# Patient Record
Sex: FEMALE | Race: WHITE | NOT HISPANIC OR LATINO | Employment: PART TIME | URBAN - METROPOLITAN AREA
[De-identification: names, ages, dates, MRNs, and addresses within clinical notes are randomized per-mention and may not be internally consistent; named-entity substitution may affect disease eponyms.]

---

## 2017-01-24 ENCOUNTER — ALLSCRIPTS OFFICE VISIT (OUTPATIENT)
Dept: OTHER | Facility: OTHER | Age: 32
End: 2017-01-24

## 2017-01-24 ENCOUNTER — TRANSCRIBE ORDERS (OUTPATIENT)
Dept: LAB | Facility: HOSPITAL | Age: 32
End: 2017-01-24

## 2017-01-24 ENCOUNTER — APPOINTMENT (OUTPATIENT)
Dept: LAB | Facility: HOSPITAL | Age: 32
End: 2017-01-24
Payer: COMMERCIAL

## 2017-01-24 DIAGNOSIS — F45.8 ANXIETY HYPERVENTILATION: ICD-10-CM

## 2017-01-24 DIAGNOSIS — R73.09 OTHER ABNORMAL GLUCOSE: ICD-10-CM

## 2017-01-24 DIAGNOSIS — R73.03 DIABETES MELLITUS, LATENT: ICD-10-CM

## 2017-01-24 DIAGNOSIS — E66.01 MORBID OBESITY, UNSPECIFIED OBESITY TYPE (HCC): ICD-10-CM

## 2017-01-24 DIAGNOSIS — F41.9 ANXIETY HYPERVENTILATION: Primary | ICD-10-CM

## 2017-01-24 DIAGNOSIS — F45.8 ANXIETY HYPERVENTILATION: Primary | ICD-10-CM

## 2017-01-24 DIAGNOSIS — F41.9 ANXIETY HYPERVENTILATION: ICD-10-CM

## 2017-01-24 LAB
ALBUMIN SERPL BCP-MCNC: 3.3 G/DL (ref 3.5–5)
ALP SERPL-CCNC: 72 U/L (ref 46–116)
ALT SERPL W P-5'-P-CCNC: 18 U/L (ref 12–78)
ANION GAP SERPL CALCULATED.3IONS-SCNC: 6 MMOL/L (ref 4–13)
AST SERPL W P-5'-P-CCNC: 9 U/L (ref 5–45)
BILIRUB SERPL-MCNC: 0.38 MG/DL (ref 0.2–1)
BUN SERPL-MCNC: 12 MG/DL (ref 5–25)
CALCIUM SERPL-MCNC: 8.7 MG/DL (ref 8.3–10.1)
CHLORIDE SERPL-SCNC: 103 MMOL/L (ref 100–108)
CHOLEST SERPL-MCNC: 172 MG/DL (ref 50–200)
CO2 SERPL-SCNC: 29 MMOL/L (ref 21–32)
CREAT SERPL-MCNC: 0.68 MG/DL (ref 0.6–1.3)
ERYTHROCYTE [DISTWIDTH] IN BLOOD BY AUTOMATED COUNT: 14.6 % (ref 11.6–15.1)
EST. AVERAGE GLUCOSE BLD GHB EST-MCNC: 120 MG/DL
GFR SERPL CREATININE-BSD FRML MDRD: >60 ML/MIN/1.73SQ M
GLUCOSE SERPL-MCNC: 123 MG/DL (ref 65–140)
HBA1C MFR BLD: 5.8 % (ref 4.2–6.3)
HCT VFR BLD AUTO: 41.5 % (ref 34.8–46.1)
HDLC SERPL-MCNC: 63 MG/DL (ref 40–60)
HGB BLD-MCNC: 13.5 G/DL (ref 11.5–15.4)
LDLC SERPL CALC-MCNC: 87 MG/DL (ref 0–100)
MCH RBC QN AUTO: 26.8 PG (ref 26.8–34.3)
MCHC RBC AUTO-ENTMCNC: 32.5 G/DL (ref 31.4–37.4)
MCV RBC AUTO: 82 FL (ref 82–98)
PLATELET # BLD AUTO: 471 THOUSANDS/UL (ref 149–390)
PMV BLD AUTO: 9.6 FL (ref 8.9–12.7)
POTASSIUM SERPL-SCNC: 3.7 MMOL/L (ref 3.5–5.3)
PROT SERPL-MCNC: 7.4 G/DL (ref 6.4–8.2)
RBC # BLD AUTO: 5.04 MILLION/UL (ref 3.81–5.12)
SODIUM SERPL-SCNC: 138 MMOL/L (ref 136–145)
TRIGL SERPL-MCNC: 111 MG/DL
TSH SERPL DL<=0.05 MIU/L-ACNC: 1.68 UIU/ML (ref 0.36–3.74)
WBC # BLD AUTO: 13.72 THOUSAND/UL (ref 4.31–10.16)

## 2017-01-24 PROCEDURE — 83036 HEMOGLOBIN GLYCOSYLATED A1C: CPT

## 2017-01-24 PROCEDURE — 80061 LIPID PANEL: CPT

## 2017-01-24 PROCEDURE — 36415 COLL VENOUS BLD VENIPUNCTURE: CPT

## 2017-01-24 PROCEDURE — 85027 COMPLETE CBC AUTOMATED: CPT

## 2017-01-24 PROCEDURE — 80053 COMPREHEN METABOLIC PANEL: CPT

## 2017-01-24 PROCEDURE — 84443 ASSAY THYROID STIM HORMONE: CPT

## 2017-01-26 ENCOUNTER — GENERIC CONVERSION - ENCOUNTER (OUTPATIENT)
Dept: OTHER | Facility: OTHER | Age: 32
End: 2017-01-26

## 2018-01-11 NOTE — RESULT NOTES
Verified Results  (1) CBC/ PLT (NO DIFF) [id number] 07:06AM Brooke Notegraphy     Test Name Result Flag Reference   HEMATOCRIT 41 5 %  34 8-46 1   HEMOGLOBIN 13 5 g/dL  11 5-15 4   MCHC 32 5 g/dL  31 4-37 4   MCH 26 8 pg  26 8-34 3   MCV 82 fL  82-98   PLATELET COUNT 892 Thousands/uL H 149-390   RBC COUNT 5 04 Million/uL  3 81-5 12   RDW 14 6 %  11 6-15 1   WBC COUNT 13 72 Thousand/uL H 4 31-10 16   MPV 9 6 fL  8 9-12 7     (1) HEMOGLOBIN A1C [id number] 07:06AM Brooke Notegraphy     Test Name Result Flag Reference   HEMOGLOBIN A1C 5 8 %  4 2-6 3   EST  AVG  GLUCOSE 120 mg/dl       (1) COMPREHENSIVE METABOLIC PANEL [id number] 25:57DM Brooke Notegraphy     Test Name Result Flag Reference   GLUCOSE,RANDM 123 mg/dL     If the patient is fasting, the ADA then defines impaired fasting glucose as > 100 mg/dL and diabetes as > or equal to 123 mg/dL  SODIUM 138 mmol/L  136-145   POTASSIUM 3 7 mmol/L  3 5-5 3   CHLORIDE 103 mmol/L  100-108   CARBON DIOXIDE 29 mmol/L  21-32   ANION GAP (CALC) 6 mmol/L  4-13   BLOOD UREA NITROGEN 12 mg/dL  5-25   CREATININE 0 68 mg/dL  0 60-1 30   Standardized to IDMS reference method   CALCIUM 8 7 mg/dL  8 3-10 1   BILI, TOTAL 0 38 mg/dL  0 20-1 00   ALK PHOSPHATAS 72 U/L     ALT (SGPT) 18 U/L  12-78   AST(SGOT) 9 U/L  5-45   ALBUMIN 3 3 g/dL L 3 5-5 0   TOTAL PROTEIN 7 4 g/dL  6 4-8 2   eGFR Non-African American      >60 0 ml/min/1 73sq [facility] Disease Education Program recommendations are as follows:  GFR calculation is accurate only with a steady state creatinine  Chronic Kidney disease less than 60 ml/min/1 73 sq  meters  Kidney failure less than 15 ml/min/1 73 sq  meters  (1) LIPID PANEL, FASTING [id number] 07:06AM Brooke Notegraphy     Test Name Result Flag Reference   CHOLESTEROL 172 mg/dL     HDL,DIRECT 63 mg/dL H 40-60   Specimen collection should occur prior to Metamizole administration due to the potential for falsely depressed results     LDL CHOLESTEROL CALCULATED 87 mg/dL  0-100   Triglyceride:         Normal              <150 mg/dl       Borderline High    150-199 mg/dl       High               200-499 mg/dl       Very High          >499 mg/dl  Cholesterol:         Desirable        <200 mg/dl      Borderline High  200-239 mg/dl      High             >239 mg/dl  HDL Cholesterol:        High    >59 mg/dL      Low     <41 mg/dL  LDL CALCULATED:    This screening LDL is a calculated result  It does not have the accuracy of the Direct Measured LDL in the monitoring of patients with hyperlipidemia and/or statin therapy  Direct Measure LDL (CSQ761) must be ordered separately in these patients  TRIGLYCERIDES 111 mg/dL  <=150   Specimen collection should occur prior to N-Acetylcysteine or Metamizole administration due to the potential for falsely depressed results  (1) TSH WITH FT4 REFLEX [id number] 07:06AM Akira Richards     Test Name Result Flag Reference   TSH 1 680 uIU/mL  0 358-3 740   Patients undergoing fluorescein dye angiography may retain small amounts of fluorescein in the body for 48-72 hours post procedure  Samples containing fluorescein can produce falsely depressed TSH values  If the patient had this procedure,a specimen should be resubmitted post fluorescein clearance  The recommended reference ranges for TSH during pregnancy are as follows:  First trimester 0 1 to 2 5 uIU/mL  Second trimester  0 2 to 3 0 uIU/mL  Third trimester 0 3 to 3 0 uIU/m       Discussion/Summary   blood work good  slightly elev wbcs likely r/t hives  consider allergy testing   copy of labs mailed     Signatures   Electronically signed by : DEE Millard; Jan 26 2017 12:45PM EST                       (Author)

## 2018-01-13 VITALS
BODY MASS INDEX: 45.99 KG/M2 | SYSTOLIC BLOOD PRESSURE: 132 MMHG | TEMPERATURE: 99.3 F | DIASTOLIC BLOOD PRESSURE: 80 MMHG | HEART RATE: 92 BPM | RESPIRATION RATE: 16 BRPM | HEIGHT: 67 IN | WEIGHT: 293 LBS

## 2018-12-19 ENCOUNTER — OFFICE VISIT (OUTPATIENT)
Dept: OBGYN CLINIC | Facility: MEDICAL CENTER | Age: 33
End: 2018-12-19
Payer: COMMERCIAL

## 2018-12-19 VITALS
SYSTOLIC BLOOD PRESSURE: 120 MMHG | DIASTOLIC BLOOD PRESSURE: 82 MMHG | HEIGHT: 67 IN | BODY MASS INDEX: 45.99 KG/M2 | WEIGHT: 293 LBS

## 2018-12-19 DIAGNOSIS — Z30.41 SURVEILLANCE FOR BIRTH CONTROL, ORAL CONTRACEPTIVES: Primary | ICD-10-CM

## 2018-12-19 DIAGNOSIS — Z01.419 ENCOUNTER FOR ROUTINE GYNECOLOGICAL EXAMINATION WITH PAPANICOLAOU SMEAR OF CERVIX: ICD-10-CM

## 2018-12-19 DIAGNOSIS — E66.01 CLASS 3 SEVERE OBESITY WITH BODY MASS INDEX (BMI) OF 50.0 TO 59.9 IN ADULT, UNSPECIFIED OBESITY TYPE, UNSPECIFIED WHETHER SERIOUS COMORBIDITY PRESENT (HCC): ICD-10-CM

## 2018-12-19 PROBLEM — E66.813 CLASS 3 SEVERE OBESITY WITH BODY MASS INDEX (BMI) OF 50.0 TO 59.9 IN ADULT (HCC): Status: ACTIVE | Noted: 2018-12-19

## 2018-12-19 PROCEDURE — 99385 PREV VISIT NEW AGE 18-39: CPT | Performed by: NURSE PRACTITIONER

## 2018-12-19 PROCEDURE — 87624 HPV HI-RISK TYP POOLED RSLT: CPT | Performed by: NURSE PRACTITIONER

## 2018-12-19 PROCEDURE — G0145 SCR C/V CYTO,THINLAYER,RESCR: HCPCS | Performed by: NURSE PRACTITIONER

## 2018-12-19 RX ORDER — ESCITALOPRAM OXALATE 20 MG/1
1 TABLET ORAL
COMMUNITY
Start: 2016-06-06 | End: 2021-02-02 | Stop reason: SDUPTHER

## 2018-12-19 RX ORDER — NORGESTIMATE AND ETHINYL ESTRADIOL
1 KIT DAILY
Qty: 84 TABLET | Refills: 3 | Status: SHIPPED | OUTPATIENT
Start: 2018-12-19 | End: 2019-03-26 | Stop reason: SDUPTHER

## 2018-12-19 RX ORDER — NORGESTIMATE AND ETHINYL ESTRADIOL
1 KIT DAILY
Refills: 0 | COMMUNITY
Start: 2018-10-26 | End: 2018-12-19 | Stop reason: SDUPTHER

## 2018-12-19 NOTE — PROGRESS NOTES
ASSESSMENT & PLAN: Hellen Sanchez is a 29 y o  Nancy Watts with normal gynecologic exam     1   Routine well woman exam done today  2  Pap and HPV:  The patient's last pap and hpv was   It was normal     Pap and cotesting was done today  Current ASCCP Guidelines reviewed  3   The following were reviewed in today's visit: breast self exam, mammography screening ordered, use and side effects of OCPs, adequate intake of calcium and vitamin D, exercise, healthy diet   Gerhardt Sep 4  Referral for wt management given to pt  She is motivated to lose wt, usual wt was ~ 255, until had major life stressor  5  rx for ocp sent to the pharmacy for the year  CC:  Annual Gynecologic Examination    HPI: Hellen Sanchez is a 29 y o  Nancy Watts who presents for annual gynecologic examination  She has the following concerns:   Interested in non surgical wt loss program  Works at [employer], and here b/c of ins  Change and wants pap test  Had Leep in , with f/u paps q 6 months for awhile then went to routine schedule  Health Maintenance:    She wears her seatbelt routinely  She does not perform regular monthly self breast exams  She feels safe at home  Past Medical History:   Diagnosis Date    Anxiety     Depression        Past Surgical History:   Procedure Laterality Date    CERVICAL BIOPSY  W/ LOOP ELECTRODE EXCISION      TONSILLECTOMY         Past OB/Gyn History:  OB History      Para Term  AB Living    0 0 0 0 0 0    SAB TAB Ectopic Multiple Live Births    0 0 0 0 0        Pt does not have menstrual issues  History of sexually transmitted infection: No   History of abnormal pap smears: Yes in   Patient is  currently sexually active    heterosexual   The current method of family planning is condoms and ocp    Family History   Problem Relation Age of Onset    Diabetes Mother     Hypertension Father     Colon cancer Maternal Grandmother     Diabetes Paternal Grandmother    Alden Arellano Diabetes Paternal Grandfather     Heart disease Paternal Grandfather        Social History:  Social History     Social History    Marital status: Single     Spouse name: N/A    Number of children: N/A    Years of education: N/A     Occupational History    Not on file  Social History Main Topics    Smoking status: Never Smoker    Smokeless tobacco: Never Used    Alcohol use Yes      Comment: wine 2 glasses a week    Drug use: No    Sexual activity: Yes     Partners: Male     Birth control/ protection: Pill     Other Topics Concern    Not on file     Social History Narrative    No narrative on file     Presently lives alone  Patient is single  Patient is currently employed as a , psychiatric therapist at [employer]  No Known Allergies      Current Outpatient Prescriptions:     escitalopram (LEXAPRO) 20 mg tablet, Take 1 tablet by mouth, Disp: , Rfl:     TRI-LO-IMANI 0 18/0 215/0 25 MG-25 MCG per tablet, Take 1 tablet by mouth daily, Disp: , Rfl: 0      Review of Systems  Constitutional :no fever, feels well, no tiredness, no recent weight gain or loss  ENT: no ear ache, no loss of hearing, no nosebleeds or nasal discharge, no sore throat or hoarseness  Cardiovascular: no complaints of slow or fast heart beat, no chest pain, no palpitations, no leg claudication or lower extremity edema  Respiratory: no complaints of shortness of shortness of breath, no CEBALLOS  Breasts:no complaints of breast pain, breast lump, or nipple discharge  Gastrointestinal: no complaints of abdominal pain, constipation, nausea, vomiting, or diarrhea or bloody stools  Genitourinary : no complaints of dysuria, incontinence, pelvic pain, no dysmenorrhea, vaginal discharge or abnormal vaginal bleeding and as noted in HPI  Musculoskeletal: no complaints of arthralgia, no myalgia, no joint swelling or stiffness, no limb pain or swelling    Integumentary: no complaints of skin rash or lesion, itching or dry skin  Neurological: no complaints of headache, no confusion, no numbness or tingling, no dizziness or fainting    Objective      /82   Ht 5' 7" (1 702 m)   Wt (!) 155 kg (341 lb)   LMP 12/03/2018   BMI 53 41 kg/m²   General:   appears stated age, cooperative, alert normal mood and affect   Neck: normal, supple,trachea midline, no masses   Heart: regular rate and rhythm, S1, S2 normal, no murmur, click, rub or gallop   Lungs: clear to auscultation bilaterally   Breasts: normal appearance, no masses or tenderness   Abdomen: soft, non-tender, without masses or organomegaly   Vulva: normal female genitalia   Vagina: normal vagina   Urethra: normal   Cervix: Normal, no discharge  Uterus: normal size, contour, position, consistency, mobility, non-tender   Adnexa: normal adnexa   Lymphatic palpation of lymph nodes in neck, axilla, groin and/or other locations: no lymphadenopathy or masses noted   Skin normal skin turgor and no rashes     Psychiatric orientation to person, place, and time: normal  mood and affect: normal

## 2018-12-21 LAB
HPV HR 12 DNA CVX QL NAA+PROBE: NEGATIVE
HPV16 DNA CVX QL NAA+PROBE: NEGATIVE
HPV18 DNA CVX QL NAA+PROBE: NEGATIVE

## 2018-12-24 LAB
LAB AP GYN PRIMARY INTERPRETATION: NORMAL
LAB AP LMP: NORMAL
Lab: NORMAL

## 2019-03-11 ENCOUNTER — OFFICE VISIT (OUTPATIENT)
Dept: URGENT CARE | Facility: CLINIC | Age: 34
End: 2019-03-11
Payer: COMMERCIAL

## 2019-03-11 VITALS
TEMPERATURE: 99.2 F | DIASTOLIC BLOOD PRESSURE: 94 MMHG | HEART RATE: 66 BPM | WEIGHT: 293 LBS | OXYGEN SATURATION: 98 % | HEIGHT: 67 IN | SYSTOLIC BLOOD PRESSURE: 152 MMHG | RESPIRATION RATE: 18 BRPM | BODY MASS INDEX: 45.99 KG/M2

## 2019-03-11 DIAGNOSIS — J01.00 ACUTE NON-RECURRENT MAXILLARY SINUSITIS: Primary | ICD-10-CM

## 2019-03-11 PROCEDURE — 99213 OFFICE O/P EST LOW 20 MIN: CPT | Performed by: NURSE PRACTITIONER

## 2019-03-11 PROCEDURE — S9088 SERVICES PROVIDED IN URGENT: HCPCS | Performed by: NURSE PRACTITIONER

## 2019-03-11 RX ORDER — AMOXICILLIN AND CLAVULANATE POTASSIUM 875; 125 MG/1; MG/1
1 TABLET, FILM COATED ORAL EVERY 12 HOURS SCHEDULED
Qty: 14 TABLET | Refills: 0 | Status: SHIPPED | OUTPATIENT
Start: 2019-03-11 | End: 2019-03-19 | Stop reason: HOSPADM

## 2019-03-11 NOTE — PROGRESS NOTES
[facility]        NAME: Samia Ragsdale is a 29 y o  female  : 1985    MRN: [id number]  DATE: 2019  TIME: 4:47 PM    Assessment and Plan   Acute non-recurrent maxillary sinusitis [J01 00]  1  Acute non-recurrent maxillary sinusitis  amoxicillin-clavulanate (AUGMENTIN) 875-125 mg per tablet         Patient Instructions     Take meds as directed  Follow up with PCP in 7 days  Proceed to  ER if symptoms worsen  Chief Complaint     Sinus pressure and pain    Chief Complaint   Patient presents with    Nasal Congestion     x 2 weeks; has taken mucinex and sudafed with no relief         History of Present Illness       HPI  She has been having symptoms for about 2 weeks  Used sudafed the past 3 days, with mucinex, and they are not helping  Review of Systems   Review of Systems   Constitutional: Positive for chills  HENT: Positive for congestion, postnasal drip, rhinorrhea, sinus pressure and sinus pain  Negative for ear pain, sore throat and trouble swallowing  Respiratory: Positive for cough (from post nasal drip in back of the throat)  Negative for chest tightness, shortness of breath and wheezing  Cardiovascular: Negative for chest pain  Gastrointestinal: Negative for abdominal pain, diarrhea, nausea and vomiting           Current Medications       Current Outpatient Medications:     amoxicillin-clavulanate (AUGMENTIN) 875-125 mg per tablet, Take 1 tablet by mouth every 12 (twelve) hours for 7 days, Disp: 14 tablet, Rfl: 0    escitalopram (LEXAPRO) 20 mg tablet, Take 1 tablet by mouth, Disp: , Rfl:     TRI-LO-IMANI 0 18/0 215/0 25 MG-25 MCG per tablet, Take 1 tablet by mouth daily for 84 days, Disp: 84 tablet, Rfl: 3    Current Allergies     Allergies as of 2019    (No Known Allergies)            The following portions of the patient's history were reviewed and updated as appropriate: allergies, current medications, past family history, past medical history, past social history, past surgical history and problem list      Past Medical History:   Diagnosis Date    Anxiety     Depression        Past Surgical History:   Procedure Laterality Date    CERVICAL BIOPSY  W/ LOOP ELECTRODE EXCISION  2009    TONSILLECTOMY  1990       Family History   Problem Relation Age of Onset    Diabetes Mother     Hypertension Father     Colon cancer Maternal Grandmother     Diabetes Paternal Grandmother     Diabetes Paternal Grandfather     Heart disease Paternal Grandfather          Medications have been verified  Objective   /94 (BP Location: Left arm, Patient Position: Sitting, Cuff Size: Large) Comment: pt states not usual for her/sudafed?/will f/u with pcp  Pulse 66   Temp 99 2 °F (37 3 °C) (Tympanic Core)   Resp 18   Ht 5' 7" (1 702 m)   Wt (!) 154 kg (340 lb)   SpO2 98%   BMI 53 25 kg/m²        Physical Exam     Physical Exam   HENT:   Right Ear: External ear normal    Left Ear: External ear normal    Mod post nasal drip  Mod nasal discharge  Turbinates 2-3+  Mod TTP of both the maxillary and frontal sinuses  Cardiovascular: Normal rate and regular rhythm  Pulmonary/Chest: Effort normal and breath sounds normal  No respiratory distress  Lymphadenopathy:     She has no cervical adenopathy

## 2019-03-11 NOTE — PATIENT INSTRUCTIONS

## 2019-03-17 ENCOUNTER — HOSPITAL ENCOUNTER (OUTPATIENT)
Facility: HOSPITAL | Age: 34
Setting detail: OBSERVATION
Discharge: HOME | End: 2019-03-19
Attending: EMERGENCY MEDICINE | Admitting: FAMILY MEDICINE
Payer: COMMERCIAL

## 2019-03-17 ENCOUNTER — APPOINTMENT (EMERGENCY)
Dept: RADIOLOGY | Facility: HOSPITAL | Age: 34
End: 2019-03-17
Payer: COMMERCIAL

## 2019-03-17 ENCOUNTER — APPOINTMENT (EMERGENCY)
Dept: CT IMAGING | Facility: HOSPITAL | Age: 34
End: 2019-03-17
Payer: COMMERCIAL

## 2019-03-17 DIAGNOSIS — E66.01 CLASS 3 SEVERE OBESITY WITH BODY MASS INDEX (BMI) OF 50.0 TO 59.9 IN ADULT, UNSPECIFIED OBESITY TYPE, UNSPECIFIED WHETHER SERIOUS COMORBIDITY PRESENT (HCC): ICD-10-CM

## 2019-03-17 DIAGNOSIS — J01.90: ICD-10-CM

## 2019-03-17 DIAGNOSIS — I50.9 ACUTE CONGESTIVE HEART FAILURE (HCC): ICD-10-CM

## 2019-03-17 DIAGNOSIS — J81.1 PULMONARY EDEMA: ICD-10-CM

## 2019-03-17 DIAGNOSIS — I31.3 PERICARDIAL EFFUSION: ICD-10-CM

## 2019-03-17 DIAGNOSIS — J90 PLEURAL EFFUSION: Primary | ICD-10-CM

## 2019-03-17 DIAGNOSIS — R03.0 ELEVATED BP WITHOUT DIAGNOSIS OF HYPERTENSION: ICD-10-CM

## 2019-03-17 PROBLEM — R06.00 EXERTIONAL DYSPNEA: Status: ACTIVE | Noted: 2019-03-17

## 2019-03-17 PROBLEM — IMO0001 BIRTH CONTROL: Status: ACTIVE | Noted: 2019-03-17

## 2019-03-17 PROBLEM — R06.09 EXERTIONAL DYSPNEA: Status: ACTIVE | Noted: 2019-03-17

## 2019-03-17 PROBLEM — F32.A DEPRESSION: Status: ACTIVE | Noted: 2019-03-17

## 2019-03-17 PROBLEM — I31.39 PERICARDIAL EFFUSION: Status: ACTIVE | Noted: 2019-03-17

## 2019-03-17 LAB
ALBUMIN SERPL BCP-MCNC: 3.3 G/DL (ref 3–5.2)
ALP SERPL-CCNC: 85 U/L (ref 43–122)
ALT SERPL W P-5'-P-CCNC: 31 U/L (ref 9–52)
ANION GAP SERPL CALCULATED.3IONS-SCNC: 5 MMOL/L (ref 5–14)
AST SERPL W P-5'-P-CCNC: 22 U/L (ref 14–36)
BASOPHILS # BLD AUTO: 0.1 THOUSANDS/ΜL (ref 0–0.1)
BASOPHILS NFR BLD AUTO: 1 % (ref 0–1)
BILIRUB SERPL-MCNC: 0.5 MG/DL
BUN SERPL-MCNC: 17 MG/DL (ref 5–25)
CALCIUM SERPL-MCNC: 9 MG/DL (ref 8.4–10.2)
CHLORIDE SERPL-SCNC: 104 MMOL/L (ref 97–108)
CO2 SERPL-SCNC: 28 MMOL/L (ref 22–30)
CREAT SERPL-MCNC: 0.56 MG/DL (ref 0.6–1.2)
EOSINOPHIL # BLD AUTO: 0.1 THOUSAND/ΜL (ref 0–0.4)
EOSINOPHIL NFR BLD AUTO: 2 % (ref 0–6)
ERYTHROCYTE [DISTWIDTH] IN BLOOD BY AUTOMATED COUNT: 15 %
EXT PREG TEST URINE: NEGATIVE
GFR SERPL CREATININE-BSD FRML MDRD: 124 ML/MIN/1.73SQ M
GLUCOSE SERPL-MCNC: 106 MG/DL (ref 70–99)
HCT VFR BLD AUTO: 35.2 % (ref 36–46)
HGB BLD-MCNC: 11.2 G/DL (ref 12–16)
LIPASE SERPL-CCNC: 22 U/L (ref 23–300)
LYMPHOCYTES # BLD AUTO: 1.9 THOUSANDS/ΜL (ref 0.5–4)
LYMPHOCYTES NFR BLD AUTO: 19 % (ref 25–45)
MCH RBC QN AUTO: 25.7 PG (ref 26–34)
MCHC RBC AUTO-ENTMCNC: 32 G/DL (ref 31–36)
MCV RBC AUTO: 80 FL (ref 80–100)
MONOCYTES # BLD AUTO: 0.6 THOUSAND/ΜL (ref 0.2–0.9)
MONOCYTES NFR BLD AUTO: 6 % (ref 1–10)
NEUTROPHILS # BLD AUTO: 7.2 THOUSANDS/ΜL (ref 1.8–7.8)
NEUTS SEG NFR BLD AUTO: 73 % (ref 45–65)
NT-PROBNP SERPL-MCNC: 1430 PG/ML (ref 0–299)
PLATELET # BLD AUTO: 356 THOUSANDS/UL (ref 150–450)
PMV BLD AUTO: 7.7 FL (ref 8.9–12.7)
POTASSIUM SERPL-SCNC: 4 MMOL/L (ref 3.6–5)
PROT SERPL-MCNC: 6.1 G/DL (ref 5.9–8.4)
RBC # BLD AUTO: 4.38 MILLION/UL (ref 4–5.2)
SODIUM SERPL-SCNC: 137 MMOL/L (ref 137–147)
TROPONIN I SERPL-MCNC: <0.01 NG/ML (ref 0–0.03)
TSH SERPL DL<=0.05 MIU/L-ACNC: 3.31 UIU/ML (ref 0.47–4.68)
WBC # BLD AUTO: 9.8 THOUSAND/UL (ref 4.5–11)

## 2019-03-17 PROCEDURE — 36415 COLL VENOUS BLD VENIPUNCTURE: CPT | Performed by: EMERGENCY MEDICINE

## 2019-03-17 PROCEDURE — 99285 EMERGENCY DEPT VISIT HI MDM: CPT

## 2019-03-17 PROCEDURE — 83690 ASSAY OF LIPASE: CPT | Performed by: EMERGENCY MEDICINE

## 2019-03-17 PROCEDURE — 81025 URINE PREGNANCY TEST: CPT | Performed by: EMERGENCY MEDICINE

## 2019-03-17 PROCEDURE — 85025 COMPLETE CBC W/AUTO DIFF WBC: CPT | Performed by: EMERGENCY MEDICINE

## 2019-03-17 PROCEDURE — 83880 ASSAY OF NATRIURETIC PEPTIDE: CPT | Performed by: EMERGENCY MEDICINE

## 2019-03-17 PROCEDURE — 71046 X-RAY EXAM CHEST 2 VIEWS: CPT

## 2019-03-17 PROCEDURE — 93005 ELECTROCARDIOGRAM TRACING: CPT

## 2019-03-17 PROCEDURE — 84484 ASSAY OF TROPONIN QUANT: CPT | Performed by: FAMILY MEDICINE

## 2019-03-17 PROCEDURE — 80053 COMPREHEN METABOLIC PANEL: CPT | Performed by: EMERGENCY MEDICINE

## 2019-03-17 PROCEDURE — 84484 ASSAY OF TROPONIN QUANT: CPT | Performed by: EMERGENCY MEDICINE

## 2019-03-17 PROCEDURE — 71275 CT ANGIOGRAPHY CHEST: CPT

## 2019-03-17 PROCEDURE — 84443 ASSAY THYROID STIM HORMONE: CPT | Performed by: FAMILY MEDICINE

## 2019-03-17 PROCEDURE — 99220 PR INITIAL OBSERVATION CARE/DAY 70 MINUTES: CPT | Performed by: FAMILY MEDICINE

## 2019-03-17 RX ORDER — LISINOPRIL 10 MG/1
10 TABLET ORAL DAILY
Status: DISCONTINUED | OUTPATIENT
Start: 2019-03-17 | End: 2019-03-18

## 2019-03-17 RX ORDER — FUROSEMIDE 10 MG/ML
20 INJECTION INTRAMUSCULAR; INTRAVENOUS
Status: DISCONTINUED | OUTPATIENT
Start: 2019-03-17 | End: 2019-03-19

## 2019-03-17 RX ORDER — FUROSEMIDE 10 MG/ML
20 INJECTION INTRAMUSCULAR; INTRAVENOUS ONCE
Status: COMPLETED | OUTPATIENT
Start: 2019-03-17 | End: 2019-03-17

## 2019-03-17 RX ORDER — IBUPROFEN 600 MG/1
600 TABLET ORAL ONCE
Status: COMPLETED | OUTPATIENT
Start: 2019-03-17 | End: 2019-03-17

## 2019-03-17 RX ORDER — ESCITALOPRAM OXALATE 10 MG/1
20 TABLET ORAL DAILY
Status: DISCONTINUED | OUTPATIENT
Start: 2019-03-17 | End: 2019-03-19 | Stop reason: HOSPADM

## 2019-03-17 RX ORDER — ACETAMINOPHEN 325 MG/1
650 TABLET ORAL EVERY 6 HOURS PRN
Status: DISCONTINUED | OUTPATIENT
Start: 2019-03-17 | End: 2019-03-19 | Stop reason: HOSPADM

## 2019-03-17 RX ORDER — FLUTICASONE PROPIONATE 50 MCG
1 SPRAY, SUSPENSION (ML) NASAL DAILY
Status: DISCONTINUED | OUTPATIENT
Start: 2019-03-17 | End: 2019-03-18

## 2019-03-17 RX ADMIN — FUROSEMIDE 20 MG: 10 INJECTION, SOLUTION INTRAMUSCULAR; INTRAVENOUS at 13:37

## 2019-03-17 RX ADMIN — IBUPROFEN 600 MG: 600 TABLET ORAL at 16:45

## 2019-03-17 RX ADMIN — ENOXAPARIN SODIUM 40 MG: 40 INJECTION SUBCUTANEOUS at 16:17

## 2019-03-17 RX ADMIN — FLUTICASONE PROPIONATE 1 SPRAY: 50 SPRAY, METERED NASAL at 16:42

## 2019-03-17 RX ADMIN — LISINOPRIL 10 MG: 10 TABLET ORAL at 16:17

## 2019-03-17 RX ADMIN — FUROSEMIDE 20 MG: 10 INJECTION, SOLUTION INTRAVENOUS at 16:18

## 2019-03-17 RX ADMIN — ESCITALOPRAM OXALATE 20 MG: 10 TABLET ORAL at 16:17

## 2019-03-17 RX ADMIN — IOHEXOL 100 ML: 350 INJECTION, SOLUTION INTRAVENOUS at 12:44

## 2019-03-17 NOTE — ED PROVIDER NOTES
History  Chief Complaint   Patient presents with    Shortness of Breath     Any kind of exertion cause me to feel short of breath  I have been on Augmentin for 5 days for sinuse infection  I have had no fever or pains  I feel bloated  to my upper abdomin  I have beeen burbing some  Patient is a 36-year-old female complaining of a 2 day history of  worsening dyspnea on exertion  Denies any chest pain  Patient states that she has been battling a sinus infection for last week was seen at urgent care and placed on amoxicillin  Patient does not believe she has any prominent leg swelling this time  No cough no fevers sweats or chills  Patient is complaining of some upper abdominal bloating pain as well as GI upset  Patient attributes that to the amoxicillin  Patient does have a history of smoking as well as oral birth control pills  Is concerned that she might have a blood clot  Prior to Admission Medications   Prescriptions Last Dose Informant Patient Reported? Taking? TRI-LO-IMANI 0 18/0 215/0 25 MG-25 MCG per tablet   No No   Sig: Take 1 tablet by mouth daily for 84 days   amoxicillin-clavulanate (AUGMENTIN) 875-125 mg per tablet   No No   Sig: Take 1 tablet by mouth every 12 (twelve) hours for 7 days   escitalopram (LEXAPRO) 20 mg tablet   Yes No   Sig: Take 1 tablet by mouth      Facility-Administered Medications: None       Past Medical History:   Diagnosis Date    Anxiety     Depression        Past Surgical History:   Procedure Laterality Date    CERVICAL BIOPSY  W/ LOOP ELECTRODE EXCISION  2009    TONSILLECTOMY  1990       Family History   Problem Relation Age of Onset    Diabetes Mother     Hypertension Father     Colon cancer Maternal Grandmother     Diabetes Paternal Grandmother     Diabetes Paternal Grandfather     Heart disease Paternal Grandfather      I have reviewed and agree with the history as documented      Social History     Tobacco Use    Smoking status: Never Smoker    Smokeless tobacco: Never Used   Substance Use Topics    Alcohol use: Yes     Comment: wine 2 glasses a week    Drug use: No        Review of Systems   Constitutional: Negative  HENT: Negative  Eyes: Negative  Respiratory: Positive for shortness of breath  Negative for cough and wheezing  Cardiovascular: Negative  Negative for chest pain  Gastrointestinal: Positive for abdominal distention  Endocrine: Negative  Genitourinary: Negative  Musculoskeletal: Negative  Skin: Negative  Allergic/Immunologic: Negative  Neurological: Negative  Hematological: Negative  Psychiatric/Behavioral: Negative  All other systems reviewed and are negative  Physical Exam  Physical Exam   Constitutional: She is oriented to person, place, and time  She appears well-developed and well-nourished  HENT:   Head: Normocephalic and atraumatic  Mouth/Throat: Oropharynx is clear and moist    Eyes: Pupils are equal, round, and reactive to light  Neck: Normal range of motion  Neck supple  Cardiovascular: Normal rate, regular rhythm, normal heart sounds and intact distal pulses  Pulmonary/Chest: Effort normal and breath sounds normal  No accessory muscle usage or stridor  No apnea, no tachypnea and no bradypnea  No respiratory distress  She has no decreased breath sounds  She has no wheezes  She has no rhonchi  She has no rales  Abdominal: Soft  Bowel sounds are normal    Musculoskeletal: Normal range of motion  Right lower leg: Normal  She exhibits no tenderness and no edema  Left lower leg: Normal  She exhibits no tenderness and no edema  Neurological: She is alert and oriented to person, place, and time  Skin: Skin is warm and dry  Capillary refill takes less than 2 seconds  Psychiatric: She has a normal mood and affect  Her behavior is normal    Nursing note and vitals reviewed        Vital Signs  ED Triage Vitals [03/17/19 1045]   Temperature Pulse Respirations Blood Pressure SpO2   98 °F (36 7 °C) 96 20 (!) 181/119 97 %      Temp Source Heart Rate Source Patient Position - Orthostatic VS BP Location FiO2 (%)   Tympanic Monitor Sitting Left arm --      Pain Score       No Pain           Vitals:    03/17/19 1045   BP: (!) 181/119   Pulse: 96   Patient Position - Orthostatic VS: Sitting       qSOFA     Row Name 03/17/19 1045                Altered mental status GCS < 15          Respiratory Rate > / =52  0        Systolic BP < / =333  0        Q Sofa Score  0              Visual Acuity      ED Medications  Medications - No data to display    Diagnostic Studies  Results Reviewed     Procedure Component Value Units Date/Time    CBC and differential [[id number]]     Lab Status:  No result Specimen:  Blood     Comprehensive metabolic panel [[id number]]     Lab Status:  No result Specimen:  Blood     NT-BNP PRO [[id number]]     Lab Status:  No result Specimen:  Blood     Lipase [[id number]]     Lab Status:  No result Specimen:  Blood     POCT pregnancy, urine [[id number]]     Lab Status:  No result                  XR chest pa & lateral    (Results Pending)              Procedures  Procedures       Phone Contacts  ED Phone Contact    ED Course  ED Course as of Mar 17 1407   Sun Mar 17, 2019   1202 NT-proBNP(!): 1,430   1206 Spoke with patient about results  Will CT scan check troponin  [address] over results with patient  SLIM paged  [id number] with Dr Dayami Castillo will admit med surge tele out  wants trial of IV 20 mg Lasix                                    MDM  Number of Diagnoses or Management Options  Pericardial effusion:   Pleural effusion:   Pulmonary edema:      Amount and/or Complexity of Data Reviewed  Clinical lab tests: ordered and reviewed  Tests in the radiology section of CPT®: ordered and reviewed  Tests in the medicine section of CPT®: reviewed and ordered  Discuss the patient with other providers: yes  Independent visualization of images, tracings, or specimens: yes        Disposition  Final diagnoses:   None     ED Disposition     None      Follow-up Information    None         Patient's Medications   Discharge Prescriptions    No medications on file     No discharge procedures on file      ED Provider  Electronically Signed by           Gary Corral MD  03/17/19 3975

## 2019-03-17 NOTE — ASSESSMENT & PLAN NOTE
· Small found on CT    Will add MERRICK/RF/ESR CRP  · 2D echo to be done tomorrow  · Cardiology consulted

## 2019-03-17 NOTE — ED NOTES
Pt  States that she started with feeling short of breath while walking for a couple of weeks -  Is being treated for a sinus infection with Augmentin for the last 5 days  Has had diarrhea /vomiting and abd  Pain with the medication     States that last night she started with swelling of her legs and feet - Upper abd feels swollen       Raghav Rayo, DOMENICO  03/17/19 5423

## 2019-03-17 NOTE — ASSESSMENT & PLAN NOTE
· Came in with elevated blood pressure no history of hypertension  Could be secondary to being fluid overloaded  She will be continued diuresing also added lisinopril 10 mg daily

## 2019-03-17 NOTE — H&P
H&P- Ronal Seals 1985, 29 y o  female MRN: [id number]    Unit/Bed#: 5T -01 Encounter: [id number]    Primary Care Provider: Susen Koyanagi, MD   Date and time admitted to hospital: 3/17/2019 10:41 AM        Depression  Assessment & Plan  · Resume Lexapro    Elevated BP without diagnosis of hypertension  Assessment & Plan  · Came in with elevated blood pressure no history of hypertension  Could be secondary to being fluid overloaded  She will be continued diuresing also added lisinopril 10 mg daily  Birth control  Assessment & Plan  · Patient is on birth control I will hold while inpatient discussed the risk of increased for clots  Subacute sinusitis  Assessment & Plan  · Has pressure headache with the tenderness of the frontal and maxillary sinuses though she has no discharge has been on antibiotics for 5 days of Augmentin  With no improvement  She actually has the nasal congestion with sinus pain for 2 weeks to quit 2 weeks prior to evaluation by urgent care  · I will not change the antibiotic I will discontinue the Augmentin actually will check a CT sinus   · Will add Flonase daily  · Provide Tylenol p r n  Pain    Pericardial effusion  Assessment & Plan  · Small found on CT  Will add MERRICK/RF/ESR CRP  · 2D echo to be done tomorrow  · Cardiology consulted    Class 3 severe obesity with body mass index (BMI) of 50 0 to 59 9 in adult [facility])  Assessment & Plan  · Counseled  · Will need to check hemoglobin A1c  · Will check TSH    * Exertional dyspnea  Assessment & Plan  · Concern for new onset heart failure  Negative for PE CT of the chest revealed mild pulmonary edema bilateral small pleural effusions small pericardial effusion and a proBNP of 14 30  Patient felt better than urinated quite a bit after the Lasix 20 mg IV x1  Patient does state that from Monday to now she feels like she can about 20 lb with increased lower extremity edema    EKG reviewed no acute ischemic changes troponins negative  Recent viral URI it seems  With on response to antibiotics for 5 days as was treated by urgent care for a sinus infection  · Patient is still significantly edematous in the lower extremities will provide Lasix 20 mg IV b i d  With re-evaluation tomorrow will repeat a proBNP 2D echo troponins x2  Will a TSH as well  EKG in the morning  · Ask Cardiology to evaluate  · I do not suspect this is an infectious multifocal pneumonia- as she has no cough, no fever and no white count although she has been on antibiotics I will do a procalcitonin in the morning  · Check vde              VTE Prophylaxis: Enoxaparin (Lovenox)  / reason for no mechanical VTE prophylaxis Ruling out DVT   Code Status: full code  POLST: There is no POLST form on file for this patient (pre-hospital)  Discussion with family:  Boyfriend at bedside    Anticipated Length of Stay:  Patient will be admitted on an Observation basis with an anticipated length of stay of  < 2 midnights  Justification for Hospital Stay:  Exertional dyspnea workup    Total Time for Visit, including Counseling / Coordination of Care: 1 hour  Greater than 50% of this total time spent on direct patient counseling and coordination of care  Chief Complaint:   Shortness of breath    History of Present Illness:    Safia Becker is a 29 y o  female who presents with exertional shortness of breath to the point that she can having get dressed normal like she used to also happens when she walks up the stairs, since Monday  The patient had evaluation at the urgent care center on March 11th for a 2 week history of sinus had pressure was started on Augmentin for 5 days she completed but she still has the sinus pain and of the head pressure and shortness of breath has improved  She has no nasal congestion  She also noticed her legs have increased swelling  No cough    She is able to sleep flat without any shortness of breath she does not have any paroxysmal nocturnal dyspnea  She states since Monday she feels like she gained 20 lb she feels her abdomen being bloated as well  She denied chest pain at any time  She is a smoker 1 pack per day she only drinks 2 drinks a week she denies any drug use  She does not have any postnasal drip  No dizziness  She does not have any family history with heart disease at young age  she works her at [employer]  Review of Systems:    Review of Systems   Constitutional: Positive for activity change and unexpected weight change  Negative for fatigue and fever  HENT: Positive for sinus pressure  Negative for ear pain, rhinorrhea and sore throat  Eyes: Negative  Negative for pain and itching  Respiratory: Positive for shortness of breath  Negative for cough and wheezing  Cardiovascular: Positive for leg swelling  Negative for chest pain and palpitations  Gastrointestinal: Negative  Negative for abdominal pain, diarrhea, nausea and vomiting  Endocrine: Negative for polydipsia, polyphagia and polyuria  Genitourinary: Negative for dysuria and flank pain  Musculoskeletal: Negative  Negative for back pain and myalgias  Skin: Negative  Negative for rash and wound  Neurological: Positive for headaches  Negative for dizziness, syncope, speech difficulty, weakness, light-headedness and numbness  Psychiatric/Behavioral: Negative for agitation, confusion and decreased concentration  All other systems reviewed and are negative  Past Medical and Surgical History:     Past Medical History:   Diagnosis Date    Anxiety     Depression        Past Surgical History:   Procedure Laterality Date    CERVICAL BIOPSY  W/ LOOP ELECTRODE EXCISION  2009    TONSILLECTOMY  1990       Meds/Allergies:    Prior to Admission medications    Medication Sig Start Date End Date Taking?  Authorizing Provider   amoxicillin-clavulanate (AUGMENTIN) 875-125 mg per tablet Take 1 tablet by mouth every 12 (twelve) hours for 7 days 3/11/19 3/18/19 Yes ARYA Yepez   escitalopram (LEXAPRO) 20 mg tablet Take 1 tablet by mouth 6/6/16  Yes Historical Provider, MD Tomas June 0 18/0 215/0 25 MG-25 MCG per tablet Take 1 tablet by mouth daily for 84 days 12/19/18 3/17/19 Yes ARYA Gallego     I have reviewed home medications using allscripts  Allergies: No Known Allergies    Social History:     Marital Status: Single   Substance Use History:   Social History     Substance and Sexual Activity   Alcohol Use Yes    Comment: wine 2 glasses a week     Social History     Tobacco Use   Smoking Status Never Smoker   Smokeless Tobacco Never Used     Social History     Substance and Sexual Activity   Drug Use No       Family History:    Family History   Problem Relation Age of Onset    Diabetes Mother     Hypertension Father     Colon cancer Maternal Grandmother     Diabetes Paternal Grandmother     Diabetes Paternal Grandfather     Heart disease Paternal Grandfather        Physical Exam:     Vitals:   Blood Pressure: (!) 184/97 (03/17/19 1536)  Pulse: 72 (03/17/19 1433)  Temperature: 98 7 °F (37 1 °C) (03/17/19 1433)  Temp Source: Temporal (03/17/19 1433)  Respirations: 18 (03/17/19 1433)  Height: 5' 7" (170 2 cm) (03/17/19 1433)  Weight - Scale: (!) 163 kg (358 lb 11 oz) (03/17/19 1433)  SpO2: 97 % (03/17/19 1433)    Physical Exam   Constitutional: She is oriented to person, place, and time  obese   HENT:   Head: Normocephalic and atraumatic  Nose: Right sinus exhibits maxillary sinus tenderness and frontal sinus tenderness  Left sinus exhibits maxillary sinus tenderness and frontal sinus tenderness  Eyes: Pupils are equal, round, and reactive to light  EOM are normal    Neck: Normal range of motion  No JVD present  Cardiovascular: Normal rate, regular rhythm and normal heart sounds  Pulmonary/Chest: Effort normal and breath sounds normal  No stridor  No respiratory distress  She has no wheezes  She has no rales     Was given lasix   Abdominal: Soft  Bowel sounds are normal  She exhibits no distension  There is no tenderness  There is no guarding  Musculoskeletal: Normal range of motion  She exhibits edema (from feet up shins)  She exhibits no tenderness  Neurological: She is alert and oriented to person, place, and time  She has normal reflexes  Skin: Skin is warm  Psychiatric: She has a normal mood and affect  Additional Data:     Lab Results: I have personally reviewed pertinent reports  Results from last 7 days   Lab Units 03/17/19  1128   WBC Thousand/uL 9 80   HEMOGLOBIN g/dL 11 2*   HEMATOCRIT % 35 2*   PLATELETS Thousands/uL 356   NEUTROS PCT % 73*   LYMPHS PCT % 19*   MONOS PCT % 6   EOS PCT % 2     Results from last 7 days   Lab Units 03/17/19  1128   SODIUM mmol/L 137   POTASSIUM mmol/L 4 0   CHLORIDE mmol/L 104   CO2 mmol/L 28   BUN mg/dL 17   CREATININE mg/dL 0 56*   ANION GAP mmol/L 5   CALCIUM mg/dL 9 0   ALBUMIN g/dL 3 3   TOTAL BILIRUBIN mg/dL 0 50   ALK PHOS U/L 85   ALT U/L 31   AST U/L 22   GLUCOSE RANDOM mg/dL 106*                       Imaging: I have personally reviewed pertinent films in PACS    CTA ED chest PE study   Final Result by Wendy Vincent MD (03/17 1303)      1  No evidence of embolism in the main pulmonary artery  Suboptimal evaluation of the lobar, segmental, and subsegmental branches due to poor opacification  2   Mild diffuse bilateral interlobular septal thickening and ill-defined groundglass opacities suspicious for mild pulmonary edema  Multifocal infection in the appropriate clinical setting is included in the differential    3   Small right and trace left pleural effusions  4   Small pericardial effusion  5   5 mm subpleural nodule in the right lower lobe  Based on current Fleischner Society 2017 Guidelines on incidental pulmonary nodule, no routine follow-up is needed if the patient is considered low risk for lung cancer    If the patient is considered high risk for lung cancer, 12 month follow-up non-contrast chest CT is recommended  The study was marked in [facility] for immediate notification  Workstation performed: [id number]         XR chest pa & lateral   ED Interpretation by Yandel Lo MD (03/17 1200)   Cardiomegaly  Final Result by Zeny Richmond MD (03/17 0636)      No active pulmonary disease on examination which is somewhat limited secondary to low lung volumes  Workstation performed: [id number]         CT sinus wo contrast    (Results Pending)   VAS lower limb venous duplex study, complete bilateral    (Results Pending)       EKG, Pathology, and Other Studies Reviewed on Admission:   · EKG: reviewed    Allscripts / Epic Records Reviewed: Yes     ** Please Note: This note has been constructed using a voice recognition system   **

## 2019-03-17 NOTE — ASSESSMENT & PLAN NOTE
· Has pressure headache with the tenderness of the frontal and maxillary sinuses though she has no discharge has been on antibiotics for 5 days of Augmentin  With no improvement  She actually has the nasal congestion with sinus pain for 2 weeks to quit 2 weeks prior to evaluation by urgent care  · I will not change the antibiotic I will discontinue the Augmentin actually will check a CT sinus   · Will add Flonase daily  · Provide Tylenol p r n   Pain

## 2019-03-17 NOTE — ASSESSMENT & PLAN NOTE
· Patient is on birth control I will hold while inpatient discussed the risk of increased for clots

## 2019-03-17 NOTE — ED PROCEDURE NOTE
PROCEDURE  ECG 12 Lead Documentation  Date/Time: 3/17/2019 11:04 AM  Performed by: Isidoro Branch MD  Authorized by: Isidoro Branch MD     Indications / Diagnosis:  Dyspnea  ECG reviewed by me, the ED Provider: yes    Patient location:  ED  Interpretation:     Interpretation: normal    Rate:     ECG rate:  71    ECG rate assessment: normal    Rhythm:     Rhythm: sinus rhythm    Ectopy:     Ectopy: none    QRS:     QRS axis:  Normal    QRS intervals:  Normal  Conduction:     Conduction: normal    ST segments:     ST segments:  Normal  T waves:     T waves: normal           Isidoro Branch MD  03/17/19 1104

## 2019-03-17 NOTE — NURSING NOTE
PT arrived on floor from ED @ [address] regular B/L LE + 2 edema Lungs decreased clear + bowel sounds x4 + PP ABD soft large  Denies any pain  Will continue to monitor call bell within reach

## 2019-03-17 NOTE — ASSESSMENT & PLAN NOTE
· Concern for new onset heart failure  Negative for PE CT of the chest revealed mild pulmonary edema bilateral small pleural effusions small pericardial effusion and a proBNP of 14 30  Patient felt better than urinated quite a bit after the Lasix 20 mg IV x1  Patient does state that from Monday to now she feels like she can about 20 lb with increased lower extremity edema  EKG reviewed no acute ischemic changes troponins negative  Recent viral URI it seems  With on response to antibiotics for 5 days as was treated by urgent care for a sinus infection  · Patient is still significantly edematous in the lower extremities will provide Lasix 20 mg IV b i d  With re-evaluation tomorrow will repeat a proBNP 2D echo troponins x2  Will a TSH as well    EKG in the morning  · Ask Cardiology to evaluate  · I do not suspect this is an infectious multifocal pneumonia- as she has no cough, no fever and no white count although she has been on antibiotics I will do a procalcitonin in the morning  · Check vde

## 2019-03-18 ENCOUNTER — APPOINTMENT (OUTPATIENT)
Dept: CT IMAGING | Facility: HOSPITAL | Age: 34
End: 2019-03-18
Payer: COMMERCIAL

## 2019-03-18 ENCOUNTER — APPOINTMENT (OUTPATIENT)
Dept: NON INVASIVE DIAGNOSTICS | Facility: HOSPITAL | Age: 34
End: 2019-03-18
Payer: COMMERCIAL

## 2019-03-18 PROBLEM — I50.9 ACUTE CONGESTIVE HEART FAILURE (HCC): Status: ACTIVE | Noted: 2019-03-17

## 2019-03-18 LAB
ANION GAP SERPL CALCULATED.3IONS-SCNC: 6 MMOL/L (ref 5–14)
ATRIAL RATE: 71 BPM
ATRIAL RATE: 77 BPM
BASOPHILS # BLD AUTO: 0.1 THOUSANDS/ΜL (ref 0–0.1)
BASOPHILS NFR BLD AUTO: 1 % (ref 0–1)
BUN SERPL-MCNC: 14 MG/DL (ref 5–25)
CALCIUM SERPL-MCNC: 8.6 MG/DL (ref 8.4–10.2)
CHLORIDE SERPL-SCNC: 101 MMOL/L (ref 97–108)
CO2 SERPL-SCNC: 29 MMOL/L (ref 22–30)
CREAT SERPL-MCNC: 0.62 MG/DL (ref 0.6–1.2)
CRP SERPL QL: 36.7 MG/L
EOSINOPHIL # BLD AUTO: 0.3 THOUSAND/ΜL (ref 0–0.4)
EOSINOPHIL NFR BLD AUTO: 3 % (ref 0–6)
ERYTHROCYTE [DISTWIDTH] IN BLOOD BY AUTOMATED COUNT: 15.2 %
ERYTHROCYTE [SEDIMENTATION RATE] IN BLOOD: 51 MM/HOUR (ref 1–20)
EST. AVERAGE GLUCOSE BLD GHB EST-MCNC: 120 MG/DL
GFR SERPL CREATININE-BSD FRML MDRD: 120 ML/MIN/1.73SQ M
GLUCOSE SERPL-MCNC: 94 MG/DL (ref 70–99)
HBA1C MFR BLD: 5.8 % (ref 4.2–6.3)
HCT VFR BLD AUTO: 31.8 % (ref 36–46)
HGB BLD-MCNC: 10.5 G/DL (ref 12–16)
LYMPHOCYTES # BLD AUTO: 2.8 THOUSANDS/ΜL (ref 0.5–4)
LYMPHOCYTES NFR BLD AUTO: 28 % (ref 25–45)
MAGNESIUM SERPL-MCNC: 2 MG/DL (ref 1.6–2.3)
MCH RBC QN AUTO: 26.2 PG (ref 26–34)
MCHC RBC AUTO-ENTMCNC: 33 G/DL (ref 31–36)
MCV RBC AUTO: 79 FL (ref 80–100)
MONOCYTES # BLD AUTO: 0.6 THOUSAND/ΜL (ref 0.2–0.9)
MONOCYTES NFR BLD AUTO: 6 % (ref 1–10)
NEUTROPHILS # BLD AUTO: 6.1 THOUSANDS/ΜL (ref 1.8–7.8)
NEUTS SEG NFR BLD AUTO: 62 % (ref 45–65)
NT-PROBNP SERPL-MCNC: 1640 PG/ML (ref 0–299)
P AXIS: 40 DEGREES
P AXIS: 74 DEGREES
PLATELET # BLD AUTO: 329 THOUSANDS/UL (ref 150–450)
PLATELET BLD QL SMEAR: ADEQUATE
PMV BLD AUTO: 8.1 FL (ref 8.9–12.7)
POTASSIUM SERPL-SCNC: 3.4 MMOL/L (ref 3.6–5)
PR INTERVAL: 138 MS
PR INTERVAL: 158 MS
PROCALCITONIN SERPL-MCNC: <0.05 NG/ML
QRS AXIS: 36 DEGREES
QRS AXIS: 54 DEGREES
QRSD INTERVAL: 76 MS
QRSD INTERVAL: 80 MS
QT INTERVAL: 378 MS
QT INTERVAL: 392 MS
QTC INTERVAL: 410 MS
QTC INTERVAL: 443 MS
RBC # BLD AUTO: 4.01 MILLION/UL (ref 4–5.2)
RBC MORPH BLD: NORMAL
SODIUM SERPL-SCNC: 136 MMOL/L (ref 137–147)
T WAVE AXIS: 20 DEGREES
T WAVE AXIS: 54 DEGREES
VENTRICULAR RATE: 71 BPM
VENTRICULAR RATE: 77 BPM
WBC # BLD AUTO: 9.9 THOUSAND/UL (ref 4.5–11)

## 2019-03-18 PROCEDURE — 85652 RBC SED RATE AUTOMATED: CPT | Performed by: FAMILY MEDICINE

## 2019-03-18 PROCEDURE — 86038 ANTINUCLEAR ANTIBODIES: CPT | Performed by: FAMILY MEDICINE

## 2019-03-18 PROCEDURE — 70486 CT MAXILLOFACIAL W/O DYE: CPT

## 2019-03-18 PROCEDURE — 83036 HEMOGLOBIN GLYCOSYLATED A1C: CPT | Performed by: FAMILY MEDICINE

## 2019-03-18 PROCEDURE — 84145 PROCALCITONIN (PCT): CPT | Performed by: FAMILY MEDICINE

## 2019-03-18 PROCEDURE — 93970 EXTREMITY STUDY: CPT

## 2019-03-18 PROCEDURE — 93306 TTE W/DOPPLER COMPLETE: CPT | Performed by: INTERNAL MEDICINE

## 2019-03-18 PROCEDURE — 83880 ASSAY OF NATRIURETIC PEPTIDE: CPT | Performed by: FAMILY MEDICINE

## 2019-03-18 PROCEDURE — 99226 PR SBSQ OBSERVATION CARE/DAY 35 MINUTES: CPT | Performed by: FAMILY MEDICINE

## 2019-03-18 PROCEDURE — 83735 ASSAY OF MAGNESIUM: CPT | Performed by: FAMILY MEDICINE

## 2019-03-18 PROCEDURE — 80048 BASIC METABOLIC PNL TOTAL CA: CPT | Performed by: FAMILY MEDICINE

## 2019-03-18 PROCEDURE — 85025 COMPLETE CBC W/AUTO DIFF WBC: CPT | Performed by: FAMILY MEDICINE

## 2019-03-18 PROCEDURE — 93010 ELECTROCARDIOGRAM REPORT: CPT | Performed by: INTERNAL MEDICINE

## 2019-03-18 PROCEDURE — 93970 EXTREMITY STUDY: CPT | Performed by: SURGERY

## 2019-03-18 PROCEDURE — 86140 C-REACTIVE PROTEIN: CPT | Performed by: FAMILY MEDICINE

## 2019-03-18 PROCEDURE — 93005 ELECTROCARDIOGRAM TRACING: CPT

## 2019-03-18 PROCEDURE — 93306 TTE W/DOPPLER COMPLETE: CPT

## 2019-03-18 PROCEDURE — 86430 RHEUMATOID FACTOR TEST QUAL: CPT | Performed by: FAMILY MEDICINE

## 2019-03-18 PROCEDURE — 99244 OFF/OP CNSLTJ NEW/EST MOD 40: CPT | Performed by: INTERNAL MEDICINE

## 2019-03-18 RX ORDER — POTASSIUM CHLORIDE 20 MEQ/1
20 TABLET, EXTENDED RELEASE ORAL ONCE
Status: COMPLETED | OUTPATIENT
Start: 2019-03-18 | End: 2019-03-18

## 2019-03-18 RX ORDER — FLUTICASONE PROPIONATE 50 MCG
1 SPRAY, SUSPENSION (ML) NASAL 2 TIMES DAILY
Status: DISCONTINUED | OUTPATIENT
Start: 2019-03-18 | End: 2019-03-19 | Stop reason: HOSPADM

## 2019-03-18 RX ORDER — CARVEDILOL 3.12 MG/1
3.12 TABLET ORAL 2 TIMES DAILY WITH MEALS
Status: DISCONTINUED | OUTPATIENT
Start: 2019-03-18 | End: 2019-03-19 | Stop reason: HOSPADM

## 2019-03-18 RX ORDER — LISINOPRIL 20 MG/1
20 TABLET ORAL DAILY
Status: DISCONTINUED | OUTPATIENT
Start: 2019-03-19 | End: 2019-03-19 | Stop reason: HOSPADM

## 2019-03-18 RX ORDER — IBUPROFEN 600 MG/1
600 TABLET ORAL EVERY 6 HOURS PRN
Status: DISCONTINUED | OUTPATIENT
Start: 2019-03-18 | End: 2019-03-19 | Stop reason: HOSPADM

## 2019-03-18 RX ADMIN — FUROSEMIDE 20 MG: 10 INJECTION, SOLUTION INTRAVENOUS at 16:44

## 2019-03-18 RX ADMIN — FUROSEMIDE 20 MG: 10 INJECTION, SOLUTION INTRAVENOUS at 09:59

## 2019-03-18 RX ADMIN — POTASSIUM CHLORIDE 20 MEQ: 20 TABLET, EXTENDED RELEASE ORAL at 12:02

## 2019-03-18 RX ADMIN — IBUPROFEN 600 MG: 600 TABLET ORAL at 12:02

## 2019-03-18 RX ADMIN — Medication 1 SPRAY: at 21:25

## 2019-03-18 RX ADMIN — CARVEDILOL 3.12 MG: 3.12 TABLET, FILM COATED ORAL at 16:43

## 2019-03-18 RX ADMIN — FLUTICASONE PROPIONATE 1 SPRAY: 50 SPRAY, METERED NASAL at 08:40

## 2019-03-18 RX ADMIN — LISINOPRIL 10 MG: 10 TABLET ORAL at 08:36

## 2019-03-18 RX ADMIN — ESCITALOPRAM OXALATE 20 MG: 10 TABLET ORAL at 08:36

## 2019-03-18 NOTE — ASSESSMENT & PLAN NOTE
· Patient responded well to the Lasix 20 mg IV b i d  Her legs did decrease edema and she is not short of breath anymore  Her lungs are clear  But states she still has some or edema to go on her lower extremities will continue IV Lasix for another 24 hours awaiting 2D echo results to reveal the LV function     Negative for PE CT of the chest revealed mild pulmonary edema bilateral small pleural effusions small pericardial effusion and a proBNP of 14 30  Patient felt better than urinated quite a bit after the Lasix 20 mg IV x1  Patient does state that from Monday to now she feels like she can about 20 lb with increased lower extremity edema  EKG reviewed no acute ischemic changes troponins negative  Recent viral URI it seems  With on response to antibiotics for 5 days as was treated by urgent care for a sinus infection  · TSH is normal troponins are negative  ESR is elevated most likely suspicion is myocarditis with a 2 weeks ago viral infection  · Ask Cardiology to evaluate  · I do not suspect this is an infectious multifocal pneumonia- as she has no cough, no fever and no white count although she has been on antibiotics I will do a procalcitonin in the morning- still pending  · Check vde- neg   · Discussed with cardiology as well  · Also had a long discussion with the patient as well  · Increase lisinopril 20 mg daily blood pressure uncontrolled  Could be also aspect anxiety she is very worried about her new diagnosis

## 2019-03-18 NOTE — NURSING NOTE
Pt alert and oriented times three  Pt states she feels a little better  Lower extremities with 2/1 pulses  No shortness of breath  Call bell with in reach, continue to monitor

## 2019-03-18 NOTE — UTILIZATION REVIEW
Initial Clinical Review    Admission: Date/Time/Statement: 03/17/19 @ 1321 -- OBS  Orders Placed This Encounter   Procedures    Place in Observation     Standing Status:   Standing     Number of Occurrences:   1     Order Specific Question:   Admitting Physician     Answer:   Yasmeen Parmar [[id number]]     Order Specific Question:   Level of Care     Answer:   Med Surg [16]     ED: Date/Time/Mode of Arrival:   ED Arrival Information     Expected Arrival Acuity Means of Arrival Escorted By Service Admission Type    - 3/17/2019 10:19 Urgent Walk-In Self General Medicine Urgent    Arrival Complaint    sob        Chief Complaint:   Chief Complaint   Patient presents with    Shortness of Breath     Any kind of exertion cause me to feel short of breath  I have been on Augmentin for 5 days for sinuse infection  I have had no fever or pains  I feel bloated  to my upper abdomin  I have beeen burbing some  Assessment/Plan: 28 y/o female presents with SOB with minimal activity for ~5 days  Was seen at urgent care on 3/11 for a 2 wk hx of sinus pressue and started on Augmentin x 5d, which she completed but still has the sinus pain and head pressure  Now noted to have (+) LE, with feeling of abd swelling and states she has gained 20 lbs since Mon     · Dx: Exertional dyspnea -- concern for new onset heart failure -- CT of the chest revealed mild pulmonary edema bilateral small pleural effusions small pericardial effusion and a proBNP of 14 30  Had some improvement after lasix 20 mg IV  Echo ordered  Cardiology consult    · Elevated BP (no hx of HTN) -- added lisinopril 10 mg daily  · Subacute sinusitis -- d/c augmentin, recheck CT sinus, tylenol prn    ED Vital Signs:   ED Triage Vitals [03/17/19 1045]   Temperature Pulse Respirations Blood Pressure SpO2   98 °F (36 7 °C) 96 20 (!) 181/119 97 %      Temp Source Heart Rate Source Patient Position - Orthostatic VS BP Location FiO2 (%)   Tympanic Monitor Sitting Left arm --      Pain Score       No Pain        Wt Readings from Last 1 Encounters:   03/18/19 (!) 161 kg (354 lb 11 5 oz)     Vital Signs:  03/18/19 0728  99 2 °F (37 3 °C)  83    169/106Abnormal   98 %    03/18/19 0025  97 9 °F (36 6 °C)  81  22  167/97  93 %    03/17/19 1926  97 7 °F (36 5 °C)  69  20  145/85  98 %    03/17/19 1536        184/97Abnormal       03/17/19 1433  98 7 °F (37 1 °C)  72  18  193/107Abnormal   97 %    03/17/19 1343    69  20  166/103Abnormal   97 %    03/17/19 1208    73  20  162/97  97 %    03/17/19 1045  98 °F (36 7 °C)  96  20  181/119Abnormal   97 %      03/18/19 0600  161 kg (354 lb 11 5 oz)Abnormal      03/17/19 1433  163 kg (358 lb 11 oz)Abnormal   5' 7" (1 702 m)   03/17/19 1045  163 kg (360 lb)Abnormal   5' 7" (1 702 m)       Pertinent Labs/Diagnostic Test Results: Cr 0 56, Glucose 106, Lipase 22, BNP 1,430, Hgb 11 2, Hct 35 2  CXR -- No active pulmonary disease on examination which is somewhat limited secondary to low lung volumes  CTA chest PE study --  1   No evidence of embolism in the main pulmonary artery  Suboptimal evaluation of the lobar, segmental, and subsegmental branches due to poor opacification  2   Mild diffuse bilateral interlobular septal thickening and ill-defined groundglass opacities suspicious for mild pulmonary edema  Multifocal infection in the appropriate clinical setting is included in the differential   3   Small right and trace left pleural effusions  4   Small pericardial effusion  5   5 mm subpleural nodule in the right lower lobe    EKG -- Sinus rhythm with Premature atrial complexes       ED Treatment:   Medication Administration from 03/17/2019 1019 to 03/17/2019 1428       Date/Time Order Dose Route Action     03/17/2019 1244 iohexol (OMNIPAQUE) 350 MG/ML injection (MULTI-DOSE) 100 mL 100 mL Intravenous Given     03/17/2019 1337 furosemide (LASIX) injection 20 mg 20 mg Intravenous Given     Past Medical/Surgical History:    Active Ambulatory Problems     Diagnosis Date Noted    Encounter for routine gynecological examination with Papanicolaou smear of cervix 12/19/2018    Class 3 severe obesity with body mass index (BMI) of 50 0 to 59 9 in adult [facility]) 12/19/2018     Past Medical History:   Diagnosis Date    Anxiety     Depression      Admitting Diagnosis: Shortness of breath [R06 02]  Pericardial effusion [I31 3]  Pulmonary edema [J81 1]  Pleural effusion [J90]  Age/Sex: 29 y o  female  Admission Orders:  Scheduled Meds:   Current Facility-Administered Medications:  acetaminophen 650 mg Oral Q6H PRN   enoxaparin 40 mg Subcutaneous Daily   escitalopram 20 mg Oral Daily   fluticasone 1 spray Each Nare Daily   furosemide 20 mg Intravenous BID (diuretic)   lisinopril 10 mg Oral Daily     Telem  Serial trops  EKG  Daily wts  I/O  Activity as madi  Cardiac diet

## 2019-03-18 NOTE — CONSULTS
Consultation - Cardiology   Adam Zhou 29 y o  female MRN: [id number]  Unit/Bed#: 5T -50 Encounter: [id number]    Physician Requesting Consult: Stanislaw Barbosa MD  Reason for Consult / Principal Problem:      Congestive heart failure  Consulting physician:  Mike Rutledge MD      Assessment/Plan     Assessment:  1  Acute congestive heart failure, right and left, HEFpEF  2  ?  Myocarditis verses decompensation related to obesity and sleep apnea  The elevated sedimentation rate is suggestive of myocarditis      Plan:  1  Would observe patient overnight to make sure she does not have arrhythmia associated with possible myocarditis  2  Recommend long-term weight loss/bariatric surgery  3  Recommend screening for sleep apnea  4  Would be glad to see patient in the office in about a month post discharge     History of Present Illness   HPI: Adam Zhou is a 29y o  year old female who developed which she felt was a sinus infection approximately 3 weeks ago  About 2 weeks ago, she began to note exertional dyspnea  One week ago she went to an emergency center who placed her on Augmentin  She developed progressive abdominal bloating and peripheral edema with worsening of her shortness of breath and came to the emergency room  Chest x-ray is compatible with congestive heart failure and CT scan of the chest documents congestive heart failure  Abdominal bloating and peripheral edema of her lower extremities was present  She states that she had gained 20 lb in the last several weeks  She has responded extremely well to diuretics at the present time but the etiology of her congestive heart failure is not apparent  She has a mild anemia of 11 2 g/dL which appears to be iron deficient C  Her sedimentation rate is elevated at 51  C reactive protein is pending  Laboratory studies demonstrated an initial BNP of 1430 with a repeat BNP today of 1640  I am not sure these values are significantly different  Troponin levels have been normal   EKG demonstrates sinus rhythm with PACs  Patient smokes a pack a day and has 2 drinks a week  She is morbidly obese with a BMI of 50 0-59 9  Historical Information   Past Medical History:   Diagnosis Date    Anxiety     Depression      Past Surgical History:   Procedure Laterality Date    CERVICAL BIOPSY  W/ LOOP ELECTRODE EXCISION  2009    TONSILLECTOMY  1990     Social History:  Social History     Substance and Sexual Activity   Alcohol Use Yes    Comment: wine 2 glasses a week     Social History     Substance and Sexual Activity   Drug Use No     Social History     Tobacco Use   Smoking Status Never Smoker   Smokeless Tobacco Never Used     Family History:   family history includes Colon cancer in her maternal grandmother; Diabetes in her mother, paternal grandfather, and paternal grandmother; Heart disease in her paternal grandfather; Hypertension in her father  Meds/Allergies   Current Facility-Administered Medications   Medication Dose Route Frequency    acetaminophen (TYLENOL) tablet 650 mg  650 mg Oral Q6H PRN    enoxaparin (LOVENOX) subcutaneous injection 40 mg  40 mg Subcutaneous Daily    escitalopram (LEXAPRO) tablet 20 mg  20 mg Oral Daily    fluticasone (FLONASE) 50 mcg/act nasal spray 1 spray  1 spray Each Nare Daily    furosemide (LASIX) injection 20 mg  20 mg Intravenous BID (diuretic)    ibuprofen (MOTRIN) tablet 600 mg  600 mg Oral Q6H PRN    [START ON 3/19/2019] lisinopril (ZESTRIL) tablet 20 mg  20 mg Oral Daily    potassium chloride (K-DUR,KLOR-CON) CR tablet 20 mEq  20 mEq Oral Once     No Known Allergies    Review of Systems   Constitutional: Negative  HENT: Negative  Respiratory: Positive for shortness of breath  Negative for chest tightness  Cardiovascular: Positive for leg swelling  Negative for chest pain  Gastrointestinal: Positive for abdominal distention  Endocrine: Negative  Genitourinary: Negative  Musculoskeletal: Negative  Skin: Negative  Allergic/Immunologic: Negative  Neurological: Positive for headaches  Hematological: Negative  Psychiatric/Behavioral: Negative  Objective   Vitals: Blood pressure (!) 169/106, pulse 83, temperature 99 2 °F (37 3 °C), temperature source Temporal, resp  rate 22, height 5' 7" (1 702 m), weight (!) 161 kg (354 lb 11 5 oz), last menstrual period 03/14/2019, SpO2 98 %  Orthostatic Blood Pressures      Most Recent Value   Blood Pressure  169/106  (Abnormal)  filed at 03/18/2019 0541   Patient Position - Orthostatic VS  Sitting filed at 03/18/2019 0323          Intake/Output Summary (Last 24 hours) at 3/18/2019 1145  Last data filed at 3/18/2019 0830  Gross per 24 hour   Intake 600 ml   Output 2875 ml   Net -2275 ml     Invasive Devices     Peripheral Intravenous Line            Peripheral IV 03/18/19 Left Hand less than 1 day                Physical Exam   Constitutional: She is oriented to person, place, and time  She appears well-developed and well-nourished  Morbidly obese   HENT:   Head: Normocephalic and atraumatic  Neck: Normal range of motion  Neck supple  No JVD present  No tracheal deviation present  No thyromegaly present  Cardiovascular: Normal rate, regular rhythm, normal heart sounds and intact distal pulses  Exam reveals no gallop and no friction rub  No murmur heard  Pulmonary/Chest: Effort normal  No respiratory distress  She has no rales  Abdominal: Soft  Bowel sounds are normal    Musculoskeletal: Normal range of motion  She exhibits no edema  Neurological: She is alert and oriented to person, place, and time  Skin: Skin is warm and dry  Psychiatric: She has a normal mood and affect   Her behavior is normal        Lab Results:     CBC with diff:   Results from last 7 days   Lab Units 03/18/19  0509   WBC Thousand/uL 9 90   RBC Million/uL 4 01   HEMOGLOBIN g/dL 10 5*   HEMATOCRIT % 31 8*   MCV fL 79*   MCH pg 26 2   MCHC g/dL 33 0   RDW % 15 2   MPV fL 8 1*   PLATELETS Thousands/uL 329     CMP:   Results from last 7 days   Lab Units 03/18/19  0509 03/17/19  1128   SODIUM mmol/L 136* 137   POTASSIUM mmol/L 3 4* 4 0   CHLORIDE mmol/L 101 104   CO2 mmol/L 29 28   BUN mg/dL 14 17   CREATININE mg/dL 0 62 0 56*   CALCIUM mg/dL 8 6 9 0   AST U/L  --  22   ALT U/L  --  31   ALK PHOS U/L  --  85   EGFR ml/min/1 73sq m 120 124     Troponin:   0   Lab Value Date/Time    TROPONINI <0 01 03/17/2019 2005    Basil Cruel <0 01 03/17/2019 1712    TROPONINI <0 01 03/17/2019 1212     BNP:   Results from last 7 days   Lab Units 03/18/19  0509   POTASSIUM mmol/L 3 4*   CHLORIDE mmol/L 101   CO2 mmol/L 29   BUN mg/dL 14   CREATININE mg/dL 0 62   CALCIUM mg/dL 8 6   EGFR ml/min/1 73sq m 120     TSH:   Results from last 7 days   Lab Units 03/17/19  1712   TSH 3RD GENERATON uIU/mL 3 310     Echocardiogram  LEFT VENTRICLE:  Low normal left ventricular systolic function, EF 58%  Moderately enlarged left ventricular cavity  Mild concentric left ventricular hypertrophy  No regional wall motion abnormalities  Normal left ventricular diastolic function and filling  pressures   LEFT ATRIUM:  Mild left atrial enlargement    RIGHT ATRIUM:  Mild right atrial enlargement    MITRAL VALVE:  Mild mitral regurgitation    TRICUSPID VALVE:  Trivial tricuspid regurgitation    PULMONARY ARTERIES:  Mild pulmonary hypertension, pulmonary artery systolic pressure is estimated at 38 mmHg  PERICARDIUM:  Trivial posterior pericardial effusion of no hemodynamic significance    Imaging:   I have personally reviewed pertinent reports        EKG:  Normal sinus rhythm with PACs

## 2019-03-18 NOTE — SOCIAL WORK
Pt admitted as observation with SOB/Fluid overload- likely CHF with echo pending  Observation notification reviewed with verbal understanding of same given- pt signed and to be scanned into EHR  Pt lives with BF in [facility] w/ Bed/bath on 2nd floor-no issues navigating steps  Pt is independent with all aspects of care and household chores, continues to work FT and drives self (pt drove self to hospital and car in parking garage)  Pt's [place] [facility] and is looking to switch to one closer to home- [address] [facility] [address] information placed on AVS per her request   Pt uses [facility] for prescriptions  No questions/concerns voiced at this time  No barriers to d/c identified at this time however will continue to follow should any arise

## 2019-03-18 NOTE — ASSESSMENT & PLAN NOTE
· Has pressure headache with the tenderness of the frontal and maxillary sinuses though she has no discharge has been on antibiotics for 5 days of Augmentin  With no improvement  She actually has the nasal congestion with sinus pain for 2 weeks to quit 2 weeks prior to evaluation by urgent care  · I will not change the antibiotic I will discontinue the Augmentin actually will check a CT sinus   · Will add Flonase daily  Increased to b i d  As patient states has been helping  · Provide Tylenol p r n   Pain  · Ibuprofen for  sinus headache pressure as it has been helping

## 2019-03-18 NOTE — PLAN OF CARE
Problem: DISCHARGE PLANNING - CARE MANAGEMENT  Goal: Discharge to post-acute care or home with appropriate resources  Description  INTERVENTIONS:  - Conduct assessment to determine patient/family and health care team treatment goals, and need for post-acute services based on payer coverage, community resources, and patient preferences, and barriers to discharge  - Address psychosocial, clinical, and financial barriers to discharge as identified in assessment in conjunction with the patient/family and health care team  - Arrange appropriate level of post-acute services according to patient?s   needs and preference and payer coverage in collaboration with the physician and health care team  - Communicate with and update the patient/family, physician, and health care team regarding progress on the discharge plan  - Arrange appropriate transportation to post-acute venues  Outcome: Adequate for Discharge  No Barriers to d/c identified at this time

## 2019-03-18 NOTE — PROGRESS NOTES
Progress Note - Nabor Cooperr 1985, 29 y o  female MRN: [id number]    Unit/Bed#: 5T -01 Encounter: [id number]    Primary Care Provider: Dayami Alvarado MD   Date and time admitted to hospital: 3/17/2019 10:41 AM        Depression  Assessment & Plan  · continue Lexapro    Elevated BP without diagnosis of hypertension  Assessment & Plan  · Still elevated blood pressure although could be also exacerbated by anxiety is she is worried about the new diagnosis  Continue Lasix IV and also increase the Toprol to 20 mg daily  Birth control  Assessment & Plan  · Patient is on birth control I will hold while inpatient discussed the risk of increased for clots  Subacute sinusitis  Assessment & Plan  · Has pressure headache with the tenderness of the frontal and maxillary sinuses though she has no discharge has been on antibiotics for 5 days of Augmentin  With no improvement  She actually has the nasal congestion with sinus pain for 2 weeks to quit 2 weeks prior to evaluation by urgent care  · I will not change the antibiotic I will discontinue the Augmentin actually will check a CT sinus   · Will add Flonase daily  Increased to b i d  As patient states has been helping  · Provide Tylenol p r n  Pain  · Ibuprofen for  sinus headache pressure as it has been helping    Pericardial effusion  Assessment & Plan  · Small found on CT  Will add MERRICK/RF/ESR CRP- in is pending the CRP is pending ESR is elevated see above explanation suspect myocarditis  · 2D echo to be done today  · Cardiology consulted- discussed with dr Glenna Kaur    Class 3 severe obesity with body mass index (BMI) of 50 0 to 59 9 in adult [facility])  Assessment & Plan  · Counseled  · Will need to check hemoglobin A1c- pending   · tsh normal     * Acute congestive heart failure ([address] )  Assessment & Plan  · Patient responded well to the Lasix 20 mg IV b i d  Her legs did decrease edema and she is not short of breath anymore  Her lungs are clear  But states she still has some or edema to go on her lower extremities will continue IV Lasix for another 24 hours awaiting 2D echo results to reveal the LV function     Negative for PE CT of the chest revealed mild pulmonary edema bilateral small pleural effusions small pericardial effusion and a proBNP of 14 30  Patient felt better than urinated quite a bit after the Lasix 20 mg IV x1  Patient does state that from Monday to now she feels like she can about 20 lb with increased lower extremity edema  EKG reviewed no acute ischemic changes troponins negative  Recent viral URI it seems  With on response to antibiotics for 5 days as was treated by urgent care for a sinus infection  · TSH is normal troponins are negative  ESR is elevated most likely suspicion is myocarditis with a 2 weeks ago viral infection  · Ask Cardiology to evaluate  · I do not suspect this is an infectious multifocal pneumonia- as she has no cough, no fever and no white count although she has been on antibiotics I will do a procalcitonin in the morning- still pending  · Check vde- neg   · Discussed with cardiology as well  · Also had a long discussion with the patient as well  · Increase lisinopril 20 mg daily blood pressure uncontrolled  Could be also aspect anxiety she is very worried about her new diagnosis  VTE Pharmacologic Prophylaxis:   Pharmacologic: Enoxaparin (Lovenox)  Mechanical VTE Prophylaxis in Place: Yes    Patient Centered Rounds: I have performed bedside rounds with nursing staff today  Discussions with Specialists or Other Care Team Provider:  Cardiology    Education and Discussions with Family / Patient:  Patient and boyfriend    Time Spent for Care: 45 minutes  More than 50% of total time spent on counseling and coordination of care as described above      Current Length of Stay: 0 day(s)    Current Patient Status: Observation   Certification Statement: The patient will continue to require additional inpatient hospital stay due to CHF    Discharge Plan:  Home when medically cleared    Code Status: Level 1 - Full Code      Subjective:   Patient seen and examined breathing improved leg edema improved no chest pain she says the Flonase helps her in terms of her sinus pressure she does not have any sinus discharge she still has the pressure headache right now it comes up when she wakes up/lays down  Objective:     Vitals:   Temp (24hrs), Av 4 °F (36 9 °C), Min:97 7 °F (36 5 °C), Max:99 2 °F (37 3 °C)    Temp:  [97 7 °F (36 5 °C)-99 2 °F (37 3 °C)] 99 2 °F (37 3 °C)  HR:  [69-83] 83  Resp:  [18-22] 22  BP: (145-193)/() 169/106  SpO2:  [93 %-98 %] 98 %  Body mass index is 55 56 kg/m²  Input and Output Summary (last 24 hours): Intake/Output Summary (Last 24 hours) at 3/18/2019 1129  Last data filed at 3/18/2019 0830  Gross per 24 hour   Intake 600 ml   Output 2875 ml   Net -2275 ml       Physical Exam:     Physical Exam   Constitutional: She is oriented to person, place, and time  obese   HENT:   Head: Normocephalic and atraumatic  Nose: Right sinus exhibits maxillary sinus tenderness and frontal sinus tenderness  Left sinus exhibits maxillary sinus tenderness and frontal sinus tenderness  Eyes: Pupils are equal, round, and reactive to light  EOM are normal    Neck: Normal range of motion  No JVD present  Cardiovascular: Normal rate, regular rhythm and normal heart sounds  Pulmonary/Chest: Effort normal and breath sounds normal  No stridor  No respiratory distress  She has no wheezes  She has no rales  She exhibits no tenderness  Abdominal: Soft  Bowel sounds are normal    Musculoskeletal: Normal range of motion  She exhibits edema (although improved )  Neurological: She is alert and oriented to person, place, and time  She has normal reflexes  Skin: Skin is warm  Psychiatric: She has a normal mood and affect           Additional Data:     Labs:    Results from last 7 days   Lab Units 03/18/19  0509   WBC Thousand/uL 9 90   HEMOGLOBIN g/dL 10 5*   HEMATOCRIT % 31 8*   PLATELETS Thousands/uL 329   NEUTROS PCT % 62   LYMPHS PCT % 28   MONOS PCT % 6   EOS PCT % 3     Results from last 7 days   Lab Units 03/18/19  0509 03/17/19  1128   SODIUM mmol/L 136* 137   POTASSIUM mmol/L 3 4* 4 0   CHLORIDE mmol/L 101 104   CO2 mmol/L 29 28   BUN mg/dL 14 17   CREATININE mg/dL 0 62 0 56*   ANION GAP mmol/L 6 5   CALCIUM mg/dL 8 6 9 0   ALBUMIN g/dL  --  3 3   TOTAL BILIRUBIN mg/dL  --  0 50   ALK PHOS U/L  --  85   ALT U/L  --  31   AST U/L  --  22   GLUCOSE RANDOM mg/dL 94 106*                           * I Have Reviewed All Lab Data Listed Above  * Additional Pertinent Lab Tests Reviewed: All Labs Within Last 24 Hours Reviewed    Imaging:    Imaging Reports Reviewed Today Include: awaiting ct/echo  Imaging Personally Reviewed by Myself Includes:      Recent Cultures (last 7 days):           Last 24 Hours Medication List:     Current Facility-Administered Medications:  acetaminophen 650 mg Oral Q6H PRN Riky Michele MD   enoxaparin 40 mg Subcutaneous Daily Riky Michele MD   escitalopram 20 mg Oral Daily Riky Michele MD   fluticasone 1 spray Each Nare Daily Riky Michele MD   furosemide 20 mg Intravenous BID (diuretic) Riky Michele MD   ibuprofen 600 mg Oral Q6H PRN Riky Michele MD   [START ON 3/19/2019] lisinopril 20 mg Oral Daily Riky Michele MD   potassium chloride 20 mEq Oral Once Riky Michele MD        Today, Patient Was Seen By: Riky Michele MD    ** Please Note: Dictation voice to text software may have been used in the creation of this document   **

## 2019-03-18 NOTE — ASSESSMENT & PLAN NOTE
· Small found on CT    Will add MERRICK/RF/ESR CRP- in is pending the CRP is pending ESR is elevated see above explanation suspect myocarditis  · 2D echo to be done today  · Cardiology consulted- discussed with dr Giorgi Corea

## 2019-03-18 NOTE — ASSESSMENT & PLAN NOTE
· Still elevated blood pressure although could be also exacerbated by anxiety is she is worried about the new diagnosis  Continue Lasix IV and also increase the Toprol to 20 mg daily

## 2019-03-19 ENCOUNTER — TRANSITIONAL CARE MANAGEMENT (OUTPATIENT)
Dept: FAMILY MEDICINE CLINIC | Facility: CLINIC | Age: 34
End: 2019-03-19

## 2019-03-19 VITALS
DIASTOLIC BLOOD PRESSURE: 86 MMHG | SYSTOLIC BLOOD PRESSURE: 153 MMHG | WEIGHT: 293 LBS | RESPIRATION RATE: 20 BRPM | TEMPERATURE: 97.4 F | HEART RATE: 66 BPM | OXYGEN SATURATION: 93 % | HEIGHT: 67 IN | BODY MASS INDEX: 45.99 KG/M2

## 2019-03-19 PROBLEM — R73.03 PREDIABETES: Status: ACTIVE | Noted: 2019-03-19

## 2019-03-19 PROBLEM — I10 HTN (HYPERTENSION): Status: ACTIVE | Noted: 2019-03-17

## 2019-03-19 LAB
ANION GAP SERPL CALCULATED.3IONS-SCNC: 3 MMOL/L (ref 5–14)
BUN SERPL-MCNC: 14 MG/DL (ref 5–25)
CALCIUM SERPL-MCNC: 8.5 MG/DL (ref 8.4–10.2)
CHLORIDE SERPL-SCNC: 100 MMOL/L (ref 97–108)
CO2 SERPL-SCNC: 32 MMOL/L (ref 22–30)
CREAT SERPL-MCNC: 0.53 MG/DL (ref 0.6–1.2)
GFR SERPL CREATININE-BSD FRML MDRD: 126 ML/MIN/1.73SQ M
GLUCOSE SERPL-MCNC: 100 MG/DL (ref 70–99)
POTASSIUM SERPL-SCNC: 3.3 MMOL/L (ref 3.6–5)
RHEUMATOID FACT SER QL LA: NEGATIVE
SODIUM SERPL-SCNC: 135 MMOL/L (ref 137–147)

## 2019-03-19 PROCEDURE — 99213 OFFICE O/P EST LOW 20 MIN: CPT | Performed by: INTERNAL MEDICINE

## 2019-03-19 PROCEDURE — 80048 BASIC METABOLIC PNL TOTAL CA: CPT | Performed by: FAMILY MEDICINE

## 2019-03-19 PROCEDURE — 99217 PR OBSERVATION CARE DISCHARGE MANAGEMENT: CPT | Performed by: FAMILY MEDICINE

## 2019-03-19 RX ORDER — IBUPROFEN 600 MG/1
600 TABLET ORAL EVERY 6 HOURS PRN
Qty: 30 TABLET | Refills: 0 | Status: SHIPPED | OUTPATIENT
Start: 2019-03-19 | End: 2019-03-21 | Stop reason: ALTCHOICE

## 2019-03-19 RX ORDER — FUROSEMIDE 20 MG/1
20 TABLET ORAL DAILY
Status: DISCONTINUED | OUTPATIENT
Start: 2019-03-20 | End: 2019-03-19 | Stop reason: HOSPADM

## 2019-03-19 RX ORDER — LISINOPRIL 20 MG/1
20 TABLET ORAL 2 TIMES DAILY
Qty: 60 TABLET | Refills: 0 | Status: SHIPPED | OUTPATIENT
Start: 2019-03-19 | End: 2019-06-12 | Stop reason: SDUPTHER

## 2019-03-19 RX ORDER — LISINOPRIL 10 MG/1
10 TABLET ORAL EVERY EVENING
Status: DISCONTINUED | OUTPATIENT
Start: 2019-03-19 | End: 2019-03-19 | Stop reason: HOSPADM

## 2019-03-19 RX ORDER — POTASSIUM CHLORIDE 20 MEQ/1
40 TABLET, EXTENDED RELEASE ORAL ONCE
Status: COMPLETED | OUTPATIENT
Start: 2019-03-19 | End: 2019-03-19

## 2019-03-19 RX ORDER — FLUTICASONE PROPIONATE 50 MCG
1 SPRAY, SUSPENSION (ML) NASAL 2 TIMES DAILY
Qty: 1 G | Refills: 0 | Status: SHIPPED | OUTPATIENT
Start: 2019-03-19 | End: 2019-07-13 | Stop reason: ALTCHOICE

## 2019-03-19 RX ORDER — FUROSEMIDE 20 MG/1
20 TABLET ORAL DAILY
Qty: 30 TABLET | Refills: 0 | Status: SHIPPED | OUTPATIENT
Start: 2019-03-20 | End: 2019-04-15 | Stop reason: SDUPTHER

## 2019-03-19 RX ORDER — CARVEDILOL 3.12 MG/1
3.12 TABLET ORAL 2 TIMES DAILY WITH MEALS
Qty: 60 TABLET | Refills: 0 | Status: SHIPPED | OUTPATIENT
Start: 2019-03-19 | End: 2019-08-08 | Stop reason: SDUPTHER

## 2019-03-19 RX ADMIN — LISINOPRIL 20 MG: 20 TABLET ORAL at 08:58

## 2019-03-19 RX ADMIN — ENOXAPARIN SODIUM 40 MG: 40 INJECTION SUBCUTANEOUS at 08:58

## 2019-03-19 RX ADMIN — FUROSEMIDE 20 MG: 10 INJECTION, SOLUTION INTRAVENOUS at 08:58

## 2019-03-19 RX ADMIN — ESCITALOPRAM OXALATE 20 MG: 10 TABLET ORAL at 08:58

## 2019-03-19 RX ADMIN — CARVEDILOL 3.12 MG: 3.12 TABLET, FILM COATED ORAL at 08:58

## 2019-03-19 RX ADMIN — Medication 1 SPRAY: at 08:59

## 2019-03-19 RX ADMIN — POTASSIUM CHLORIDE 40 MEQ: 20 TABLET, EXTENDED RELEASE ORAL at 08:58

## 2019-03-19 NOTE — ASSESSMENT & PLAN NOTE
· Counseled  · Will need to check hemoglobin A1c- 5 8-prediabetic I had a long discussion with her regarding weight loss nutrition has been obtained    · tsh normal

## 2019-03-19 NOTE — ASSESSMENT & PLAN NOTE
· Suspect secondary myocarditis  From a viral infection  Also exacerbated by uncontrolled new onset hypertension , obesity  It has improved she dropped down to 159 4 kilos good urine output  DC IV diuretics will switch her over to Lasix 20 mg p o  Daily to start tomorrow  Her blood pressure was controlled improved yesterday today was elevated I will continue the Coreg 3 125 mg p o  B i d  Increase lisinopril to 20 mg p o  B i d   As discussed with Cardiology as well she does have a preserved ejection fraction heart failure  · I did discuss with the patient regarding her weight and I did obtain a nutritional consult she wants to go over the nutrition diet plan she is aware that she is overweight but at this point I did recommend to her for bariatric surgery she does not want to get at this time  · Monitor weights at home low-salt diet

## 2019-03-19 NOTE — ASSESSMENT & PLAN NOTE
· Has pressure headache with the tenderness of the frontal and maxillary sinuses though she has no discharge has been on antibiotics for 5 days of Augmentin  With no improvement  She actually has the nasal congestion with sinus pain for 2 weeks to quit 2 weeks prior to evaluation by urgent care  · She does not have any ongoing sinusitis I did review the CT she has nasal septum deviation with some mucosal thickening  And her symptoms have improved since the Flonase 1 puff b i d  -continue that outpatient and recommended follow-up with ENT regarding the nasal septum

## 2019-03-19 NOTE — DISCHARGE SUMMARY
Discharge- Caroline Goodson 1985, 29 y o  female MRN: [id number]    Unit/Bed#: 5T -01 Encounter: [id number]    Primary Care Provider: Susan Almonte MD   Date and time admitted to hospital: 3/17/2019 10:41 AM        Prediabetes  Assessment & Plan  · Counseled-hemoglobin A1c 5 8 disc had a long discussion regarding weight loss she is going to see nutrition today  Will not treat at this time  Depression  Assessment & Plan  · continue Lexapro    HTN (hypertension)  Assessment & Plan  · New onset, improved but was elevated today continue Coreg 3 125 mg p o  B i d  And increase lisinopril to 20 mg p o  B i d     Birth control  Assessment & Plan  · Patient is on birth control I will hold while inpatient discussed the risk of increased for clots  Subacute sinusitis  Assessment & Plan  · Has pressure headache with the tenderness of the frontal and maxillary sinuses though she has no discharge has been on antibiotics for 5 days of Augmentin  With no improvement  She actually has the nasal congestion with sinus pain for 2 weeks to quit 2 weeks prior to evaluation by urgent care  · She does not have any ongoing sinusitis I did review the CT she has nasal septum deviation with some mucosal thickening  And her symptoms have improved since the Flonase 1 puff b i d  -continue that outpatient and recommended follow-up with ENT regarding the nasal septum  Pericardial effusion  Assessment & Plan  · Small found on CT  Will add MERRICK/RF/ESR CRP- in is pending the CRP is pending ESR is elevated see above explanation suspect myocarditis-MERRICK and RF pending   · 2D echo -a trivial pericardial effusion  · Cardiology consulted- discussed with dr Ayla Corea    Class 3 severe obesity with body mass index (BMI) of 50 0 to 59 9 in [facility])  Assessment & Plan  · Counseled  · Will need to check hemoglobin A1c- 5 8-prediabetic I had a long discussion with her regarding weight loss nutrition has been obtained    · tsh normal     * Acute congestive heart failure ([facility] [address] )  Assessment & Plan  · Suspect secondary myocarditis  From a viral infection  Also exacerbated by uncontrolled new onset hypertension , obesity  It has improved she dropped down to 159 4 kilos good urine output  DC IV diuretics will switch her over to Lasix 20 mg p o  Daily to start tomorrow  Her blood pressure was controlled improved yesterday today was elevated I will continue the Coreg 3 125 mg p o  B i d  Increase lisinopril to 20 mg p o  B i d   As discussed with Cardiology as well she does have a preserved ejection fraction heart failure  · I did discuss with the patient regarding her weight and I did obtain a nutritional consult she wants to go over the nutrition diet plan she is aware that she is overweight but at this point I did recommend to her for bariatric surgery she does not want to get at this time  · Monitor weights at home low-salt diet  Discharging Physician / Practitioner: Tamanna Boyd MD  PCP: Susan Almonte MD  Admission Date:   Admission Orders (From admission, onward)    Ordered        03/17/19 1321  Place in Observation  Once     Order ID Start Status   [id number] 03/17/19 1321 Completed              Discharge Date: 03/19/19    Resolved Problems  Date Reviewed: 3/19/2019    None          Consultations During Hospital Stay:  · Cardiology  · Nutrition    Procedures Performed:   · none    Significant Findings / Test Results:   · CT PE- 1  No evidence of embolism in the main pulmonary artery  Suboptimal evaluation of the lobar, segmental, and subsegmental branches due to poor opacification  2   Mild diffuse bilateral interlobular septal thickening and ill-defined groundglass opacities suspicious for mild pulmonary edema  Multifocal infection in the appropriate clinical setting is included in the differential   3   Small right and trace left pleural effusions  4   Small pericardial effusion    · 2D echo- EF 49% moderately enlarged left ventricular cavity  Mild concentric left ventricular hypertrophy  Mild pulmonary hypertension  Trivial posterior pericardial effusion of no hemodynamic significance  · VD E- negative  · CT sinus-   Slight narrowing of the left anterior ostiomeatal complex due to some localized mucosal thickening      Rightward septal deviation    Incidental Findings:   ·   5 mm subpleural nodule in the right lower lobe  Based on current Fleischner Society 2017 Guidelines on incidental pulmonary nodule, no routine follow-up is needed if the patient is considered low risk for lung cancer  If the patient is considered   high risk for lung cancer, 12 month follow-up non-contrast chest CT is recommended  Test Results Pending at Discharge (will require follow up):   · MERRICK and a RF     Outpatient Tests Requested:  · none    Complications:  none    Reason for Admission:  Shortness of breath    Hospital Course:     Princess Doll is a 29 y o  female patient who originally presented to the hospital on 3/17/2019 due to shortness of breath  Patient had what seemed like a viral URI versus sinusitis 2 weeks ago when she went to the urgent care center got augmentin but she still had head pressure and sinus tenderness, also progressive shortness of breath on exertion surgery came into the ER her imaging CT PE and chest x-ray showed vascular congestion pleural effusions  ESR and CRP elevated most likely myocarditis set her the acute CHF with preserved ejection fraction as discussed with Cardiology  She was IV diuresed and lost from 163 kilos to 159 with great diuresis in terms of urine she was switched over to Lasix 20 mg daily her blood pressure also became more control she does have new onset hypertension also morbid obesity for which she was extensively counseled on weight loss  As the risk factors to sent her into heart failure is obesity and new onset hypertension her medications were adjusted    She did have a CT sinus which does not show an acute sinusitis just a nasal septal deviation with mild mucosal thickening which improved her symptoms improved with Flonase b i d  With no longer sinus tenderness or head pressure  Hemoglobin A1c was 5 8 she has a pre diabetic nutritional was to evaluate the patient discussed diet plan as an outpatient as the patient does not want the proceed with bariatric surgery tree at this time  Her probnp was 1430  Her symptoms have extensively improved she was ambulated without shortness of breath leg edema came down  She was clear to be discharged home she may return to work a week after discharge follow-up with Cardiology as scheduled and also establish care with a primary care physician  Her TSH is normal   Procalcitonin was normal         Please see above list of diagnoses and related plan for additional information  Condition at Discharge: stable     Discharge Day Visit / Exam:     Subjective:  Patient seen and examined denies any chest pain or shortness of breath feels much better no head pressure no sinus tenderness after than Flonase  Vitals: Blood Pressure: 153/86 (03/19/19 0751)  Pulse: 66 (03/19/19 0751)  Temperature: (!) 97 4 °F (36 3 °C) (03/19/19 0751)  Temp Source: Temporal (03/19/19 0751)  Respirations: 20 (03/19/19 0751)  Height: 5' 7" (170 2 cm) (03/18/19 0926)  Weight - Scale: (!) 161 kg (355 lb 6 1 oz) (03/19/19 0600)  SpO2: 93 % (03/19/19 0751)  Exam:   Physical Exam   Constitutional: She is oriented to person, place, and time  Morbid obesity   HENT:   Head: Normocephalic and atraumatic  Eyes: Pupils are equal, round, and reactive to light  EOM are normal    Neck: Normal range of motion  Cardiovascular: Normal rate, regular rhythm and normal heart sounds  Pulmonary/Chest: Effort normal and breath sounds normal  No stridor  No respiratory distress  She has no wheezes  Abdominal: Soft  Bowel sounds are normal  She exhibits no distension   There is no tenderness  Musculoskeletal: Normal range of motion  She exhibits no edema  Neurological: She is alert and oriented to person, place, and time  She has normal reflexes  Skin: Skin is warm  Psychiatric: She has a normal mood and affect  Discussion with Family:  Patient    Discharge instructions/Information to patient and family:   See after visit summary for information provided to patient and family  Provisions for Follow-Up Care:  See after visit summary for information related to follow-up care and any pertinent home health orders  Disposition:     Home    For Discharges to [facility] SNF:   · Not Applicable to this Patient - Not Applicable to this Patient    Planned Readmission: no      Discharge Statement:  I spent >35 minutes discharging the patient  This time was spent on the day of discharge  I had direct contact with the patient on the day of discharge  Greater than 50% of the total time was spent examining patient, answering all patient questions, arranging and discussing plan of care with patient as well as directly providing post-discharge instructions  Additional time then spent on discharge activities  Discharge Medications:  See after visit summary for reconciled discharge medications provided to patient and family        ** Please Note: This note has been constructed using a voice recognition system **

## 2019-03-19 NOTE — PROGRESS NOTES
Progress Note - Cardiology   Iwona Fitting 29 y o  female MRN: [id number]  Unit/Bed#: 5T -01 Encounter: [id number]    Assessment:  1  Acute Heart failure with preserved ejection fraction  2  Suspect viral myocarditis  3  Hypertension   4  Morbid obesity     Plan:  1  Okay to discharge patient  2  Instructed patient on the importance of losing weight and even consider bariatric surgery  3  Follow-up in my office with consideration for repeat echocardiogram in 3 months   4  Sedimentation rate and CRP prior to return to see me in the office  Interval history:  Patient is feeling much better since yesterday  Her breathing is back to normal   She no longer feels bloated  All peripheral edema has resolved  She is anxious to go home  Vitals: /86 (BP Location: Right arm)   Pulse 66   Temp (!) 97 4 °F (36 3 °C) (Temporal)   Resp 20   Ht 5' 7" (1 702 m)   Wt (!) 161 kg (355 lb 6 1 oz)   LMP 03/14/2019 (Exact Date)   SpO2 93%   BMI 55 66 kg/m²   Vitals:    03/18/19 0600 03/19/19 0600   Weight: (!) 161 kg (354 lb 11 5 oz) (!) 161 kg (355 lb 6 1 oz)     Orthostatic Blood Pressures      Most Recent Value   Blood Pressure  153/86 filed at 03/19/2019 0751   Patient Position - Orthostatic VS  Lying filed at 03/19/2019 0751            Intake/Output Summary (Last 24 hours) at 3/19/2019 1158  Last data filed at 3/19/2019 0600  Gross per 24 hour   Intake 660 ml   Output 750 ml   Net -90 ml       Invasive Devices     Peripheral Intravenous Line            Peripheral IV 03/18/19 Left Hand 1 day                Review of Systems   Respiratory: Negative for cough, choking, chest tightness, shortness of breath and wheezing  Cardiovascular: Negative for chest pain, palpitations and leg swelling  Musculoskeletal: Negative for gait problem  Skin: Negative for rash  Neurological: Negative for dizziness, tremors, syncope, weakness, light-headedness, numbness and headaches     Psychiatric/Behavioral: Negative for agitation and behavioral problems  The patient is not hyperactive  Physical Exam   Constitutional: She is oriented to person, place, and time  She appears well-developed and well-nourished  No distress  Morbidly obese   HENT:   Head: Normocephalic and atraumatic  Neck: No JVD present  No thyromegaly present  Cardiovascular: Normal rate, regular rhythm, normal heart sounds and intact distal pulses  Exam reveals no gallop and no friction rub  No murmur heard  Pulmonary/Chest: No respiratory distress  She has no wheezes  She has no rales  Musculoskeletal: She exhibits no edema  Neurological: She is alert and oriented to person, place, and time  Skin: Skin is warm and dry  Psychiatric: She has a normal mood and affect  Her behavior is normal        Lab Results:   CBC with diff:   Results from last 7 days   Lab Units 03/18/19  0509   WBC Thousand/uL 9 90   RBC Million/uL 4 01   HEMOGLOBIN g/dL 10 5*   HEMATOCRIT % 31 8*   MCV fL 79*   MCH pg 26 2   MCHC g/dL 33 0   RDW % 15 2   MPV fL 8 1*   PLATELETS Thousands/uL 329     BNP:   Results from last 7 days   Lab Units 03/19/19  0505   POTASSIUM mmol/L 3 3*   CHLORIDE mmol/L 100   CO2 mmol/L 32*   BUN mg/dL 14   CREATININE mg/dL 0 53*   CALCIUM mg/dL 8 5   EGFR ml/min/1 73sq m 126       Imaging: I have personally reviewed pertinent reports

## 2019-03-19 NOTE — ASSESSMENT & PLAN NOTE
· New onset, improved but was elevated today continue Coreg 3 125 mg p o  B i d   And increase lisinopril to 20 mg p o  B i d

## 2019-03-19 NOTE — PLAN OF CARE
Problem: Potential for Falls  Goal: Patient will remain free of falls  Description  INTERVENTIONS:  - Assess patient frequently for physical needs  -  Identify cognitive and physical deficits and behaviors that affect risk of falls    -  [place] fall precautions as indicated by assessment   - Educate patient/family on patient safety including physical limitations  - Instruct patient to call for assistance with activity based on assessment  - Modify environment to reduce risk of injury  - Consider OT/PT consult to assist with strengthening/mobility  Outcome: Progressing     Problem: PAIN - ADULT  Goal: Verbalizes/displays adequate comfort level or baseline comfort level  Description  Interventions:  - Encourage patient to monitor pain and request assistance  - Assess pain using appropriate pain scale  - Administer analgesics based on type and severity of pain and evaluate response  - Implement non-pharmacological measures as appropriate and evaluate response  - Consider cultural and social influences on pain and pain management  - Notify physician/advanced practitioner if interventions unsuccessful or patient reports new pain  Outcome: Progressing     Problem: INFECTION - ADULT  Goal: Absence or prevention of progression during hospitalization  Description  INTERVENTIONS:  - Assess and monitor for signs and symptoms of infection  - Monitor lab/diagnostic results  - Monitor all insertion sites, i e  indwelling lines, tubes, and drains  - Monitor endotracheal (as able) and nasal secretions for changes in amount and color  - [place] appropriate cooling/warming therapies per order  - Administer medications as ordered  - Instruct and encourage patient and family to use good hand hygiene technique  - Identify and instruct in appropriate isolation precautions for identified infection/condition  Outcome: Progressing  Goal: Absence of fever/infection during neutropenic period  Description  INTERVENTIONS:  - Monitor WBC  - Implement neutropenic guidelines  Outcome: Progressing     Problem: SAFETY ADULT  Goal: Patient will remain free of falls  Description  INTERVENTIONS:  - Assess patient frequently for physical needs  -  Identify cognitive and physical deficits and behaviors that affect risk of falls    -  [place] fall precautions as indicated by assessment   - Educate patient/family on patient safety including physical limitations  - Instruct patient to call for assistance with activity based on assessment  - Modify environment to reduce risk of injury  - Consider OT/PT consult to assist with strengthening/mobility  Outcome: Progressing  Goal: Maintain or return to baseline ADL function  Description  INTERVENTIONS:  -  Assess patient's ability to carry out ADLs; assess patient's baseline for ADL function and identify physical deficits which impact ability to perform ADLs (bathing, care of mouth/teeth, toileting, grooming, dressing, etc )  - Assess/evaluate cause of self-care deficits   - Assess range of motion  - Assess patient's mobility; develop plan if impaired  - Assess patient's need for assistive devices and provide as appropriate  - Encourage maximum independence but intervene and supervise when necessary  ¯ Involve family in performance of ADLs  ¯ Assess for home care needs following discharge   ¯ Request OT consult to assist with ADL evaluation and planning for discharge  ¯ Provide patient education as appropriate  Outcome: Progressing  Goal: Maintain or return mobility status to optimal level  Description  INTERVENTIONS:  - Assess patient's baseline mobility status (ambulation, transfers, stairs, etc )    - Identify cognitive and physical deficits and behaviors that affect mobility  - Identify mobility aids required to assist with transfers and/or ambulation (gait belt, sit-to-stand, lift, walker, cane, etc )  - [place] fall precautions as indicated by assessment  - Record patient progress and toleration of activity level on Mobility SBAR; progress patient to next Phase/Stage  - Instruct patient to call for assistance with activity based on assessment  - Request Rehabilitation consult to assist with strengthening/weightbearing, etc   Outcome: Progressing     Problem: DISCHARGE PLANNING  Goal: Discharge to home or other facility with appropriate resources  Description  INTERVENTIONS:  - Identify barriers to discharge w/patient and caregiver  - Arrange for needed discharge resources and transportation as appropriate  - Identify discharge learning needs (meds, wound care, etc )  - Arrange for interpretive services to assist at discharge as needed  - Refer to Case Management Department for coordinating discharge planning if the patient needs post-hospital services based on physician/advanced practitioner order or complex needs related to functional status, cognitive ability, or social support system  Outcome: Progressing     Problem: Knowledge Deficit  Goal: Patient/family/caregiver demonstrates understanding of disease process, treatment plan, medications, and discharge instructions  Description  Complete learning assessment and assess knowledge base  Interventions:  - Provide teaching at level of understanding  - Provide teaching via preferred learning methods  Outcome: Progressing     Problem: Nutrition/Hydration-ADULT  Goal: Nutrient/Hydration intake appropriate for improving, restoring or maintaining nutritional needs  Description  Monitor and assess patient's nutrition/hydration status for malnutrition (ex- brittle hair, bruises, dry skin, pale skin and conjunctiva, muscle wasting, smooth red tongue, and disorientation)  Collaborate with interdisciplinary team and initiate plan and interventions as ordered  Monitor patient's weight and dietary intake as ordered or per policy  Utilize nutrition screening tool and intervene per policy   Determine patient's food preferences and provide high-protein, high-caloric foods as appropriate       INTERVENTIONS:  - Monitor oral intake, urinary output, labs, and treatment plans  - Assess nutrition and hydration status and recommend course of action  - Evaluate amount of meals eaten  - Assist patient with eating if necessary   - Allow adequate time for meals  - Recommend/ encourage appropriate diets, oral nutritional supplements, and vitamin/mineral supplements  - Order, calculate, and assess calorie counts as needed  - Recommend, monitor, and adjust tube feedings and TPN/PPN based on assessed needs  - Assess need for intravenous fluids  - Provide specific nutrition/hydration education as appropriate  - Include patient/family/caregiver in decisions related to nutrition  Outcome: Progressing     Problem: DISCHARGE PLANNING - CARE MANAGEMENT  Goal: Discharge to post-acute care or home with appropriate resources  Description  INTERVENTIONS:  - Conduct assessment to determine patient/family and health care team treatment goals, and need for post-acute services based on payer coverage, community resources, and patient preferences, and barriers to discharge  - Address psychosocial, clinical, and financial barriers to discharge as identified in assessment in conjunction with the patient/family and health care team  - Arrange appropriate level of post-acute services according to patient?s   needs and preference and payer coverage in collaboration with the physician and health care team  - Communicate with and update the patient/family, physician, and health care team regarding progress on the discharge plan  - Arrange appropriate transportation to post-acute venues  Outcome: Progressing

## 2019-03-19 NOTE — ASSESSMENT & PLAN NOTE
· Counseled-hemoglobin A1c 5 8 disc had a long discussion regarding weight loss she is going to see nutrition today  Will not treat at this time

## 2019-03-19 NOTE — DISCHARGE INSTRUCTIONS
Carvedilol (By mouth)   Carvedilol (xgr-UV-auf-ol)  Treats high blood pressure and heart failure  Also reduces the risk of death after a heart attack  This medicine is a beta-blocker  Brand Name(s): Coreg, Tramaine HSU   There may be other brand names for this medicine  When This Medicine Should Not Be Used: This medicine is not right for everyone  Do not use it if you had an allergic reaction to carvedilol, or if you have asthma, severe liver disease, or certain heart problems  Ask your doctor about these heart problems  How to Use This Medicine:   Long Acting Capsule, Tablet  · Take your medicine as directed  Your dose may need to be changed several times to find what works best for you  · It is best to take this medicine with food or milk  · Extended-release capsule instructions:   ¨ Take the capsule in the morning with food  ¨ Swallow the capsule whole  Do not crush or chew it  ¨ If you cannot swallow the capsule, you may open it and sprinkle the medicine over a spoonful of applesauce  Swallow the applesauce right away  · Read and follow the patient instructions that come with this medicine  Talk to your doctor or pharmacist if you have any questions  · Store the medicine in a closed container at room temperature, away from heat, moisture, and direct light  · Missed dose: Take a dose as soon as you remember  If it is almost time for your next dose, wait until then and take a regular dose  Do not take extra medicine to make up for a missed dose  Drugs and Foods to Avoid:   Ask your doctor or pharmacist before using any other medicine, including over-the-counter medicines, vitamins, and herbal products  · Some medicines can affect how carvedilol works  Tell your doctor if you are using amiodarone, clonidine, diltiazem, cyclosporine, digoxin, fluconazole, reserpine, rifampin, verapamil, or an MAO inhibitor (MAOI)    Warnings While Using This Medicine:   · Tell your doctor if you are pregnant or breastfeeding, or if you have kidney disease, liver disease, bradycardia (slow heartbeat), coronary artery disease, circulation problems, edema (fluid retention or swelling), heart or blood vessel problems, low blood pressure, lung problems (such as bronchitis or emphysema), an overactive thyroid, pheochromocytoma, or frequent chest pains  Tell your doctor if you have a history of severe allergic reactions or if you are scheduled to have surgery  · This medicine may cause the following problems:   ¨ Changes to your blood sugar level (if you have diabetes, report any blood sugar level changes to your doctor)  ¨ Fewer tears than usual in contact lens wearers  ¨ An eye problem called Intraoperative Floppy Iris Syndrome during cataract surgery  · This medicine may make you dizzy or drowsy  Do not drive, use machines, or do anything else that could be dangerous if you are not alert  · Do not stop using this medicine suddenly  Your doctor will need to slowly decrease your dose before you stop it completely  · Keep all medicine out of the reach of children  Never share your medicine with anyone    Possible Side Effects While Using This Medicine:   Call your doctor right away if you notice any of these side effects:  · Allergic reaction: Itching or hives, swelling in your face or hands, swelling or tingling in your mouth or throat, chest tightness, trouble breathing  · Change in how much or how often you urinate  · Chest pain that may spread to your arms, jaw, back, or neck, trouble breathing, nausea, unusual sweating, faintness  · Leg pain when you walk, legs and feet that feel cold or numb  · Lightheadedness, dizziness, or fainting  · Rapid weight gain, swelling in your hands, ankles, or feet  · Shaking, trembling, sweating, hunger, confusion  · Slow, fast, or uneven heartbeat  · Unusual bleeding or bruising  · Wheezing or trouble breathing  If you notice these less serious side effects, talk with your doctor:   · Diarrhea  · Trouble having sex  · Unusual tiredness or weakness  If you notice other side effects that you think are caused by this medicine, tell your doctor  Call your doctor for medical advice about side effects  You may report side effects to FDA at 4-793-FDA-9830  © 2017 [address] Information is for End User's use only and may not be sold, redistributed or otherwise used for commercial purposes  The above information is an  only  It is not intended as medical advice for individual conditions or treatments  Talk to your doctor, nurse or pharmacist before following any medical regimen to see if it is safe and effective for you  Fluticasone (Into the nose)   Fluticasone (lvgg-ANT-k-sone)  Treats allergy symptoms, such as runny or stuffy nose  This medicine is a corticosteroid  Brand Name(s): Children's Flonase, DermacinRx Leeo, DermacinRx [place], [facility], Flonase Sensimist, Fluticasone Propionate Novaplus, Ticaspray, Veramyst   There may be other brand names for this medicine  When This Medicine Should Not Be Used: This medicine is not right for everyone  Do not use if you had an allergic reaction to fluticasone  How to Use This Medicine:   Spray  · Your doctor will tell you how much medicine to use  Do not use more than directed  · This medicine is for use only in the nose  Do not get any of it in your eyes or on your skin  If it does get on these areas, rinse it off right away  · Prime the spray: Release 6 test sprays into the air away from the face, or pump the bottle until some of the medicine sprays out  Now it is ready to use  Prime the spray if it has not been used for more than 7 days (or 30 days for Veramyst®) or if the cap has been left off the bottle for 5 days or longer  · Shake the medicine well just before each use  · Before using the medicine, gently blow your nose to clear the nostrils    · After using the nasal spray, wipe the tip of the bottle with a clean tissue and put the cap back on  · You may need to use this medicine for a few days before you start to feel better  · Read and follow the patient instructions that come with this medicine  Talk to your doctor or pharmacist if you have any questions  · Follow the instructions on the medicine label if you are using this medicine without a prescription  · Missed dose: Take a dose as soon as you remember  If it is almost time for your next dose, wait until then and take a regular dose  Do not take extra medicine to make up for a missed dose  · Keep the bottle tightly closed when not using it  Store at room temperature, away from heat and direct light  Do not freeze or refrigerate  Throw this medicine away after you use 120 sprays  Drugs and Foods to Avoid:   Ask your doctor or pharmacist before using any other medicine, including over-the-counter medicines, vitamins, and herbal products  · Do not use this medicine together with ritonavir  · Some foods and medicines can affect how fluticasone works  Tell your doctor if you are using ketoconazole  Warnings While Using This Medicine:   · Tell your doctor if you are pregnant or breastfeeding, or if you have liver disease, asthma, an infection, or a history of cataracts or glaucoma  Make sure your doctor knows if you have had nose surgery, a nose injury, or a recent infection in your nose  · This medicine may cause the following problems:  ¨ Holes or ulcers inside the nose  ¨ Slow wound healing  ¨ Cataracts or glaucoma  ¨ Problems with the adrenal glands  ¨ Slow growth in children  · Avoid people who are sick or have infections  Tell your doctor right away if you think you have been exposed to measles or chickenpox  · Your doctor will check your progress and the effects of this medicine at regular visits  Keep all appointments  · Call your doctor if your symptoms do not improve or if they get worse    · Keep all medicine out of the reach of children  Never share your medicine with anyone  Possible Side Effects While Using This Medicine:   Call your doctor right away if you notice any of these side effects:  · Allergic reaction: Itching or hives, swelling in your face or hands, swelling or tingling in your mouth or throat, chest tightness, trouble breathing  · Burning, redness, swelling, or irritation around or inside your nose  · Eye pain or vision changes  · Fever, chills, cough, sore throat, and body aches  · Heavy nosebleeds  · Sores or white patches inside the nose or mouth  · Tiredness, weakness, dizziness  If you notice other side effects that you think are caused by this medicine, tell your doctor  Call your doctor for medical advice about side effects  You may report side effects to FDA at 6-093-FDA-1897  © 2017 [address] Information is for End User's use only and may not be sold, redistributed or otherwise used for commercial purposes  The above information is an  only  It is not intended as medical advice for individual conditions or treatments  Talk to your doctor, nurse or pharmacist before following any medical regimen to see if it is safe and effective for you  Furosemide (By mouth)   Furosemide (eajs-SE-ue-mide)  Treats fluid retention (edema) and high blood pressure  This medicine is a diuretic (water pill)  Brand Name(s): Active-Medicated Specimen Collection Kit, Diuscreen Multi-Drug Medicated Test Kit, Lasix, RX-Specimen Collection Kit, Specimen Collection Kit   There may be other brand names for this medicine  When This Medicine Should Not Be Used: This medicine is not right for everyone  Do not use it if you had an allergic reaction to furosemide  How to Use This Medicine:   Liquid, Tablet  · Take your medicine as directed  Your dose may need to be changed several times to find what works best for you    · You may take this medicine with food if it upsets your stomach  · Oral liquid: Measure the oral liquid medicine with a marked measuring spoon, oral syringe, or medicine cup  · Tablet: Swallow the tablet whole  Do not crush, break, or chew it  · Missed dose: Take a dose as soon as you remember  If it is almost time for your next dose, wait until then and take a regular dose  Do not take extra medicine to make up for a missed dose  · Store the medicine in a closed container at room temperature, away from heat, moisture, and direct light  Drugs and Foods to Avoid:   Ask your doctor or pharmacist before using any other medicine, including over-the-counter medicines, vitamins, and herbal products  · Some medicines can affect how furosemide works  Tell your doctor if you are also using any of the following:  ¨ Cisplatin, cyclosporine, digoxin, ethacrynic acid, licorice, lithium, methotrexate, or phenytoin  ¨ Adrenocorticotropic hormone (ACTH)  ¨ Laxative  ¨ Medicine to treat an infection  ¨ NSAID pain or arthritis medicine (including aspirin, diclofenac, ibuprofen, indomethacin, naproxen)  ¨ Other blood pressure medicines  ¨ Steroid medicine (including dexamethasone, hydrocortisone, methylprednisolone, prednisolone, prednisone)  ¨ Thyroid medicine  · If you also take sucralfate, allow at least 2 hours between the time you take furosemide and the time you take sucralfate  · Alcohol, narcotic pain medicine, or sleeping pills may cause you to feel more lightheaded, dizzy, or faint when used with this medicine  Warnings While Using This Medicine:   · Tell your doctor if you are pregnant or breastfeeding, or if you have kidney disease, liver disease (including cirrhosis), diabetes, gout, low blood pressure, lupus, an enlarged prostate, trouble urinating, or an allergy to sulfa drugs  Tell your doctor if you are on a low-salt diet    · This medicine may cause the following problems:   ¨ Low levels of minerals in your blood, such as potassium and sodium  ¨ Blood sugar level changes  ¨ Hearing problems  · Make sure any doctor or dentist who treats you knows that you are using this medicine  · This medicine could lower your blood pressure too much, especially when you first use it or if you are dehydrated  Stand or sit up slowly if you feel lightheaded or dizzy  · This medicine may make your skin more sensitive to sunlight  Wear sunscreen  Do not use sunlamps or tanning beds  · Your doctor will do lab tests at regular visits to check on the effects of this medicine  Keep all appointments  · Keep all medicine out of the reach of children  Never share your medicine with anyone  Possible Side Effects While Using This Medicine:   Call your doctor right away if you notice any of these side effects:  · Allergic reaction: Itching or hives, swelling in your face or hands, swelling or tingling in your mouth or throat, chest tightness, trouble breathing  · Blistering, peeling, red skin rash  · Confusion, weakness, muscle twitching  · Dry mouth, increased thirst, muscle cramps, uneven heartbeat  · Sudden and severe stomach pain, nausea, vomiting, fever, lightheadedness  · Hearing loss, ringing in the ears  · Lightheadedness, dizziness, fainting  · Severe diarrhea  · Unusual bleeding or bruising  · Yellow skin or eyes  If you notice these less serious side effects, talk with your doctor:   · Loss of appetite, stomach cramps  If you notice other side effects that you think are caused by this medicine, tell your doctor  Call your doctor for medical advice about side effects  You may report side effects to FDA at 3-037-FDA-0341  © 2017 [address] Information is for End User's use only and may not be sold, redistributed or otherwise used for commercial purposes  The above information is an  only  It is not intended as medical advice for individual conditions or treatments   Talk to your doctor, nurse or pharmacist before following any medical regimen to see if it is safe and effective for you  Ibuprofen (By mouth)   Ibuprofen (eye-bue-PROE-fen)  Treats pain and fever  This medicine is an NSAID  Brand Name(s): Advil, Advil Children's, Advil Liqui-Gels, Advil Migraine, All-Purpose First Aid Kit, Children's Ibuprofen, Children's Motrin, Comfort Pac, Concentrated Motrin Infants' Drops, Equate Ibuprofen Joao Strength, Genpril, [facility]ighbor Cap-Profen, Good Neighbor Ibuprofen Infants', [facility] Children's Ibuprofen, [facility] Ibuprofen   There may be other brand names for this medicine  When This Medicine Should Not Be Used: This medicine is not right for everyone  Do not use if you had an allergic reaction (including asthma) to ibuprofen, aspirin, or another NSAID, or right before or after heart surgery  How to Use This Medicine:   Capsule, Liquid Filled Capsule, Suspension, Tablet, Chewable Tablet  · Your doctor will tell you how much medicine to use  Do not use more than directed  · Prescription ibuprofen should come with a Medication Guide  Ask your pharmacist for the Medication Guide if you do not have one  · Follow the instructions on the medicine label if you are using this medicine without a prescription  · Take this medicine with food or milk if it upsets your stomach  · Oral liquid: Shake well just before using  Measure with a marked measuring spoon, oral syringe, or medicine cup  · Chewable tablet: Chew completely before you swallow it  Then drink some water to make sure you swallow all of the medicine  · For Children: Ask your pharmacist if you are not sure how much medicine to give a child  The dose is usually based on weight, not age  Never give more medicine than directed  · For Adults: Do not take more than 6 pills in 1 day (24 hours) unless your doctor tells you to  · Missed dose: If you take this medicine on a regular basis and miss a dose, take it as soon as you can   If it is almost time for your next dose, wait until then to use the medicine and skip the missed dose  Do not use extra medicine to make up for a missed dose  · Store the medicine in a closed container at room temperature, away from heat, moisture, and direct light  Do not freeze the oral liquid  Drugs and Foods to Avoid:   Ask your doctor or pharmacist before using any other medicine, including over-the-counter medicines, vitamins, and herbal products  · Some foods and medicine can affect how ibuprofen works  Tell your doctor if you are also using lithium, methotrexate, a blood thinner (such as warfarin), a steroid medicine (such as hydrocortisone, prednisolone, prednisone), a diuretic (water pill), or an ACE inhibitor blood pressure medicine  · Do not use any other NSAID medicine unless your doctor says it is okay  Some other NSAIDs are aspirin, diclofenac, naproxen, or celecoxib  · Do not drink alcohol while you are using this medicine  Warnings While Using This Medicine:   · Tell your doctor if you are pregnant or breastfeeding  Do not use this medicine during the later part of pregnancy  · Tell your doctor if you have kidney disease, liver disease, asthma, lupus or a similar connective tissue disease, or a history of ulcers or other digestion problems  Tell your doctor if you smoke or have heart or blood circulation problems, including high blood pressure, heart failure (CHF), or bleeding problems  · This medicine may cause the following problems:  ¨ Bleeding and ulcers in the stomach or intestines  ¨ Higher risk of heart attack or stroke  ¨ Liver damage  ¨ Kidney damage  ¨ Vision problems  · Call your doctor if symptoms get worse, pain lasts more than 10 days, or fever lasts more than 3 days  · This medicine might contain sugar or phenylalanine (aspartame)  · Tell any doctor or dentist who treats you that you are using this medicine  · Keep all medicine out of the reach of children  Never share your medicine with anyone    Possible Side Effects While Using This Medicine:   Call your doctor right away if you notice any of these side effects:  · Allergic reaction: Itching or hives, swelling in your face or hands, swelling or tingling in your mouth or throat, chest tightness, trouble breathing  · Blistering, peeling, or red skin rash  · Change in how much or how often you urinate  · Chest pain that may spread to your arms, jaw, back, or neck, trouble breathing, nausea, unusual sweating, faintness  · Chest pain, trouble breathing, weakness on one side of your body, severe headache, trouble seeing or talking, pain in your lower leg  · Dark urine or pale stools, nausea, vomiting, loss of appetite, stomach pain, yellow skin or eyes  · Fever, neck pain, stiff neck  · Severe stomach pain, vomiting blood, bloody or black, tarry stools  · Swelling in your hands, ankles, or feet, rapid weight gain  · Trouble seeing, blind spots, change in how you see colors  · Unusual bleeding, bruising, or weakness  If you notice these less serious side effects, talk with your doctor:   · Constipation, diarrhea, gas, mild upset stomach  · Dizziness, headache, ringing in the ears  If you notice other side effects that you think are caused by this medicine, tell your doctor  Call your doctor for medical advice about side effects  You may report side effects to FDA at 8-045-FDA-5113  © 2017 [address] Information is for End User's use only and may not be sold, redistributed or otherwise used for commercial purposes  The above information is an  only  It is not intended as medical advice for individual conditions or treatments  Talk to your doctor, nurse or pharmacist before following any medical regimen to see if it is safe and effective for you  Lisinopril (By mouth)   Lisinopril (lye-SIN-oh-pril)  Treats high blood pressure and heart failure  Also given to reduce the risk of death after a heart attack  This medicine is an ACE inhibitor     Brand Name(s): Prinivil, Qbrelis, Zestril   There may be other brand names for this medicine  When This Medicine Should Not Be Used: This medicine is not right for everyone  Do not use it if you had an allergic reaction to lisinopril or another ACE inhibitor, or if you are pregnant  How to Use This Medicine:   Liquid, Tablet  · Take your medicine as directed  Your dose may need to be changed several times to find what works best for you  · Oral liquid: Measure the oral liquid medicine with a marked measuring spoon, oral syringe, or medicine cup  · Missed dose: Take a dose as soon as you remember  If it is almost time for your next dose, wait until then and take a regular dose  Do not take extra medicine to make up for a missed dose  · Store the medicine in a closed container at room temperature, away from heat, moisture, and direct light  Drugs and Foods to Avoid:   Ask your doctor or pharmacist before using any other medicine, including over-the-counter medicines, vitamins, and herbal products  · Do not use this medicine together with aliskiren if you have diabetes  · Some foods and medicines may affect how lisinopril works  Tell your doctor if you are using any of the following:   ¨ Aliskiren, everolimus, lithium, sirolimus, temsirolimus  ¨ Another blood pressure medicine, including an angiotensin receptor blocker (ARB)  ¨ Diuretic (water pill, including amiloride, spironolactone, triamterene)  ¨ Insulin or diabetes medicine  ¨ NSAID pain or arthritis medicine (including aspirin, celecoxib, diclofenac, ibuprofen, naproxen)  · Ask your doctor before you use any medicine, supplement, or salt substitute that contains potassium  Warnings While Using This Medicine:   · It is not safe to take this medicine during pregnancy  It could harm an unborn baby  Tell your doctor right away if you become pregnant    · Tell your doctor if you are breastfeeding, or if you have kidney disease, liver disease, diabetes, or heart or blood vessel disease  · This medicine may cause the following problems:  ¨ Angioedema (severe swelling)  ¨ Kidney problems  ¨ Serious liver problems  · This medicine could lower your blood pressure too much, especially when you first use it or if you are dehydrated  Stand or sit up slowly if you feel lightheaded or dizzy  · Do not stop using this medicine without asking your doctor, even if you feel well  This medicine will not cure your high blood pressure, but it will help keep it in a normal range  You may have to take blood pressure medicine for the rest of your life  · Tell any doctor or dentist who treats you that you are using this medicine  · Your doctor will do lab tests at regular visits to check on the effects of this medicine  Keep all appointments  · Keep all medicine out of the reach of children  Never share your medicine with anyone  Possible Side Effects While Using This Medicine:   Call your doctor right away if you notice any of these side effects:  · Allergic reaction: Itching or hives, swelling in your face or hands, swelling or tingling in your mouth or throat, chest tightness, trouble breathing  · Blistering, peeling, or red skin rash  · Change in how much or how often you urinate  · Confusion, weakness, uneven heartbeat, trouble breathing, numbness or tingling in your hands, feet, or lips  · Dark urine or pale stools, nausea, vomiting, loss of appetite, stomach pain, yellow skin or eyes  · Fever, chills, sore throat, body aches  · Lightheadedness, dizziness, fainting  · Severe stomach pain (with or without nausea or vomiting)  If you notice these less serious side effects, talk with your doctor:   · Dry cough  If you notice other side effects that you think are caused by this medicine, tell your doctor  Call your doctor for medical advice about side effects   You may report side effects to FDA at 3-417-FDA-1574  © 2017 [address] Information is for End User's use only and may not be sold, redistributed or otherwise used for commercial purposes  The above information is an  only  It is not intended as medical advice for individual conditions or treatments  Talk to your doctor, nurse or pharmacist before following any medical regimen to see if it is safe and effective for you

## 2019-03-19 NOTE — NURSING NOTE
Monitor shows normal sinus rhythm  VSS  Pt  Denies SOB or chest pain  Lung sounds decreased in bases  +2 bilateral lower extremity edema persists  Pt  OOB, ambulating in hallway  Call bell within reach  Will continue to monitor

## 2019-03-19 NOTE — NURSING NOTE
Discharge Note  Patient leaves for home in stable condition, afebrile with all personal items after verbalizing understanding of discharge instructions  Is accompanied off unit by staff walking as requested

## 2019-03-19 NOTE — NURSING NOTE
Monitor shows normal sinus rhythm  VSS  Pt  Denies SOB or chest pain  No change in previous assessment  Will continue to monitor

## 2019-03-19 NOTE — NURSING NOTE
On initial rounds patient in no apparent distress  Skin warm and dry to touch  Pleasant and cooperative  Denies pain  Ambulates well by self  Patient has large legs was asked if that was normal she said yes so no edema  All safety maintained  Will continue to monitor

## 2019-03-19 NOTE — ASSESSMENT & PLAN NOTE
· Small found on CT    Will add MERRICK/RF/ESR CRP- in is pending the CRP is pending ESR is elevated see above explanation suspect myocarditis-MERRICK and RF pending   · 2D echo -a trivial pericardial effusion  · Cardiology consulted- discussed with dr Tierney Desai

## 2019-03-20 LAB — RYE IGE QN: NEGATIVE

## 2019-03-20 NOTE — UTILIZATION REVIEW
Notification of Discharge  This is a Notification of Discharge from our facility [address]  Please be advised that this patient has been discharge from our facility  Below you will find the admission and discharge date and time including the patients disposition  PRESENTATION DATE: 3/17/2019 10:41 AM  IP ADMISSION DATE: N/A N/A  DISCHARGE DATE: 3/19/2019  1:47 PM  DISPOSITION: [id number] Utilization Review Department  Phone: [phone]; Fax [phone]  [email or web address]  ATTENTION: Please call with any questions or concerns to [phone]  and carefully listen to the prompts so that you are directed to the right person  Send all requests for admission clinical reviews, approved or denied determinations and any other requests to fax [phone]   All voicemails are confidential

## 2019-03-21 ENCOUNTER — OFFICE VISIT (OUTPATIENT)
Dept: FAMILY MEDICINE CLINIC | Facility: CLINIC | Age: 34
End: 2019-03-21
Payer: COMMERCIAL

## 2019-03-21 VITALS
WEIGHT: 293 LBS | DIASTOLIC BLOOD PRESSURE: 70 MMHG | HEIGHT: 67 IN | HEART RATE: 72 BPM | SYSTOLIC BLOOD PRESSURE: 134 MMHG | RESPIRATION RATE: 16 BRPM | BODY MASS INDEX: 45.99 KG/M2

## 2019-03-21 DIAGNOSIS — I31.3 PERICARDIAL EFFUSION: ICD-10-CM

## 2019-03-21 DIAGNOSIS — R73.03 PRE-DIABETES: ICD-10-CM

## 2019-03-21 DIAGNOSIS — E66.01 MORBID OBESITY (HCC): ICD-10-CM

## 2019-03-21 DIAGNOSIS — I10 ESSENTIAL HYPERTENSION: Primary | ICD-10-CM

## 2019-03-21 PROBLEM — I50.41 ACUTE COMBINED SYSTOLIC AND DIASTOLIC CONGESTIVE HEART FAILURE (HCC): Status: ACTIVE | Noted: 2019-03-17

## 2019-03-21 PROBLEM — J01.90 SUBACUTE SINUSITIS: Status: RESOLVED | Noted: 2019-03-17 | Resolved: 2019-03-21

## 2019-03-21 PROBLEM — Z01.419 ENCOUNTER FOR ROUTINE GYNECOLOGICAL EXAMINATION WITH PAPANICOLAOU SMEAR OF CERVIX: Status: RESOLVED | Noted: 2018-12-19 | Resolved: 2019-03-21

## 2019-03-21 PROCEDURE — 3078F DIAST BP <80 MM HG: CPT | Performed by: FAMILY MEDICINE

## 2019-03-21 PROCEDURE — 1111F DSCHRG MED/CURRENT MED MERGE: CPT | Performed by: FAMILY MEDICINE

## 2019-03-21 PROCEDURE — 3008F BODY MASS INDEX DOCD: CPT | Performed by: FAMILY MEDICINE

## 2019-03-21 PROCEDURE — 3075F SYST BP GE 130 - 139MM HG: CPT | Performed by: FAMILY MEDICINE

## 2019-03-21 PROCEDURE — 99205 OFFICE O/P NEW HI 60 MIN: CPT | Performed by: FAMILY MEDICINE

## 2019-03-21 RX ORDER — NORGESTIMATE AND ETHINYL ESTRADIOL 7DAYSX3 LO
1 KIT ORAL DAILY
COMMUNITY
End: 2019-11-21 | Stop reason: SDUPTHER
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
## 2019-03-26 DIAGNOSIS — Z30.41 SURVEILLANCE FOR BIRTH CONTROL, ORAL CONTRACEPTIVES: ICD-10-CM

## 2019-03-27 RX ORDER — NORGESTIMATE AND ETHINYL ESTRADIOL
1 KIT DAILY
Qty: 84 TABLET | Refills: 2 | Status: SHIPPED | OUTPATIENT
Start: 2019-03-27 | End: 2019-04-05 | Stop reason: SDUPTHER

## 2019-04-03 ENCOUNTER — APPOINTMENT (OUTPATIENT)
Dept: LAB | Facility: HOSPITAL | Age: 34
End: 2019-04-03
Payer: COMMERCIAL

## 2019-04-03 ENCOUNTER — OFFICE VISIT (OUTPATIENT)
Dept: CARDIOLOGY CLINIC | Facility: CLINIC | Age: 34
End: 2019-04-03
Payer: COMMERCIAL

## 2019-04-03 VITALS
HEART RATE: 80 BPM | DIASTOLIC BLOOD PRESSURE: 72 MMHG | WEIGHT: 293 LBS | SYSTOLIC BLOOD PRESSURE: 104 MMHG | HEIGHT: 67 IN | BODY MASS INDEX: 45.99 KG/M2

## 2019-04-03 DIAGNOSIS — I10 ESSENTIAL HYPERTENSION: ICD-10-CM

## 2019-04-03 DIAGNOSIS — E66.01 MORBID OBESITY (HCC): ICD-10-CM

## 2019-04-03 DIAGNOSIS — Z72.0 TOBACCO ABUSE: ICD-10-CM

## 2019-04-03 DIAGNOSIS — I50.32 CHRONIC DIASTOLIC HEART FAILURE (HCC): Primary | ICD-10-CM

## 2019-04-03 DIAGNOSIS — I50.32 CHRONIC DIASTOLIC HEART FAILURE (HCC): ICD-10-CM

## 2019-04-03 LAB
ANION GAP SERPL CALCULATED.3IONS-SCNC: 9 MMOL/L (ref 4–13)
BUN SERPL-MCNC: 20 MG/DL (ref 5–25)
CALCIUM SERPL-MCNC: 8.9 MG/DL (ref 8.3–10.1)
CHLORIDE SERPL-SCNC: 103 MMOL/L (ref 100–108)
CO2 SERPL-SCNC: 28 MMOL/L (ref 21–32)
CREAT SERPL-MCNC: 0.71 MG/DL (ref 0.6–1.3)
CRP SERPL HS-MCNC: 42.74 MG/L
GFR SERPL CREATININE-BSD FRML MDRD: 113 ML/MIN/1.73SQ M
GLUCOSE SERPL-MCNC: 113 MG/DL (ref 65–140)
MAGNESIUM SERPL-MCNC: 2 MG/DL (ref 1.6–2.6)
POTASSIUM SERPL-SCNC: 4.2 MMOL/L (ref 3.5–5.3)
SODIUM SERPL-SCNC: 140 MMOL/L (ref 136–145)

## 2019-04-03 PROCEDURE — 86141 C-REACTIVE PROTEIN HS: CPT

## 2019-04-03 PROCEDURE — 36415 COLL VENOUS BLD VENIPUNCTURE: CPT | Performed by: NURSE PRACTITIONER

## 2019-04-03 PROCEDURE — 99215 OFFICE O/P EST HI 40 MIN: CPT | Performed by: NURSE PRACTITIONER

## 2019-04-03 PROCEDURE — 80048 BASIC METABOLIC PNL TOTAL CA: CPT | Performed by: NURSE PRACTITIONER

## 2019-04-03 PROCEDURE — 83735 ASSAY OF MAGNESIUM: CPT

## 2019-04-04 ENCOUNTER — TELEPHONE (OUTPATIENT)
Dept: PSYCHIATRY | Facility: CLINIC | Age: 34
End: 2019-04-04

## 2019-04-04 ENCOUNTER — TELEPHONE (OUTPATIENT)
Dept: CARDIOLOGY CLINIC | Facility: CLINIC | Age: 34
End: 2019-04-04

## 2019-04-04 DIAGNOSIS — I50.32 DIASTOLIC CHF, CHRONIC (HCC): Primary | ICD-10-CM

## 2019-04-05 ENCOUNTER — TELEPHONE (OUTPATIENT)
Dept: SLEEP CENTER | Facility: CLINIC | Age: 34
End: 2019-04-05

## 2019-04-05 ENCOUNTER — CONSULT (OUTPATIENT)
Dept: BARIATRICS | Facility: CLINIC | Age: 34
End: 2019-04-05
Payer: COMMERCIAL

## 2019-04-05 VITALS
SYSTOLIC BLOOD PRESSURE: 120 MMHG | DIASTOLIC BLOOD PRESSURE: 90 MMHG | TEMPERATURE: 97.9 F | WEIGHT: 293 LBS | RESPIRATION RATE: 18 BRPM | BODY MASS INDEX: 45.99 KG/M2 | HEIGHT: 67 IN | HEART RATE: 68 BPM

## 2019-04-05 DIAGNOSIS — R73.03 PRE-DIABETES: ICD-10-CM

## 2019-04-05 DIAGNOSIS — F32.89 OTHER DEPRESSION: ICD-10-CM

## 2019-04-05 DIAGNOSIS — E66.01 MORBID OBESITY (HCC): Primary | ICD-10-CM

## 2019-04-05 DIAGNOSIS — D64.9 ANEMIA, UNSPECIFIED TYPE: ICD-10-CM

## 2019-04-05 DIAGNOSIS — N93.9 ABNORMAL UTERINE BLEEDING: ICD-10-CM

## 2019-04-05 DIAGNOSIS — I50.41 ACUTE COMBINED SYSTOLIC AND DIASTOLIC CONGESTIVE HEART FAILURE (HCC): ICD-10-CM

## 2019-04-05 DIAGNOSIS — I10 ESSENTIAL HYPERTENSION: ICD-10-CM

## 2019-04-05 PROCEDURE — 99244 OFF/OP CNSLTJ NEW/EST MOD 40: CPT | Performed by: PHYSICIAN ASSISTANT

## 2019-04-10 ENCOUNTER — TELEPHONE (OUTPATIENT)
Dept: BARIATRICS | Facility: CLINIC | Age: 34
End: 2019-04-10

## 2019-04-15 DIAGNOSIS — I50.9 ACUTE CONGESTIVE HEART FAILURE, UNSPECIFIED HEART FAILURE TYPE (HCC): ICD-10-CM

## 2019-04-15 DIAGNOSIS — I10 HYPERTENSION, UNSPECIFIED TYPE: Primary | ICD-10-CM

## 2019-04-15 DIAGNOSIS — E66.01 MORBID OBESITY (HCC): ICD-10-CM

## 2019-04-15 RX ORDER — LISINOPRIL 20 MG/1
20 TABLET ORAL DAILY
Qty: 30 TABLET | Refills: 5 | Status: SHIPPED | OUTPATIENT
Start: 2019-04-15 | End: 2019-05-02 | Stop reason: SDUPTHER

## 2019-04-15 RX ORDER — CARVEDILOL 3.12 MG/1
3.12 TABLET ORAL 2 TIMES DAILY WITH MEALS
Qty: 60 TABLET | Refills: 5 | Status: SHIPPED | OUTPATIENT
Start: 2019-04-15 | End: 2019-05-02 | Stop reason: SDUPTHER

## 2019-04-15 RX ORDER — FUROSEMIDE 20 MG/1
20 TABLET ORAL DAILY
Qty: 30 TABLET | Refills: 5 | Status: SHIPPED | OUTPATIENT
Start: 2019-04-15 | End: 2019-04-19 | Stop reason: SDUPTHER

## 2019-04-19 DIAGNOSIS — I50.9 ACUTE CONGESTIVE HEART FAILURE, UNSPECIFIED HEART FAILURE TYPE (HCC): ICD-10-CM

## 2019-04-22 RX ORDER — FUROSEMIDE 20 MG/1
20 TABLET ORAL DAILY
Qty: 90 TABLET | Refills: 3 | Status: SHIPPED | OUTPATIENT
Start: 2019-04-22 | End: 2019-05-16 | Stop reason: SDUPTHER

## 2019-04-24 ENCOUNTER — OFFICE VISIT (OUTPATIENT)
Dept: BARIATRICS | Facility: CLINIC | Age: 34
End: 2019-04-24

## 2019-04-24 VITALS — BODY MASS INDEX: 45.99 KG/M2 | HEIGHT: 67 IN | WEIGHT: 293 LBS

## 2019-04-24 DIAGNOSIS — R63.5 ABNORMAL WEIGHT GAIN: ICD-10-CM

## 2019-04-24 PROCEDURE — RECHECK: Performed by: DIETITIAN, REGISTERED

## 2019-04-24 PROCEDURE — WMPFE WEIGHT MANAGEMENT PRO FEE EMPLOYEE: Performed by: DIETITIAN, REGISTERED

## 2019-04-29 ENCOUNTER — APPOINTMENT (OUTPATIENT)
Dept: LAB | Facility: HOSPITAL | Age: 34
End: 2019-04-29
Payer: COMMERCIAL

## 2019-04-29 DIAGNOSIS — N93.9 ABNORMAL UTERINE BLEEDING: ICD-10-CM

## 2019-04-29 DIAGNOSIS — E66.01 MORBID OBESITY (HCC): ICD-10-CM

## 2019-04-29 DIAGNOSIS — I10 ESSENTIAL HYPERTENSION: ICD-10-CM

## 2019-04-29 DIAGNOSIS — I50.32 DIASTOLIC CHF, CHRONIC (HCC): ICD-10-CM

## 2019-04-29 DIAGNOSIS — F32.89 OTHER DEPRESSION: ICD-10-CM

## 2019-04-29 DIAGNOSIS — I50.41 ACUTE COMBINED SYSTOLIC AND DIASTOLIC CONGESTIVE HEART FAILURE (HCC): ICD-10-CM

## 2019-04-29 DIAGNOSIS — R73.03 PRE-DIABETES: ICD-10-CM

## 2019-04-29 DIAGNOSIS — D64.9 ANEMIA, UNSPECIFIED TYPE: ICD-10-CM

## 2019-04-29 LAB
CHOLEST SERPL-MCNC: 181 MG/DL
FERRITIN SERPL-MCNC: 34 NG/ML (ref 8–388)
FOLATE SERPL-MCNC: 5.9 NG/ML (ref 3.1–17.5)
HCT VFR BLD AUTO: 39.8 % (ref 36–46)
HDLC SERPL-MCNC: 44 MG/DL (ref 40–59)
HGB BLD-MCNC: 12.7 G/DL (ref 12–16)
IRON SATN MFR SERPL: 35 %
IRON SERPL-MCNC: 96 UG/DL (ref 50–170)
LDLC SERPL CALC-MCNC: 111 MG/DL
NONHDLC SERPL-MCNC: 137 MG/DL
TIBC SERPL-MCNC: 277 UG/DL (ref 250–450)
TRIGL SERPL-MCNC: 128 MG/DL
VIT B12 SERPL-MCNC: 289 PG/ML (ref 100–900)

## 2019-04-29 PROCEDURE — 83540 ASSAY OF IRON: CPT

## 2019-04-29 PROCEDURE — 85014 HEMATOCRIT: CPT

## 2019-04-29 PROCEDURE — 82728 ASSAY OF FERRITIN: CPT

## 2019-04-29 PROCEDURE — 82746 ASSAY OF FOLIC ACID SERUM: CPT

## 2019-04-29 PROCEDURE — 82607 VITAMIN B-12: CPT

## 2019-04-29 PROCEDURE — 80061 LIPID PANEL: CPT

## 2019-04-29 PROCEDURE — 36415 COLL VENOUS BLD VENIPUNCTURE: CPT

## 2019-04-29 PROCEDURE — 85018 HEMOGLOBIN: CPT

## 2019-04-29 PROCEDURE — 83550 IRON BINDING TEST: CPT

## 2019-05-01 ENCOUNTER — HOSPITAL ENCOUNTER (OUTPATIENT)
Dept: SLEEP CENTER | Facility: CLINIC | Age: 34
Discharge: HOME | End: 2019-05-01
Payer: COMMERCIAL

## 2019-05-01 DIAGNOSIS — I50.32 CHRONIC DIASTOLIC HEART FAILURE (HCC): ICD-10-CM

## 2019-05-01 PROCEDURE — G0399 HOME SLEEP TEST/TYPE 3 PORTA: HCPCS

## 2019-05-02 ENCOUNTER — CLINICAL SUPPORT (OUTPATIENT)
Dept: BARIATRICS | Facility: CLINIC | Age: 34
End: 2019-05-02

## 2019-05-02 ENCOUNTER — OFFICE VISIT (OUTPATIENT)
Dept: CARDIOLOGY CLINIC | Facility: CLINIC | Age: 34
End: 2019-05-02
Payer: COMMERCIAL

## 2019-05-02 VITALS
WEIGHT: 293 LBS | DIASTOLIC BLOOD PRESSURE: 70 MMHG | HEIGHT: 67 IN | SYSTOLIC BLOOD PRESSURE: 106 MMHG | HEART RATE: 78 BPM | BODY MASS INDEX: 45.99 KG/M2 | OXYGEN SATURATION: 98 %

## 2019-05-02 VITALS — HEIGHT: 67 IN | BODY MASS INDEX: 45.99 KG/M2 | WEIGHT: 293 LBS

## 2019-05-02 DIAGNOSIS — I10 HTN (HYPERTENSION), BENIGN: ICD-10-CM

## 2019-05-02 DIAGNOSIS — F17.200 TOBACCO USE DISORDER: ICD-10-CM

## 2019-05-02 DIAGNOSIS — E66.01 MORBID OBESITY WITH BMI OF 50.0-59.9, ADULT (HCC): ICD-10-CM

## 2019-05-02 DIAGNOSIS — I50.30 (HFPEF) HEART FAILURE WITH PRESERVED EJECTION FRACTION (HCC): Primary | ICD-10-CM

## 2019-05-02 DIAGNOSIS — R63.5 ABNORMAL WEIGHT GAIN: Primary | ICD-10-CM

## 2019-05-02 PROCEDURE — RECHECK

## 2019-05-02 PROCEDURE — 99204 OFFICE O/P NEW MOD 45 MIN: CPT | Performed by: INTERNAL MEDICINE

## 2019-05-03 ENCOUNTER — TELEPHONE (OUTPATIENT)
Dept: SLEEP CENTER | Facility: CLINIC | Age: 34
End: 2019-05-03

## 2019-05-08 ENCOUNTER — OFFICE VISIT (OUTPATIENT)
Dept: BEHAVIORAL/MENTAL HEALTH CLINIC | Facility: CLINIC | Age: 34
End: 2019-05-08
Payer: COMMERCIAL

## 2019-05-08 DIAGNOSIS — F41.1 GENERALIZED ANXIETY DISORDER: ICD-10-CM

## 2019-05-08 DIAGNOSIS — F33.1 MDD (MAJOR DEPRESSIVE DISORDER), RECURRENT EPISODE, MODERATE (HCC): Primary | ICD-10-CM

## 2019-05-08 PROCEDURE — 90791 PSYCH DIAGNOSTIC EVALUATION: CPT | Performed by: SOCIAL WORKER

## 2019-05-09 ENCOUNTER — CLINICAL SUPPORT (OUTPATIENT)
Dept: BARIATRICS | Facility: CLINIC | Age: 34
End: 2019-05-09

## 2019-05-09 VITALS — BODY MASS INDEX: 45.99 KG/M2 | WEIGHT: 293 LBS | HEIGHT: 67 IN

## 2019-05-09 DIAGNOSIS — R63.5 ABNORMAL WEIGHT GAIN: Primary | ICD-10-CM

## 2019-05-09 PROCEDURE — RECHECK

## 2019-05-13 ENCOUNTER — OFFICE VISIT (OUTPATIENT)
Dept: BARIATRICS | Facility: CLINIC | Age: 34
End: 2019-05-13

## 2019-05-13 VITALS — WEIGHT: 293 LBS | BODY MASS INDEX: 45.99 KG/M2 | HEIGHT: 67 IN

## 2019-05-13 DIAGNOSIS — R63.5 ABNORMAL WEIGHT GAIN: Primary | ICD-10-CM

## 2019-05-13 PROCEDURE — RECHECK: Performed by: DIETITIAN, REGISTERED

## 2019-05-16 ENCOUNTER — CLINICAL SUPPORT (OUTPATIENT)
Dept: BARIATRICS | Facility: CLINIC | Age: 34
End: 2019-05-16

## 2019-05-16 VITALS — BODY MASS INDEX: 45.99 KG/M2 | WEIGHT: 293 LBS | HEIGHT: 67 IN

## 2019-05-16 DIAGNOSIS — R63.5 ABNORMAL WEIGHT GAIN: Primary | ICD-10-CM

## 2019-05-16 DIAGNOSIS — I50.9 ACUTE CONGESTIVE HEART FAILURE, UNSPECIFIED HEART FAILURE TYPE (HCC): ICD-10-CM

## 2019-05-16 PROCEDURE — RECHECK

## 2019-05-16 RX ORDER — FUROSEMIDE 20 MG/1
TABLET ORAL
Qty: 30 TABLET | Refills: 0 | Status: SHIPPED | OUTPATIENT
Start: 2019-05-16 | End: 2019-07-13 | Stop reason: ALTCHOICE

## 2019-05-23 ENCOUNTER — CLINICAL SUPPORT (OUTPATIENT)
Dept: BARIATRICS | Facility: CLINIC | Age: 34
End: 2019-05-23

## 2019-05-23 VITALS — BODY MASS INDEX: 45.99 KG/M2 | WEIGHT: 293 LBS | HEIGHT: 67 IN

## 2019-05-23 DIAGNOSIS — R63.5 ABNORMAL WEIGHT GAIN: Primary | ICD-10-CM

## 2019-05-23 PROCEDURE — RECHECK

## 2019-05-30 ENCOUNTER — CLINICAL SUPPORT (OUTPATIENT)
Dept: BARIATRICS | Facility: CLINIC | Age: 34
End: 2019-05-30

## 2019-05-30 VITALS — BODY MASS INDEX: 45.99 KG/M2 | WEIGHT: 293 LBS | HEIGHT: 67 IN

## 2019-05-30 DIAGNOSIS — R63.5 ABNORMAL WEIGHT GAIN: Primary | ICD-10-CM

## 2019-05-30 PROCEDURE — RECHECK

## 2019-06-03 ENCOUNTER — HOSPITAL ENCOUNTER (OUTPATIENT)
Dept: RADIOLOGY | Facility: HOSPITAL | Age: 34
Discharge: HOME | End: 2019-06-03
Attending: INTERNAL MEDICINE
Payer: COMMERCIAL

## 2019-06-03 DIAGNOSIS — I50.30 (HFPEF) HEART FAILURE WITH PRESERVED EJECTION FRACTION (HCC): ICD-10-CM

## 2019-06-03 DIAGNOSIS — I10 HTN (HYPERTENSION), BENIGN: ICD-10-CM

## 2019-06-03 PROCEDURE — A9585 GADOBUTROL INJECTION: HCPCS | Performed by: INTERNAL MEDICINE

## 2019-06-03 PROCEDURE — 75561 CARDIAC MRI FOR MORPH W/DYE: CPT

## 2019-06-03 RX ADMIN — GADOBUTROL 24 ML: 604.72 INJECTION INTRAVENOUS at 17:45

## 2019-06-06 ENCOUNTER — CLINICAL SUPPORT (OUTPATIENT)
Dept: BARIATRICS | Facility: CLINIC | Age: 34
End: 2019-06-06

## 2019-06-06 VITALS — WEIGHT: 293 LBS | HEIGHT: 67 IN | BODY MASS INDEX: 45.99 KG/M2

## 2019-06-06 DIAGNOSIS — R63.5 ABNORMAL WEIGHT GAIN: Primary | ICD-10-CM

## 2019-06-06 PROCEDURE — RECHECK

## 2019-06-07 ENCOUNTER — OFFICE VISIT (OUTPATIENT)
Dept: BARIATRICS | Facility: CLINIC | Age: 34
End: 2019-06-07
Payer: COMMERCIAL

## 2019-06-07 VITALS
RESPIRATION RATE: 18 BRPM | TEMPERATURE: 98 F | DIASTOLIC BLOOD PRESSURE: 72 MMHG | SYSTOLIC BLOOD PRESSURE: 120 MMHG | WEIGHT: 293 LBS | BODY MASS INDEX: 44.41 KG/M2 | HEIGHT: 68 IN | HEART RATE: 61 BPM

## 2019-06-07 DIAGNOSIS — E66.01 MORBID OBESITY (HCC): Primary | ICD-10-CM

## 2019-06-07 DIAGNOSIS — I10 ESSENTIAL HYPERTENSION: ICD-10-CM

## 2019-06-07 DIAGNOSIS — R73.03 PRE-DIABETES: ICD-10-CM

## 2019-06-07 PROCEDURE — 99214 OFFICE O/P EST MOD 30 MIN: CPT | Performed by: PHYSICIAN ASSISTANT

## 2019-06-10 ENCOUNTER — OFFICE VISIT (OUTPATIENT)
Dept: BEHAVIORAL/MENTAL HEALTH CLINIC | Facility: CLINIC | Age: 34
End: 2019-06-10
Payer: COMMERCIAL

## 2019-06-10 DIAGNOSIS — F41.1 GENERALIZED ANXIETY DISORDER: ICD-10-CM

## 2019-06-10 DIAGNOSIS — F32.89 OTHER DEPRESSION: ICD-10-CM

## 2019-06-10 PROCEDURE — 90834 PSYTX W PT 45 MINUTES: CPT | Performed by: SOCIAL WORKER

## 2019-06-12 DIAGNOSIS — R03.0 ELEVATED BP WITHOUT DIAGNOSIS OF HYPERTENSION: ICD-10-CM

## 2019-06-12 RX ORDER — LISINOPRIL 20 MG/1
20 TABLET ORAL 2 TIMES DAILY
Qty: 180 TABLET | Refills: 3 | OUTPATIENT
Start: 2019-06-12 | End: 2019-07-13 | Stop reason: SDUPTHER

## 2019-06-13 ENCOUNTER — TELEPHONE (OUTPATIENT)
Dept: CARDIOLOGY CLINIC | Facility: CLINIC | Age: 34
End: 2019-06-13

## 2019-06-13 ENCOUNTER — CLINICAL SUPPORT (OUTPATIENT)
Dept: BARIATRICS | Facility: CLINIC | Age: 34
End: 2019-06-13

## 2019-06-13 VITALS — HEIGHT: 68 IN | BODY MASS INDEX: 44.41 KG/M2 | WEIGHT: 293 LBS

## 2019-06-13 DIAGNOSIS — R63.5 ABNORMAL WEIGHT GAIN: Primary | ICD-10-CM

## 2019-06-13 PROCEDURE — RECHECK

## 2019-06-19 ENCOUNTER — OFFICE VISIT (OUTPATIENT)
Dept: BARIATRICS | Facility: CLINIC | Age: 34
End: 2019-06-19

## 2019-06-19 VITALS — WEIGHT: 293 LBS | BODY MASS INDEX: 45.99 KG/M2 | HEIGHT: 67 IN

## 2019-06-19 DIAGNOSIS — R63.5 ABNORMAL WEIGHT GAIN: Primary | ICD-10-CM

## 2019-06-19 PROCEDURE — RECHECK: Performed by: DIETITIAN, REGISTERED

## 2019-06-20 ENCOUNTER — APPOINTMENT (OUTPATIENT)
Dept: LAB | Facility: HOSPITAL | Age: 34
End: 2019-06-20
Payer: COMMERCIAL

## 2019-06-20 DIAGNOSIS — R73.03 PRE-DIABETES: ICD-10-CM

## 2019-06-20 DIAGNOSIS — I50.30 (HFPEF) HEART FAILURE WITH PRESERVED EJECTION FRACTION (HCC): ICD-10-CM

## 2019-06-20 LAB
ANION GAP SERPL CALCULATED.3IONS-SCNC: 4 MMOL/L (ref 5–14)
BUN SERPL-MCNC: 18 MG/DL (ref 5–25)
CALCIUM SERPL-MCNC: 8.8 MG/DL (ref 8.4–10.2)
CHLORIDE SERPL-SCNC: 104 MMOL/L (ref 97–108)
CO2 SERPL-SCNC: 29 MMOL/L (ref 22–30)
CREAT SERPL-MCNC: 0.56 MG/DL (ref 0.6–1.2)
EST. AVERAGE GLUCOSE BLD GHB EST-MCNC: 126 MG/DL
GFR SERPL CREATININE-BSD FRML MDRD: 124 ML/MIN/1.73SQ M
GLUCOSE P FAST SERPL-MCNC: 103 MG/DL (ref 70–99)
HBA1C MFR BLD: 6 % (ref 4.2–6.3)
NT-PROBNP SERPL-MCNC: 493 PG/ML (ref 0–299)
POTASSIUM SERPL-SCNC: 4.2 MMOL/L (ref 3.6–5)
SODIUM SERPL-SCNC: 137 MMOL/L (ref 137–147)

## 2019-06-20 PROCEDURE — 83880 ASSAY OF NATRIURETIC PEPTIDE: CPT

## 2019-06-20 PROCEDURE — 80048 BASIC METABOLIC PNL TOTAL CA: CPT | Performed by: INTERNAL MEDICINE

## 2019-06-20 PROCEDURE — 83036 HEMOGLOBIN GLYCOSYLATED A1C: CPT

## 2019-06-20 PROCEDURE — 36415 COLL VENOUS BLD VENIPUNCTURE: CPT | Performed by: INTERNAL MEDICINE

## 2019-06-22 ENCOUNTER — TELEPHONE (OUTPATIENT)
Dept: OTHER | Facility: OTHER | Age: 34
End: 2019-06-22

## 2019-06-22 NOTE — TELEPHONE ENCOUNTER
0920: Pt is calling stating she is at the office and the door is locked  She received an appointment notice via My Chart to reminder her of her appointment today at [phone]  She is requesting a call, I did explain to her the office is closed

## 2019-06-24 ENCOUNTER — SOCIAL WORK (OUTPATIENT)
Dept: BEHAVIORAL/MENTAL HEALTH CLINIC | Facility: CLINIC | Age: 34
End: 2019-06-24
Payer: COMMERCIAL

## 2019-06-24 DIAGNOSIS — F41.1 GENERALIZED ANXIETY DISORDER: ICD-10-CM

## 2019-06-24 DIAGNOSIS — F32.89 OTHER DEPRESSION: ICD-10-CM

## 2019-06-24 PROCEDURE — 90834 PSYTX W PT 45 MINUTES: CPT | Performed by: SOCIAL WORKER

## 2019-06-24 NOTE — TELEPHONE ENCOUNTER
Called and left  for patient to call the office expressing my apologies that patient didn't get the phone call to reschedule appointment for Saturday

## 2019-06-27 ENCOUNTER — CLINICAL SUPPORT (OUTPATIENT)
Dept: BARIATRICS | Facility: CLINIC | Age: 34
End: 2019-06-27

## 2019-06-27 VITALS — HEIGHT: 68 IN | WEIGHT: 293 LBS | BODY MASS INDEX: 44.41 KG/M2

## 2019-06-27 DIAGNOSIS — R63.5 ABNORMAL WEIGHT GAIN: Primary | ICD-10-CM

## 2019-06-27 PROCEDURE — RECHECK

## 2019-07-02 ENCOUNTER — CLINICAL SUPPORT (OUTPATIENT)
Dept: BARIATRICS | Facility: CLINIC | Age: 34
End: 2019-07-02

## 2019-07-02 ENCOUNTER — OFFICE VISIT (OUTPATIENT)
Dept: OBGYN CLINIC | Facility: MEDICAL CENTER | Age: 34
End: 2019-07-02
Payer: COMMERCIAL

## 2019-07-02 VITALS — BODY MASS INDEX: 47.53 KG/M2 | DIASTOLIC BLOOD PRESSURE: 74 MMHG | WEIGHT: 293 LBS | SYSTOLIC BLOOD PRESSURE: 118 MMHG

## 2019-07-02 VITALS — BODY MASS INDEX: 44.41 KG/M2 | WEIGHT: 293 LBS | HEIGHT: 68 IN

## 2019-07-02 DIAGNOSIS — N89.8 VAGINAL DISCHARGE: ICD-10-CM

## 2019-07-02 DIAGNOSIS — N93.0 POSTCOITAL BLEEDING: ICD-10-CM

## 2019-07-02 DIAGNOSIS — R63.5 ABNORMAL WEIGHT GAIN: Primary | ICD-10-CM

## 2019-07-02 DIAGNOSIS — Z11.3 SCREENING EXAMINATION FOR STD (SEXUALLY TRANSMITTED DISEASE): Primary | ICD-10-CM

## 2019-07-02 DIAGNOSIS — B97.7 HPV IN FEMALE: ICD-10-CM

## 2019-07-02 PROCEDURE — 87480 CANDIDA DNA DIR PROBE: CPT | Performed by: NURSE PRACTITIONER

## 2019-07-02 PROCEDURE — 99214 OFFICE O/P EST MOD 30 MIN: CPT | Performed by: NURSE PRACTITIONER

## 2019-07-02 PROCEDURE — RECHECK

## 2019-07-02 PROCEDURE — 87510 GARDNER VAG DNA DIR PROBE: CPT | Performed by: NURSE PRACTITIONER

## 2019-07-02 PROCEDURE — 87660 TRICHOMONAS VAGIN DIR PROBE: CPT | Performed by: NURSE PRACTITIONER

## 2019-07-02 RX ORDER — IMIQUIMOD 12.5 MG/.25G
1 CREAM TOPICAL 3 TIMES WEEKLY
Qty: 12 EACH | Refills: 2 | Status: SHIPPED | OUTPATIENT
Start: 2019-07-03 | End: 2019-10-12 | Stop reason: ALTCHOICE

## 2019-07-02 NOTE — PROGRESS NOTES
Assessment Diagnoses and all orders for this visit:    HPV in female    Vaginal discharge  -     VAGINOSIS DNA PROBE (AFFIRM)    Postcoital bleeding  -     Chlamydia/GC amplified DNA by PCR    Screening examination for STD (sexually transmitted disease)  -     Chlamydia/GC amplified DNA by PCR                Post coital bleeding and friable cx with insertion cleaning cx with q tip before g/c specimen collected  Plan: culture for g/c sent, affim culture of vaginal d/c sent  Will call with results of testing  Gave info on cryo as a possible option if pcb persists  Pt desires pregnancy in future and had leep in the              Past  Last pap 12/2018 was neg with neg hpv  Small genital wart near rectum  Rx for aldara with instructions for use d/w pt and rx sent to pharmacy  Recently dx'd with chf,  Has d/c'd smoking has lost 30# and going to wt management and wt watcher programs  Kelly Johnson is a 29 y o  female here for a problem visit  Patient is complaining of brb after sex for 3 times  Sx's started   2  weeks ago  Patient reports that sx's are not related to her menses  States more sexually active now since losing weight  Same partner  Patient Active Problem List   Diagnosis    Morbid obesity ([facility] [address] )    Acute combined systolic and diastolic congestive heart failure (HCC)    Pericardial effusion    Birth control    Essential hypertension    Depression    Pre-diabetes    Abnormal blood sugar    Generalized anxiety disorder    History of cervical dysplasia    Obstructive sleep apnea    Snoring       Gynecologic History  Patient's last menstrual period was 06/18/2019  The current method of family planning is OCP (estrogen/progesterone)      Past Medical History:   Diagnosis Date    Anxiety     Depression     Temporomandibular joint disorder     Resolved 7/7/2015      Past Surgical History:   Procedure Laterality Date    CERVICAL BIOPSY  W/ LOOP ELECTRODE EXCISION      DENTAL SURGERY      [place] teeth extraction    TONSILLECTOMY       Family History   Problem Relation Age of Onset    Diabetes Mother     Hypertension Father     Obesity Father     OCD Father     Colon cancer Maternal Grandmother     Diabetes Paternal Grandmother     Stroke Paternal Grandmother         TIA    Diabetes Paternal Grandfather     Heart disease Paternal Grandfather     Alcohol abuse Brother     Depression Maternal Grandfather     Anxiety disorder Maternal Grandfather     Hypothyroidism Paternal Aunt     Substance Abuse Neg Hx     Thyroid cancer Neg Hx      Social History     Socioeconomic History    Marital status: Single     Spouse name: Not on file    Number of children: Not on file    Years of education: Not on file    Highest education level: Not on file   Occupational History    Not on file   Social Needs    Financial resource strain: Not on file    Food insecurity:     Worry: Not on file     Inability: Not on file    Transportation needs:     Medical: Not on file     Non-medical: Not on file   Tobacco Use    Smoking status: Former Smoker     Last attempt to quit: 3/17/2019     Years since quittin 2    Smokeless tobacco: Never Used   Substance and Sexual Activity    Alcohol use: Yes     Comment: wine 2 glasses a week    Drug use: No    Sexual activity: Yes     Partners: Male     Birth control/protection: Pill   Lifestyle    Physical activity:     Days per week: Not on file     Minutes per session: Not on file    Stress: Not on file   Relationships    Social connections:     Talks on phone: Not on file     Gets together: Not on file     Attends Confucianism service: Not on file     Active member of club or organization: Not on file     Attends meetings of clubs or organizations: Not on file     Relationship status: Not on file    Intimate partner violence:     Fear of current or ex partner: Not on file     Emotionally abused: Not on file     Physically abused: Not on file     Forced sexual activity: Not on file   Other Topics Concern    Not on file   Social History Narrative    Current every day smoker - As per [employer]    Daily caffeinated coffee consumption      No Known Allergies    Current Outpatient Medications:     carvedilol (COREG) 3 125 mg tablet, Take 1 tablet (3 125 mg total) by mouth 2 (two) times a day with meals, Disp: 60 tablet, Rfl: 0    escitalopram (LEXAPRO) 20 mg tablet, Take 1 tablet by mouth, Disp: , Rfl:     furosemide (LASIX) 20 mg tablet, TAKE 1 TABLET BY MOUTH EVERY DAY, Disp: 30 tablet, Rfl: 0    lisinopril (ZESTRIL) 20 mg tablet, Take 1 tablet (20 mg total) by mouth 2 (two) times a day, Disp: 180 tablet, Rfl: 3    norgestimate-ethinyl estradiol (TRI-LO-IMANI) 0 18/0 215/0 25 MG-25 MCG per tablet, Take 1 tablet by mouth daily, Disp: , Rfl:     fluticasone (FLONASE) 50 mcg/act nasal spray, 1 spray into each nostril 2 (two) times a day (Patient not taking: Reported on 6/7/2019), Disp: 1 g, Rfl: 0    Review of Systems  Constitutional :no fever, feels well, no tiredness, no recent weight gain or loss  ENT: no ear ache, no loss of hearing, no nosebleeds or nasal discharge, no sore throat or hoarseness  Cardiovascular: no complaints of slow or fast heart beat, no chest pain, no palpitations, no leg claudication or lower extremity edema  Respiratory: no complaints of shortness of shortness of breath, no CEBALLOS  Breasts:no complaints of breast pain, breast lump, or nipple discharge  Gastrointestinal: no complaints of abdominal pain, constipation, nausea, vomiting, or diarrhea or bloody stools  Genitourinary : no complaints of dysuria, incontinence, pelvic pain, no dysmenorrhea, vaginal discharge or abnormal vaginal bleeding and as noted in HPI  Musculoskeletal: no complaints of arthralgia, no myalgia, no joint swelling or stiffness, no limb pain or swelling    Integumentary: no complaints of skin rash or lesion, itching or dry skin  Neurological: no complaints of headache, no confusion, no numbness or tingling, no dizziness or fainting     Objective     /74   Wt (!) 140 kg (308 lb)   LMP 06/18/2019   BMI 47 53 kg/m²     General:   appears stated age, cooperative, alert normal mood and affect   Vulva: normal female genitalia   Vagina: normal vagina   Urethra: normal   Cervix: Normal, no discharge  friable with swabbing  Uterus: normal size, contour, position, consistency, mobility, non-tender   Adnexa: no mass, fullness, tenderness   Lymphatic palpation of lymph nodes in neck, axilla, groin and/or other locations: no lymphadenopathy or masses noted   Skin normal skin turgor and no rashes     Psychiatric orientation to person, place, and time: normal  mood and affect: normal

## 2019-07-03 PROCEDURE — 87491 CHLMYD TRACH DNA AMP PROBE: CPT | Performed by: NURSE PRACTITIONER

## 2019-07-03 PROCEDURE — 87591 N.GONORRHOEAE DNA AMP PROB: CPT | Performed by: NURSE PRACTITIONER

## 2019-07-05 LAB
C TRACH DNA SPEC QL NAA+PROBE: NEGATIVE
CANDIDA RRNA VAG QL PROBE: NEGATIVE
G VAGINALIS RRNA GENITAL QL PROBE: POSITIVE
N GONORRHOEA DNA SPEC QL NAA+PROBE: NEGATIVE
T VAGINALIS RRNA GENITAL QL PROBE: NEGATIVE

## 2019-07-08 DIAGNOSIS — B96.89 BV (BACTERIAL VAGINOSIS): Primary | ICD-10-CM

## 2019-07-08 DIAGNOSIS — N76.0 BV (BACTERIAL VAGINOSIS): Primary | ICD-10-CM

## 2019-07-08 RX ORDER — METRONIDAZOLE 500 MG/1
500 TABLET ORAL EVERY 12 HOURS SCHEDULED
Qty: 14 TABLET | Refills: 0 | Status: SHIPPED | OUTPATIENT
Start: 2019-07-08 | End: 2019-07-15

## 2019-07-08 NOTE — TELEPHONE ENCOUNTER
Notified of BV on culture and need for treatment  RX  For flagyl pended    Advised no ETOH while taking medication

## 2019-07-11 ENCOUNTER — CLINICAL SUPPORT (OUTPATIENT)
Dept: BARIATRICS | Facility: CLINIC | Age: 34
End: 2019-07-11

## 2019-07-11 VITALS — HEIGHT: 68 IN | BODY MASS INDEX: 44.41 KG/M2 | WEIGHT: 293 LBS

## 2019-07-11 DIAGNOSIS — R63.5 ABNORMAL WEIGHT GAIN: Primary | ICD-10-CM

## 2019-07-11 PROCEDURE — RECHECK

## 2019-07-13 ENCOUNTER — OFFICE VISIT (OUTPATIENT)
Dept: FAMILY MEDICINE CLINIC | Facility: CLINIC | Age: 34
End: 2019-07-13
Payer: COMMERCIAL

## 2019-07-13 VITALS
RESPIRATION RATE: 16 BRPM | DIASTOLIC BLOOD PRESSURE: 70 MMHG | HEIGHT: 67 IN | BODY MASS INDEX: 45.99 KG/M2 | HEART RATE: 76 BPM | SYSTOLIC BLOOD PRESSURE: 110 MMHG | WEIGHT: 293 LBS

## 2019-07-13 DIAGNOSIS — I50.9 ACUTE CONGESTIVE HEART FAILURE, UNSPECIFIED HEART FAILURE TYPE (HCC): ICD-10-CM

## 2019-07-13 DIAGNOSIS — F41.1 GENERALIZED ANXIETY DISORDER: ICD-10-CM

## 2019-07-13 DIAGNOSIS — R03.0 ELEVATED BP WITHOUT DIAGNOSIS OF HYPERTENSION: ICD-10-CM

## 2019-07-13 DIAGNOSIS — R73.09 ABNORMAL BLOOD SUGAR: Primary | ICD-10-CM

## 2019-07-13 DIAGNOSIS — R73.03 PRE-DIABETES: ICD-10-CM

## 2019-07-13 DIAGNOSIS — I10 ESSENTIAL HYPERTENSION: ICD-10-CM

## 2019-07-13 DIAGNOSIS — G47.33 OBSTRUCTIVE SLEEP APNEA: ICD-10-CM

## 2019-07-13 DIAGNOSIS — M79.671 RIGHT FOOT PAIN: ICD-10-CM

## 2019-07-13 DIAGNOSIS — E66.01 MORBID OBESITY (HCC): ICD-10-CM

## 2019-07-13 PROBLEM — I31.39 PERICARDIAL EFFUSION: Status: RESOLVED | Noted: 2019-03-17 | Resolved: 2019-07-13

## 2019-07-13 PROBLEM — I50.41 ACUTE COMBINED SYSTOLIC AND DIASTOLIC CONGESTIVE HEART FAILURE (HCC): Status: RESOLVED | Noted: 2019-03-17 | Resolved: 2019-07-13

## 2019-07-13 PROBLEM — IMO0001 BIRTH CONTROL: Status: RESOLVED | Noted: 2019-03-17 | Resolved: 2019-07-13

## 2019-07-13 PROBLEM — I31.3 PERICARDIAL EFFUSION: Status: RESOLVED | Noted: 2019-03-17 | Resolved: 2019-07-13

## 2019-07-13 PROCEDURE — 99215 OFFICE O/P EST HI 40 MIN: CPT | Performed by: FAMILY MEDICINE

## 2019-07-13 RX ORDER — LISINOPRIL 20 MG/1
20 TABLET ORAL
Qty: 180 TABLET | Refills: 3
Start: 2019-07-13 | End: 2019-11-20 | Stop reason: SDUPTHER

## 2019-07-13 RX ORDER — FUROSEMIDE 20 MG/1
20 TABLET ORAL DAILY
Qty: 30 TABLET | Refills: 0
Start: 2019-07-13 | End: 2019-08-26 | Stop reason: SDUPTHER

## 2019-07-13 NOTE — PROGRESS NOTES
ASSESSMENT/PLAN:    Foot pain  Patient will follow up with Podiatry    Obstructive sleep apnea  With her continued weight loss I would not do anything for treatment until she was done losing weight and then reassess whether she even needs anything at that time  Viral myocarditis  MRI shows no inflammation an ejection fraction improved 49-50%  Follows with Cardiology and here    Hypertension  Blood pressure is excellent 110/70, with that 45 lb weight loss we will cut her lisinopril in half so that she only takes 1 tablet in the evening  We will check her back in 3 months  As long as her blood pressure maintained itself we will try to ease back on her medications  If she gets lightheaded or dizzy she and it is consistent she will let us know    Elevated fasting blood sugar  A1c 6 from 5 8  We will check it again in 3 months     Lung nodule on CT  We will check that again in March 2020     Morbid obesity  Doing excellently with lifestyle changes and has already lost 45 lb in 3 months! Recheck in 3 months with A1c prior  Recheck sooner if needed           Health Maintenance   Topic Date Due    Pneumococcal Vaccine: Pediatrics (0 to 5 Years) and At-Risk Patients (6 to 59 Years) (1 of 1 - PPSV23) 09/12/1992    BMI: Followup Plan  09/12/2004    DTaP,Tdap,and Td Vaccines (5 - Tdap) 03/30/2010    INFLUENZA VACCINE  07/01/2019    BMI: Adult  07/13/2020    PAP SMEAR  12/19/2020    Pneumococcal Vaccine: 65+ Years (1 of 2 - PCV13) 09/12/2051    HEPATITIS B VACCINES  Completed         Problem List as of 7/13/2019 Reviewed: 6/7/2019  3:53 PM by Kennedy Martinez PA-C    Abnormal blood sugar    Acute combined systolic and diastolic congestive heart failure [facility])    Last Assessment & Plan 3/17/2019 Hospital Encounter Edited 3/19/2019 11:13 AM by Orma Phoenix, MD     · Suspect secondary myocarditis  From a viral infection  Also exacerbated by uncontrolled new onset hypertension , obesity    It has improved she dropped down to 159 4 kilos good urine output  DC IV diuretics will switch her over to Lasix 20 mg p o  Daily to start tomorrow  Her blood pressure was controlled improved yesterday today was elevated I will continue the Coreg 3 125 mg p o  B i d  Increase lisinopril to 20 mg p o  B i d   As discussed with Cardiology as well she does have a preserved ejection fraction heart failure  · I did discuss with the patient regarding her weight and I did obtain a nutritional consult she wants to go over the nutrition diet plan she is aware that she is overweight but at this point I did recommend to her for bariatric surgery she does not want to get at this time  · Monitor weights at home low-salt diet  Birth control    Last Assessment & Plan 3/17/2019 Hospital Encounter Written 3/19/2019 11:09 AM by Ana Rosa Dsouza MD     · Patient is on birth control I will hold while inpatient discussed the risk of increased for clots  Depression    Last Assessment & Plan 4/5/2019 Consult Edited 4/5/2019 12:30 PM by Tami Lopez PA-C     -tearful during visit due to recent health stressors, but reports over all well controlled since initial treatment in [id number] with Lexapro  -Seeing therapy this month          Essential hypertension    Last Assessment & Plan 6/7/2019 Office Visit Written 6/7/2019  3:53 PM by Tami Lopez PA-C     -monitor BP  -parameters reviewed         Generalized anxiety disorder    History of cervical dysplasia    Morbid obesity [facility])    Last Assessment & Plan 6/7/2019 Office Visit Edited 6/7/2019  3:53 PM by Tami Lopez PA-C     -Patient is pursuing [facility]-Intensive Lifestyle Intervention Program  -Initial weight loss goal of 5-10% weight loss for improved health    -Reports that she is happy she has stayed consistent with her weight loss and lifestyle changes, however, reports concerns she will lose adherence since the fear of health subsides   Reports her BP is improving and cardiac studies have been better  Encouraged her to write her motivation and goals down where she can see them  Encouraged consistent follow up  She has also encouraged her boy friend to help her stay motivated  Reports he is a good support and grills chicken for her during the week that she can have with salad  Initial: 334 2 (Pt had lost 15+ lbs prior to starting MWM)  Current: 313 2 lbs  Change: -21 lbs (-6 3 %)  Goal:         Obstructive sleep apnea    Pericardial effusion    Last Assessment & Plan 3/17/2019 Hospital Encounter Written 3/19/2019 11:11 AM by Romulo Lo MD     · Small found on CT  Will add MERRICK/RF/ESR CRP- in is pending the CRP is pending ESR is elevated see above explanation suspect myocarditis-MERRICK and RF pending   · 2D echo -a trivial pericardial effusion  · Cardiology consulted- discussed with dr Nicola Schwab 6/7/2019 Office Visit Written 6/7/2019  9:46 AM by Anmol Sebastian PA-C     -last A1c 5 8 %  -lifestyle changes for now, repeat after [facility]         Snoring            Subjective:   Chief Complaint   Patient presents with    Congestive Heart Failure    Anxiety    Depression    Hypertension     Patient is here for recheck  Since last visit she saw Cardiology and she had a MRI of her heart  Her ejection fraction went from 49% to 58%! She has normal LV and RV motion  There is no longer any inflammation in her heart    Through [facility] weight loss program and weight watchers she has lost 45 lb since last visit! She has learned the best thing is to be resultant and not to worry about good days bad days are bad weeks  She starts again and continues on  She is looking at it as a change of life    The asked her to do a sleep study which she did and she only has mild obstructive sleep apnea  Patient did get her A1c done and is somewhat disappointed in that being 6 going up from 5 8      Patient now has pain in her right foot at the head of her 1st metatarsal   She notices if she puts any weight on this sometimes it is tender      patient ID: Ksenia Pineda is a 29 y o  female      Past Medical History:   Diagnosis Date    Anxiety     Depression     Temporomandibular joint disorder     Resolved 2015      Past Surgical History:   Procedure Laterality Date    CERVICAL BIOPSY  W/ LOOP ELECTRODE EXCISION      DENTAL SURGERY      Gustavus teeth extraction    TONSILLECTOMY       Family History   Problem Relation Age of Onset    Diabetes Mother     Hypertension Father     Obesity Father     OCD Father     Colon cancer Maternal Grandmother     Diabetes Paternal Grandmother     Stroke Paternal Grandmother         TIA    Diabetes Paternal Grandfather     Heart disease Paternal Grandfather     Alcohol abuse Brother     Depression Maternal Grandfather     Anxiety disorder Maternal Grandfather     Hypothyroidism Paternal Aunt     Substance Abuse Neg Hx     Thyroid cancer Neg Hx      Social History     Tobacco Use    Smoking status: Former Smoker     Last attempt to quit: 3/17/2019     Years since quittin 3    Smokeless tobacco: Never Used   Substance Use Topics    Alcohol use: Yes     Comment: wine 2 glasses a week    Drug use: No     Social History     Tobacco Use   Smoking Status Former Smoker    Last attempt to quit: 3/17/2019    Years since quittin 3   Smokeless Tobacco Never Used        MED LIST WAS REVIEWED AND UPDATED       ROS    Constitutional  No fever chills no fatigue no weight loss no weight gain    Mental status  No anxiety or depression and no sleep disturbances no changes in personality or emotional problems no suicidal or homicidal ideations    Eyes  No eye pain no red eyes no visual disturbances no discharge no eye itch    ENT  No earache no hearing loss nasal discharge no sore throat no hoarseness no postnasal drip     Cardio  No chest pain no palpitations no leg edema no claudication no dyspnea on exertion no nocturnal dyspnea    Respiratory  No shortness of breath or wheeze no cough no orthopnea no dyspnea on exertion no hemoptysis no sputum production    GI  No abdominal pain no nausea no vomiting no diarrhea or constipation no bloody stools no change in bowel habits no change in weight      no dysuria hematuria no pyuria no incontinence no pelvic pain    Musculoskeletal  Foot pain as above    Skin  No rashes or lesions no itchiness and no wounds    Neuro  No headache dizziness lightheadedness syncope numbness paresthesias or confusion    Heme  No swollen glands no easy bruising          Objective:      VITALS:  Wt Readings from Last 3 Encounters:   07/13/19 (!) 138 kg (303 lb 6 4 oz)   07/11/19 (!) 140 kg (308 lb 12 8 oz)   07/02/19 (!) 140 kg (309 lb)     BP Readings from Last 3 Encounters:   07/13/19 110/70   07/02/19 118/74   06/07/19 120/72     Pulse Readings from Last 3 Encounters:   07/13/19 76   06/07/19 61   05/02/19 78     Body mass index is 47 52 kg/m²      Laboratory Results:   Lab Results   Component Value Date    WBC 9 90 03/18/2019    WBC 9 80 03/17/2019    WBC 13 72 (H) 01/24/2017    HGB 12 7 04/29/2019    HGB 10 5 (L) 03/18/2019    HGB 11 2 (L) 03/17/2019    HCT 39 8 04/29/2019    HCT 31 8 (L) 03/18/2019    HCT 35 2 (L) 03/17/2019    MCV 79 (L) 03/18/2019    MCV 80 03/17/2019    MCV 82 01/24/2017     03/18/2019     03/17/2019     (H) 01/24/2017     Lab Results   Component Value Date    K 4 2 06/20/2019    K 4 2 04/03/2019    K 3 3 (L) 03/19/2019     06/20/2019     04/03/2019     03/19/2019    CO2 29 06/20/2019    CO2 28 04/03/2019    CO2 32 (H) 03/19/2019    BUN 18 06/20/2019    BUN 20 04/03/2019    BUN 14 03/19/2019     Lab Results   Component Value Date    ALT 31 03/17/2019    AST 22 03/17/2019    ALKPHOS 85 03/17/2019    EGFR 124 06/20/2019    CALCIUM 8 8 06/20/2019     No results found for: BKOPZQ9      Lipid Profile:   No results found for: CHOL  Lab Results   Component Value Date    HDL 44 04/29/2019    HDL 63 (H) 01/24/2017     Lab Results   Component Value Date    LDLCALC 111 04/29/2019    LDLCALC 87 01/24/2017     Lab Results   Component Value Date    TRIG 128 04/29/2019    TRIG 111 01/24/2017       Diabetic labs (if applicable)  Lab Results   Component Value Date    HGBA1C 6 0 06/20/2019    HGBA1C 5 8 03/18/2019    HGBA1C 5 8 01/24/2017     Lab Results   Component Value Date    GLUF 103 (H) 06/20/2019    LDLCALC 111 04/29/2019    CREATININE 0 56 (L) 06/20/2019          Physical Exam    Constitutional  Appears healthy, Looks well, Appearance consistent with age    Mental Status  Alert, Oriented, Cooperative, Memory function normal , clean, and reasonable    Neck  No neck mass, No thyromegaly, Good carotid upstrokes bilaterally, trachea midline positive click    Respiratory  Breath sounds normal, No rales, No rhonchi, No wheezing, normal palpation    Cardiac   Regular rhythm without ectopy or murmur no S3-S4, no heave lift or thrill to palpation    Vascular  No leg edema, No pedal edema    Muscular skeletal  Cannot reproduce pain in the foot and foot itself looks normal     Skin  No appreciable rashes or abnormal appearing lesions       BMI Counseling: Body mass index is 47 52 kg/m²  Discussed the patient's BMI with her  The BMI is above average  BMI counseling and education was provided to the patient  Exercise recommendations include moderate aerobic physical activity for 150 minutes/week

## 2019-07-13 NOTE — PATIENT INSTRUCTIONS
1  Please see Podiatry regarding her foot pain    2  Drop the lisinopril morning and only take 1 sometime in the evening with or without meals does not matter    3  See you back in 3 months with nonfasting A1c prior and to check her blood pressure    4   If you get lightheaded or dizzy and it is consistent please call us

## 2019-07-18 ENCOUNTER — CLINICAL SUPPORT (OUTPATIENT)
Dept: BARIATRICS | Facility: CLINIC | Age: 34
End: 2019-07-18

## 2019-07-18 VITALS — HEIGHT: 68 IN | WEIGHT: 293 LBS | BODY MASS INDEX: 44.41 KG/M2

## 2019-07-18 DIAGNOSIS — R63.5 ABNORMAL WEIGHT GAIN: Primary | ICD-10-CM

## 2019-07-18 PROCEDURE — RECHECK

## 2019-07-19 ENCOUNTER — OFFICE VISIT (OUTPATIENT)
Dept: SLEEP CENTER | Facility: CLINIC | Age: 34
End: 2019-07-19
Payer: COMMERCIAL

## 2019-07-19 VITALS
HEIGHT: 67 IN | WEIGHT: 293 LBS | SYSTOLIC BLOOD PRESSURE: 104 MMHG | BODY MASS INDEX: 45.99 KG/M2 | DIASTOLIC BLOOD PRESSURE: 68 MMHG

## 2019-07-19 DIAGNOSIS — F32.A ANXIETY AND DEPRESSION: ICD-10-CM

## 2019-07-19 DIAGNOSIS — G47.8 NON-RESTORATIVE SLEEP: ICD-10-CM

## 2019-07-19 DIAGNOSIS — I10 ESSENTIAL HYPERTENSION: ICD-10-CM

## 2019-07-19 DIAGNOSIS — G47.33 OBSTRUCTIVE SLEEP APNEA: Primary | ICD-10-CM

## 2019-07-19 DIAGNOSIS — F45.8 BRUXISM: ICD-10-CM

## 2019-07-19 DIAGNOSIS — E66.01 MORBID OBESITY (HCC): ICD-10-CM

## 2019-07-19 DIAGNOSIS — F41.9 ANXIETY AND DEPRESSION: ICD-10-CM

## 2019-07-19 PROCEDURE — 99204 OFFICE O/P NEW MOD 45 MIN: CPT | Performed by: INTERNAL MEDICINE

## 2019-07-19 NOTE — PROGRESS NOTES
Consultation - [address]  29 y o  female  :1985  IVQ:[id number]    Physician Requesting Consult:      Reason for Consult : At your kind request I saw this patient for initial sleep evaluation today  Mariya Hurtado DO  A home sleep study was undertaken to evaluate for sleep disordered breathing and   patient is here to review results and further options  The study demonstrated : CRISTAL (respiratory event index of) 6 4 /hour  Minimum oxygen saturation was 79% and 4 3% of the study was spent with saturations less than 90%  The snore index was 2 2%  PFSH, Problem List, Medications & Allergies were reviewed in EMR  She  has a past medical history of Anxiety, Depression, and Temporomandibular joint disorder  She has a current medication list which includes the following prescription(s): carvedilol, escitalopram, furosemide, lisinopril, multiple vitamins-calcium, norgestimate-ethinyl estradiol, and imiquimod  HPI:  Study was undertaken because of myocarditis causing CHF and risk factors for sleep disordered breathing  She reported no snoring or breathing difficulties during sleep  She is satisfied with sleep and daytime function  Other Complaints: none  Restless Leg Syndrome: has typical symptoms occurring around 4 nights out of 7 in the evenings when sitting  She has dysesthesias involving lower extremities associated with urges to move that is relieved by stretching or walking Parasomnia: reports teeth grinding during sleep, but no other features of parasomnia  Sleep Routine:   Typical Bedtime:  10:00 p m  Gets OOB:  7:00 a m  TIB:9 hrs  Sleep latency:<  30 minutes Sleep Interruptions:infrequent x/night   Awakens: with aid of multiple alarms if trying to awaken by 5:00 a m  Evie Cutting Upon awakening:feels refreshed She estimates getting 7 5 hrs sleep  She denied Excessive Daytime Sleepiness or napping  [place] Sleepiness Scale rated at Total score:        Habits: reports that she quit smoking about 4 months ago  She has quit using smokeless tobacco , reports that she drinks alcohol ,  reports that she does not use drugs  ,Caffeine use:limited , Exercise routine: regular    Family History:  Father has obstructive sleep apnea  ROS: reviewed & as attached  Significant for intentional weight loss of around 40 lb in the past 4 months  She was experiencing swelling of her ankles and shortness of breath that have improved as did her cardiac function  Her last ejection fraction was 53%  She has a history of depression but she feels is controlled on Lexapro but continues to report some anxiety  EXAM:    Vitals /68   Ht 5' 7" (1 702 m)   Wt (!) 136 kg (300 lb 4 oz)   BMI 47 03 kg/m²     General  Well groomed female; appears stated age; no apparent distress  Psychiatric   Speech:clear and coherent; Cooperative  Normal mood, affect & thought , Anxious   Head   Craniofacial anatomy:normal Sinuses: non- tender  TMJ: Normal     Eyes   EOM's intact;  conjunctiva/corneas clear         Nasal Airway  is patent Septum:  Is slightly deviated, Mucous membranes:appear normal     Turbinates: normal ;  No Rhinorrhea; No PND  Oral   Airway  crowded Tongue:  Scalloped; Modified Mallampati class III (soft and hard palate and base of uvula visible)  Hard Palate:normal ;Soft Palate:  redundant soft palate  Tonsils: no hypertrophy  Teeth: normal       Neck  appears thick and there's extra fatty tissue; Neck Circumference: 36 5cm; Supple; no abnormal masses; Thyroid:normal  Trachea:central      Lymph    No Cervical or Submandibular Lymhadenopathy   Heart:   S1,S2 normal;RRR; no gallop; nomurmurs     Lungs   Respiratory Effort:normal  Air entry good bilaterally  No wheezes  No rales   Abdomen   Obese, Soft & non-tender     Extremities   No pedal edema  No clubbing or cyanosis  Skin   Skin is warm and dry;  Color& Hydration good; no facial rashes or lesions    Neurologic  Alert and orientated; CNII-XII intact; Motor normal; Sensation normal    Muscskeltl    Muscle bulk, tone and power WNL Gait:normal         I reviewed results of her echocardiogram done in March of this year that demonstrated:  Ejection fraction was 49%  Moderately and large left ventricular cavity  Mild concentric left ventricular hypertrophy  No regional wall motion abnormalities  Normal LV ventricular diastolic function and filling    IMPRESSION: Primary Sleep/Secondary(to Medical or Psych conditions) & comorbidities   1  Obstructive sleep apnea  Diagnostic Sleep Study   2  Bruxism     3  Non-restorative sleep     4  Essential hypertension     5  Morbid obesity ([address] )     6  Anxiety and depression        PLAN:  1  I reviewed results of the Sleep study with the patient  2  With respect to above conditions, I  counseled on pathophysiology, diagnosis, treatment options, risks and benefits; inter-relationship and effects on symptoms and comorbidities; risks of no treatment; costs and insurance aspects  3  Patient declined positive airway pressure therapy stating she would not be able to tolerate  4  Since home sleep testing typically understates severity especially since the oxygen desaturations that are worse in relation to the respiratory event index, I recommended re-evaluation in view of her ongoing symptoms and comorbidities,  5  She was encouraged to persist with her efforts weight reduction and positional therapy  6  I will see her for follow-up after the study to review results and further details of treatment options  Thank you for allowing me to participate in the care of this patient  I will keep you apprised of developments      Sincerely,     Authenticated electronically by Suleman Guzman MD   on 97/28/72   Board Certified Specialist

## 2019-07-19 NOTE — PATIENT INSTRUCTIONS
What is SUZAN? Obstructive sleep apnea is a common and serious sleep disorder that causes you to stop breathing during sleep  The airway repeatedly becomes blocked, limiting the amount of air that reaches your lungs  When this happens, you may snore loudly or making choking noises as you try to breathe  Your brain and body becomes oxygen deprived and you may wake up  This may happen a few times a night, or in more severe cases, several hundred times a night  Sleep apnea can make you wake up in the morning feeling tired or unrefreshed even though you have had a full night of sleep  During the day, you may feel fatigued, have difficulty concentrating or you may even unintentionally fall asleep  This is because your body is waking up numerous times throughout the night, even though you might not be conscious of each awakening  The lack of oxygen your body receives can have negative long-term consequences for your health  This includes:  High blood pressure  Heart disease  Irregular heart rhythms  Stroke  Pre-diabetes and diabetes  Depression    Testing  An objective evaluation of your sleep may be needed before your board certified sleep physician can make a diagnosis  Options include:   In-lab overnight sleep study  This type of sleep study requires you to stay overnight at a sleep center, in a bed that may resemble a hotel room  You will sleep with sensors hooked up to various parts of your body  These sensors record your brain waves, heartbeat, breathing and movement  An overnight sleep study also provides your doctor with the most complete information about your sleep  Learn more about an overnight sleep study      Home sleep apnea test  Some patients with high risk factors for obstructive sleep apnea and no other medical disorders may be candidates for a home sleep apnea test  The testing equipment differs in that it is less complicated than what is used in an overnight sleep study   As such, does not give all the data an in-lab will and if negative, may not mean you do not have the problem  Treatment for sleep apnea  includes using a continuous positive airway pressure (CPAP) machine to keep your airway open during sleep  A mask is placed over your nose and mouth, or just your nose  The mask is hooked to the CPAP machine that blows a gentle stream of air into the mask when you breathe  This helps keep your airway open so you can breathe more regularly  Extra oxygen may be given to you through the machine  You may be given a mouth device  It looks like a mouth guard or dental retainer and stops your tongue and mouth tissues from blocking your throat while you sleep  Surgery may be needed to remove extra tissues that block your mouth, throat, or nose  Manage sleep apnea:   Do not smoke  Nicotine and other chemicals in cigarettes and cigars can cause lung damage  Ask your healthcare provider for information if you currently smoke and need help to quit  E-cigarettes or smokeless tobacco still contain nicotine  Talk to your healthcare provider before you use these products  Do not drink alcohol or take sedative medicine before you go to sleep  Alcohol and sedatives can relax the muscles and tissues around your throat  This can block the airflow to your lungs  Maintain a healthy weight  Excess tissue around your throat may restrict your breathing  Ask your healthcare provider for information if you need to lose weight  Sleep on your side or use pillows designed to prevent sleep apnea  This prevents your tongue or other tissues from blocking your throat  You can also raise the head of your bed  Driving Safety  Refrain from driving when drowsy  Follow up with your healthcare provider as directed:  Write down your questions so you remember to ask them during your visits  Go to [employer] website for more information: Sleepeducation  org     What is SUZAN?    Obstructive sleep apnea is a common and serious sleep disorder that causes you to stop breathing during sleep  The airway repeatedly becomes blocked, limiting the amount of air that reaches your lungs  When this happens, you may snore loudly or making choking noises as you try to breathe  Your brain and body becomes oxygen deprived and you may wake up  This may happen a few times a night, or in more severe cases, several hundred times a night  Sleep apnea can make you wake up in the morning feeling tired or unrefreshed even though you have had a full night of sleep  During the day, you may feel fatigued, have difficulty concentrating or you may even unintentionally fall asleep  This is because your body is waking up numerous times throughout the night, even though you might not be conscious of each awakening  The lack of oxygen your body receives can have negative long-term consequences for your health  This includes:  High blood pressure  Heart disease  Irregular heart rhythms  Stroke  Pre-diabetes and diabetes  Depression    Testing  An objective evaluation of your sleep may be needed before your board certified sleep physician can make a diagnosis  Options include:   In-lab overnight sleep study  This type of sleep study requires you to stay overnight at a sleep center, in a bed that may resemble a hotel room  You will sleep with sensors hooked up to various parts of your body  These sensors record your brain waves, heartbeat, breathing and movement  An overnight sleep study also provides your doctor with the most complete information about your sleep  Learn more about an overnight sleep study      Home sleep apnea test  Some patients with high risk factors for obstructive sleep apnea and no other medical disorders may be candidates for a home sleep apnea test  The testing equipment differs in that it is less complicated than what is used in an overnight sleep study   As such, does not give all the data an in-lab will and if negative, may not mean you do not have the problem  Treatment for sleep apnea  includes using a continuous positive airway pressure (CPAP) machine to keep your airway open during sleep  A mask is placed over your nose and mouth, or just your nose  The mask is hooked to the CPAP machine that blows a gentle stream of air into the mask when you breathe  This helps keep your airway open so you can breathe more regularly  Extra oxygen may be given to you through the machine  You may be given a mouth device  It looks like a mouth guard or dental retainer and stops your tongue and mouth tissues from blocking your throat while you sleep  Surgery may be needed to remove extra tissues that block your mouth, throat, or nose  Manage sleep apnea:   Do not smoke  Nicotine and other chemicals in cigarettes and cigars can cause lung damage  Ask your healthcare provider for information if you currently smoke and need help to quit  E-cigarettes or smokeless tobacco still contain nicotine  Talk to your healthcare provider before you use these products  Do not drink alcohol or take sedative medicine before you go to sleep  Alcohol and sedatives can relax the muscles and tissues around your throat  This can block the airflow to your lungs  Maintain a healthy weight  Excess tissue around your throat may restrict your breathing  Ask your healthcare provider for information if you need to lose weight  Sleep on your side or use pillows designed to prevent sleep apnea  This prevents your tongue or other tissues from blocking your throat  You can also raise the head of your bed  Driving Safety  Refrain from driving when drowsy  Follow up with your healthcare provider as directed:  Write down your questions so you remember to ask them during your visits  Go to AAS website for more information: Sleepeducation  org

## 2019-07-19 NOTE — PROGRESS NOTES
Review of Systems      Genitourinary need to urinate more than twice a night   Cardiology ankle/leg swelling   Gastrointestinal none   Neurology frequent headaches and awaken with headache   Constitutional none   Integumentary none   Psychiatry anxiety   Musculoskeletal none   Pulmonary shortness of breath with activity   ENT none   Endocrine none   Hematological none

## 2019-07-25 ENCOUNTER — CLINICAL SUPPORT (OUTPATIENT)
Dept: BARIATRICS | Facility: CLINIC | Age: 34
End: 2019-07-25

## 2019-07-25 VITALS — WEIGHT: 293 LBS | HEIGHT: 68 IN | BODY MASS INDEX: 44.41 KG/M2

## 2019-07-25 DIAGNOSIS — R63.5 ABNORMAL WEIGHT GAIN: Primary | ICD-10-CM

## 2019-07-25 PROCEDURE — RECHECK

## 2019-07-30 NOTE — PROGRESS NOTES
Weight Management Medical Nutrition Assessment  Safia presented for the 3 month follow-up  session with the [facility]   Today's weight is 300 8#    She has lost 3 # in the past  week and overall has lost 23 8# in the past 4 months and  33 4# since first medical visit  Positive body composition changes noted  Reevue indirect calorimter revealed REE is normal ( -7%) compared to the predictive normal for someone her same age, height, and gender  She stated she has noticed she feels the dietary changes she has made are becoming more a normal way of life,  She is food journaling using Aula 7 Pal but will average 0486-7216 calories and stated she feels good on this calorie range  She stated she is resistant to exercise- discussed ways to increase her daily steps  We discussed focusing on planning dinner and pre logging night snack in the upcomig weeks  Anthropometric Measurements  Start Weight (lbs): 324  6#   (334 2# - 2019 - [facility] visit)  Current Weight (lbs): 300 8#  TBW % Change from start weight:6%  Ideal Body Weight (lbs):135#  Goal Weight (lbs):ST#     LT-200#     Weight Loss History  Previous weight loss attempts: Commercial Programs (Weight Watchers, Sowmya [employer]der, etc )  Self Created Diets (Portion Control, Healthy Food Choices, etc )     Food and Nutrition Related History        Food Recall     Breakfast: protein bar   Snack:protein bar  Lunch:salad with chicken / tuna salad - larger qu  Snack:yogurt  Get home from work: nothing  Dinner: larger meal   Snack:     Trigger: sweets/ PB / Fruit Snack      Beverages: water, coffee with 1/2 1/2 4 TBSP Fat Free  Volume of beverage intake: 64 -80oz daily      Weekends: Worse, dine out more before/ calories from alcohol now still dine out   Cravings: sweets  Trouble area of day:evening      Frequency of Eating out: biweekly  Food restrictions:none  Cooking: self   Food Shopping: self     Physical Activity Intake  Activity:6,000-8,000 steps    Frequency:infrequently  Physical limitations/barriers to exercise: none     Estimated Needs  Energy  Tanita: [id number]   R 1 3 -1000 =1540 calories  Reevue Indirect Calorimeter REE: 1973 calories           Weight loss without exercise:1374 calories            Weight loss with exercise: 6004-5482 calories               Bear Jesse Energy Needs:  BMR : 2107 calories    1-2# loss weekly sedentary:1473-2857 calories    1-2 # loss weekly lightly active: 1923-5681 calories  Protein:74-92gm      (1 2-1 5g/kg IBW)  Fluid: 51-61oz     (25-30ml/kg IBW)     Nutrition Diagnosis  Yes;    Overweight/obesity  related to Excess energy intake as evidenced by  BMI more than normative standard for age and sex (obesity-grade III 40+)     Nutrition Intervention     Nutrition Prescription  Calories:1600 calories weekdays and flex to 1800 weekends   Protein:74-90gm daily   Fluid:50-60oz daily      Meal Plan  Breakfast:300/20/30  Snack:  Lunch:400/30/30  Snack: 150-200  Dinner:400-500/30/30  Snack:150-200     Nutrition Education:    Healthy Core Manual  Calorie controlled menu  Lean protein food choices  Healthy snack options  Food journaling tips        Nutrition Counseling:  Strategies: meal planning, portion sizes, healthy snack choices, hydration, fiber intake, protein intake, exercise, food journal        Monitoring and Evaluation:  Evaluation criteria:  Energy Intake  Meet protein needs  Maintain adequate hydration  Monitor weekly weight  Meal planning/preparation  Food journal   Decreased portions at mealtimes and snacks  Physical activity      Barriers to learning:none  Readiness to change: Action  Comprehension: good  Expected Compliance: good

## 2019-07-31 ENCOUNTER — OFFICE VISIT (OUTPATIENT)
Dept: BARIATRICS | Facility: CLINIC | Age: 34
End: 2019-07-31

## 2019-07-31 VITALS — HEIGHT: 67 IN | BODY MASS INDEX: 45.99 KG/M2 | WEIGHT: 293 LBS

## 2019-07-31 DIAGNOSIS — R63.5 ABNORMAL WEIGHT GAIN: Primary | ICD-10-CM

## 2019-07-31 PROCEDURE — RECHECK: Performed by: DIETITIAN, REGISTERED

## 2019-08-05 ENCOUNTER — SOCIAL WORK (OUTPATIENT)
Dept: BEHAVIORAL/MENTAL HEALTH CLINIC | Facility: CLINIC | Age: 34
End: 2019-08-05
Payer: COMMERCIAL

## 2019-08-05 DIAGNOSIS — F32.89 OTHER DEPRESSION: ICD-10-CM

## 2019-08-05 DIAGNOSIS — F41.1 GENERALIZED ANXIETY DISORDER: ICD-10-CM

## 2019-08-05 PROCEDURE — 90834 PSYTX W PT 45 MINUTES: CPT | Performed by: SOCIAL WORKER

## 2019-08-06 NOTE — PSYCH
Psychotherapy Provided: Individual Psychotherapy 50 minutes     Length of time in session: 50 minutes, follow up in 2 week    Goals addressed in session: Goal 1     Pain:      none    0    Current suicide risk : Low     D:  Edgar Nevarez spoke for the duration of today's session about her relationship with her parents and her lack of validation from them  Ways that she continues to seek approval from them were discussed  A:  Edgar Nevarez was much less guarded today as she spoke  She responded positively to this worker's support and became more willing to discuss her thoughts and feelings as there session progressed  P:  Upcoming sessions will be used to continue to address her treatment goals  Behavioral Health Treatment Plan [facility]: Diagnosis and Treatment Plan explained to Rubén Rondon relates understanding diagnosis and is agreeable to Treatment Plan   Yes

## 2019-08-08 ENCOUNTER — CLINICAL SUPPORT (OUTPATIENT)
Dept: BARIATRICS | Facility: CLINIC | Age: 34
End: 2019-08-08

## 2019-08-08 VITALS — HEIGHT: 68 IN | WEIGHT: 293 LBS | BODY MASS INDEX: 44.41 KG/M2

## 2019-08-08 DIAGNOSIS — R63.5 ABNORMAL WEIGHT GAIN: Primary | ICD-10-CM

## 2019-08-08 DIAGNOSIS — R03.0 ELEVATED BP WITHOUT DIAGNOSIS OF HYPERTENSION: ICD-10-CM

## 2019-08-08 PROCEDURE — RECHECK

## 2019-08-09 RX ORDER — CARVEDILOL 3.12 MG/1
3.12 TABLET ORAL 2 TIMES DAILY WITH MEALS
Qty: 120 TABLET | Refills: 3 | Status: SHIPPED | OUTPATIENT
Start: 2019-08-09 | End: 2019-11-25 | Stop reason: SDUPTHER

## 2019-08-14 ENCOUNTER — OFFICE VISIT (OUTPATIENT)
Dept: BARIATRICS | Facility: CLINIC | Age: 34
End: 2019-08-14
Payer: COMMERCIAL

## 2019-08-14 VITALS
DIASTOLIC BLOOD PRESSURE: 78 MMHG | TEMPERATURE: 98.7 F | HEART RATE: 69 BPM | BODY MASS INDEX: 44.41 KG/M2 | SYSTOLIC BLOOD PRESSURE: 118 MMHG | RESPIRATION RATE: 18 BRPM | WEIGHT: 293 LBS | HEIGHT: 68 IN

## 2019-08-14 DIAGNOSIS — E66.01 MORBID OBESITY (HCC): Primary | ICD-10-CM

## 2019-08-14 DIAGNOSIS — I10 ESSENTIAL HYPERTENSION: ICD-10-CM

## 2019-08-14 DIAGNOSIS — R73.03 PRE-DIABETES: ICD-10-CM

## 2019-08-14 DIAGNOSIS — G47.33 OBSTRUCTIVE SLEEP APNEA: ICD-10-CM

## 2019-08-14 PROCEDURE — 99214 OFFICE O/P EST MOD 30 MIN: CPT | Performed by: PHYSICIAN ASSISTANT

## 2019-08-14 NOTE — PATIENT INSTRUCTIONS
Goals: Food log (ie ) www myfitnesspal com,sparkpeople  com,loseit com,calorieking  com,etc  baritastic  No sugary beverages  At least 64oz of water daily  Increase physical activity by 10 minutes daily  Gradually increase physical activity to a goal of 5 days per week for 30 minutes of MODERATE intensity PLUS 2 days per week of FULL BODY resistance training  Consider having exercise assessment   5-10 servings of fruits and vegetables per day and 25-35 grams of dietary fiber per day, gradually increasing   Your goal: walking 5x/week for next  Consider adding plant based proteins  Keep up the great work!!

## 2019-08-14 NOTE — PROGRESS NOTES
Assessment/Plan: Morbid obesity ([facility] [address] )  -Patient is pursuing Healthy[employer] after completing  [facility]  -Initial weight loss goal of 5-10% weight loss for improved health  -Discussed options for weight loss medications (cravings around menses)- finds this manageable so not interested  -She will focus on increasing her exercise - see goals below  -She will try adding plant based proteins like beans and lentils  -Recommend f/u as scheduled with sleep medicine     Initial: 334 2 (Pt had lost 15+ lbs prior to starting MWM)  Current: 286 7  (-16 5 lbs since 6/7/19)  Change: -47 5 lbs  Goal:    Pre-diabetes  5 8 --> 6%  -would have suspected a decrease since she has lost 47 5 lbs  -would recommend repeating in 4-6 months as she continues with positive lifestyle changes  -can consider metformin or Saxenda    Essential hypertension  -continue to monitor BP    Follow up in approximately 3 months with Non-Surgical Physician/Advanced Practitioner and as scheduled with RD  25 minute visit, >50% time spent counseling patient on diet behavior and exercise modification for weight loss  Goals:  Food log (ie ) www myfitnesspal com,sparkpeople  com,loseit com,calorieking  com,etc  baritastic  No sugary beverages  At least 64oz of water daily  Increase physical activity by 10 minutes daily  Gradually increase physical activity to a goal of 5 days per week for 30 minutes of MODERATE intensity PLUS 2 days per week of FULL BODY resistance training  Consider having exercise assessment   5-10 servings of fruits and vegetables per day and 25-35 grams of dietary fiber per day, gradually increasing   Your goal: walking 5x/week for next  Consider adding plant based proteins  Keep up the great work!!     Diagnoses and all orders for this visit:    [facility] ([facility] )    Essential hypertension    Pre-diabetes    Obstructive sleep apnea    Subjective:   Chief Complaint   Patient presents with   [facility] Follow-up     mwm fu     Patient ID: Maryjane Leiva  is a 29 y o  female with excess weight/obesity here to pursue weight management  Patient is pursuing [employer]  HPI  The patient presents for MWM follow up  BP: no concerns  SUZAN: mild sleep apnea, in lab study recommended  Pt is contemplating treatment as she would like to see if it improves/resolves with continued weight loss  Food logging: stays consistent with morning and lunch meals  Noticing she is spending more on groceries, particularly meats  Reviewed potential benefits of plant based proteins  She will consider adding these  Planning dinner: improving and meal prepping! Pre logging snacks: finds this helpful  Increased appetite/cravings: denies other than around menses (cravings)- finds this manageable  Fruit/Vegetable servings: 2 fruit, 1-3 vegetables   Exercise: finds this her biggest challenge  Walking 3 days for 30 minutes  Hydration: 64 oz    The following portions of the patient's history were reviewed and updated as appropriate: allergies, current medications, past family history, past medical history, past social history, past surgical history and problem list     Review of Systems   Respiratory: Negative  Cardiovascular: Negative  Gastrointestinal: Negative  Psychiatric/Behavioral: Negative  Objective:    /78 (BP Location: Left arm, Patient Position: Sitting, Cuff Size: Large)   Pulse 69   Temp 98 7 °F (37 1 °C) (Tympanic)   Resp 18   Ht 5' 7 5" (1 715 m)   Wt 135 kg (296 lb 11 2 oz)   BMI 45 78 kg/m²      Physical Exam   Nursing note and vitals reviewed  Constitutional   General appearance: Abnormal   well developed and morbidly obese  Pulmonary   Respiratory effort: No increased work of breathing or signs of respiratory distress  Abdomen   Abdomen: Abnormal   The abdomen was obese     Musculoskeletal   Gait and station: Normal     Psychiatric   Orientation to person, place and time: Normal  Affect: appropriate

## 2019-08-15 NOTE — ASSESSMENT & PLAN NOTE
-Patient is pursuing HealthyWays after completing  [facility]  -Initial weight loss goal of 5-10% weight loss for improved health  -Discussed options for weight loss medications (cravings around menses)- finds this manageable so not interested  -She will focus on increasing her exercise - see goals below  -She will try adding plant based proteins like beans and lentils     -Recommend f/u as scheduled with sleep medicine     Initial: 334 2 (Pt had lost 15+ lbs prior to starting MWM)  Current: 286 7  (-16 5 lbs since 6/7/19)  Change: -47 5 lbs  Goal:

## 2019-08-15 NOTE — ASSESSMENT & PLAN NOTE
5 8 --> 6%  -would have suspected a decrease since she has lost 47 5 lbs  -would recommend repeating in 4-6 months as she continues with positive lifestyle changes  -can consider metformin or Saxenda

## 2019-08-19 ENCOUNTER — HOSPITAL ENCOUNTER (OUTPATIENT)
Dept: SLEEP CENTER | Facility: CLINIC | Age: 34
Discharge: HOME | End: 2019-08-19
Payer: COMMERCIAL

## 2019-08-19 DIAGNOSIS — G47.33 OBSTRUCTIVE SLEEP APNEA: ICD-10-CM

## 2019-08-19 PROCEDURE — 95810 POLYSOM 6/> YRS 4/> PARAM: CPT

## 2019-08-20 ENCOUNTER — OFFICE VISIT (OUTPATIENT)
Dept: PODIATRY | Facility: CLINIC | Age: 34
End: 2019-08-20
Payer: COMMERCIAL

## 2019-08-20 VITALS
WEIGHT: 293 LBS | HEIGHT: 67 IN | SYSTOLIC BLOOD PRESSURE: 120 MMHG | DIASTOLIC BLOOD PRESSURE: 84 MMHG | HEART RATE: 96 BPM | BODY MASS INDEX: 45.99 KG/M2

## 2019-08-20 DIAGNOSIS — M77.41 METATARSALGIA OF RIGHT FOOT: Primary | ICD-10-CM

## 2019-08-20 DIAGNOSIS — M79.671 RIGHT FOOT PAIN: ICD-10-CM

## 2019-08-20 PROCEDURE — 99242 OFF/OP CONSLTJ NEW/EST SF 20: CPT | Performed by: PODIATRIST

## 2019-08-20 RX ORDER — IBUPROFEN 600 MG/1
600 TABLET ORAL
Qty: 90 TABLET | Refills: 0 | Status: SHIPPED | OUTPATIENT
Start: 2019-08-20 | End: 2019-08-28 | Stop reason: ALTCHOICE

## 2019-08-20 NOTE — PROGRESS NOTES
Sleep Study Documentation    Pre-Sleep Study       Sleep testing procedure explained to patient:YES    Patient napped prior to study:NO    [address] [place] worker after 12PM   Caffeine use:NO    Alcohol:Dayshift workers after 5PM: Alcohol use:NO    Typical day for patient:YES       Study Documentation    Sleep Study Indications:  Snoring, BMI>30, unrefreshing sleep  Sleep Study: Diagnostic   Snore:Mild  Supplemental O2: no    O2 flow rate (L/min) range 0  O2 flow rate (L/min) final 0  Minimum SaO2  88 %  Baseline SaO2  94 7 %            EKG abnormalities: no     EEG abnormalities: no    Sleep Study Recorded < 2 hours: N/A    Sleep Study Recorded > 2 hours but incomplete study: N/A    Sleep Study Recorded 6 hours but no sleep obtained: NO          Post-Sleep Study    Medication used at bedtime or during sleep study:NO    Patient reports time it took to fall asleep:greater than 60 minutes    Patient reports waking up during study:3 or more times  Patient reports returning to sleep in 10 to 30 minutes  Patient reports sleeping 4 to 6 hours without dreaming  Patient reports sleep during study:worse than usual    Patient rated sleepiness: Somewhat sleepy or tired    PAP treatment:no

## 2019-08-20 NOTE — PROGRESS NOTES
Assessment/Plan:    X-rays, two views right foot negative for osseous pathology or fracture  Explained to patient that her metatarsalgia is likely due to overuse in her weight as well as her shoes which are typically flats  Advised her to wear running shoes as much as possible  Patient placed on ibuprofen 600 mg t i d  P c     No problem-specific Assessment & Plan notes found for this encounter  Diagnoses and all orders for this visit:    Metatarsalgia of right foot  -     XR foot 2 vw right; Future  -     ibuprofen (MOTRIN) 600 mg tablet; Take 1 tablet (600 mg total) by mouth 3 (three) times daily after meals for 30 days    Right foot pain  -     Ambulatory referral to Podiatry          Subjective:      Patient ID: Elena Guan is a 29 y o  female  HPI     Patient, a 44-year-old overweight female presents with right foot pain of 8 months duration  Patient does not recall trauma to the right foot  She states that her pain is a 3 or 4/10  The pain is present along the 2nd metatarsal shaft  Patient states that she is not limping  The discomfort is more annoying than severe  The following portions of the patient's history were reviewed and updated as appropriate: allergies, current medications, past family history, past medical history, past social history, past surgical history and problem list     Review of Systems   Constitutional:        Obesity   Cardiovascular: Negative  Gastrointestinal: Negative  Musculoskeletal: Positive for gait problem  Neurological: Negative for numbness  Objective:      /84   Pulse 96   Ht 5' 7" (1 702 m) Comment: verbal  Wt 134 kg (294 lb 6 4 oz)   BMI 46 11 kg/m²          Physical Exam   Constitutional: She is oriented to person, place, and time  She appears well-developed and well-nourished  Cardiovascular: Regular rhythm and intact distal pulses  Musculoskeletal: Normal range of motion  She exhibits edema     Pain with palpation 2nd metatarsal shaft right foot  Both feet are edematous  Lipoma is noted each ankle  Neurological: She is alert and oriented to person, place, and time  No sensory deficit  Skin: Skin is warm and dry  No erythema

## 2019-08-22 ENCOUNTER — CLINICAL SUPPORT (OUTPATIENT)
Dept: BARIATRICS | Facility: CLINIC | Age: 34
End: 2019-08-22

## 2019-08-22 ENCOUNTER — TELEPHONE (OUTPATIENT)
Dept: SLEEP CENTER | Facility: CLINIC | Age: 34
End: 2019-08-22

## 2019-08-22 VITALS — BODY MASS INDEX: 44.41 KG/M2 | HEIGHT: 68 IN | WEIGHT: 293 LBS

## 2019-08-22 DIAGNOSIS — R63.5 ABNORMAL WEIGHT GAIN: Primary | ICD-10-CM

## 2019-08-22 PROCEDURE — RECHECK

## 2019-08-22 NOTE — TELEPHONE ENCOUNTER
Advised patient sleep study shows no SUZAN   Reminded patient she has follow up appointment with Dr Batsheva Veronica 10/11/2019

## 2019-08-26 DIAGNOSIS — I50.9 ACUTE CONGESTIVE HEART FAILURE, UNSPECIFIED HEART FAILURE TYPE (HCC): ICD-10-CM

## 2019-08-26 RX ORDER — FUROSEMIDE 20 MG/1
20 TABLET ORAL DAILY
Qty: 30 TABLET | Refills: 0
Start: 2019-08-26 | End: 2019-08-28 | Stop reason: SDUPTHER

## 2019-08-26 RX ORDER — FUROSEMIDE 20 MG/1
20 TABLET ORAL DAILY
Qty: 30 TABLET | Refills: 0 | Status: CANCELLED
Start: 2019-08-26

## 2019-08-27 ENCOUNTER — SOCIAL WORK (OUTPATIENT)
Dept: BEHAVIORAL/MENTAL HEALTH CLINIC | Facility: CLINIC | Age: 34
End: 2019-08-27
Payer: COMMERCIAL

## 2019-08-27 DIAGNOSIS — F41.1 GENERALIZED ANXIETY DISORDER: ICD-10-CM

## 2019-08-27 DIAGNOSIS — F32.89 OTHER DEPRESSION: ICD-10-CM

## 2019-08-27 PROCEDURE — 90834 PSYTX W PT 45 MINUTES: CPT | Performed by: SOCIAL WORKER

## 2019-08-27 NOTE — PROGRESS NOTES
Heart Failure Outpatient Consult Note - Ksenia Pineda 29 y o  female MRN: [id number]    @ Encounter: [id number]      Assessment/Plan:    Patient Active Problem List    Diagnosis Date Noted    Essential hypertension 03/17/2019     Priority: Low    History of cervical dysplasia 07/20/2016     Priority: Low    Morbid obesity ([address] ) 07/07/2015     Priority: Low    Pre-diabetes 07/30/2014     Priority: Low    Generalized anxiety disorder 07/30/2014     Priority: Low    Depression 05/28/2013     Priority: Low    Obstructive sleep apnea     Snoring        # New onset HFpEF, Stage C, NYHA 2  Etiology: possible HTN as /100 mmHg on hospital admit, possible viral myocarditis given presentation to [facility] with sinusitis symptoms; no family hx of SCD or cardiomyopathy, no sign etoh use, given age- ischemic less likely   She is morbidly obese and will need screening for SUZAN  Her uncontrolled BP and diet alone with obesity could have contributed to her volume overload and mostly HFpEF  CRP 37 on admit, sed rate 51  MERRICK and RF normal    Weight: on admit 3/17: 358 lbs, 325 lbs today    NT pro BNP 1430, 493 in 6/20/19  EKG - SR with PACs    CMR 6/3/19: EF: 58%  Normal RV function    Echocardiogram 3/18/19  LVEF: 50-55%  LVIDd: 5 8 cm  RV: mildly enlarged  MR:  PASP: 38 mmHg    Lab Results   Component Value Date    NTBNP 493 (H) 06/20/2019        Neurohormonal Blockade:  --Beta-Blocker: coreg 3 125 mg BID  --ACEi, ARB or ARNi: lisinopril 20 mg daily  --Aldosterone Receptor Blocker:  --Diuretic: lasix 20 mg daily    Sudden Cardiac Death Risk Reduction:  --ICD:     # HTN- was 190/100 mmHg on admit 3/17, better controlled now  # morbid obesity- weight 358 lbs on admit 3/17; 332 lbs on follow up 4/3  Weight today 298 lbs  Doing [facility] weight management  # tobacco - 1 ppd- quit in March  # Sleep eval  Home study- mild SUZAN, lowest oxygen sat 79%  Polysomnogram 8/20/19- no significant apnea, lowest oxygen 88%    Today's Plan:  If loses 20 more lbs and systolic BP < 644 mmHg can stop coreg    --2g sodium diet  Weights daily    HPI:      28 yo female who had presented to PCP 3/11 with sinusitis symptoms and started on Augmentin  On 3/17 presented to ED with exertional dyspnea noticed on getting dressed and climbing stairs  She also reported LE edema but no PND or orthopnea but noticed abdominal bloating and weight gain for about a month  Smokes 1 ppd and only has about 2 drinks per week and no drug use  No family hx of SCD or cardiomyopathy  Her edema is what made her go to the hospital    CTA showed small pericardial effusion, mild pulmonary edema  TTE showed EF around 50%, LVEDd 5 8 cm, PASP 38 mmHg  She was diuresed and started on HFrEF specific heart failure therapy  She is enrolled in weight watchers and part of weight management nutritional assessment  Interim:  Sleep studies as above    CMR 6/3/19: EF: 58%  Normal RV function    Was on Lisinopril 20 mg twice daily but now down to in evening    Review of Systems   Constitutional: Negative for activity change, appetite change, fatigue and unexpected weight change  HENT: Negative for congestion and nosebleeds  Eyes: Negative  Respiratory: Negative for cough, chest tightness and shortness of breath  Cardiovascular: Negative for chest pain, palpitations and leg swelling  Gastrointestinal: Negative for abdominal distention  Endocrine: Negative  Genitourinary: Negative  Musculoskeletal: Negative  Skin: Negative  Neurological: Negative for dizziness, syncope and weakness  Hematological: Negative  Psychiatric/Behavioral: Negative  Past Medical History:   Diagnosis Date    Anxiety     Depression     Temporomandibular joint disorder     Resolved 7/7/2015      No Known Allergies        Current Outpatient Medications:     carvedilol (COREG) 3 125 mg tablet, Take 1 tablet (3 125 mg total) by mouth 2 (two) times a day with meals, Disp: 120 tablet, Rfl: 3    escitalopram (LEXAPRO) 20 mg tablet, Take 1 tablet by mouth, Disp: , Rfl:     furosemide (LASIX) 20 mg tablet, Take 1 tablet (20 mg total) by mouth daily, Disp: 30 tablet, Rfl: 0    lisinopril (ZESTRIL) 20 mg tablet, Take 1 tablet (20 mg total) by mouth daily at bedtime, Disp: 180 tablet, Rfl: 3    Multiple Vitamins-Calcium (ONE-A-DAY WOMENS PO), Take by mouth, Disp: , Rfl:     norgestimate-ethinyl estradiol (TRI-LO-IMANI) 0 18/0 215/0 25 MG-25 MCG per tablet, Take 1 tablet by mouth daily, Disp: , Rfl:     imiquimod (ALDARA) 5 % cream, Apply 1 packet topically 3 (three) times a week (Patient not taking: Reported on 2019), Disp: 12 each, Rfl: 2    Social History     Socioeconomic History    Marital status: Single     Spouse name: Not on file    Number of children: Not on file    Years of education: Not on file    Highest education level: Not on file   Occupational History    Not on file   Social Needs    Financial resource strain: Not on file    Food insecurity:     Worry: Not on file     Inability: Not on file    Transportation needs:     Medical: Not on file     Non-medical: Not on file   Tobacco Use    Smoking status: Former Smoker     Last attempt to quit: 3/17/2019     Years since quittin 4    Smokeless tobacco: Former User   Substance and Sexual Activity    Alcohol use: Yes     Comment: wine 2 glasses a week    Drug use: No    Sexual activity: Yes     Partners: Male     Birth control/protection: Pill   Lifestyle    Physical activity:     Days per week: Not on file     Minutes per session: Not on file    Stress: Not on file   Relationships    Social connections:     Talks on phone: Not on file     Gets together: Not on file     Attends [employer] service: Not on file     Active member of club or organization: Not on file     Attends meetings of clubs or organizations: Not on file     Relationship status: Not on file    Intimate partner violence:     Fear of current or ex partner: Not on file     Emotionally abused: Not on file     Physically abused: Not on file     Forced sexual activity: Not on file   Other Topics Concern    Not on file   Social History Narrative    Current every day smoker - As per [employer]    Daily caffeinated coffee consumption        Family History   Problem Relation Age of Onset    Diabetes Mother     Hypertension Father     Obesity Father     OCD Father     Colon cancer Maternal Grandmother     Diabetes Paternal Grandmother     Stroke Paternal Grandmother         TIA    Diabetes Paternal Grandfather     Heart disease Paternal Grandfather     Alcohol abuse Brother     Depression Maternal Grandfather     Anxiety disorder Maternal Grandfather     Hypothyroidism Paternal Aunt     Substance Abuse Neg Hx     Thyroid cancer Neg Hx        Physical Exam:    Vitals: Blood pressure 122/74, pulse 75, height 5' 7 5" (1 715 m), weight 136 kg (298 lb 12 8 oz), SpO2 99 %, not currently breastfeeding , Body mass index is 46 11 kg/m² ,   Wt Readings from Last 3 Encounters:   08/28/19 136 kg (298 lb 12 8 oz)   08/22/19 135 kg (298 lb 3 2 oz)   08/20/19 134 kg (294 lb 6 4 oz)       Physical Exam:    Physical Exam   Constitutional: She is oriented to person, place, and time  She appears well-developed and well-nourished  HENT:   Head: Normocephalic and atraumatic  Eyes: Pupils are equal, round, and reactive to light  EOM are normal    Neck: Normal range of motion  No JVD present  Cardiovascular: Normal rate, regular rhythm and normal heart sounds  No murmur heard  Pulmonary/Chest: Effort normal and breath sounds normal  She has no rales  Abdominal: Soft  Bowel sounds are normal  She exhibits no distension  Musculoskeletal: Normal range of motion  She exhibits no edema  Neurological: She is alert and oriented to person, place, and time  Skin: Skin is warm and dry  She is not diaphoretic  Psychiatric: She has a normal mood and affect         Labs & Results:    Lab Results   Component Value Date    WBC 9 90 03/18/2019    HGB 12 7 04/29/2019    HCT 39 8 04/29/2019    MCV 79 (L) 03/18/2019     03/18/2019     Lab Results   Component Value Date    CALCIUM 8 8 06/20/2019    SODIUM 137 06/20/2019    K 4 2 06/20/2019    CO2 29 06/20/2019     06/20/2019    BUN 18 06/20/2019    CREATININE 0 56 (L) 06/20/2019     Lab Results   Component Value Date    NTBNP 493 (H) 06/20/2019      No results found for: CHOL  Lab Results   Component Value Date    HDL 44 04/29/2019    HDL 63 (H) 01/24/2017     Lab Results   Component Value Date    LDLCALC 111 04/29/2019    LDLCALC 87 01/24/2017     Lab Results   Component Value Date    TRIG 128 04/29/2019    TRIG 111 01/24/2017     No results found for: CHOLHDL    EKG personally reviewed by Costa Armstrong, DO  Counseling / Coordination of Care  Total floor / unit time spent today 25 minutes  Greater than 50% of total time was spent with the patient and / or family counseling and / or coordination of care  A description of the counseling / coordination of care: 15 min  Thank you for the opportunity to participate in the care of this patient  Costa URENA    Director of Advanced Heart Failure Program  Medical Director of [address] [place] [address]

## 2019-08-28 ENCOUNTER — OFFICE VISIT (OUTPATIENT)
Dept: CARDIOLOGY CLINIC | Facility: CLINIC | Age: 34
End: 2019-08-28
Payer: COMMERCIAL

## 2019-08-28 VITALS
OXYGEN SATURATION: 99 % | HEIGHT: 68 IN | HEART RATE: 75 BPM | DIASTOLIC BLOOD PRESSURE: 74 MMHG | BODY MASS INDEX: 44.41 KG/M2 | SYSTOLIC BLOOD PRESSURE: 122 MMHG | WEIGHT: 293 LBS

## 2019-08-28 DIAGNOSIS — I50.9 ACUTE CONGESTIVE HEART FAILURE, UNSPECIFIED HEART FAILURE TYPE (HCC): ICD-10-CM

## 2019-08-28 DIAGNOSIS — E66.01 MORBID OBESITY WITH BMI OF 45.0-49.9, ADULT (HCC): ICD-10-CM

## 2019-08-28 DIAGNOSIS — I10 HTN (HYPERTENSION), BENIGN: ICD-10-CM

## 2019-08-28 DIAGNOSIS — I50.32 CHRONIC HEART FAILURE WITH PRESERVED EJECTION FRACTION (HCC): Primary | ICD-10-CM

## 2019-08-28 PROCEDURE — 99214 OFFICE O/P EST MOD 30 MIN: CPT | Performed by: INTERNAL MEDICINE

## 2019-08-28 PROCEDURE — 3074F SYST BP LT 130 MM HG: CPT | Performed by: INTERNAL MEDICINE

## 2019-08-28 RX ORDER — FUROSEMIDE 20 MG/1
20 TABLET ORAL DAILY
Qty: 30 TABLET | Refills: 6 | Status: SHIPPED | OUTPATIENT
Start: 2019-08-28 | End: 2019-09-21 | Stop reason: SDUPTHER

## 2019-08-29 ENCOUNTER — CLINICAL SUPPORT (OUTPATIENT)
Dept: BARIATRICS | Facility: CLINIC | Age: 34
End: 2019-08-29

## 2019-08-29 VITALS — WEIGHT: 293 LBS | HEIGHT: 68 IN | BODY MASS INDEX: 44.41 KG/M2

## 2019-08-29 DIAGNOSIS — R63.5 ABNORMAL WEIGHT GAIN: Primary | ICD-10-CM

## 2019-08-29 PROCEDURE — RECHECK

## 2019-08-29 RX ORDER — FUROSEMIDE 20 MG/1
20 TABLET ORAL DAILY
Qty: 30 TABLET | Refills: 0
Start: 2019-08-29

## 2019-08-29 NOTE — PSYCH
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

## 2019-09-05 ENCOUNTER — CLINICAL SUPPORT (OUTPATIENT)
Dept: BARIATRICS | Facility: CLINIC | Age: 34
End: 2019-09-05

## 2019-09-05 VITALS — BODY MASS INDEX: 44.41 KG/M2 | HEIGHT: 68 IN | WEIGHT: 293 LBS

## 2019-09-05 DIAGNOSIS — R63.5 ABNORMAL WEIGHT GAIN: Primary | ICD-10-CM

## 2019-09-05 PROCEDURE — RECHECK

## 2019-09-09 NOTE — PROGRESS NOTES
Weight Management Medical Nutrition Assessment  Safia presented for the 4 month follow-up  session with the [facility] Program   Today's weight is 293 7#   She has lost 2 9 # in the past week and overall has lost 30 9# in the past 5 months and 40 5# since first medical visit  She stated she has noticed she feels the dietary changes she has made are becoming more a normal way of life  Collene Goldmann is food journaling using [employer] but will average 8979-9417 and flex up 2200 calories on weekends  She stated she has started to take long walks on the weekend and feels this is helping with her weight loss and off setting her higher calorie intake on the weekends  She stated she ws nervous about dining out events to celebrate her birthday  We discussed dining out tips and ways to shift calories on those days       Anthropometric Measurements  Start Weight (#): 324  6#   (334 2# - 2019 - [place] visit)  Current Weight (#): 293 7#  TBW % Change from start weight:9 5%  Ideal Body Weight (#):135#  Goal Weight (#):ST#     LT-200#     Weight Loss History  Previous weight loss attempts: Commercial Programs (Weight Watchers, Nata Hayden, etc )  Self Created Diets (Portion Control, Healthy Food Choices, etc )     Food and Nutrition Related History        Food Recall     Breakfast: yogurt or skip   Snack:yogurt   Lunch:salad with chicken / tuna salad   Snack:yogurt  Get home from work: nothing  Friddie Li- smaller portions   Snack:enlightened bar      Trigger: sweets/ PB / Fruit Snack      Beverages: water, coffee with 1/2 1/2 4 TBSP Fat Free  Volume of beverage intake: 64 -80oz daily      Weekends: Worse, dine out more before/ calories from alcohol now still dine out   Cravings: sweets at night   Trouble area of day:evening right before bed      Frequency of Eating out: in frequent   Food restrictions:none  Cooking: self   Food Shopping: self     Physical Activity Intake  Activity:6,000-8,000 steps  - walking an hour on Saturday or Sunday  Frequency:infrequently  Physical limitations/barriers to exercise: none     Estimated Needs  Energy  Tanita: [id number]   P 1 3 -1000 =1540 calories  [employer] Indirect Calorimeter REE: 1973 calories           Weight loss without exercise:1374 calories            Weight loss with exercise: 8326-1430 calories               Bear [employer] Needs:  BMR : 2075  calories    1-2# loss weekly sedentary: 0836-1028 calories    1-2 # loss weekly lightly active: 0734-0134 calories  Protein:74-92gm      (1 2-1 5g/kg IBW)  Fluid: 51-61oz     (25-30ml/kg IBW)     Nutrition Diagnosis  Yes;    Overweight/obesity  related to Excess energy intake as evidenced by  BMI more than normative standard for age and sex (obesity-grade III 36+)     Nutrition Intervention     Nutrition Prescription  Calories:1500 calories weekdays and flex to 2000 weekends   Protein:74-90gm daily   Fluid:50-60oz daily      Meal Plan  Breakfast:300/20/30  Snack:  Lunch:400/30/30  Snack: 150-200  Dinner:400-500/30/30  Snack:150-200     Nutrition Education:    Healthy Core Manual  Calorie controlled menu  Lean protein food choices  Healthy snack options  Food journaling tips        Nutrition Counseling:  Strategies: meal planning, portion sizes, healthy snack choices, hydration, fiber intake, protein intake, exercise, food journal        Monitoring and Evaluation:  Evaluation criteria:  Energy Intake  Meet protein needs  Maintain adequate hydration  Monitor weekly weight  Meal planning/preparation  Food journal   Decreased portions at mealtimes and snacks  Physical activity      Barriers to learning:none  Readiness to change: Action  Comprehension: good  Expected Compliance: good

## 2019-09-10 ENCOUNTER — OFFICE VISIT (OUTPATIENT)
Dept: BARIATRICS | Facility: CLINIC | Age: 34
End: 2019-09-10

## 2019-09-10 VITALS — WEIGHT: 293 LBS | HEIGHT: 67 IN | BODY MASS INDEX: 45.99 KG/M2

## 2019-09-10 DIAGNOSIS — R63.5 ABNORMAL WEIGHT GAIN: Primary | ICD-10-CM

## 2019-09-10 PROCEDURE — RECHECK: Performed by: DIETITIAN, REGISTERED

## 2019-09-19 ENCOUNTER — CLINICAL SUPPORT (OUTPATIENT)
Dept: BARIATRICS | Facility: CLINIC | Age: 34
End: 2019-09-19

## 2019-09-19 VITALS — BODY MASS INDEX: 44.41 KG/M2 | HEIGHT: 68 IN | WEIGHT: 293 LBS

## 2019-09-19 DIAGNOSIS — R63.5 ABNORMAL WEIGHT GAIN: Primary | ICD-10-CM

## 2019-09-19 PROCEDURE — RECHECK

## 2019-09-21 DIAGNOSIS — I50.9 ACUTE CONGESTIVE HEART FAILURE, UNSPECIFIED HEART FAILURE TYPE (HCC): ICD-10-CM

## 2019-09-23 RX ORDER — FUROSEMIDE 20 MG/1
20 TABLET ORAL DAILY
Qty: 30 TABLET | Refills: 10 | Status: SHIPPED | OUTPATIENT
Start: 2019-09-23 | End: 2019-11-20 | Stop reason: SDUPTHER

## 2019-09-25 NOTE — PSYCH
Treatment Plan Tracking    # 0Treatment Plan not completed within required time limits due to: Client has not been seen in past 30 days

## 2019-09-25 NOTE — BH TREATMENT PLAN
Ricardo Saldivar  1985         Date of Initial Treatment Plan: 5/8/19   Date of Current Treatment Plan: 9/26/19     Treatment Plan Number 2     Strengths/Personal Resources for Self Care: Intelligent, honest, insightful     Diagnosis:   1   MDD (major depressive disorder), recurrent episode, moderate ([address] )      2  Generalized anxiety disorder            Area of Needs: uncertainty and poor self worth           Long Term Goal 1: AI want to [facility] valued / valuable  Target Date:1/26/20  Completion Date: na         Short Term Objectives for Goal 1: AI will practice effective communication with myself and others that demonstrates enhanced self worth           GOAL 1: Modality: Individual 2x per month   Completion Date na and The person(s) responsible for carrying out the plan is  Akin Solares[address]: Diagnosis and Treatment Plan explained to Linda Hauser relates understanding diagnosis and is agreeable to Treatment Plan          Client Comments : Please share your thoughts, feelings, need and/or experiences regarding your treatment plan:         __________________________________________________________________

## 2019-09-26 ENCOUNTER — SOCIAL WORK (OUTPATIENT)
Dept: BEHAVIORAL/MENTAL HEALTH CLINIC | Facility: CLINIC | Age: 34
End: 2019-09-26
Payer: COMMERCIAL

## 2019-09-26 DIAGNOSIS — F32.89 OTHER DEPRESSION: ICD-10-CM

## 2019-09-26 DIAGNOSIS — F41.1 GENERALIZED ANXIETY DISORDER: ICD-10-CM

## 2019-09-26 PROCEDURE — 90834 PSYTX W PT 45 MINUTES: CPT | Performed by: SOCIAL WORKER

## 2019-09-27 NOTE — PSYCH
Psychotherapy Provided: Individual Psychotherapy 50 minutes     Length of time in session: 50 minutes, follow up in 2 week    Goals addressed in session: Goal 1     Pain:      none    0    Current suicide risk : Low     D:  Chris Child spoke for the duration of today's session about her feelings re: her relationship with her paramour  She verbalized her conflicted feelings about their future as he has many very endearing traits while also lacking some follow through  A:  Chris Child appears to be concerned that he will not make the changes he agrees to and yet she very much wants him to     P:  Upcoming sessions will be used to continue to support her with the above  Behavioral Health Treatment Plan [facility]: Diagnosis and Treatment Plan explained to Roger Sullivan relates understanding diagnosis and is agreeable to Treatment Plan   Yes

## 2019-10-03 ENCOUNTER — CLINICAL SUPPORT (OUTPATIENT)
Dept: BARIATRICS | Facility: CLINIC | Age: 34
End: 2019-10-03
Payer: COMMERCIAL

## 2019-10-03 VITALS — HEIGHT: 68 IN | WEIGHT: 290.4 LBS | BODY MASS INDEX: 44.01 KG/M2

## 2019-10-03 DIAGNOSIS — R63.5 ABNORMAL WEIGHT GAIN: Primary | ICD-10-CM

## 2019-10-03 PROCEDURE — RECHECK

## 2019-10-09 ENCOUNTER — OFFICE VISIT (OUTPATIENT)
Dept: BARIATRICS | Facility: CLINIC | Age: 34
End: 2019-10-09

## 2019-10-09 ENCOUNTER — APPOINTMENT (OUTPATIENT)
Dept: LAB | Facility: HOSPITAL | Age: 34
End: 2019-10-09
Payer: COMMERCIAL

## 2019-10-09 DIAGNOSIS — R63.5 ABNORMAL WEIGHT GAIN: Primary | ICD-10-CM

## 2019-10-09 DIAGNOSIS — R73.09 ABNORMAL BLOOD SUGAR: ICD-10-CM

## 2019-10-09 DIAGNOSIS — I50.32 CHRONIC HEART FAILURE WITH PRESERVED EJECTION FRACTION (HCC): ICD-10-CM

## 2019-10-09 LAB
EST. AVERAGE GLUCOSE BLD GHB EST-MCNC: 114 MG/DL
HBA1C MFR BLD: 5.6 % (ref 4.2–6.3)
NT-PROBNP SERPL-MCNC: 229 PG/ML (ref 0–299)

## 2019-10-09 PROCEDURE — 83036 HEMOGLOBIN GLYCOSYLATED A1C: CPT

## 2019-10-09 PROCEDURE — RECHECK: Performed by: DIETITIAN, REGISTERED

## 2019-10-09 PROCEDURE — 83880 ASSAY OF NATRIURETIC PEPTIDE: CPT

## 2019-10-09 PROCEDURE — 36415 COLL VENOUS BLD VENIPUNCTURE: CPT

## 2019-10-09 NOTE — PROGRESS NOTES
Weight Management Medical Nutrition Assessment  Safia presented for the 5 month follow-up  session with the [facility]   Today's weight is 289 3 #   She has lost 6 1# in the past 3 week and overall has lost 35 3# in the past 6 months and # since first medical visit   She stated she has noticed she feels the dietary changes she has made are becoming more a normal way of life   She is food journaling using [employer] but will average 0130-3559 and flex up 2200 calories on weekends - using weight watchers james  Enjoying fitness challenge- motivates her  Anthropometric Measurements  Start Weight (#): 324  6#   (334 2# - 2019 - [place] visit)  Current Weight (#): 289 3#  TBW % Change from start weight:11%  Ideal Body Weight (#):135#  Goal Weight (#):ST#     LT-200#     Weight Loss History  Previous weight loss attempts: Commercial Programs (Weight Watchers, [employer], etc )  Self Created Diets (Portion Control, Healthy Food Choices, etc )     Food and Nutrition Related History        Food Recall     Breakfast: yogurt or skip   Snack:greek yogurt   Lunch:salad with chicken / tuna salad   Snack:yogurt or protein bar   Get home from work: nothing  Dinner: larger meal - smaller portions   Snack:enlightened bar      Trigger: sweets/ PB / Fruit Snack      Beverages: water, coffee with 1/2 1/2 4 TBSP Fat Free  Volume of beverage intake: 64-80oz daily      Weekends: Worse, dine out more before/ calories from alcohol now still dine out   Cravings: sweets at night   Trouble area of day:evening right before bed      Frequency of Eating out: in frequent   Food restrictions:none  Cooking: self   Food Shopping: self     Physical Activity Intake  Activity:6,000-8,000 steps  - walking 3 miles   Frequency: 5x week  Physical limitations/barriers to exercise: none     Estimated Needs  Energy  Tanita: BMR:4   X 1 3 -1000 =1540 calories  [employer] Indirect Calorimeter EIT: 5276 calories           Weight loss without exercise:1374 calories            Weight loss with exercise: 9244-5095 calories               [employer] Needs:  BMR : 2050  calories    1-2# loss weekly sedentary: 8909-3598 calories    1-2 # loss weekly lightly active: 0649-0285 calories  Protein:74-92gm      (1 2-1 5g/kg IBW)  Fluid: 51-61oz     (25-30ml/kg IBW)     Nutrition Diagnosis  Yes;    Overweight/obesity  related to Excess energy intake as evidenced by  BMI more than normative standard for age and sex (obesity-grade III 36+)     Nutrition Intervention     Nutrition Prescription  Calories:1600 calories on sedentary days  and flex to 6456-6826 on walk days   Protein:74-90gm daily   Fluid:50-60oz daily      Meal Plan  Breakfast:300/20/30  Snack:  Lunch:400/30/30  Snack: 150-200  Dinner:400-500/30/30  Snack:150-200     Nutrition Education:    Healthy Core Manual  Calorie controlled menu  Lean protein food choices  Healthy snack options  Food journaling tips        Nutrition Counseling:  Strategies: meal planning, portion sizes, healthy snack choices, hydration, fiber intake, protein intake, exercise, food journal        Monitoring and Evaluation:  Evaluation criteria:  Energy Intake  Meet protein needs  Maintain adequate hydration  Monitor weekly weight  Meal planning/preparation  Food journal   Decreased portions at mealtimes and snacks  Physical activity      Barriers to learning:none  Readiness to change: Action  Comprehension: good  Expected Compliance: good

## 2019-10-10 NOTE — PROGRESS NOTES
ASSESSMENT/PLAN:    Foot pain  X-ray normal  Almost resolved     Obstructive sleep apnea   Patient actually does not have this so this will be deleted from her chart      Viral myocarditis, history of  MRI shows no inflammation an ejection fraction improved 49-50%  Follows with Cardiology and here  For Cardiology if she loses another 20 lb she could stop her lisinopril  She already has cut it down     Hypertension  Continue lisinopril until another 20 lb comes off then per Cardiology she can stop  If she gets lightheaded or dizzy prior patient to call so I could cut the medicine down     Elevated fasting blood sugar  A1c is now normal 5 7 from 6  0! We will check it again in 6 months     Lung nodule on CT  We will check that again in March 2020, 1 the order given     Morbid obesity  Doing excellently with lifestyle changes and has already lost 55 lbs! !     Recheck in 6 months, fasting blood work with urine 1 week prior  Recheck sooner if needed           Health Maintenance   Topic Date Due    Pneumococcal Vaccine: Pediatrics (0 to 5 Years) and At-Risk Patients (6 to 59 Years) (1 of 1 - PPSV23) 09/12/1992    DTaP,Tdap,and Td Vaccines (5 - Tdap) 03/30/2010    INFLUENZA VACCINE  07/01/2019    BMI: Followup Plan  07/13/2020    BMI: Adult  10/12/2020    Cervical Cancer Screening  12/19/2020    Pneumococcal Vaccine: 65+ Years (1 of 2 - PCV13) 09/12/2051    HEPATITIS B VACCINES  Completed         Problem List as of 10/12/2019 Reviewed: 8/14/2019  4:10 PM by Paco Hylton PA-C    Depression    Last Assessment & Plan 4/5/2019 Consult Edited 4/5/2019 12:30 PM by Paco Hylton PA-C     -tearful during visit due to recent health stressors, but reports over all well controlled since initial treatment in [id number] with Lexapro  -Seeing therapy this month          Essential hypertension    Last Assessment & Plan 8/14/2019 Office Visit Written 8/15/2019  9:39 AM by Paco Hylton PA-C     -continue to monitor BP Generalized anxiety disorder    History of cervical dysplasia    Morbid obesity [facility])    Last Assessment & Plan 8/14/2019 Office Visit Written 8/15/2019  9:37 AM by Phill Abreu PA-C     -Patient is pursuing [employer] after completing  [facility] Lifestyle Intervention Program  -Initial weight loss goal of 5-10% weight loss for improved health  -Discussed options for weight loss medications (cravings around menses)- finds this manageable so not interested  -She will focus on increasing her exercise - see goals below  -She will try adding plant based proteins like beans and lentils  -Recommend f/u as scheduled with sleep medicine     Initial: 334 2 (Pt had lost 15+ lbs prior to starting MWM)  Current: 286 7  (-16 5 lbs since 6/7/19)  Change: -47 5 lbs  Goal:         Obstructive sleep apnea    Pre-diabetes    Last Assessment & Plan 8/14/2019 Office Visit Edited 8/15/2019  9:38 AM by Phill Abreu PA-C     5 8 --> 6%  -would have suspected a decrease since she has lost 47 5 lbs  -would recommend repeating in 4-6 months as she continues with positive lifestyle changes  -can consider metformin or Saxenda                 Subjective:   Chief Complaint   Patient presents with    Hypertension    Elevated fasting blood sugar     Patient is here for recheck  Since last visit she has lost another 17 lb bringing her total weight loss to greater than 55 lb! She is doing this naturally through weight loss at [facility] as well as [employer] and exercise  Patient is proud of herself and feels very good  In the meantime she had her sleep study evaluated she does not have sleep apnea    She also saw Cardiology who told her she loses another 20 lb she could be off all medications  She also had her A1c done as well as a BNP for recheck  She is here today to go over those results  patient ID: Xochitl Mota is a 36 y o  female      Past Medical History:   Diagnosis Date    Anxiety     Depression     Temporomandibular joint disorder     Resolved 2015      Past Surgical History:   Procedure Laterality Date    CERVICAL BIOPSY  W/ LOOP ELECTRODE EXCISION      DENTAL SURGERY      [place] teeth extraction    TONSILLECTOMY       Family History   Problem Relation Age of Onset    Diabetes Mother     Hypertension Father     Obesity Father     OCD Father     Colon cancer Maternal Grandmother     Diabetes Paternal Grandmother     Stroke Paternal Grandmother         TIA    Diabetes Paternal Grandfather     Heart disease Paternal Grandfather     Alcohol abuse Brother     Depression Maternal Grandfather     Anxiety disorder Maternal Grandfather     Hypothyroidism Paternal Aunt     Substance Abuse Neg Hx     Thyroid cancer Neg Hx      Social History     Tobacco Use    Smoking status: Former Smoker     Last attempt to quit: 3/17/2019     Years since quittin 5    Smokeless tobacco: Former User   Substance Use Topics    Alcohol use: Yes     Comment: wine 2 glasses a week    Drug use: No     Social History     Tobacco Use   Smoking Status Former Smoker    Last attempt to quit: 3/17/2019    Years since quittin 5   Smokeless Tobacco Former User        MED LIST WAS REVIEWED AND UPDATED       ROS    Constitutional  No fever chills no fatigue no weight loss no weight gain    Mental status  No anxiety or depression and no sleep disturbances no changes in personality or emotional problems no suicidal or homicidal ideations    Eyes  No eye pain no red eyes no visual disturbances no discharge no eye itch    ENT  No earache no hearing loss nasal discharge no sore throat no hoarseness no postnasal drip     Cardio  No chest pain no palpitations no leg edema no claudication no dyspnea on exertion no nocturnal dyspnea    Respiratory  No shortness of breath or wheeze no cough no orthopnea no dyspnea on exertion no hemoptysis no sputum production    GI  No abdominal pain no nausea no vomiting no diarrhea or constipation no bloody stools no change in bowel habits no change in weight      no dysuria hematuria no pyuria no incontinence no pelvic pain    Musculoskeletal  No myalgias arthralgias no joint swelling or stiffness no limb pain or swelling    Skin  No rashes or lesions no itchiness and no wounds    Neuro  No headache dizziness lightheadedness syncope numbness paresthesias or confusion    Heme  No swollen glands no easy bruising            Objective:      VITALS:  Wt Readings from Last 3 Encounters:   10/12/19 130 kg (286 lb 1 6 oz)   10/11/19 131 kg (288 lb)   10/03/19 132 kg (290 lb 6 4 oz)     BP Readings from Last 3 Encounters:   10/12/19 120/74   10/11/19 108/68   08/28/19 122/74     Pulse Readings from Last 3 Encounters:   10/12/19 84   10/11/19 78   08/28/19 75     Body mass index is 44 81 kg/m²      Laboratory Results:   Lab Results   Component Value Date    WBC 9 90 03/18/2019    WBC 9 80 03/17/2019    WBC 13 72 (H) 01/24/2017    HGB 12 7 04/29/2019    HGB 10 5 (L) 03/18/2019    HGB 11 2 (L) 03/17/2019    HCT 39 8 04/29/2019    HCT 31 8 (L) 03/18/2019    HCT 35 2 (L) 03/17/2019    MCV 79 (L) 03/18/2019    MCV 80 03/17/2019    MCV 82 01/24/2017     03/18/2019     03/17/2019     (H) 01/24/2017     Lab Results   Component Value Date    K 4 2 06/20/2019    K 4 2 04/03/2019    K 3 3 (L) 03/19/2019     06/20/2019     04/03/2019     03/19/2019    CO2 29 06/20/2019    CO2 28 04/03/2019    CO2 32 (H) 03/19/2019    BUN 18 06/20/2019    BUN 20 04/03/2019    BUN 14 03/19/2019     Lab Results   Component Value Date    ALT 31 03/17/2019    AST 22 03/17/2019    ALKPHOS 85 03/17/2019    EGFR 124 06/20/2019    CALCIUM 8 8 06/20/2019     No results found for: TSHRFT4      Lipid Profile:   No results found for: CHOL  Lab Results   Component Value Date    HDL 44 04/29/2019    HDL 63 (H) 01/24/2017     Lab Results   Component Value Date    LDLCALC 111 04/29/2019 [address] 87 01/24/2017     Lab Results   Component Value Date    TRIG 128 04/29/2019    TRIG 111 01/24/2017       Diabetic labs (if applicable)  Lab Results   Component Value Date    HGBA1C 5 6 10/09/2019    HGBA1C 6 0 06/20/2019    HGBA1C 5 8 03/18/2019     Lab Results   Component Value Date    GLUF 103 (H) 06/20/2019    LDLCALC 111 04/29/2019    CREATININE 0 56 (L) 06/20/2019          Physical Exam    Constitutional  Appears healthy, Looks well, Appearance consistent with age    Mental Status  Alert, Oriented, Cooperative, Memory function normal , clean, and reasonable    Neck  No neck mass, No thyromegaly, Good carotid upstrokes bilaterally, trachea midline positive click    Respiratory  Breath sounds normal, No rales, No rhonchi, No wheezing, normal palpation    Cardiac   Regular rhythm without ectopy or murmur no S3-S4, no heave lift or thrill to palpation    Vascular  No leg edema, No pedal edema    Muscular skeletal  No clubbing cyanosis , muscle tone normal    Skin  No appreciable rashes or abnormal appearing lesions

## 2019-10-11 ENCOUNTER — OFFICE VISIT (OUTPATIENT)
Dept: SLEEP CENTER | Facility: CLINIC | Age: 34
End: 2019-10-11
Payer: COMMERCIAL

## 2019-10-11 VITALS
WEIGHT: 288 LBS | BODY MASS INDEX: 43.65 KG/M2 | HEIGHT: 68 IN | HEART RATE: 78 BPM | DIASTOLIC BLOOD PRESSURE: 68 MMHG | SYSTOLIC BLOOD PRESSURE: 108 MMHG

## 2019-10-11 DIAGNOSIS — F45.8 BRUXISM: ICD-10-CM

## 2019-10-11 DIAGNOSIS — F41.1 GENERALIZED ANXIETY DISORDER: ICD-10-CM

## 2019-10-11 DIAGNOSIS — R06.83 PRIMARY SNORING: Primary | ICD-10-CM

## 2019-10-11 DIAGNOSIS — E66.01 MORBID OBESITY (HCC): ICD-10-CM

## 2019-10-11 DIAGNOSIS — G47.8 NON-RESTORATIVE SLEEP: ICD-10-CM

## 2019-10-11 DIAGNOSIS — I10 ESSENTIAL HYPERTENSION: ICD-10-CM

## 2019-10-11 PROCEDURE — 3074F SYST BP LT 130 MM HG: CPT | Performed by: INTERNAL MEDICINE

## 2019-10-11 PROCEDURE — 99214 OFFICE O/P EST MOD 30 MIN: CPT | Performed by: INTERNAL MEDICINE

## 2019-10-11 PROCEDURE — 3078F DIAST BP <80 MM HG: CPT | Performed by: INTERNAL MEDICINE

## 2019-10-11 NOTE — PROGRESS NOTES
Follow-up Note - Sleep Center   Valentina Jackson  36 y o  female  :1985  VGY:[id number]    I saw Wild Forrest in sleep clinic today for her sleep complaints & comorbidities  Patient recently had a diagnostic sleep study and is here to review results / treatment options  The study demonstrated no evidence for obstructive sleep apnea: AHI 1 7 /hour     Infrequent snoring was noted    Minimum oxygen saturation 88 %  with mean oxygen saturation during sleep of 93 3%  She had difficulty initiating and maintaining sleep  Sleep efficiency was reduced at 66%  However sleep architecture was normal        PFSH, Problem List, Medications & Allergies were reviewed in EMR  Interval changes: none reported  She  has a past medical history of Anxiety, Depression, and Temporomandibular joint disorder  She has a current medication list which includes the following prescription(s): carvedilol, escitalopram, furosemide, lisinopril, multiple vitamins-calcium, norgestimate-ethinyl estradiol, and imiquimod  ROS: reviewed see attached  Significant for intentional weight loss of around 55 lb since March this year  Presently she is not reporting cardiac or respiratory symptoms  Her BNP is now normal  HPI:  She reports no snoring or breathing difficulties during sleep  She is satisfied with sleep and daytime function  She had difficulty falling asleep on the study night because of anxiety  She continues to report teeth grinding  Sleep Routine:  She is getting 8 hours sleep and reports no difficulty initiating or maintaining sleep  She feels sleep quality has improved  She awakens with the aid of an alarm feeling refreshed  She denied excessive drowsiness and rated herself at Total score: 3 /24 on the [place] sleepiness scale  Habits:  reports that she quit smoking about 6 months ago  She has quit using smokeless tobacco  She reports that she drinks alcohol  She reports that she does not use drugs  EXAM: /68   Pulse 78   Ht 5' 7 5" (1 715 m)   Wt 131 kg (288 lb)   BMI 44 44 kg/m²     Patient is well groomed; well appearing  Skin/Extrem: warm & dry; col & hydration normal; no edema  Psych: cooperativeand in no distress  Mental state appears normal   CNS: Alert, orientated, clear & coherent speech  H&N: EOMI; NC/AT:no facial pressure marks, no rashes  Neck Circumference: 14 cm  ENMT Mucosa appearsnormal Nasal airway:patent  Oral airway:  crowded  Resp:effort is normal CVS: RRR ABD: truncal obesity MSK:Gait normal     IMPRESSION: Primary Sleep/Secondary(to Medical or Psych conditions) & comorbidities   1  Primary snoring     2  Non-restorative sleep      Improved   3  Bruxism     4  Generalized anxiety disorder     5  Essential hypertension     6  Morbid obesity ([facility] [address] )         PLAN:    1  I reviewed results of the Sleep studies with the patient  2  With respect to above conditions, I counseled on pathophysiology, diagnosis, treatment options, risks and benefits; inter-relationship and effects on symptoms and comorbidities; risks of no treatment; costs and insurance aspects  3  She Is in the weight management program and was encouraged persist with her efforts at weight reduction  I anticipate the residual snoring will resolve with further weight reduction  4  Cognitive behavioral therapy was initiated, Sleep Hygiene and behavioral techniques to manage Insomnia were discussed  5  Follow-up to be scheduled as needed  Thank you for allowing me to participate in the care of this patient       Sincerely,    Authenticated electronically by Levy Vásquez MD on 23/74/54   Board Certified Specialist

## 2019-10-11 NOTE — PATIENT INSTRUCTIONS

## 2019-10-11 NOTE — PROGRESS NOTES
Review of Systems      Genitourinary need to urinate more than twice a night   Cardiology none   Gastrointestinal none   Neurology none   Constitutional weight change   Integumentary none   Psychiatry anxiety   Musculoskeletal leg cramps   Pulmonary none   ENT none   Endocrine none   Hematological none

## 2019-10-12 ENCOUNTER — OFFICE VISIT (OUTPATIENT)
Dept: FAMILY MEDICINE CLINIC | Facility: CLINIC | Age: 34
End: 2019-10-12
Payer: COMMERCIAL

## 2019-10-12 VITALS
RESPIRATION RATE: 16 BRPM | BODY MASS INDEX: 44.9 KG/M2 | SYSTOLIC BLOOD PRESSURE: 120 MMHG | WEIGHT: 286.1 LBS | DIASTOLIC BLOOD PRESSURE: 74 MMHG | HEART RATE: 84 BPM | HEIGHT: 67 IN

## 2019-10-12 DIAGNOSIS — E78.2 MIXED HYPERLIPIDEMIA: ICD-10-CM

## 2019-10-12 DIAGNOSIS — R73.09 ELEVATED HEMOGLOBIN A1C: ICD-10-CM

## 2019-10-12 DIAGNOSIS — F41.1 GENERALIZED ANXIETY DISORDER: ICD-10-CM

## 2019-10-12 DIAGNOSIS — I10 ESSENTIAL HYPERTENSION: Primary | ICD-10-CM

## 2019-10-12 DIAGNOSIS — E66.01 MORBID OBESITY (HCC): ICD-10-CM

## 2019-10-12 DIAGNOSIS — E55.9 VITAMIN D DEFICIENCY: ICD-10-CM

## 2019-10-12 DIAGNOSIS — R91.1 PULMONARY NODULE: ICD-10-CM

## 2019-10-12 PROCEDURE — 99214 OFFICE O/P EST MOD 30 MIN: CPT | Performed by: FAMILY MEDICINE

## 2019-10-12 NOTE — PATIENT INSTRUCTIONS
1  Excellent work with all your weight loss, now you do not have sleep apnea which does not sound like he had anyway, you're A1c is back to normal range, and her blood pressure is excellent and you are almost ready to stop any medicine for that  I will see you back in 6 months with fasting blood work 1 week prior with urine    I will see you sooner if needed    If you start getting lightheaded when he changed positions from lying to sitting or sitting to standing let me know so we could come back here lisinopril sooner rather than later

## 2019-10-21 ENCOUNTER — SOCIAL WORK (OUTPATIENT)
Dept: BEHAVIORAL/MENTAL HEALTH CLINIC | Facility: CLINIC | Age: 34
End: 2019-10-21
Payer: COMMERCIAL

## 2019-10-21 DIAGNOSIS — F41.1 GENERALIZED ANXIETY DISORDER: ICD-10-CM

## 2019-10-21 DIAGNOSIS — F32.89 OTHER DEPRESSION: ICD-10-CM

## 2019-10-21 PROCEDURE — 90834 PSYTX W PT 45 MINUTES: CPT | Performed by: SOCIAL WORKER

## 2019-10-22 NOTE — PSYCH
Psychotherapy Provided: Individual Psychotherapy 50 minutes     Length of time in session: 50 minutes, follow up in 2 week    Goals addressed in session: Goal 1     Pain:      none    0    Current suicide risk : Low     D:  Julianne Eastman spoke for the duration of today's session about her feelings re: her relationship with her food, as well as with her paramour  She again erbalized her conflicted feelings about their future while also more openly discussing her struggles with candy, portions  A:  Julianne Eastman appears to be less defended and more open to the therapy relationship at this time  P:  Upcoming sessions will be used to continue to support her with the above  Behavioral Health Treatment Plan [facility]: Diagnosis and Treatment Plan explained to Kathya Lara relates understanding diagnosis and is agreeable to Treatment Plan   Yes

## 2019-10-24 ENCOUNTER — CLINICAL SUPPORT (OUTPATIENT)
Dept: BARIATRICS | Facility: CLINIC | Age: 34
End: 2019-10-24

## 2019-10-24 VITALS — BODY MASS INDEX: 44.19 KG/M2 | WEIGHT: 291.6 LBS | HEIGHT: 68 IN

## 2019-10-24 DIAGNOSIS — R63.5 ABNORMAL WEIGHT GAIN: Primary | ICD-10-CM

## 2019-10-24 PROCEDURE — RECHECK

## 2019-10-31 ENCOUNTER — CLINICAL SUPPORT (OUTPATIENT)
Dept: BARIATRICS | Facility: CLINIC | Age: 34
End: 2019-10-31

## 2019-10-31 VITALS — BODY MASS INDEX: 43.98 KG/M2 | HEIGHT: 68 IN | WEIGHT: 290.2 LBS

## 2019-10-31 DIAGNOSIS — R63.5 ABNORMAL WEIGHT GAIN: Primary | ICD-10-CM

## 2019-10-31 PROCEDURE — RECHECK

## 2019-11-07 ENCOUNTER — CLINICAL SUPPORT (OUTPATIENT)
Dept: BARIATRICS | Facility: CLINIC | Age: 34
End: 2019-11-07

## 2019-11-07 VITALS — BODY MASS INDEX: 43.8 KG/M2 | WEIGHT: 289 LBS | HEIGHT: 68 IN

## 2019-11-07 DIAGNOSIS — R63.5 ABNORMAL WEIGHT GAIN: Primary | ICD-10-CM

## 2019-11-07 PROCEDURE — RECHECK

## 2019-11-07 NOTE — PROGRESS NOTES
Assessment/Plan: Morbid obesity with body mass index (BMI) of 40 0 to 49 9 ([facility] [address] )  -Patient is pursuing [employer] after completing  [facility]-Intensive Lifestyle Intervention Program  -Initial weight loss goal of 5-10% weight loss for improved health  -Caution [place] with hx of CHF  -She is considering purchasing a treadmill for the winter months or going back to the gym  -Grazing at night- not logging  Encouraged mindfulness and meditation  Encouraged her to write down goals/motivations  Encouraged her to log these snacks/items  She is happy that she is noticing this behavior and plans to work on this over the next few months  Recommend trying to drink water before snacking and see if this helps determine if she is hungry or not  States she feels it is more so out of boredom  Trying to keep triggers out of site, however, her boy friend eats unhealthy snacks and she knows they are in the house, which makes this challenging  -Discussed options for weight loss medications- she is not currently interested     Initial: 334 2 (Pt had lost 15+ lbs prior to starting MWM)  Current: 289 7 (-7 lbs since 8/14/19)    Change: -44 5 lbs (13 % TBW)  Goal: unsure, recommended she think about this as this may help keep her motivated  Before end of visit mentioned STG of losing 10 lbs before the end of the year  Another goal is paying attention to her mental health and continuing with therapy especially when she has emotional eating during a stressful week  Essential hypertension  HTN/CHF  -on carvedilol, lisinopril, and furosemide  -continue to monitor  -Continue management with Cardiology- Dr Misti Manriquez  -per 8/28/19 visit with cardiology: "If loses 20 more lbs and systolic BP < 699 mmHg can stop coreg " Weight from 8/28 - 298    Elevated hemoglobin A1c  5 8 --> 6% --> 5 6 %    Follow up in approximately 3 months with Non-Surgical Physician/Advanced Practitioner      Goals:  Food log (ie ) www myfitnesspal com,sparkpeople  com,loseit com,calorieking  com,etc  baritastic  No sugary beverages  At least 64oz of water daily  Increase physical activity by 10 minutes daily  Gradually increase physical activity to a goal of 5 days per week for 30 minutes of MODERATE intensity PLUS 2 days per week of FULL BODY resistance training  5-10 servings of fruits and vegetables per day and 25-35 grams of dietary fiber per day, gradually increasing  1600 calories on sedentary days and flex to [id number] on walk days   Monitor blood pressure and notify the provider if consistently around 100/60 or you have symptoms of low blood pressure (lightheadedness/dizziness)     Diagnoses and all orders for this visit:    Morbid obesity with body mass index (BMI) of 40 0 to 49 9 (HCC)    Essential hypertension    Elevated hemoglobin A1c        Subjective:   Chief Complaint   Patient presents with    Follow-up     3mo fu     Patient ID: Xochitl Mota  is a 36 y o  female with excess weight/obesity here to pursue weight management  Patient is pursuing [place]  HPI  The patient presents for MWM follow up  BP: no concerns    Food logging: yes, but noticing she has been adding bites in the evenings and has not been logging this, which she feels is a reason for the slower weight loss  Not feeling as motivated  Increased appetite/cravings: not particularly, but grazing at night  Feels this may be attributed to stress with recent board exam  Fruit/Vegetable servings: 3 fruit, 3 vegetables  Exercise: walking 2-3x/week+ trying to do some strength training- 1x/week  Hydration: 64 oz of water per day    The following portions of the patient's history were reviewed and updated as appropriate: allergies, current medications, past family history, past medical history, past social history, past surgical history and problem list     Review of Systems   Respiratory: Negative  Cardiovascular: Negative      Gastrointestinal: Negative  Neurological: Negative for dizziness and light-headedness  Psychiatric/Behavioral:        Denies concerns with mood     Objective:    /80 (BP Location: Left arm, Patient Position: Sitting, Cuff Size: Large)   Pulse (!) 53   Temp 97 9 °F (36 6 °C) (Tympanic)   Resp 17   Ht 5' 7 5" (1 715 m)   Wt 131 kg (289 lb 11 2 oz)   BMI 44 70 kg/m²      Physical Exam   Nursing note and vitals reviewed  Constitutional   General appearance: Abnormal   well developed and morbidly obese  Eyes No conjunctival pallor  Ears, Nose, Mouth, and Throat Oral mucosa moist    Pulmonary   Respiratory effort: No increased work of breathing or signs of respiratory distress  Auscultation of lungs: Clear to auscultation, equal breath sounds bilaterally, no wheezes, no rales, no rhonci  Cardiovascular   Auscultation of heart: Normal rate and rhythm, normal S1 and S2, without murmurs  Examination of extremities for edema and/or varicosities: Normal   no edema  Abdomen   Abdomen: Abnormal   The abdomen was obese  Bowel sounds were normal  The abdomen was soft and nontender     Musculoskeletal   Gait and station: Normal     Psychiatric   Orientation to person, place and time: Normal     Affect: appropriate

## 2019-11-11 ENCOUNTER — SOCIAL WORK (OUTPATIENT)
Dept: BEHAVIORAL/MENTAL HEALTH CLINIC | Facility: CLINIC | Age: 34
End: 2019-11-11
Payer: COMMERCIAL

## 2019-11-11 DIAGNOSIS — F41.1 GENERALIZED ANXIETY DISORDER: ICD-10-CM

## 2019-11-11 DIAGNOSIS — F32.89 OTHER DEPRESSION: ICD-10-CM

## 2019-11-11 PROCEDURE — 90834 PSYTX W PT 45 MINUTES: CPT | Performed by: SOCIAL WORKER

## 2019-11-12 ENCOUNTER — OFFICE VISIT (OUTPATIENT)
Dept: BARIATRICS | Facility: CLINIC | Age: 34
End: 2019-11-12
Payer: COMMERCIAL

## 2019-11-12 VITALS
WEIGHT: 289.7 LBS | SYSTOLIC BLOOD PRESSURE: 120 MMHG | DIASTOLIC BLOOD PRESSURE: 80 MMHG | HEIGHT: 68 IN | TEMPERATURE: 97.9 F | HEART RATE: 53 BPM | BODY MASS INDEX: 43.91 KG/M2 | RESPIRATION RATE: 17 BRPM

## 2019-11-12 DIAGNOSIS — R73.09 ELEVATED HEMOGLOBIN A1C: ICD-10-CM

## 2019-11-12 DIAGNOSIS — E66.01 MORBID OBESITY WITH BODY MASS INDEX (BMI) OF 40.0 TO 49.9 (HCC): Primary | ICD-10-CM

## 2019-11-12 DIAGNOSIS — I10 ESSENTIAL HYPERTENSION: ICD-10-CM

## 2019-11-12 PROCEDURE — 99214 OFFICE O/P EST MOD 30 MIN: CPT | Performed by: PHYSICIAN ASSISTANT

## 2019-11-12 NOTE — ASSESSMENT & PLAN NOTE
-Patient is pursuing [employer] after completing  [facility]-Intensive Lifestyle Intervention Program  -Initial weight loss goal of 5-10% weight loss for improved health  -Caution [place] with hx of CHF  -She is considering purchasing a treadmill for the winter months or going back to the gym  -Grazing at night- not logging  Encouraged mindfulness and meditation  Encouraged her to write down goals/motivations  Encouraged her to log these snacks/items  She is happy that she is noticing this behavior and plans to work on this over the next few months  Recommend trying to drink water before snacking and see if this helps determine if she is hungry or not  States she feels it is more so out of boredom  Trying to keep triggers out of site, however, her boy friend eats unhealthy snacks and she knows they are in the house, which makes this challenging  -Discussed options for weight loss medications- she is not currently interested     Initial: 334 2 (Pt had lost 15+ lbs prior to starting MWM)  Current: 289 7 (-7 lbs since 8/14/19)    Change: -44 5 lbs (13 % TBW)  Goal: unsure, recommended she think about this as this may help keep her motivated  Before end of visit mentioned STG of losing 10 lbs before the end of the year  Another goal is paying attention to her mental health and continuing with therapy especially when she has emotional eating during a stressful week

## 2019-11-12 NOTE — ASSESSMENT & PLAN NOTE
HTN/CHF  -on carvedilol, lisinopril, and furosemide  -continue to monitor  -Continue management with Cardiology- Dr Hero Dhillon  -per 8/28/19 visit with cardiology: "If loses 20 more lbs and systolic BP < 005 mmHg can stop coreg " Weight from 8/28 - 298

## 2019-11-12 NOTE — PATIENT INSTRUCTIONS
Goals: Food log (ie ) www myfitnesspal com,sparkpeople  com,loseit com,calorieking  com,etc  baritastic  No sugary beverages  At least 64oz of water daily  Increase physical activity by 10 minutes daily   Gradually increase physical activity to a goal of 5 days per week for 30 minutes of MODERATE intensity PLUS 2 days per week of FULL BODY resistance training  5-10 servings of fruits and vegetables per day and 25-35 grams of dietary fiber per day, gradually increasing  1600 calories on sedentary days and flex to [id number] on walk days   Monitor blood pressure and notify the provider if consistently around 100/60 or you have symptoms of low blood pressure (lightheadedness/dizziness)

## 2019-11-13 ENCOUNTER — HOSPITAL ENCOUNTER (OUTPATIENT)
Dept: CT IMAGING | Facility: HOSPITAL | Age: 34
Discharge: HOME | End: 2019-11-13
Payer: COMMERCIAL

## 2019-11-13 DIAGNOSIS — R91.1 PULMONARY NODULE: ICD-10-CM

## 2019-11-13 PROCEDURE — 71250 CT THORAX DX C-: CPT

## 2019-11-14 NOTE — RESULT ENCOUNTER NOTE
Please call patient regarding CT scan  The previously seen nodule has resolved  No other CTs of her chest needed for this problem

## 2019-11-20 DIAGNOSIS — I50.9 ACUTE CONGESTIVE HEART FAILURE, UNSPECIFIED HEART FAILURE TYPE (HCC): ICD-10-CM

## 2019-11-20 DIAGNOSIS — R03.0 ELEVATED BP WITHOUT DIAGNOSIS OF HYPERTENSION: ICD-10-CM

## 2019-11-21 DIAGNOSIS — Z30.41 SURVEILLANCE FOR BIRTH CONTROL, ORAL CONTRACEPTIVES: Primary | ICD-10-CM

## 2019-11-21 RX ORDER — LISINOPRIL 20 MG/1
20 TABLET ORAL
Qty: 180 TABLET | Refills: 1
Start: 2019-11-21 | End: 2019-12-09 | Stop reason: SDUPTHER

## 2019-11-21 RX ORDER — FUROSEMIDE 20 MG/1
20 TABLET ORAL DAILY
Qty: 90 TABLET | Refills: 3 | Status: SHIPPED | OUTPATIENT
Start: 2019-11-21 | End: 2021-03-01 | Stop reason: ALTCHOICE

## 2019-11-21 RX ORDER — NORGESTIMATE AND ETHINYL ESTRADIOL 7DAYSX3 LO
1 KIT ORAL DAILY
Qty: 28 TABLET | Refills: 1 | Status: SHIPPED | OUTPATIENT
Start: 2019-11-21 | End: 2019-12-30 | Stop reason: SDUPTHER

## 2019-11-22 NOTE — PSYCH
Psychotherapy Provided: Individual Psychotherapy 50 minutes     Length of time in session: 50 minutes, follow up in 2 week    Goals addressed in session: Goal 1     Pain:      none    0    Current suicide risk : Low     D:  Garth Oneal spoke again today for the duration of today's session about her feelings re: her relationship with her food, as well as with her paramour  She shared her work in expressing her limit setting with him and her desire to have a healthy adult relationship with a partner and her self  A:  Garth Oneal appears to be less defended and more open to the therapy relationship at this time  P:  Upcoming sessions will be used to continue to support her with the above  Behavioral Health Treatment Plan [facility]: Diagnosis and Treatment Plan explained to Myrna Shinto relates understanding diagnosis and is agreeable to Treatment Plan   Yes

## 2019-11-25 ENCOUNTER — SOCIAL WORK (OUTPATIENT)
Dept: BEHAVIORAL/MENTAL HEALTH CLINIC | Facility: CLINIC | Age: 34
End: 2019-11-25
Payer: COMMERCIAL

## 2019-11-25 DIAGNOSIS — F41.1 GENERALIZED ANXIETY DISORDER: ICD-10-CM

## 2019-11-25 DIAGNOSIS — F32.89 OTHER DEPRESSION: ICD-10-CM

## 2019-11-25 DIAGNOSIS — R03.0 ELEVATED BP WITHOUT DIAGNOSIS OF HYPERTENSION: ICD-10-CM

## 2019-11-25 PROCEDURE — 90834 PSYTX W PT 45 MINUTES: CPT | Performed by: SOCIAL WORKER

## 2019-11-25 RX ORDER — CARVEDILOL 3.12 MG/1
3.12 TABLET ORAL 2 TIMES DAILY WITH MEALS
Qty: 180 TABLET | Refills: 3 | Status: SHIPPED | OUTPATIENT
Start: 2019-11-25 | End: 2020-06-23 | Stop reason: SDUPTHER

## 2019-11-26 NOTE — PROGRESS NOTES
Weight Management Medical Nutrition Assessment  Safia presented for the 6 month follow-up  session with the [facility]   Today's weight is 284 3  #   She has lost 5 4# in the past 2 week and overall has lost 40 3# in the past 7 months and 49 9# since first medical visit   She stated she has been struggling lately with staying motivated to make the dietary changes she needs to make  She has noticed she is snacking more at night  She has not been able to walk like she would like and is not food journaling daily  When she has journaled she is averaging 3080-1135 calories   She stated she does feel like she is being more mindful of food choices and portion sizes daily        Anthropometric Measurements  Start Weight (#): 324 6# (19)  (334 2# - 2019 - [place] visit)  Current Weight (#): 284 3#  TBW % Change from start weight: 12 4%   Ideal Body Weight (#):135#  Goal Weight (#):ST#     LT-200#     Weight Loss History  Previous weight loss attempts: Commercial Programs (Weight Watchers, Veeqo Bart, etc )  Self Created Diets (Portion Control, Healthy Food Choices, etc )     Food and Nutrition Related History        Food Recall     Breakfast: yogurt or skip   Snack:greek yogurt   Lunch:salad with chicken / tuna salad   Snack:yogurt or protein bar   Get home from work: nothing  Dinner: larger meal - smaller portions   Snack:snacking more- PB/ PB pretzels     Trigger: sweets/ PB / Fruit Snack      Beverages: water, coffee with 1/2 1/2 4 TBSP Fat Free  Volume of beverage intake: 64-80oz daily      Weekends: Worse, dine out more before/ calories from alcohol now still dine out   Cravings: sweets at night   Trouble area of day:evening right before bed      Frequency of Eating out: in frequent   Food restrictions:none  Cooking: self   Food Shopping: self     Physical Activity Intake  Activity:6,000-8,000 steps     Frequency:infrequent  Physical limitations/barriers to exercise: none     Estimated Needs  Energy  Marlo: [id number]   I 1 3 -1000 =1540 calories  Reevue Indirect Calorimeter [id number] calories           Weight loss without exercise:1374 calories            Weight loss with exercise: 5967-5000 calories               Kelsea Reza Energy Needs:  BMR : 2027  calories    1-2# loss weekly sedentary: 0667-3948 calories    1-2 # loss weekly lightly active: 7985-6324 calories  Protein:74-92gm      (1 2-1 5g/kg IBW)  Fluid: 51-61oz     (25-30ml/kg IBW)     Nutrition Diagnosis  Yes;    Overweight/obesity  related to Excess energy intake as evidenced by  BMI more than normative standard for age and sex (obesity-grade III 36+)     Nutrition Intervention     Nutrition Prescription  Calories:7993-9812 calories on sedentary days  and flex HASEEB 4758-7570 on walk days   Protein:74-90gm daily   Fluid:50-60oz daily      Meal Plan  Breakfast:300/20/30  Snack:  Lunch:400/30/30  Snack: 150-200  Dinner:400-500/30/30  Snack:150-200     Nutrition Education:    Healthy Core Manual  Calorie controlled menu  Lean protein food choices  Healthy snack options  Food journaling tips        Nutrition Counseling:  Strategies: meal planning, portion sizes, healthy snack choices, hydration, fiber intake, protein intake, exercise, food journal        Monitoring and Evaluation:  Evaluation criteria:  Energy Intake  Meet protein needs  Maintain adequate hydration  Monitor weekly weight  Meal planning/preparation  Food journal   Decreased portions at mealtimes and snacks  Physical activity      Barriers to learning:none  Readiness to change: Action  Comprehension: good  Expected Compliance: good

## 2019-11-26 NOTE — PSYCH
Psychotherapy Provided: Individual Psychotherapy 50 minutes     Length of time in session: 50 minutes, follow up in 2 week    Goals addressed in session: Goal 1     Pain:      none    0    Current suicide risk : Low     D:  Penny Santiago spoke again today for the duration of today's session about her feelings re: her relationship with her paramour  She divulged her recent outburst towards him as well as her frustration with him  A:  Penny Santiago continues to be less defended and more open to the therapy relationship at this time  She continues to process more each session resulting in more awareness and growth  P:  Upcoming sessions will be used to continue to support her with the above  Behavioral Health Treatment Plan [facility]: Diagnosis and Treatment Plan explained to Laila Che relates understanding diagnosis and is agreeable to Treatment Plan   Yes

## 2019-11-27 ENCOUNTER — OFFICE VISIT (OUTPATIENT)
Dept: BARIATRICS | Facility: CLINIC | Age: 34
End: 2019-11-27

## 2019-11-27 VITALS — HEIGHT: 67 IN | WEIGHT: 284.3 LBS | BODY MASS INDEX: 44.62 KG/M2

## 2019-11-27 DIAGNOSIS — R63.5 ABNORMAL WEIGHT GAIN: Primary | ICD-10-CM

## 2019-11-27 PROCEDURE — RECHECK: Performed by: DIETITIAN, REGISTERED

## 2019-12-09 ENCOUNTER — SOCIAL WORK (OUTPATIENT)
Dept: BEHAVIORAL/MENTAL HEALTH CLINIC | Facility: CLINIC | Age: 34
End: 2019-12-09
Payer: COMMERCIAL

## 2019-12-09 DIAGNOSIS — R03.0 ELEVATED BP WITHOUT DIAGNOSIS OF HYPERTENSION: ICD-10-CM

## 2019-12-09 DIAGNOSIS — F32.89 OTHER DEPRESSION: ICD-10-CM

## 2019-12-09 DIAGNOSIS — F41.1 GENERALIZED ANXIETY DISORDER: ICD-10-CM

## 2019-12-09 PROCEDURE — 90834 PSYTX W PT 45 MINUTES: CPT | Performed by: SOCIAL WORKER

## 2019-12-09 RX ORDER — LISINOPRIL 20 MG/1
20 TABLET ORAL
Qty: 180 TABLET | Refills: 1 | Status: SHIPPED | OUTPATIENT
Start: 2019-12-09 | End: 2020-07-20

## 2019-12-10 NOTE — PSYCH
Psychotherapy Provided: Individual Psychotherapy 50 minutes     Length of time in session: 50 minutes, follow up in 2 week    Goals addressed in session: Goal 1     Pain:      none    0    Current suicide risk : Low     D:  Mary Jacobson shared news of her engagement with this worker today  She discussed her excitement about this as well as her frustrations with his lack of motivation to "do his part" to care for their home  A:  Mary Jacobson continues to be less defended and more open to the therapy relationship at this time  She continues to process more each session resulting in more awareness and growth  P:  Upcoming sessions will be used to continue to support her with the above  Behavioral Health Treatment Plan [facility]: Diagnosis and Treatment Plan explained to Ariana Cordova relates understanding diagnosis and is agreeable to Treatment Plan   Yes

## 2019-12-20 ENCOUNTER — OFFICE VISIT (OUTPATIENT)
Dept: BARIATRICS | Facility: CLINIC | Age: 34
End: 2019-12-20

## 2019-12-20 VITALS — WEIGHT: 289.5 LBS | BODY MASS INDEX: 45.44 KG/M2 | HEIGHT: 67 IN

## 2019-12-20 DIAGNOSIS — R63.5 ABNORMAL WEIGHT GAIN: Primary | ICD-10-CM

## 2019-12-20 PROCEDURE — RECHECK: Performed by: DIETITIAN, REGISTERED

## 2019-12-20 NOTE — PROGRESS NOTES
Weight Management Medical Nutrition Assessment  Safia presented for the 8 month follow-up  session with the [email or web address] [facility]   Today's weight is 289  5#   She has had 5  2# weight gain in the past 3 weeks and overall has lost 35 1# in the past 8 months and 44 7# since first medical visit   Stated she has had many social functions lately in which she knows she has exceeded her calorie goal for weight loss  She stated she has noticed a dietary habit return of stopping for candy on the way home from work  Discussed the level of control with this behavior and placing a protein bar there as she was doing before  Her physical activity has decreased   She continues to food journal  She stated she does feel like she is being more mindful of food choices and portion sizes daily        Anthropometric Measurements  Start Weight (#): 324 6# (19)  (334 2# - 2019 - [place] visit)  Current Weight (#): 289 5#  TBW % Change from start weight: 11%   Ideal Body Weight (#):135#  Goal Weight (#):ST#     LT-200#     Weight Loss History  Previous weight loss attempts: Commercial Programs (Weight Watchers, Lenell Staple, etc )  Self Created Diets (Portion Control, Healthy Food Choices, etc )     Food and Nutrition Related History        Food Recall     Breakfast: yogurt or eggs / potato/ meat   Snack:greek yogurt   Lunch:salad with chicken / tuna salad   Snack:yogurt or protein bar or candy  Get home from work: nothing- snacking   Dinner: larger meal - smaller portions   Snack:snacking more- PB/ PB pretzels     Trigger: sweets/ PB / Fruit Snack      Beverages: water, coffee with 1/2 1/2 4 TBSP Fat Free  Volume of beverage intake: 64-80oz daily      Weekends: Worse, dine out more before/ calories from alcohol now still dine out   Cravings: sweets at night   Trouble area of day:evening right before bed      Frequency of Eating out: in frequent   Food restrictions:none  Cooking: self   Food Shopping: self     Physical Activity Intake  Activity:6,000-8,000 steps     Frequency:infrequent  Physical limitations/barriers to exercise: none     Estimated Needs  Energy  Tanita: SOW:[id number]   P 1 3 -1000 =1540 calories  Reevue Indirect Calorimeter WOC: 5844 calories           Weight loss without exercise:1374 calories            Weight loss with exercise: 7585-7325 calories               Bear Jesse Energy Needs:  BMR : 2027  calories    1-2# loss weekly sedentary: 7941-3471 calories    1-2 # loss weekly lightly active: 2393-5885 calories  Protein:74-92gm      (1 2-1 5g/kg IBW)  Fluid: 51-61oz     (25-30ml/kg IBW)     Nutrition Diagnosis  Yes;    Overweight/obesity  related to Excess energy intake as evidenced by  BMI more than normative standard for age and sex (obesity-grade III 36+)     Nutrition Intervention     Nutrition Prescription  Calories:2635-7639 calories on sedentary days  and flex WC 0772-2819 on walk days   Protein:74-90gm daily   Fluid:50-60oz daily      Meal Plan  Breakfast:300/20/30  Snack:  Lunch:400/30/30  Snack: 150-200  Dinner:400-500/30/30  Snack:150-200     Nutrition Education:    Healthy Core Manual  Calorie controlled menu  Lean protein food choices  Healthy snack options  Food journaling tips        Nutrition Counseling:  Strategies: meal planning, portion sizes, healthy snack choices, hydration, fiber intake, protein intake, exercise, food journal        Monitoring and Evaluation:  Evaluation criteria:  Energy Intake  Meet protein needs  Maintain adequate hydration  Monitor weekly weight  Meal planning/preparation  Food journal   Decreased portions at mealtimes and snacks  Physical activity      Barriers to learning:none  Readiness to change: Action  Comprehension: good  Expected Compliance: good

## 2019-12-23 ENCOUNTER — SOCIAL WORK (OUTPATIENT)
Dept: BEHAVIORAL/MENTAL HEALTH CLINIC | Facility: CLINIC | Age: 34
End: 2019-12-23
Payer: COMMERCIAL

## 2019-12-23 DIAGNOSIS — F41.1 GENERALIZED ANXIETY DISORDER: ICD-10-CM

## 2019-12-23 DIAGNOSIS — F32.89 OTHER DEPRESSION: ICD-10-CM

## 2019-12-23 PROCEDURE — 90834 PSYTX W PT 45 MINUTES: CPT | Performed by: SOCIAL WORKER

## 2019-12-24 NOTE — PSYCH
Psychotherapy Provided: Individual Psychotherapy 50 minutes     Length of time in session: 50 minutes, follow up in 2 week    Goals addressed in session: Goal 1     Pain:      none    0    Current suicide risk : Low     D:  Mehdi Mayfield spoke about her visit home to her parents' in [place] with her fiance  A:  Mehdi Mayfield continues to be less defended and more open to the therapy relationship at this time  She continues to process more each session resulting in more awareness and growth  P:  Upcoming sessions will be used to continue to support her with the above  Behavioral Health Treatment Plan [facility]: Diagnosis and Treatment Plan explained to Hector Jacobson relates understanding diagnosis and is agreeable to Treatment Plan   Yes

## 2019-12-30 ENCOUNTER — TELEPHONE (OUTPATIENT)
Dept: OBGYN CLINIC | Facility: MEDICAL CENTER | Age: 34
End: 2019-12-30

## 2019-12-30 DIAGNOSIS — Z30.41 SURVEILLANCE FOR BIRTH CONTROL, ORAL CONTRACEPTIVES: ICD-10-CM

## 2019-12-30 RX ORDER — NORGESTIMATE AND ETHINYL ESTRADIOL 7DAYSX3 LO
1 KIT ORAL DAILY
Qty: 28 TABLET | Refills: 1 | Status: SHIPPED | OUTPATIENT
Start: 2019-12-30 | End: 2020-01-30 | Stop reason: SDUPTHER

## 2020-01-06 ENCOUNTER — SOCIAL WORK (OUTPATIENT)
Dept: BEHAVIORAL/MENTAL HEALTH CLINIC | Facility: CLINIC | Age: 35
End: 2020-01-06
Payer: COMMERCIAL

## 2020-01-06 ENCOUNTER — OFFICE VISIT (OUTPATIENT)
Dept: BARIATRICS | Facility: CLINIC | Age: 35
End: 2020-01-06

## 2020-01-06 VITALS — WEIGHT: 291 LBS | BODY MASS INDEX: 45.67 KG/M2 | HEIGHT: 67 IN

## 2020-01-06 DIAGNOSIS — F41.1 GENERALIZED ANXIETY DISORDER: ICD-10-CM

## 2020-01-06 DIAGNOSIS — R63.5 ABNORMAL WEIGHT GAIN: Primary | ICD-10-CM

## 2020-01-06 DIAGNOSIS — F32.89 OTHER DEPRESSION: ICD-10-CM

## 2020-01-06 PROCEDURE — 90834 PSYTX W PT 45 MINUTES: CPT | Performed by: SOCIAL WORKER

## 2020-01-06 PROCEDURE — RECHECK: Performed by: DIETITIAN, REGISTERED

## 2020-01-07 NOTE — PSYCH
Psychotherapy Provided: Individual Psychotherapy 50 minutes     Length of time in session: 50 minutes, follow up in 2 week    Goals addressed in session: Goal 1     Pain:      none    0    Current suicide risk : Low     D:  Julianne Eastman spoke about her holidays and work stress during today's session  A:  Julianne Eastman is looking forward to a transfer over the upcoming months and continues to strive to improve her overall health  P:  Upcoming sessions will be used to continue to support her with the above  Behavioral Health Treatment Plan [facility]: Diagnosis and Treatment Plan explained to Kathya Lara relates understanding diagnosis and is agreeable to Treatment Plan   Yes

## 2020-01-29 DIAGNOSIS — Z30.41 SURVEILLANCE FOR BIRTH CONTROL, ORAL CONTRACEPTIVES: ICD-10-CM

## 2020-01-30 RX ORDER — NORGESTIMATE AND ETHINYL ESTRADIOL 7DAYSX3 LO
1 KIT ORAL DAILY
Qty: 28 TABLET | Refills: 0 | Status: SHIPPED | OUTPATIENT
Start: 2020-01-30 | End: 2020-02-17 | Stop reason: SDUPTHER

## 2020-02-03 ENCOUNTER — TELEPHONE (OUTPATIENT)
Dept: OBGYN CLINIC | Facility: MEDICAL CENTER | Age: 35
End: 2020-02-03

## 2020-02-03 NOTE — TELEPHONE ENCOUNTER
Pt called and needs a refill on her [id number] sent to [facility] on [address]  She is scheduled for her annual on 2/17  Thank you!

## 2020-02-05 ENCOUNTER — SOCIAL WORK (OUTPATIENT)
Dept: BEHAVIORAL/MENTAL HEALTH CLINIC | Facility: CLINIC | Age: 35
End: 2020-02-05
Payer: COMMERCIAL

## 2020-02-05 DIAGNOSIS — F32.89 OTHER DEPRESSION: ICD-10-CM

## 2020-02-05 DIAGNOSIS — F41.1 GENERALIZED ANXIETY DISORDER: ICD-10-CM

## 2020-02-05 PROCEDURE — 90834 PSYTX W PT 45 MINUTES: CPT | Performed by: SOCIAL WORKER

## 2020-02-05 PROCEDURE — 1036F TOBACCO NON-USER: CPT | Performed by: SOCIAL WORKER

## 2020-02-06 NOTE — ASSESSMENT & PLAN NOTE
HTN/CHF  -on carvedilol, lisinopril, and furosemide  -continue to monitor  -Continue management with Cardiology- Dr Katelin Velez  -per 8/28/19 visit with cardiology: "If loses 20 more lbs and systolic BP < 274 mmHg can stop coreg " Weight from 8/28 - 298

## 2020-02-06 NOTE — PROGRESS NOTES
Assessment/Plan: Morbid obesity with BMI of 45 0-49 9, adult ([facility] 75 )  -Patient is pursuing [facility] after completing  [facility]vention [facility]  -Initial weight loss goal of 5-10% weight loss for improved health  -Caution Belviq with hx of CHF  -Discussed options for weight loss medications- she is not currently interested and would like to continue focusing on lifestyle  -Dietary recall reviewed  Recommended try to pre-plan and pre-measure evening snacks    -Exercise goals below     Initial: 334 2 (Pt had lost 15+ lbs prior to starting MWM)  Current: 291 5 (+1 8 lbs since 11/12/19)    Change:-42 7 lbs (12 75 % TBW)  Goal:     Essential hypertension  HTN/CHF  -on carvedilol, lisinopril, and furosemide  -continue to monitor  -Continue management with Cardiology- Dr Luz Angel  -per 8/28/19 visit with cardiology: "If loses 20 more lbs and systolic BP < 125 mmHg can stop coreg " Weight from 8/28 - 298    Elevated hemoglobin A1c  5 8 --> 6% --> 5 6 %    Follow up in approximately 2 months with Non-Surgical Physician/Advanced Practitioner  25 minute visit, >50% time spent counseling patient on diet behavior and exercise modification for weight loss  Goals:  Food log (ie ) www myfitnesspal com,sparkpeople  com,loseit com,calorieking  com,etc  baritastic  No sugary beverages  At least 64oz of water daily  Increase physical activity by 10 minutes daily  Gradually increase physical activity to a goal of 5 days per week for 30 minutes of MODERATE intensity PLUS 2 days per week of FULL BODY resistance training   You would like to do exercise videos for 30 minutes 3 days per week  5-10 servings of fruits and vegetables per day and 25-35 grams of dietary fiber per day, gradually increasing  0949-4815 calories on sedentary days  and flex to 2346-2384 on walk days      Diagnoses and all orders for this visit:    Morbid obesity with BMI of 45 0-49 9, adult ([facility] )    Essential hypertension    Elevated hemoglobin A1c        Subjective:   Chief Complaint   Patient presents with    Follow-up     mwm fu     Patient ID: Hamida Carvalho  is a 36 y o  female with excess weight/obesity here to pursue weight management  Patient is pursuing [facility]      HPI  The patient presents for MW follow up  States she gained 10 lbs over the holidays, but has since lost this  Excited about her engagement and feels wedding planning has catrachito staying on track difficult    Food logging: yes, struggles with evening snacking  Logging 1800 calories  She is considering a delivery meal service like hello fresh  Still trying to be mindful  Increased appetite/cravings: has had increased cravings due to stressors with new job, fiance with new job and mother in visiting  Fruit/Vegetable servings: minimal fruit, 1-4 servings vegetables  Exercise: 1x/week  Hydration: 64 oz of water per day  16 oz of coffee with 2 % milk   Alcohol- two glasses of wine on the weekends    B: greek yogurt  S: protein bar  L: salad + chickpeas + 1 5 oz Gurpreet  S: none  D: protein + starch  S: string cheese, pretzels or popcorn, but eating larger portions    The following portions of the patient's history were reviewed and updated as appropriate: allergies, current medications, past family history, past medical history, past social history, past surgical history and problem list     Review of Systems   Respiratory: Negative  Cardiovascular: Negative  Gastrointestinal: Negative  Psychiatric/Behavioral:        Denies concerns with mood     Objective:    /76 (BP Location: Left arm, Patient Position: Sitting, Cuff Size: Large)   Pulse 90   Temp 97 8 °F (36 6 °C) (Tympanic)   Resp 18   Ht 5' 7" (1 702 m)   Wt 132 kg (291 lb 8 oz)   BMI 45 66 kg/m²      Physical Exam   Nursing note and vitals reviewed  Constitutional   General appearance: Abnormal   well developed and morbidly obese     Pulmonary   Respiratory effort: No increased work of breathing or signs of respiratory distress  Abdomen   Abdomen: Abnormal   The abdomen was obese     Musculoskeletal   Gait and station: Normal     Psychiatric   Orientation to person, place and time: Normal     Affect: appropriate

## 2020-02-06 NOTE — ASSESSMENT & PLAN NOTE
-Patient is pursuing [facility] after completing  [facility]  -Initial weight loss goal of 5-10% weight loss for improved health  -Caution Belviq with hx of CHF  -Discussed options for weight loss medications- she is not currently interested and would like to continue focusing on lifestyle  -Dietary recall reviewed   Recommended try to pre-plan and pre-measure evening snacks    -Exercise goals below     Initial: 334 2 (Pt had lost 15+ lbs prior to starting MWM)  Current: 291 5 (+1 8 lbs since 11/12/19)    Change:-42 7 lbs (12 75 % TBW)  Goal:

## 2020-02-06 NOTE — PSYCH
Psychotherapy Provided: Individual Psychotherapy 50 minutes     Length of time in session: 50 minutes, follow up in 2 week    Goals addressed in session: Goal 1     Pain:      none    0    Current suicide risk : Low     D:  Babak Medellin spoke further about her work stress for the duration of  today's session  A:  Babak Medellin is very much looking forward to a transfer over the upcoming months and continues to strive to improve her overall health  P:  Upcoming sessions will be used to continue to support her with the above  Behavioral Health Treatment Plan [facility]: Diagnosis and Treatment Plan explained to Shirlene Nieto relates understanding diagnosis and is agreeable to Treatment Plan   Yes

## 2020-02-11 ENCOUNTER — OFFICE VISIT (OUTPATIENT)
Dept: BARIATRICS | Facility: CLINIC | Age: 35
End: 2020-02-11
Payer: COMMERCIAL

## 2020-02-11 VITALS
HEART RATE: 90 BPM | TEMPERATURE: 97.8 F | DIASTOLIC BLOOD PRESSURE: 76 MMHG | RESPIRATION RATE: 18 BRPM | BODY MASS INDEX: 45.75 KG/M2 | WEIGHT: 291.5 LBS | HEIGHT: 67 IN | SYSTOLIC BLOOD PRESSURE: 118 MMHG

## 2020-02-11 DIAGNOSIS — E66.01 MORBID OBESITY WITH BMI OF 45.0-49.9, ADULT (HCC): Primary | ICD-10-CM

## 2020-02-11 DIAGNOSIS — I10 ESSENTIAL HYPERTENSION: ICD-10-CM

## 2020-02-11 DIAGNOSIS — R73.09 ELEVATED HEMOGLOBIN A1C: ICD-10-CM

## 2020-02-11 PROCEDURE — 3008F BODY MASS INDEX DOCD: CPT | Performed by: PHYSICIAN ASSISTANT

## 2020-02-11 PROCEDURE — 1036F TOBACCO NON-USER: CPT | Performed by: PHYSICIAN ASSISTANT

## 2020-02-11 PROCEDURE — 99214 OFFICE O/P EST MOD 30 MIN: CPT | Performed by: PHYSICIAN ASSISTANT

## 2020-02-11 PROCEDURE — 3078F DIAST BP <80 MM HG: CPT | Performed by: PHYSICIAN ASSISTANT

## 2020-02-11 PROCEDURE — 3074F SYST BP LT 130 MM HG: CPT | Performed by: PHYSICIAN ASSISTANT

## 2020-02-11 NOTE — PATIENT INSTRUCTIONS
Goals: Food log (ie ) www myfitnesspal com,sparkpeople  com,loseit com,calorieking  com,etc  baritastic  No sugary beverages  At least 64oz of water daily  Increase physical activity by 10 minutes daily  Gradually increase physical activity to a goal of 5 days per week for 30 minutes of MODERATE intensity PLUS 2 days per week of FULL BODY resistance training   You would like to do exercise videos for 30 minutes 3 days per week  5-10 servings of fruits and vegetables per day and 25-35 grams of dietary fiber per day, gradually increasing  3592-7349 calories on sedentary days  and flex to 1004-6769 on walk days

## 2020-02-17 ENCOUNTER — ANNUAL EXAM (OUTPATIENT)
Dept: OBGYN CLINIC | Facility: MEDICAL CENTER | Age: 35
End: 2020-02-17
Payer: COMMERCIAL

## 2020-02-17 VITALS
SYSTOLIC BLOOD PRESSURE: 118 MMHG | HEIGHT: 67 IN | WEIGHT: 291 LBS | BODY MASS INDEX: 45.67 KG/M2 | DIASTOLIC BLOOD PRESSURE: 74 MMHG

## 2020-02-17 DIAGNOSIS — Z30.41 SURVEILLANCE FOR BIRTH CONTROL, ORAL CONTRACEPTIVES: ICD-10-CM

## 2020-02-17 DIAGNOSIS — N93.0 PCB (POST COITAL BLEEDING): ICD-10-CM

## 2020-02-17 DIAGNOSIS — Z01.419 ENCOUNTER FOR GYNECOLOGICAL EXAMINATION WITH PAPANICOLAOU SMEAR OF CERVIX: Primary | ICD-10-CM

## 2020-02-17 PROCEDURE — G0145 SCR C/V CYTO,THINLAYER,RESCR: HCPCS | Performed by: NURSE PRACTITIONER

## 2020-02-17 PROCEDURE — 99395 PREV VISIT EST AGE 18-39: CPT | Performed by: NURSE PRACTITIONER

## 2020-02-17 RX ORDER — NORGESTIMATE AND ETHINYL ESTRADIOL 7DAYSX3 LO
1 KIT ORAL DAILY
Qty: 90 TABLET | Refills: 3 | Status: SHIPPED | OUTPATIENT
Start: 2020-02-17 | End: 2020-03-31 | Stop reason: SDUPTHER

## 2020-02-17 RX ORDER — NORGESTIMATE AND ETHINYL ESTRADIOL 7DAYSX3 LO
1 KIT ORAL DAILY
Qty: 90 TABLET | Refills: 3 | Status: CANCELLED | OUTPATIENT
Start: 2020-02-17

## 2020-02-17 NOTE — PROGRESS NOTES
ASSESSMENT & PLAN: Aniceto Ascencio is a 36 y o  Chalmer Kipper with normal gynecologic exam     1   Routine well woman exam done today  2  Pap and HPV:  The patient's last pap and hpv was 2018  It was normal     Pap  was done today  Current ASCCP Guidelines reviewed  3   The following were reviewed in today's visit: breast self exam, use and side effects of OCPs, adequate intake of calcium and vitamin D, exercise and healthy diet  4  rx for ocp for the year sent to pharmacy  5  Will wait pap results, than call her for rto visit with Dr  To evaluate PCB  CC:  Annual Gynecologic Examination    HPI: Aniceto Ascencio is a 36 y o  Chalmer Kipper who presents for annual gynecologic examination  She has the following concerns:  Still having postcoital bleeding  Feels it is getting worse  Hx of Leep about 10 years ago  D/W her last year and given option to rto for Dr Faraz Nichols with possible cryo/ colpo ,declined at that time, but willing to consider it now  Bleeding with sex is starting to affect her and uncomfortable with the amount she bleeds, and blood all over partner  Health Maintenance:    She wears her seatbelt routinely  She does perform regular monthly self breast exams  She feels safe at home  Past Medical History:   Diagnosis Date    Anxiety     Depression     Temporomandibular joint disorder     Resolved 2015        Past Surgical History:   Procedure Laterality Date    CERVICAL BIOPSY  W/ LOOP ELECTRODE EXCISION      DENTAL SURGERY      [place] teeth extraction    TONSILLECTOMY         Past OB/Gyn History:  OB History        0    Para   0    Term   0       0    AB   0    Living   0       SAB   0    TAB   0    Ectopic   0    Multiple   0    Live Births   0               Pt does not have menstrual issues  History of sexually transmitted infection: No   History of abnormal pap smears: No      Patient is currently sexually active    heterosexual   The current method of family planning is OCP (estrogen/progesterone)      Family History   Problem Relation Age of Onset    Diabetes Mother     Hypertension Father     Obesity Father     OCD Father     Colon cancer Maternal Grandmother     Diabetes Paternal Grandmother     Stroke Paternal Grandmother         TIA    Diabetes Paternal Grandfather     Heart disease Paternal Grandfather     Alcohol abuse Brother     Depression Maternal Grandfather     Anxiety disorder Maternal Grandfather     Hypothyroidism Paternal Aunt     Substance Abuse Neg Hx     Thyroid cancer Neg Hx        Social History:  Social History     Socioeconomic History    Marital status: Single     Spouse name: Not on file    Number of children: Not on file    Years of education: Not on file    Highest education level: Not on file   Occupational History    Not on file   Social Needs    Financial resource strain: Not on file    Food insecurity:     Worry: Not on file     Inability: Not on file    Transportation needs:     Medical: Not on file     Non-medical: Not on file   Tobacco Use    Smoking status: Former Smoker     Last attempt to quit: 3/17/2019     Years since quittin 9    Smokeless tobacco: Former User   Substance and Sexual Activity    Alcohol use: Yes     Comment: wine 2 glasses a week    Drug use: No    Sexual activity: Yes     Partners: Male     Birth control/protection: Pill   Lifestyle    Physical activity:     Days per week: Not on file     Minutes per session: Not on file    Stress: Not on file   Relationships    Social connections:     Talks on phone: Not on file     Gets together: Not on file     Attends Jehovah's witness service: Not on file     Active member of club or organization: Not on file     Attends meetings of clubs or organizations: Not on file     Relationship status: Not on file    Intimate partner violence:     Fear of current or ex partner: Not on file     Emotionally abused: Not on file     Physically abused: Not on file     Forced sexual activity: Not on file   Other Topics Concern    Not on file   Social History Narrative    Current every day smoker - As per [employer]    Daily caffeinated coffee consumption      Presently lives alone  Patient is single  Patient is currently employed     No Known Allergies      Current Outpatient Medications:     carvedilol (COREG) 3 125 mg tablet, Take 1 tablet (3 125 mg total) by mouth 2 (two) times a day with meals, Disp: 180 tablet, Rfl: 3    escitalopram (LEXAPRO) 20 mg tablet, Take 1 tablet by mouth, Disp: , Rfl:     furosemide (LASIX) 20 mg tablet, Take 1 tablet (20 mg total) by mouth daily, Disp: 90 tablet, Rfl: 3    lisinopril (ZESTRIL) 20 mg tablet, Take 1 tablet (20 mg total) by mouth daily at bedtime, Disp: 180 tablet, Rfl: 1    Multiple Vitamins-Calcium (ONE-A-DAY WOMENS PO), Take by mouth, Disp: , Rfl:     norgestimate-ethinyl estradiol (TRI-LO-IMANI) 0 18/0 215/0 25 MG-25 MCG per tablet, Take 1 tablet by mouth daily, Disp: 28 tablet, Rfl: 0      Review of Systems  Constitutional :no fever, feels well, no tiredness, no recent weight gain or loss  ENT: no ear ache, no loss of hearing, no nosebleeds or nasal discharge, no sore throat or hoarseness  Cardiovascular: no complaints of slow or fast heart beat, no chest pain, no palpitations, no leg claudication or lower extremity edema  Respiratory: no complaints of shortness of shortness of breath, no CEBALLOS  Breasts:no complaints of breast pain, breast lump, or nipple discharge  Gastrointestinal: no complaints of abdominal pain, constipation, nausea, vomiting, or diarrhea or bloody stools  Genitourinary : no complaints of dysuria, incontinence, pelvic pain, no dysmenorrhea, vaginal discharge or abnormal vaginal bleeding and as noted in HPI  Musculoskeletal: no complaints of arthralgia, no myalgia, no joint swelling or stiffness, no limb pain or swelling    Integumentary: no complaints of skin rash or lesion, itching or dry skin  Neurological: no complaints of headache, no confusion, no numbness or tingling, no dizziness or fainting    Objective      /74   Ht 5' 7" (1 702 m)   Wt 132 kg (291 lb)   LMP 01/27/2020 (Approximate)   BMI 45 58 kg/m²   General:   appears stated age, cooperative, alert normal mood and affect   Neck: normal, supple,trachea midline, no masses   Heart: regular rate and rhythm, S1, S2 normal, no murmur, click, rub or gallop   Lungs: clear to auscultation bilaterally   Breasts: normal appearance, no masses or tenderness   Abdomen: soft, non-tender, without masses or organomegaly   Vulva: normal   Vagina: normal vagina   Urethra: normal   Cervix: Normal, no discharge  , friable with cytobrush  Uterus: normal size, contour, position, consistency, mobility, non-tender   Adnexa: no mass, fullness, tenderness   Lymphatic palpation of lymph nodes in neck, axilla, groin and/or other locations: no lymphadenopathy or masses noted   Skin normal skin turgor and no rashes     Psychiatric orientation to person, place, and time: normal  mood and affect: normal

## 2020-02-20 LAB
LAB AP GYN PRIMARY INTERPRETATION: NORMAL
LAB AP LMP: NORMAL
Lab: NORMAL

## 2020-02-24 NOTE — RESULT ENCOUNTER NOTE
Pl advise normal lab results  Call pt tell her pap was normal, but offer her appt with Dr Manuel Noe  To evaluate post coital bleeding,   I d/w with Talat Gauthier

## 2020-02-26 ENCOUNTER — SOCIAL WORK (OUTPATIENT)
Dept: BEHAVIORAL/MENTAL HEALTH CLINIC | Facility: CLINIC | Age: 35
End: 2020-02-26
Payer: COMMERCIAL

## 2020-02-26 DIAGNOSIS — F32.89 OTHER DEPRESSION: ICD-10-CM

## 2020-02-26 DIAGNOSIS — F41.1 GENERALIZED ANXIETY DISORDER: ICD-10-CM

## 2020-02-26 PROCEDURE — 90834 PSYTX W PT 45 MINUTES: CPT | Performed by: SOCIAL WORKER

## 2020-02-26 PROCEDURE — 1036F TOBACCO NON-USER: CPT | Performed by: SOCIAL WORKER

## 2020-02-27 NOTE — PSYCH
Psychotherapy Provided: Individual Psychotherapy 50 minutes     Length of time in session: 50 minutes, follow up in 2 week    Goals addressed in session: Goal 1     Pain:      none    0    Current suicide risk : Low     D:  Valeria Arreguin spoke further about her work stress for the duration of  today's session  A:  Valeria Arreguin continues to very much look forward to a transfer over the upcoming month as she she prepares for her wedding  P:  Upcoming sessions will be used to continue to support her with the above  Behavioral Health Treatment Plan [facility]: Diagnosis and Treatment Plan explained to Tere Rowe relates understanding diagnosis and is agreeable to Treatment Plan   Yes

## 2020-02-28 ENCOUNTER — OFFICE VISIT (OUTPATIENT)
Dept: CARDIOLOGY CLINIC | Facility: CLINIC | Age: 35
End: 2020-02-28
Payer: COMMERCIAL

## 2020-02-28 VITALS
WEIGHT: 293 LBS | HEIGHT: 67 IN | OXYGEN SATURATION: 99 % | HEART RATE: 68 BPM | DIASTOLIC BLOOD PRESSURE: 56 MMHG | BODY MASS INDEX: 45.99 KG/M2 | SYSTOLIC BLOOD PRESSURE: 108 MMHG

## 2020-02-28 DIAGNOSIS — E78.2 MIXED HYPERLIPIDEMIA: Primary | ICD-10-CM

## 2020-02-28 DIAGNOSIS — I50.32 CHRONIC HEART FAILURE WITH PRESERVED EJECTION FRACTION (HFPEF) (HCC): ICD-10-CM

## 2020-02-28 PROCEDURE — 3008F BODY MASS INDEX DOCD: CPT | Performed by: INTERNAL MEDICINE

## 2020-02-28 PROCEDURE — 3078F DIAST BP <80 MM HG: CPT | Performed by: INTERNAL MEDICINE

## 2020-02-28 PROCEDURE — 1036F TOBACCO NON-USER: CPT | Performed by: INTERNAL MEDICINE

## 2020-02-28 PROCEDURE — 99214 OFFICE O/P EST MOD 30 MIN: CPT | Performed by: INTERNAL MEDICINE

## 2020-02-28 PROCEDURE — 3074F SYST BP LT 130 MM HG: CPT | Performed by: INTERNAL MEDICINE

## 2020-02-28 NOTE — PROGRESS NOTES
Heart Failure Outpatient Consult Note - Sarthak Cowan 36 y o  female MRN: [id number]    @ Encounter: [id number]      Assessment/Plan:    Patient Active Problem List    Diagnosis Date Noted    Essential hypertension 03/17/2019     Priority: Low    History of cervical dysplasia 07/20/2016     Priority: Low    Morbid obesity with BMI of 45 0-49 9, adult ([facility] [address] ) 07/07/2015     Priority: Low    Generalized anxiety disorder 07/30/2014     Priority: Low    Depression 05/28/2013     Priority: Low    Mixed hyperlipidemia 10/12/2019    Vitamin D deficiency 10/12/2019    Elevated hemoglobin A1c 10/12/2019       # New onset HFpEF, Stage C, NYHA 2  Etiology: possible HTN as /100 mmHg on hospital admit, possible viral myocarditis given presentation to [facility] with sinusitis symptoms; no family hx of SCD or cardiomyopathy, no sign etoh use, given age- ischemic less likely   She is morbidly obese and will need screening for SUZAN  Her uncontrolled BP and diet alone with obesity could have contributed to her volume overload and mostly HFpEF  CRP 37 on admit, sed rate 51  MERRICK and RF normal    Weight: on admit 3/17: 358 lbs, 325 lbs today  NT pro BNP 1430, 493 in 6/20/19  EKG - SR with PACs    CMR 6/3/19: EF: 58%  Normal RV function    Echocardiogram 3/18/19  LVEF: 50-55%  LVIDd: 5 8 cm  RV: mildly enlarged  MR:  PASP: 38 mmHg    Lab Results   Component Value Date    NTBNP 229 10/09/2019        Neurohormonal Blockade:  --Beta-Blocker: coreg 3 125 mg BID  --ACEi, ARB or ARNi: lisinopril 20 mg daily  --Aldosterone Receptor Blocker:  --Diuretic: lasix 20 mg daily    Sudden Cardiac Death Risk Reduction:  --ICD:     # HTN- was 190/100 mmHg on admit 3/17, better controlled now    # morbid obesity- weight 358 lbs on admit 3/17; 332 lbs on follow up 4/3  Weight today 293 lbs  Doing [facility] weight management    # tobacco - 1 ppd- quit in March  # Sleep eval  Home study- mild SUZAN, lowest oxygen sat 79%  Polysomnogram 8/20/19- no significant apnea, lowest oxygen 88%    Today's Plan:  Labs- f/u on Cr, K on diuretic  --2g sodium diet  Weights daily    HPI:      35 yo female who had presented to PCP 3/11 with sinusitis symptoms and started on Augmentin  On 3/17 presented to ED with exertional dyspnea noticed on getting dressed and climbing stairs  She also reported LE edema but no PND or orthopnea but noticed abdominal bloating and weight gain for about a month  Smokes 1 ppd and only has about 2 drinks per week and no drug use  No family hx of SCD or cardiomyopathy  Her edema is what made her go to the hospital    CTA showed small pericardial effusion, mild pulmonary edema  TTE showed EF around 50%, LVEDd 5 8 cm, PASP 38 mmHg  She was diuresed and started on HFrEF specific heart failure therapy  She is enrolled in weight watchers and part of weight management nutritional assessment  Interim:  No new complaints  Continues to lose weight    Review of Systems   Constitutional: Negative for activity change, appetite change, fatigue and unexpected weight change  HENT: Negative for congestion and nosebleeds  Eyes: Negative  Respiratory: Negative for cough, chest tightness and shortness of breath  Cardiovascular: Negative for chest pain, palpitations and leg swelling  Gastrointestinal: Negative for abdominal distention  Endocrine: Negative  Genitourinary: Negative  Musculoskeletal: Negative  Skin: Negative  Neurological: Negative for dizziness, syncope and weakness  Hematological: Negative  Psychiatric/Behavioral: Negative  Past Medical History:   Diagnosis Date    Anxiety     Depression     Temporomandibular joint disorder     Resolved 7/7/2015      No Known Allergies        Current Outpatient Medications:     carvedilol (COREG) 3 125 mg tablet, Take 1 tablet (3 125 mg total) by mouth 2 (two) times a day with meals, Disp: 180 tablet, Rfl: 3    escitalopram (LEXAPRO) 20 mg tablet, Take 1 tablet by mouth, Disp: , Rfl:     furosemide (LASIX) 20 mg tablet, Take 1 tablet (20 mg total) by mouth daily, Disp: 90 tablet, Rfl: 3    lisinopril (ZESTRIL) 20 mg tablet, Take 1 tablet (20 mg total) by mouth daily at bedtime, Disp: 180 tablet, Rfl: 1    Multiple Vitamins-Calcium (ONE-A-DAY WOMENS PO), Take by mouth, Disp: , Rfl:     norgestimate-ethinyl estradiol (Tri-Lo-Soha) 0 18/0 215/0 25 MG-25 MCG per tablet, Take 1 tablet by mouth daily, Disp: 90 tablet, Rfl: 3    Social History     Socioeconomic History    Marital status: Single     Spouse name: Not on file    Number of children: Not on file    Years of education: Not on file    Highest education level: Not on file   Occupational History    Not on file   Social Needs    Financial resource strain: Not on file    Food insecurity:     Worry: Not on file     Inability: Not on file    Transportation needs:     Medical: Not on file     Non-medical: Not on file   Tobacco Use    Smoking status: Former Smoker     Last attempt to quit: 3/17/2019     Years since quittin 9    Smokeless tobacco: Former User   Substance and Sexual Activity    Alcohol use: Yes     Comment: wine 2 glasses a week    Drug use: No    Sexual activity: Yes     Partners: Male     Birth control/protection: Pill   Lifestyle    Physical activity:     Days per week: Not on file     Minutes per session: Not on file    Stress: Not on file   Relationships    Social connections:     Talks on phone: Not on file     Gets together: Not on file     Attends Cheondoism service: Not on file     Active member of club or organization: Not on file     Attends meetings of clubs or organizations: Not on file     Relationship status: Not on file    Intimate partner violence:     Fear of current or ex partner: Not on file     Emotionally abused: Not on file     Physically abused: Not on file     Forced sexual activity: Not on file   Other Topics Concern    Not on file   Social History Narrative    Current every day smoker - As per [employer]    Daily caffeinated coffee consumption        Family History   Problem Relation Age of Onset    Diabetes Mother     Hypertension Father     Obesity Father     OCD Father     Colon cancer Maternal Grandmother     Diabetes Paternal Grandmother     Stroke Paternal Grandmother         TIA    Diabetes Paternal Grandfather     Heart disease Paternal Grandfather     Alcohol abuse Brother     Depression Maternal Grandfather     Anxiety disorder Maternal Grandfather     Hypothyroidism Paternal Aunt     Substance Abuse Neg Hx     Thyroid cancer Neg Hx        Physical Exam:    Vitals: Blood pressure 108/56, pulse 68, height 5' 7" (1 702 m), weight 133 kg (293 lb 8 oz), SpO2 99 %, not currently breastfeeding , Body mass index is 45 97 kg/m² ,   Wt Readings from Last 3 Encounters:   02/28/20 133 kg (293 lb 8 oz)   02/17/20 132 kg (291 lb)   02/11/20 132 kg (291 lb 8 oz)       Physical Exam:    Physical Exam   Constitutional: She is oriented to person, place, and time  She appears well-developed and well-nourished  HENT:   Head: Normocephalic and atraumatic  Eyes: Pupils are equal, round, and reactive to light  EOM are normal    Neck: Normal range of motion  No JVD present  Cardiovascular: Normal rate, regular rhythm and normal heart sounds  No murmur heard  Pulmonary/Chest: Effort normal and breath sounds normal  She has no rales  Abdominal: Soft  Bowel sounds are normal  She exhibits no distension  Musculoskeletal: Normal range of motion  She exhibits no edema  Neurological: She is alert and oriented to person, place, and time  Skin: Skin is warm and dry  She is not diaphoretic  Psychiatric: She has a normal mood and affect         Labs & Results:    Lab Results   Component Value Date    WBC 9 90 03/18/2019    HGB 12 7 04/29/2019    HCT 39 8 04/29/2019    MCV 79 (L) 03/18/2019     03/18/2019     Lab Results   Component Value Date    CALCIUM 8 8 06/20/2019    SODIUM 137 06/20/2019    K 4 2 06/20/2019    CO2 29 06/20/2019     06/20/2019    BUN 18 06/20/2019    CREATININE 0 56 (L) 06/20/2019     Lab Results   Component Value Date    NTBNP 229 10/09/2019      No results found for: CHOL  Lab Results   Component Value Date    HDL 44 04/29/2019    HDL 63 (H) 01/24/2017     Lab Results   Component Value Date    LDLCALC 111 04/29/2019    LDLCALC 87 01/24/2017     Lab Results   Component Value Date    TRIG 128 04/29/2019    TRIG 111 01/24/2017     No results found for: CHOLHDL    EKG personally reviewed by Jayesh Botello DO  Counseling / Coordination of Care  Total floor / unit time spent today 25 minutes  Greater than 50% of total time was spent with the patient and / or family counseling and / or coordination of care  A description of the counseling / coordination of care: 15 min  Thank you for the opportunity to participate in the care of this patient  Jayesh URENA    Director of Advanced Heart Failure Program  Medical Director of [address] [place] [address] Patient/Caregiver provided printed discharge information.

## 2020-03-09 ENCOUNTER — OFFICE VISIT (OUTPATIENT)
Dept: OBGYN CLINIC | Facility: MEDICAL CENTER | Age: 35
End: 2020-03-09
Payer: COMMERCIAL

## 2020-03-09 VITALS
SYSTOLIC BLOOD PRESSURE: 124 MMHG | HEIGHT: 67 IN | WEIGHT: 293 LBS | BODY MASS INDEX: 45.99 KG/M2 | DIASTOLIC BLOOD PRESSURE: 78 MMHG

## 2020-03-09 DIAGNOSIS — N93.0 POSTCOITAL BLEEDING: Primary | ICD-10-CM

## 2020-03-09 PROCEDURE — 87591 N.GONORRHOEAE DNA AMP PROB: CPT | Performed by: OBSTETRICS & GYNECOLOGY

## 2020-03-09 PROCEDURE — 87624 HPV HI-RISK TYP POOLED RSLT: CPT | Performed by: OBSTETRICS & GYNECOLOGY

## 2020-03-09 PROCEDURE — 1036F TOBACCO NON-USER: CPT | Performed by: OBSTETRICS & GYNECOLOGY

## 2020-03-09 PROCEDURE — 3074F SYST BP LT 130 MM HG: CPT | Performed by: OBSTETRICS & GYNECOLOGY

## 2020-03-09 PROCEDURE — 3078F DIAST BP <80 MM HG: CPT | Performed by: OBSTETRICS & GYNECOLOGY

## 2020-03-09 PROCEDURE — 87491 CHLMYD TRACH DNA AMP PROBE: CPT | Performed by: OBSTETRICS & GYNECOLOGY

## 2020-03-09 PROCEDURE — 99214 OFFICE O/P EST MOD 30 MIN: CPT | Performed by: OBSTETRICS & GYNECOLOGY

## 2020-03-10 LAB
C TRACH DNA SPEC QL NAA+PROBE: NEGATIVE
HPV HR 12 DNA CVX QL NAA+PROBE: NEGATIVE
HPV16 DNA CVX QL NAA+PROBE: NEGATIVE
HPV18 DNA CVX QL NAA+PROBE: NEGATIVE
N GONORRHOEA DNA SPEC QL NAA+PROBE: NEGATIVE

## 2020-03-10 NOTE — BH TREATMENT PLAN
Ramana Lundbergs  1985         Date of Initial Treatment Plan: 5/8/19   Date of Current Treatment Plan: 3/11/20     Treatment Plan Number 3     Strengths/Personal Resources for Self Care: Intelligent, honest, insightful     Diagnosis:   1  MDD (major depressive disorder), recurrent episode, moderate (HCC)      2  Generalized anxiety disorder            Area of Needs: To decrease panic response and increase assertivness      Long Term Goal 1: AI want to practice forgiveness, self-compassion and more assertive communication     Target Date:9/11/20  Completion Date: na          Short Term Objectives for Goal 1: AI will practice effective communication with myself and others that demonstrates enhanced self worth   I will increase my awareness of my body's natural tendency to respond to percieved threat with panic          GOAL 1: Modality: Individual 2x per month   Completion Date na and The person(s) responsible for carrying out the plan is  [facility] Jeison Mayfield: Diagnosis and Treatment Plan explained to Safia, Safia relates understanding diagnosis and is agreeable to Treatment Plan          Client Comments : Please share your thoughts, feelings, need and/or experiences regarding your treatment plan:         __________________________________________________________________

## 2020-03-11 ENCOUNTER — SOCIAL WORK (OUTPATIENT)
Dept: BEHAVIORAL/MENTAL HEALTH CLINIC | Facility: CLINIC | Age: 35
End: 2020-03-11
Payer: COMMERCIAL

## 2020-03-11 DIAGNOSIS — F32.89 OTHER DEPRESSION: ICD-10-CM

## 2020-03-11 DIAGNOSIS — F41.1 GENERALIZED ANXIETY DISORDER: ICD-10-CM

## 2020-03-11 PROCEDURE — 1036F TOBACCO NON-USER: CPT | Performed by: SOCIAL WORKER

## 2020-03-11 PROCEDURE — 90834 PSYTX W PT 45 MINUTES: CPT | Performed by: SOCIAL WORKER

## 2020-03-12 ENCOUNTER — HOSPITAL ENCOUNTER (OUTPATIENT)
Dept: ULTRASOUND IMAGING | Facility: HOSPITAL | Age: 35
Discharge: HOME | End: 2020-03-12
Payer: COMMERCIAL

## 2020-03-12 DIAGNOSIS — N93.0 POSTCOITAL BLEEDING: ICD-10-CM

## 2020-03-12 PROCEDURE — 76856 US EXAM PELVIC COMPLETE: CPT

## 2020-03-12 PROCEDURE — 76830 TRANSVAGINAL US NON-OB: CPT

## 2020-03-13 NOTE — PSYCH
Psychotherapy Provided: Individual Psychotherapy 50 minutes     Length of time in session: 50 minutes, follow up in 2 week    Goals addressed in session: Goal 1     Pain:      none    0    Current suicide risk : Low     D:  Quita Orantes spoke further about her work stress for the duration of  today's session  A:  Quita Orantes continues to very much look forward to a transfer over the upcoming month as she she prepares for her wedding  P:  Upcoming sessions will be used to continue to support her with the above  Behavioral Health Treatment Plan [facility] Replaced by [facility]: Diagnosis and Treatment Plan explained to Jamilah Cabrales relates understanding diagnosis and is agreeable to Treatment Plan   Yes

## 2020-03-25 ENCOUNTER — TELEMEDICINE (OUTPATIENT)
Dept: BEHAVIORAL/MENTAL HEALTH CLINIC | Facility: CLINIC | Age: 35
End: 2020-03-25
Payer: COMMERCIAL

## 2020-03-25 DIAGNOSIS — F41.1 GENERALIZED ANXIETY DISORDER: ICD-10-CM

## 2020-03-25 DIAGNOSIS — F32.89 OTHER DEPRESSION: Primary | ICD-10-CM

## 2020-03-25 PROCEDURE — 99443 PR PHYS/QHP TELEPHONE EVALUATION 21-30 MIN: CPT | Performed by: SOCIAL WORKER

## 2020-03-25 NOTE — PSYCH
Virtual Regular Visit      Encounter provider [facility]    Provider located at [facility]    After connecting through telephone, the patient was identified by name and date of birth  Cuong Watts was informed that this is a telemedicine visit and that the visit is being conducted through telephone which may not be secure and therefore, might not be HIPAA-compliant  My office door was closed  No one else was in the room  She acknowledged consent and understanding of privacy and security of the video platform  The patient has agreed to participate and understands they can discontinue the visit at any time  Subjective  Cuong Watts is a 36 y o  female   Past Medical History:   Diagnosis Date    Anxiety     Depression     Temporomandibular joint disorder     Resolved 7/7/2015        Past Surgical History:   Procedure Laterality Date    CERVICAL BIOPSY  W/ LOOP ELECTRODE EXCISION  2009    DENTAL SURGERY      [place] teeth extraction    TONSILLECTOMY  1990       Current Outpatient Medications   Medication Sig Dispense Refill    carvedilol (COREG) 3 125 mg tablet Take 1 tablet (3 125 mg total) by mouth 2 (two) times a day with meals 180 tablet 3    escitalopram (LEXAPRO) 20 mg tablet Take 1 tablet by mouth      furosemide (LASIX) 20 mg tablet Take 1 tablet (20 mg total) by mouth daily 90 tablet 3    lisinopril (ZESTRIL) 20 mg tablet Take 1 tablet (20 mg total) by mouth daily at bedtime 180 tablet 1    Multiple Vitamins-Calcium (ONE-A-DAY WOMENS PO) Take by mouth      norgestimate-ethinyl estradiol (Tri-Lo-Soha) 0 18/0 215/0 25 MG-25 MCG per tablet Take 1 tablet by mouth daily 90 tablet 3     No current facility-administered medications for this visit  No Known Allergies  D:  Shiv Hauser spoke with this worker about her feelings re: having had her job offer rescinded  She shared her conflicted feelings about how the virus has impacted her    A:  Shiv Hauser expressed feeling guilty but very much in need of today's session and the opportunity it allowed for venting her anger  P:  Sanam Jovan will continue to be supported through this very difficult transition  I spent 35minutes with the patient during this visit

## 2020-03-25 NOTE — PROGRESS NOTES
Assessment/Plan: Morbid obesity with BMI of 45 0-49 9, adult ([facility] )  -Patient is pursuing Healthy[facility]ays after completing  [facility]ensive Lifestyle Intervention Program  -Initial weight loss goal of 5-10% weight loss for improved health  -Previously discussed options for weight loss medications- she is not currently interested and would like to continue focusing on lifestyle changes   -Caution Contrave as she is on Lexapro  -Dietary recall reviewed, suggestions provided  -Encouraged meditation and deep breathing  -Reviewed importance of food logging and exercise (when sx improve)     Initial: 334 2 (Pt had lost 15+ lbs prior to starting MWM)  Current: 395 per pt (+3 5 lbs since 2/11/20)   Change: -39 2 lbs (11 75 %)  Goal:  lbs; LTG under 200 lbs    Essential hypertension  HTN/CHF  -on carvedilol, lisinopril, and furosemide  -continue to monitor  -Continue management with Cardiology- Dr Lorraine Alexis  -per 8/28/19 visit with cardiology: "If loses 20 more lbs and systolic BP < 308 mmHg can stop coreg " Weight from 8/28 - 298    Elevated hemoglobin A1c  5 8 --> 6% --> 5 6 %  -continue with lifestyle changes  -can consider metformin or Saxenda    Follow up in 2-4 weeks with RD and approximately 2 months with Non-Surgical Physician/Advanced Practitioner  Goals:  Food log (ie ) www myfitnesspal com,sparkpeople  com,loseit com,calorieking  com,etc  baritastic  No sugary beverages  At least 64oz of water daily  Increase physical activity by 10 minutes daily   Gradually increase physical activity to a goal of 5 days per week for 30 minutes of MODERATE intensity PLUS 2 days per week of FULL BODY resistance training  5-10 servings of fruits and vegetables per day and 25-35 grams of dietary fiber per day, gradually increasing  3390-1518 calories on sedentary days  and flex to 4987-1739 on walk days      Diagnoses and all orders for this visit:    Morbid obesity with BMI of 45 0-49 9, adult ([facility] )    Essential hypertension    Elevated hemoglobin A1c        Subjective:   Chief Complaint   Patient presents with    Virtual Brief Visit     Patient ID: Kenneth Villanueva  is a 36 y o  female with excess weight/obesity here to pursue weight management  Patient is pursuing Conservative Program      HPI    Reports increased stress eating due to changes and recent COVID concerns  Also eating out of boredom  Food logging: no  B: coffee/skipping or greek yogurt  S: skips or yogurt  L: free meals at hospital- pasta dishes  S: 2 string cheeses  D: more pasta dishes recently  S: popcorn    Increased appetite/cravings: increased cravings for comfort food due to stress (carbs, decreased vegetables, but using whole wheat noodles)  Trying to still measure portions     Exercise: not walking due to back pain  Hydration: Over 70 oz of water; Drinks coffee with 2 % milk; tea with 2 % milk  Sleep: 8-9 hours    The following portions of the patient's history were reviewed and updated as appropriate: allergies, current medications, past family history, past medical history, past social history, past surgical history and problem list     Objective:    Ht 5' 7" (1 702 m) Comment: from 2/11/20  Wt 134 kg (295 lb) Comment: per pt from 3/27/20  BMI 46 20 kg/m²     Virtual Brief Visit    Problem List Items Addressed This Visit        Cardiovascular and Mediastinum    Essential hypertension     HTN/CHF  -on carvedilol, lisinopril, and furosemide  -continue to monitor  -Continue management with Cardiology- Dr Jennifer Hansen  -per 8/28/19 visit with cardiology: "If loses 20 more lbs and systolic BP < 117 mmHg can stop coreg " Weight from 8/28 - 298            Other    Morbid obesity with BMI of 45 0-49 9, adult ([facility] ) - Primary     -Patient is pursuing HealthyWays after completing  [facility] Lifestyle Intervention Program  -Initial weight loss goal of 5-10% weight loss for improved health  -Previously discussed options for weight loss medications- she is not currently interested and would like to continue focusing on lifestyle changes   -Caution Contrave as she is on Lexapro  -Dietary recall reviewed, suggestions provided  -Encouraged meditation and deep breathing  -Reviewed importance of food logging and exercise (when sx improve)     Initial: 334 2 (Pt had lost 15+ lbs prior to starting MWM)  Current: 395 per pt (+3 5 lbs since 2/11/20)   Change: -39 2 lbs (11 75 %)  Goal:  lbs; LTG under 200 lbs         Elevated hemoglobin A1c     5 8 --> 6% --> 5 6 %  -continue with lifestyle changes  -can consider metformin or Saxenda                 Reason for visit is MWM follow up    Encounter provider Phill Abreu PA-C    Provider located at [address] [place] [phone]    Recent Visits  No visits were found meeting these conditions  Showing recent visits within past 7 days and meeting all other requirements     Today's Visits  Date Type Provider Dept   03/30/20 Telemedicine Phill Abreu PA-C [facility]   Showing today's visits and meeting all other requirements     Future Appointments  Date Type Provider Dept   03/30/20 Telemedicine Stacey Baird PA-C [facility]   Showing future appointments within next 150 days and meeting all other requirements      After connecting through telephone, the patient was identified by name and date of birth  Xochitl Mota was informed that this is a telemedicine visit and that the visit is being conducted through telephone  My office door was closed  Other methods to assure confidentiality were taken  No one else was in the room  She acknowledged consent and understanding of privacy and security of the video platform  The patient has agreed to participate and understands they can discontinue the visit at any time  Patient is aware this is a billable service  Subjective  Xochitl Mota is a 36 y o  female for [facility] follow up  Past Medical History:   Diagnosis Date    Anxiety     Depression     Temporomandibular joint disorder     Resolved 7/7/2015      Past Surgical History:   Procedure Laterality Date    CERVICAL BIOPSY  W/ LOOP ELECTRODE EXCISION  2009    DENTAL SURGERY      [place] teeth extraction    TONSILLECTOMY  1990     Current Outpatient Medications   Medication Sig Dispense Refill    carvedilol (COREG) 3 125 mg tablet Take 1 tablet (3 125 mg total) by mouth 2 (two) times a day with meals 180 tablet 3    escitalopram (LEXAPRO) 20 mg tablet Take 1 tablet by mouth      furosemide (LASIX) 20 mg tablet Take 1 tablet (20 mg total) by mouth daily 90 tablet 3    lisinopril (ZESTRIL) 20 mg tablet Take 1 tablet (20 mg total) by mouth daily at bedtime 180 tablet 1    Multiple Vitamins-Calcium (ONE-A-DAY WOMENS PO) Take by mouth      norgestimate-ethinyl estradiol (Tri-Lo-Soha) 0 18/0 215/0 25 MG-25 MCG per tablet Take 1 tablet by mouth daily 90 tablet 3     No current facility-administered medications for this visit  No Known Allergies    Review of Systems   Respiratory: Negative  Cardiovascular: Negative  Gastrointestinal: Negative  Psychiatric/Behavioral:        Denies concerns with mood     I spent 30 minutes with the patient during this visit

## 2020-03-30 ENCOUNTER — TELEMEDICINE (OUTPATIENT)
Dept: BARIATRICS | Facility: CLINIC | Age: 35
End: 2020-03-30
Payer: COMMERCIAL

## 2020-03-30 VITALS — HEIGHT: 67 IN | BODY MASS INDEX: 45.99 KG/M2 | WEIGHT: 293 LBS

## 2020-03-30 DIAGNOSIS — I10 ESSENTIAL HYPERTENSION: ICD-10-CM

## 2020-03-30 DIAGNOSIS — E66.01 MORBID OBESITY WITH BMI OF 45.0-49.9, ADULT (HCC): Primary | ICD-10-CM

## 2020-03-30 DIAGNOSIS — R73.09 ELEVATED HEMOGLOBIN A1C: ICD-10-CM

## 2020-03-30 PROCEDURE — 99443 PR PHYS/QHP TELEPHONE EVALUATION 21-30 MIN: CPT | Performed by: PHYSICIAN ASSISTANT

## 2020-03-31 DIAGNOSIS — Z30.41 SURVEILLANCE FOR BIRTH CONTROL, ORAL CONTRACEPTIVES: ICD-10-CM

## 2020-03-31 RX ORDER — NORGESTIMATE AND ETHINYL ESTRADIOL 7DAYSX3 LO
1 KIT ORAL DAILY
Qty: 90 TABLET | Refills: 0 | Status: SHIPPED | OUTPATIENT
Start: 2020-03-31 | End: 2020-06-22 | Stop reason: SDUPTHER

## 2020-04-08 ENCOUNTER — TELEMEDICINE (OUTPATIENT)
Dept: BEHAVIORAL/MENTAL HEALTH CLINIC | Facility: CLINIC | Age: 35
End: 2020-04-08
Payer: COMMERCIAL

## 2020-04-08 DIAGNOSIS — F32.89 OTHER DEPRESSION: Primary | ICD-10-CM

## 2020-04-08 DIAGNOSIS — F41.1 GENERALIZED ANXIETY DISORDER: ICD-10-CM

## 2020-04-08 PROCEDURE — 90832 PSYTX W PT 30 MINUTES: CPT | Performed by: SOCIAL WORKER

## 2020-04-13 ENCOUNTER — TELEPHONE (OUTPATIENT)
Dept: BARIATRICS | Facility: CLINIC | Age: 35
End: 2020-04-13

## 2020-04-22 ENCOUNTER — TELEMEDICINE (OUTPATIENT)
Dept: BEHAVIORAL/MENTAL HEALTH CLINIC | Facility: CLINIC | Age: 35
End: 2020-04-22
Payer: COMMERCIAL

## 2020-04-22 DIAGNOSIS — F41.1 GENERALIZED ANXIETY DISORDER: Primary | ICD-10-CM

## 2020-04-22 DIAGNOSIS — F32.89 OTHER DEPRESSION: ICD-10-CM

## 2020-04-22 PROCEDURE — 90837 PSYTX W PT 60 MINUTES: CPT | Performed by: SOCIAL WORKER

## 2020-04-27 ENCOUNTER — OFFICE VISIT (OUTPATIENT)
Dept: BARIATRICS | Facility: CLINIC | Age: 35
End: 2020-04-27

## 2020-04-27 DIAGNOSIS — R63.5 ABNORMAL WEIGHT GAIN: Primary | ICD-10-CM

## 2020-04-27 PROCEDURE — RECHECK: Performed by: DIETITIAN, REGISTERED

## 2020-05-11 ENCOUNTER — OFFICE VISIT (OUTPATIENT)
Dept: BARIATRICS | Facility: CLINIC | Age: 35
End: 2020-05-11

## 2020-05-11 DIAGNOSIS — R63.5 ABNORMAL WEIGHT GAIN: Primary | ICD-10-CM

## 2020-05-11 PROCEDURE — RECHECK: Performed by: DIETITIAN, REGISTERED

## 2020-05-14 ENCOUNTER — OFFICE VISIT (OUTPATIENT)
Dept: FAMILY MEDICINE CLINIC | Facility: CLINIC | Age: 35
End: 2020-05-14
Payer: COMMERCIAL

## 2020-05-14 VITALS
SYSTOLIC BLOOD PRESSURE: 110 MMHG | WEIGHT: 293 LBS | DIASTOLIC BLOOD PRESSURE: 70 MMHG | HEART RATE: 78 BPM | BODY MASS INDEX: 45.99 KG/M2 | HEIGHT: 67 IN | TEMPERATURE: 98.3 F | RESPIRATION RATE: 16 BRPM

## 2020-05-14 DIAGNOSIS — Z23 NEED FOR TDAP VACCINATION: ICD-10-CM

## 2020-05-14 DIAGNOSIS — L23.9 ALLERGIC DERMATITIS: Primary | ICD-10-CM

## 2020-05-14 DIAGNOSIS — L25.5 RHUS DERMATITIS: ICD-10-CM

## 2020-05-14 PROCEDURE — 90471 IMMUNIZATION ADMIN: CPT

## 2020-05-14 PROCEDURE — 3008F BODY MASS INDEX DOCD: CPT | Performed by: FAMILY MEDICINE

## 2020-05-14 PROCEDURE — 3074F SYST BP LT 130 MM HG: CPT | Performed by: FAMILY MEDICINE

## 2020-05-14 PROCEDURE — 3078F DIAST BP <80 MM HG: CPT | Performed by: FAMILY MEDICINE

## 2020-05-14 PROCEDURE — 1036F TOBACCO NON-USER: CPT | Performed by: FAMILY MEDICINE

## 2020-05-14 PROCEDURE — 90715 TDAP VACCINE 7 YRS/> IM: CPT

## 2020-05-14 PROCEDURE — 99214 OFFICE O/P EST MOD 30 MIN: CPT | Performed by: FAMILY MEDICINE

## 2020-05-14 RX ORDER — UREA 450 MG/G
CREAM TOPICAL 2 TIMES DAILY
Qty: 1 TUBE | Refills: 1 | Status: SHIPPED | OUTPATIENT
Start: 2020-05-14 | End: 2021-06-09 | Stop reason: ALTCHOICE

## 2020-05-19 ENCOUNTER — TELEMEDICINE (OUTPATIENT)
Dept: FAMILY MEDICINE CLINIC | Facility: CLINIC | Age: 35
End: 2020-05-19
Payer: COMMERCIAL

## 2020-05-19 DIAGNOSIS — F41.1 GENERALIZED ANXIETY DISORDER: Primary | ICD-10-CM

## 2020-05-19 PROCEDURE — 3074F SYST BP LT 130 MM HG: CPT | Performed by: FAMILY MEDICINE

## 2020-05-19 PROCEDURE — 1036F TOBACCO NON-USER: CPT | Performed by: FAMILY MEDICINE

## 2020-05-19 PROCEDURE — 99213 OFFICE O/P EST LOW 20 MIN: CPT | Performed by: FAMILY MEDICINE

## 2020-05-19 PROCEDURE — 3078F DIAST BP <80 MM HG: CPT | Performed by: FAMILY MEDICINE

## 2020-05-26 ENCOUNTER — TELEMEDICINE (OUTPATIENT)
Dept: BARIATRICS | Facility: CLINIC | Age: 35
End: 2020-05-26
Payer: COMMERCIAL

## 2020-05-26 VITALS — BODY MASS INDEX: 45.99 KG/M2 | HEIGHT: 67 IN | WEIGHT: 293 LBS

## 2020-05-26 DIAGNOSIS — E66.01 MORBID OBESITY WITH BMI OF 45.0-49.9, ADULT (HCC): Primary | ICD-10-CM

## 2020-05-26 DIAGNOSIS — I10 ESSENTIAL HYPERTENSION: ICD-10-CM

## 2020-05-26 DIAGNOSIS — R73.09 ELEVATED HEMOGLOBIN A1C: ICD-10-CM

## 2020-05-26 PROCEDURE — 99442 PR PHYS/QHP TELEPHONE EVALUATION 11-20 MIN: CPT | Performed by: PHYSICIAN ASSISTANT

## 2020-05-26 RX ORDER — HYDROXYZINE 50 MG/1
25 TABLET, FILM COATED ORAL
COMMUNITY
End: 2020-07-20 | Stop reason: SDUPTHER

## 2020-05-27 ENCOUNTER — TELEMEDICINE (OUTPATIENT)
Dept: BEHAVIORAL/MENTAL HEALTH CLINIC | Facility: CLINIC | Age: 35
End: 2020-05-27
Payer: COMMERCIAL

## 2020-05-27 DIAGNOSIS — F41.1 GENERALIZED ANXIETY DISORDER: Primary | ICD-10-CM

## 2020-05-27 DIAGNOSIS — F32.89 OTHER DEPRESSION: ICD-10-CM

## 2020-05-27 PROCEDURE — 90834 PSYTX W PT 45 MINUTES: CPT | Performed by: SOCIAL WORKER

## 2020-06-17 ENCOUNTER — TELEMEDICINE (OUTPATIENT)
Dept: BEHAVIORAL/MENTAL HEALTH CLINIC | Facility: CLINIC | Age: 35
End: 2020-06-17
Payer: COMMERCIAL

## 2020-06-17 DIAGNOSIS — F32.89 OTHER DEPRESSION: ICD-10-CM

## 2020-06-17 DIAGNOSIS — F41.1 GENERALIZED ANXIETY DISORDER: Primary | ICD-10-CM

## 2020-06-17 PROCEDURE — 90834 PSYTX W PT 45 MINUTES: CPT | Performed by: SOCIAL WORKER

## 2020-06-22 ENCOUNTER — PATIENT MESSAGE (OUTPATIENT)
Dept: OBGYN CLINIC | Facility: MEDICAL CENTER | Age: 35
End: 2020-06-22

## 2020-06-22 DIAGNOSIS — Z30.41 SURVEILLANCE FOR BIRTH CONTROL, ORAL CONTRACEPTIVES: ICD-10-CM

## 2020-06-22 RX ORDER — NORGESTIMATE AND ETHINYL ESTRADIOL 7DAYSX3 LO
1 KIT ORAL DAILY
Qty: 90 TABLET | Refills: 0 | Status: SHIPPED | OUTPATIENT
Start: 2020-06-22 | End: 2021-03-11 | Stop reason: SDUPTHER

## 2020-06-23 DIAGNOSIS — R03.0 ELEVATED BP WITHOUT DIAGNOSIS OF HYPERTENSION: ICD-10-CM

## 2020-06-23 RX ORDER — CARVEDILOL 3.12 MG/1
3.12 TABLET ORAL 2 TIMES DAILY WITH MEALS
Qty: 180 TABLET | Refills: 2 | Status: SHIPPED | OUTPATIENT
Start: 2020-06-23 | End: 2021-04-05 | Stop reason: ALTCHOICE

## 2020-07-01 ENCOUNTER — TELEMEDICINE (OUTPATIENT)
Dept: BEHAVIORAL/MENTAL HEALTH CLINIC | Facility: CLINIC | Age: 35
End: 2020-07-01
Payer: COMMERCIAL

## 2020-07-01 DIAGNOSIS — F41.1 GENERALIZED ANXIETY DISORDER: ICD-10-CM

## 2020-07-01 DIAGNOSIS — F32.89 OTHER DEPRESSION: ICD-10-CM

## 2020-07-01 PROCEDURE — 1036F TOBACCO NON-USER: CPT | Performed by: SOCIAL WORKER

## 2020-07-01 PROCEDURE — 90834 PSYTX W PT 45 MINUTES: CPT | Performed by: SOCIAL WORKER

## 2020-07-01 NOTE — PSYCH
Virtual Regular Visit      Assessment/Plan:    Problem List Items Addressed This Visit        Other    Depression    Generalized anxiety disorder               Reason for visit is   Chief Complaint   Patient presents with    Virtual Regular Visit        Encounter provider [facility]    Provider located at [address] [facility]  [address]  [place] [address]      Recent Visits  No visits were found meeting these conditions  Showing recent visits within past 7 days and meeting all other requirements     Today's Visits  Date Type Provider Dept   07/01/20 Bennie Simon 468 [facility] Therap[facility]   Showing today's visits and meeting all other requirements     Future Appointments  No visits were found meeting these conditions  Showing future appointments within next 150 days and meeting all other requirements        The patient was identified by name and date of birth  Mago Chavez was informed that this is a telemedicine visit and that the visit is being conducted through [employer] [place]  My office door was closed  No one else was in the room  She acknowledged consent and understanding of privacy and security of the video platform  The patient has agreed to participate and understands they can discontinue the visit at any time  Patient is aware this is a billable service  Subjective  Mago Chavez is a 36 y o  female          HPI     Past Medical History:   Diagnosis Date    Anxiety     Depression     Temporomandibular joint disorder     Resolved 7/7/2015        Past Surgical History:   Procedure Laterality Date    CERVICAL BIOPSY  W/ LOOP ELECTRODE EXCISION  2009    DENTAL SURGERY      [place] teeth extraction    TONSILLECTOMY  1990       Current Outpatient Medications   Medication Sig Dispense Refill    carvedilol (COREG) 3 125 mg tablet Take 1 tablet (3 125 mg total) by mouth 2 (two) times a day with meals 180 tablet 2  escitalopram (LEXAPRO) 20 mg tablet Take 1 tablet by mouth      furosemide (LASIX) 20 mg tablet Take 1 tablet (20 mg total) by mouth daily 90 tablet 3    hydrOXYzine HCL (ATARAX) 50 mg tablet Take 50 mg by mouth daily at bedtime      lisinopril (ZESTRIL) 20 mg tablet Take 1 tablet (20 mg total) by mouth daily at bedtime 180 tablet 1    Multiple Vitamins-Calcium (ONE-A-DAY WOMENS PO) Take by mouth      norgestimate-ethinyl estradiol (Tri-Lo-Soha) 0 18/0 215/0 25 MG-25 MCG per tablet Take 1 tablet by mouth daily 90 tablet 0    Urea 45 % CREA Apply topically 2 (two) times a day (Patient not taking: Reported on 5/26/2020) 1 Tube 1     No current facility-administered medications for this visit  No Known Allergies    Goals: 1  Pain: 0  Level of Suicidality: Low     D:  Safia spoke with this worker today about her feelings re: her decision to terminate with this worker as she would prefer to be colleagues 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  She expressed her reasons for not wanting to to be in a situation that would result in a dual relationship  And this was supported by this worker  A: Miesha Hong admitted that she has been doing very well and shared the remainder of her session talking about her upcoming oral surgery    P: Miesha Hong will be discharged at her request at this time       I spent 50minutes with the patient during this visit  VIRTUAL VISIT DISCLAIMER    Janny Dominguez acknowledges that she has consented to an online visit or consultation  She understands that the online visit is based solely on information provided by her, and that, in the absence of a face-to-face physical evaluation by the physician, the diagnosis she receives is both limited and provisional in terms of accuracy and completeness  This is not intended to replace a full medical face-to-face evaluation by the physician  Janny Dominguez understands and accepts these terms

## 2020-07-20 DIAGNOSIS — R03.0 ELEVATED BP WITHOUT DIAGNOSIS OF HYPERTENSION: ICD-10-CM

## 2020-07-20 DIAGNOSIS — L23.9 ALLERGIC DERMATITIS: Primary | ICD-10-CM

## 2020-07-20 RX ORDER — HYDROXYZINE HYDROCHLORIDE 10 MG/1
30 TABLET, FILM COATED ORAL
Qty: 90 TABLET | Refills: 0 | Status: SHIPPED | OUTPATIENT
Start: 2020-07-20 | End: 2021-05-24

## 2020-07-20 RX ORDER — LISINOPRIL 20 MG/1
TABLET ORAL
Qty: 90 TABLET | Refills: 0 | Status: SHIPPED | OUTPATIENT
Start: 2020-07-20 | End: 2021-04-05 | Stop reason: ALTCHOICE

## 2020-07-23 NOTE — ASSESSMENT & PLAN NOTE
-Patient is pursuing HealthyWays after completing  [facility] Lifestyle Intervention Program  -Initial weight loss goal of 5-10% weight loss for improved health-met  -Previously discussed options for weight loss medications- she is not currently interested and would like to continue focusing on lifestyle changes   -Caution Contrave as she is on Lexapro  -Would need cardiology clearance for phentermine   -Reviewed importance of consistency with dietary and lifestyles changes   Suggestions provided  -she is considering returning to classes- not sure if she is going to need virtual due to her work schedule    -Looking forward to her wedding in October     Initial: 334 2 (Pt had lost 15+ lbs prior to starting MWM)  Current: 293 (-2 lbs since 5/26/20)   Change: -41 2 lbs (12 5 % TBW)  Goal:  lbs; LTG under 200 lbs

## 2020-07-23 NOTE — ASSESSMENT & PLAN NOTE
HTN/CHF  -on carvedilol, lisinopril, and furosemide  -continue to monitor  -Continue management with Cardiology- Dr Michael Bailey  -per 8/28/19 visit with cardiology: "If loses 20 more lbs and systolic BP < 487 mmHg can stop coreg " Weight from 8/28 - 298

## 2020-07-23 NOTE — PROGRESS NOTES
Assessment/Plan: Morbid obesity with BMI of 45 0-49 9, adult ([address] )  -Patient is pursuing HealthyWays after completing  [employer]-Intensive Lifestyle Intervention Program  -Initial weight loss goal of 5-10% weight loss for improved health-met  -Previously discussed options for weight loss medications- she is not currently interested and would like to continue focusing on lifestyle changes   -Caution Contrave as she is on Lexapro  -Would need cardiology clearance for phentermine   -Reviewed importance of consistency with dietary and lifestyles changes  Suggestions provided  -she is considering returning to classes- not sure if she is going to need virtual due to her work schedule    -Looking forward to her wedding in October     Initial: 334 2 (Pt had lost 15+ lbs prior to starting MWM)  Current: 293 (-2 lbs since 5/26/20)   Change: -41 2 lbs (12 5 % TBW)  Goal:  lbs; LTG under 200 lbs    Essential hypertension  HTN/CHF  -on carvedilol, lisinopril, and furosemide  -continue to monitor  -Continue management with Cardiology- Dr Charise Nissen  -per 8/28/19 visit with cardiology: "If loses 20 more lbs and systolic BP < 830 mmHg can stop coreg " Weight from 8/28 - 298    Elevated hemoglobin A1c  5 8 --> 6% --> 5 6 %  -continue with lifestyle changes  -can consider metformin or Saxenda    Follow up in approximately 2 months with Non-Surgical Physician/Advanced Practitioner  Goals:  Food log (ie ) www myfitnesspal com,sparkpeople  com,loseit com,calorieking  com,etc  baritastic  No sugary beverages  At least 64oz of water daily  Increase physical activity by 10 minutes daily   Gradually increase physical activity to a goal of 5 days per week for 30 minutes of MODERATE intensity PLUS 2 days per week of FULL BODY resistance training  5-10 servings of fruits and vegetables per day and 25-35 grams of dietary fiber per day, gradually increasing  3442-3239 calories on sedentary days  and flex GR [id number] on walk days  Diagnoses and all orders for this visit:    Morbid obesity with BMI of 45 0-49 9, adult ([facility] )    Essential hypertension    Elevated hemoglobin A1c        Subjective:   Chief Complaint   Patient presents with    Follow-up     Patient is MWM 2 month follow-up virtual visit   Virtual Regular Visit     Patient ID: Anna Porras  is a 36 y o  female with excess weight/obesity here to pursue weight management  Patient is pursuing Conservative Program      HPI  The patient presents for MWM follow up  Started a new job, but has been stressful  Feels overwhelmed  A lot of this stress comes from the technology, but enjoys the work she is doing  Food logging: yes, but inconsistently  Struggling with getting "back on track " Feels she knows what needs to change  Worrying more about germs due to COVID  Feels as a result is lacking energy and motivation for exercise  Drinking more alcohol on weekends since COVID (drinking vodka and cranberry- 2 per night)  Packing snacks instead of having a lunch  Suggested using a meal replacement  Not being mindful in the evening or weekends  Suggested meal prep  Fiance has been doing grocery shopping- recommended she make a list  Wondering if she should return to classes  The following portions of the patient's history were reviewed and updated as appropriate: allergies, current medications, past family history, past medical history, past social history, past surgical history and problem list     Objective:    Ht 5' 7" (1 702 m) Comment: Patient verbalized  Wt 133 kg (293 lb) Comment: Patient verbalized    BMI 45 89 kg/m²     Virtual Regular Visit    Assessment/Plan:    Problem List Items Addressed This Visit        Cardiovascular and Mediastinum    Essential hypertension     HTN/CHF  -on carvedilol, lisinopril, and furosemide  -continue to monitor  -Continue management with Cardiology- Dr Leisa Rader  -per 8/28/19 visit with cardiology: "If loses 20 more lbs and systolic BP < 120 mmHg can stop coreg " Weight from 8/28 - 298            Other    Morbid obesity with BMI of 45 0-49 9, adult ([address] ) - Primary     -Patient is pursuing HealthyWays after completing  [facility]-Intensive Lifestyle Intervention Program  -Initial weight loss goal of 5-10% weight loss for improved health-met  -Previously discussed options for weight loss medications- she is not currently interested and would like to continue focusing on lifestyle changes   -Caution Contrave as she is on Lexapro  -Would need cardiology clearance for phentermine   -Reviewed importance of consistency with dietary and lifestyles changes  Suggestions provided  -she is considering returning to classes- not sure if she is going to need virtual due to her work schedule    -Looking forward to her wedding in October     Initial: 334 2 (Pt had lost 15+ lbs prior to starting MWM)  Current: 293 (-2 lbs since 5/26/20)   Change: -41 2 lbs (12 5 % TBW)  Goal:  lbs; LTG under 200 lbs         Elevated hemoglobin A1c     5 8 --> 6% --> 5 6 %  -continue with lifestyle changes  -can consider metformin or Saxenda                Reason for visit is   Chief Complaint   Patient presents with    Follow-up     Patient is MWM 2 month follow-up virtual visit   Virtual Regular Visit        Encounter provider Nyla Clifton PA-C    Provider located at [address] [place] [phone]      Recent Visits  No visits were found meeting these conditions  Showing recent visits within past 7 days and meeting all other requirements     Today's Visits  Date Type Provider Dept   07/28/20 Telemedicine Stacey Harmon PA-C [facility]   Showing today's visits and meeting all other requirements     Future Appointments  No visits were found meeting these conditions     Showing future appointments within next 150 days and meeting all other requirements        The patient was identified by name and date of birth  Rosalia Headley was informed that this is a telemedicine visit and that the visit is being conducted through [facility] and patient was informed that this is a secure, HIPAA-compliant platform  She agrees to proceed     My office door was closed  Other methods to assure confidentiality were taken  No one else was in the room  She acknowledged consent and understanding of privacy and security of the video platform  The patient has agreed to participate and understands they can discontinue the visit at any time  Patient is aware this is a billable service  Subjective  Rosalia Headley is a 36 y o  female for MWM f/u   HPI     Past Medical History:   Diagnosis Date    Anxiety     Depression     Temporomandibular joint disorder     Resolved 7/7/2015        Past Surgical History:   Procedure Laterality Date    CERVICAL BIOPSY  W/ LOOP ELECTRODE EXCISION  2009    DENTAL SURGERY      [place] teeth extraction    TONSILLECTOMY  1990       Current Outpatient Medications   Medication Sig Dispense Refill    carvedilol (COREG) 3 125 mg tablet Take 1 tablet (3 125 mg total) by mouth 2 (two) times a day with meals 180 tablet 2    escitalopram (LEXAPRO) 20 mg tablet Take 1 tablet by mouth      furosemide (LASIX) 20 mg tablet Take 1 tablet (20 mg total) by mouth daily 90 tablet 3    hydrOXYzine HCL (ATARAX) 10 mg tablet Take 3 tablets (30 mg total) by mouth daily at bedtime As needed for itch her sleep 90 tablet 0    lisinopril (ZESTRIL) 20 mg tablet TAKE ONE TABLET BY MOUTH AT BEDTIME 90 tablet 0    Multiple Vitamins-Calcium (ONE-A-DAY WOMENS PO) Take by mouth      norgestimate-ethinyl estradiol (Tri-Lo-Shoa) 0 18/0 215/0 25 MG-25 MCG per tablet Take 1 tablet by mouth daily 90 tablet 0    Urea 45 % CREA Apply topically 2 (two) times a day (Patient not taking: Reported on 5/26/2020) 1 Tube 1     No current facility-administered medications for this visit         No Known Allergies    Review of Systems   Psychiatric/Behavioral:        Has been more stressed due to COVID; denies feeling down or depressed     Video Exam    Vitals:    07/28/20 1556   Weight: 133 kg (293 lb)   Height: 5' 7" (1 702 m)     Physical Exam   Nursing note and vitals reviewed  Constitutional   General appearance: Abnormal   well developed and morbidly obese  Pulmonary   Respiratory effort: No increased work of breathing or signs of respiratory distress  Abdomen   Abdomen: Abnormal   The abdomen was obese  Musculoskeletal   Gait and station: Moves neck and UE freely  Psychiatric   Orientation to person, place and time: Normal     Affect: appropriate    I spent 28 minutes with patient today in which greater than 50% of the time was spent in counseling/coordination of care regarding dietary and lifestyle changes for weight loss and weight loss maintenance      VIRTUAL VISIT DISCLAIMER    Hellen Sanchez acknowledges that she has consented to an online visit or consultation  She understands that the online visit is based solely on information provided by her, and that, in the absence of a face-to-face physical evaluation by the physician, the diagnosis she receives is both limited and provisional in terms of accuracy and completeness  This is not intended to replace a full medical face-to-face evaluation by the physician  Hellen Sanchez understands and accepts these terms

## 2020-07-28 ENCOUNTER — TELEMEDICINE (OUTPATIENT)
Dept: BARIATRICS | Facility: CLINIC | Age: 35
End: 2020-07-28
Payer: COMMERCIAL

## 2020-07-28 ENCOUNTER — TELEPHONE (OUTPATIENT)
Dept: BARIATRICS | Facility: CLINIC | Age: 35
End: 2020-07-28

## 2020-07-28 VITALS — BODY MASS INDEX: 45.99 KG/M2 | HEIGHT: 67 IN | WEIGHT: 293 LBS

## 2020-07-28 DIAGNOSIS — E66.01 MORBID OBESITY WITH BMI OF 45.0-49.9, ADULT (HCC): Primary | ICD-10-CM

## 2020-07-28 DIAGNOSIS — I10 ESSENTIAL HYPERTENSION: ICD-10-CM

## 2020-07-28 DIAGNOSIS — R73.09 ELEVATED HEMOGLOBIN A1C: ICD-10-CM

## 2020-07-28 PROCEDURE — 3074F SYST BP LT 130 MM HG: CPT | Performed by: PHYSICIAN ASSISTANT

## 2020-07-28 PROCEDURE — 99214 OFFICE O/P EST MOD 30 MIN: CPT | Performed by: PHYSICIAN ASSISTANT

## 2020-07-28 PROCEDURE — 3078F DIAST BP <80 MM HG: CPT | Performed by: PHYSICIAN ASSISTANT

## 2020-07-28 PROCEDURE — 1036F TOBACCO NON-USER: CPT | Performed by: PHYSICIAN ASSISTANT

## 2020-07-28 PROCEDURE — 3008F BODY MASS INDEX DOCD: CPT | Performed by: PHYSICIAN ASSISTANT

## 2020-07-28 NOTE — TELEPHONE ENCOUNTER
lmmaricruz for pt to call us back to schedule 2mo mwm folup appt and virtual appt with claribel rudolph    ----- Message from Maria Elena Campbell PA-C sent at 7/28/2020  4:19 PM EDT -----  Please schedule virtual visit with Amberly RUDOLPH and 2 months with me

## 2020-07-28 NOTE — PATIENT INSTRUCTIONS
Goals: Food log (ie ) www myfitnesspal com,sparkpeople  com,loseit com,calorieking  com,etc  baritastic  No sugary beverages  At least 64oz of water daily  Increase physical activity by 10 minutes daily   Gradually increase physical activity to a goal of 5 days per week for 30 minutes of MODERATE intensity PLUS 2 days per week of FULL BODY resistance training  5-10 servings of fruits and vegetables per day and 25-35 grams of dietary fiber per day, gradually increasing  6403-0618 calories on sedentary days  and flex IG [id number] on walk days

## 2020-08-24 ENCOUNTER — DOCUMENTATION (OUTPATIENT)
Dept: BEHAVIORAL/MENTAL HEALTH CLINIC | Facility: CLINIC | Age: 35
End: 2020-08-24

## 2020-08-24 NOTE — PROGRESS NOTES
Assessment/Plan:      There are no diagnoses linked to this encounter  Subjective:     Patient ID: Hellen Sanchez is a 36 y o  female  Outpatient Discharge Summary:   Admission Date: 5/18/19  Seabron Cushing was referred by self  Discharge Date: 8/24/20    Discharge Diagnosis:  Depression, MARIE      Treating Physician: Treatment Complications:   Presenting Problem: [facility]- went to er for swollen ankles, thought she was fine, then admitted her-- chf cause by latent infection in heart  Myocarditis  Was a smoker - has since quit  Since then, lifestyle has completely changed  Trying to be less stressed at work  Because of her personality, she is always stressed, though  bp was VERY high-- never was before       Course of treatment includes:    individual therapy   Treatment Progress: good  Criteria for Discharge: completed treatment goals and objectives and is no longer in need of services  Aftercare recommendations include: na  Discharge Medications include:  Current Outpatient Medications:     carvedilol (COREG) 3 125 mg tablet, Take 1 tablet (3 125 mg total) by mouth 2 (two) times a day with meals, Disp: 180 tablet, Rfl: 2    escitalopram (LEXAPRO) 20 mg tablet, Take 1 tablet by mouth, Disp: , Rfl:     furosemide (LASIX) 20 mg tablet, Take 1 tablet (20 mg total) by mouth daily, Disp: 90 tablet, Rfl: 3    hydrOXYzine HCL (ATARAX) 10 mg tablet, Take 3 tablets (30 mg total) by mouth daily at bedtime As needed for itch her sleep, Disp: 90 tablet, Rfl: 0    lisinopril (ZESTRIL) 20 mg tablet, TAKE ONE TABLET BY MOUTH AT BEDTIME, Disp: 90 tablet, Rfl: 0    Multiple Vitamins-Calcium (ONE-A-DAY WOMENS PO), Take by mouth, Disp: , Rfl:     norgestimate-ethinyl estradiol (Tri-Lo-Soha) 0 18/0 215/0 25 MG-25 MCG per tablet, Take 1 tablet by mouth daily, Disp: 90 tablet, Rfl: 0    Urea 45 % CREA, Apply topically 2 (two) times a day (Patient not taking: Reported on 5/26/2020), Disp: 1 Tube, Rfl: 1    Prognosis: good

## 2020-12-07 ENCOUNTER — SOCIAL WORK (OUTPATIENT)
Dept: BEHAVIORAL/MENTAL HEALTH CLINIC | Facility: CLINIC | Age: 35
End: 2020-12-07
Payer: COMMERCIAL

## 2020-12-07 DIAGNOSIS — F32.89 OTHER DEPRESSION: ICD-10-CM

## 2020-12-07 DIAGNOSIS — F41.1 GENERALIZED ANXIETY DISORDER: ICD-10-CM

## 2020-12-07 PROCEDURE — 90834 PSYTX W PT 45 MINUTES: CPT | Performed by: SOCIAL WORKER

## 2020-12-27 ENCOUNTER — IMMUNIZATIONS (OUTPATIENT)
Dept: FAMILY MEDICINE CLINIC | Facility: HOSPITAL | Age: 35
End: 2020-12-27
Payer: COMMERCIAL

## 2020-12-27 DIAGNOSIS — Z23 ENCOUNTER FOR IMMUNIZATION: ICD-10-CM

## 2020-12-27 PROCEDURE — 91301 SARS-COV-2 / COVID-19 MRNA VACCINE (MODERNA) 100 MCG: CPT

## 2020-12-27 PROCEDURE — 0011A SARS-COV-2 / COVID-19 MRNA VACCINE (MODERNA) 100 MCG: CPT

## 2021-01-13 ENCOUNTER — SOCIAL WORK (OUTPATIENT)
Dept: BEHAVIORAL/MENTAL HEALTH CLINIC | Facility: CLINIC | Age: 36
End: 2021-01-13
Payer: COMMERCIAL

## 2021-01-13 DIAGNOSIS — F41.1 GENERALIZED ANXIETY DISORDER: ICD-10-CM

## 2021-01-13 PROCEDURE — 90834 PSYTX W PT 45 MINUTES: CPT | Performed by: SOCIAL WORKER

## 2021-01-14 NOTE — PSYCH
Goals: 1  Pain: 0  Level of Suicidality: Low     D:  Safia spoke with this worker today about her feelings re: her  family interactions, her desire to get back to a more balanced place in her life of taking good care of herself while decreasing feelings of guilt, shame  A: Beth Nichols struggles with wanting therapy and wanting to engage with this worker as a colleague  However, she both enjoys and benefits from therapy and does not want to transfer to another therapist at this time      P: Beth Nichols will  be supported in continuing to make the needed changes during upcoming sessions       I spent 50minutes with the patient during this visit      This note was not shared with the patient due to this is a psychotherapy note

## 2021-01-24 ENCOUNTER — IMMUNIZATIONS (OUTPATIENT)
Dept: FAMILY MEDICINE CLINIC | Facility: HOSPITAL | Age: 36
End: 2021-01-24

## 2021-01-24 DIAGNOSIS — Z23 ENCOUNTER FOR IMMUNIZATION: Primary | ICD-10-CM

## 2021-01-24 PROCEDURE — 91301 SARS-COV-2 / COVID-19 MRNA VACCINE (MODERNA) 100 MCG: CPT

## 2021-01-24 PROCEDURE — 0012A SARS-COV-2 / COVID-19 MRNA VACCINE (MODERNA) 100 MCG: CPT

## 2021-02-02 DIAGNOSIS — F32.5 MAJOR DEPRESSIVE DISORDER IN FULL REMISSION, UNSPECIFIED WHETHER RECURRENT (HCC): Primary | ICD-10-CM

## 2021-02-02 RX ORDER — ESCITALOPRAM OXALATE 20 MG/1
20 TABLET ORAL DAILY
Qty: 90 TABLET | Refills: 1 | Status: SHIPPED | OUTPATIENT
Start: 2021-02-02 | End: 2021-09-20 | Stop reason: SDUPTHER

## 2021-03-01 ENCOUNTER — ANNUAL EXAM (OUTPATIENT)
Dept: OBGYN CLINIC | Facility: MEDICAL CENTER | Age: 36
End: 2021-03-01
Payer: COMMERCIAL

## 2021-03-01 VITALS
HEIGHT: 67 IN | DIASTOLIC BLOOD PRESSURE: 80 MMHG | BODY MASS INDEX: 45.99 KG/M2 | WEIGHT: 293 LBS | SYSTOLIC BLOOD PRESSURE: 110 MMHG

## 2021-03-01 DIAGNOSIS — I50.9 ACUTE CONGESTIVE HEART FAILURE, UNSPECIFIED HEART FAILURE TYPE (HCC): ICD-10-CM

## 2021-03-01 DIAGNOSIS — Z01.419 WELL WOMAN EXAM: Primary | ICD-10-CM

## 2021-03-01 DIAGNOSIS — Z31.69 ENCOUNTER FOR PRECONCEPTION CONSULTATION: ICD-10-CM

## 2021-03-01 PROCEDURE — 99395 PREV VISIT EST AGE 18-39: CPT | Performed by: OBSTETRICS & GYNECOLOGY

## 2021-03-01 NOTE — PROGRESS NOTES
Assessment   30 y o   Chantal Lubin with regular menses who is sexually active and currently notusing contraception presenting for annual exam  She is without complaint  Plan   Diagnoses and all orders for this visit:    Well woman exam  - Pap up to date  - Clinicians breast exam done  - return in 1yr for yearly    Encounter for preconception consultation  -     Ambulatory Referral to Maternal Fetal Medicine; Future  - Recommend discussing with Cardiologist  - Discussed contraindicated medications -- lisinopril   - Discussed category C medications -- coreg, lasix, and lexapro  - Start prenatal vitamin     Acute congestive heart failure, unspecified heart failure type [facility])  -     Ambulatory Referral to Maternal Fetal Medicine; Future      __________________________________________________________________      Subjective     Frankey Height is a 30 y o  Chantal Lubin with regular menses who is sexually active and currently notusing contraception presenting for annual exam  She is without complaint  GYN  Complaints: sometimes still gets spotting with sex, usually with deeper penetration  Denies dysmenorrhea, dyspareunia, genital discharge, genital ulcers, irregular/heavy menses, pelvic pain and vulvar/vaginal symptoms  Periods are regular every 28-30 days, lasting 4-5 days  Dysmenorrhea:mild, occurring first 1-2 days of flow  Cyclic symptoms include moodiness  Sexually active: Yes - single partner - male (just got  October!)  Hx STI: denies   Hx Abnormal pap: RAMBO ()  Last pap: 2020 - NILM    OB     Considering conception in near future  Hx acute heart failure  Wants to discuss this  Reports was secondary to dramatic BP elevation, but that an underlying etiology for the sudden HTN is unclear  She remains on BP medication currently  She reports her cardiac function has returned to baseline and she continues to see cardiology  Discussed obtaining clearance from them for pregnancy   Advised on contraindicated BP medications and to discuss transitioning with cardiology  Reviewed the spectrum of HTN/cardiac disorders in pregnancy -- CHTN, preeclampsia, peripartum cardiomyopathy  Advised on life threatening nature of severe cardiac disease, especially cardiomyopathy  Discussed risk of recurrence in pregnancy  Recommend preconception counseling with MFM    Complaints: denies  Denies urinary frequency, hematuria, urinary incontinence and dysuria    BREAST  Complaints: denies  Denies: breast lump, breast tenderness, changed mole, dryness, nipple discharge, pruritus, rash, skin color change and skin lesion(s)  Last mammogram: n/a  Personal hx: denies  Family hx: denies fhx of breast, uterine, ovarian cancers  Maternal grandmother with colon ca (72)  Patient does do regular self-exams    GENERAL  PMH reviewed/updated and is as below  Hx of CHF episode in setting of hypertensive crisis  Patient does follow with a PCP  Works as a therapist  Denies domestic violence    Exercise: treadmill, just started  Diet: weight watchers    SCREENING  Cervical Ca: pap up to date  Breast Ca: clinicians breast exam and teaching done  Colon Ca: not indicated by age      Past Medical History:   Diagnosis Date    Anxiety     Depression     Temporomandibular joint disorder     Resolved 7/7/2015        Past Surgical History:   Procedure Laterality Date    CERVICAL BIOPSY  W/ LOOP ELECTRODE EXCISION  2009    DENTAL SURGERY      [place] teeth extraction    TONSILLECTOMY  1990         Current Outpatient Medications:     carvedilol (COREG) 3 125 mg tablet, Take 1 tablet (3 125 mg total) by mouth 2 (two) times a day with meals, Disp: 180 tablet, Rfl: 2    escitalopram (LEXAPRO) 20 mg tablet, Take 1 tablet (20 mg total) by mouth daily, Disp: 90 tablet, Rfl: 1    hydrOXYzine HCL (ATARAX) 10 mg tablet, Take 3 tablets (30 mg total) by mouth daily at bedtime As needed for itch her sleep, Disp: 90 tablet, Rfl: 0    lisinopril (ZESTRIL) 20 mg tablet, TAKE ONE TABLET BY MOUTH AT BEDTIME, Disp: 90 tablet, Rfl: 0    Multiple Vitamins-Calcium (ONE-A-DAY WOMENS PO), Take by mouth, Disp: , Rfl:     norgestimate-ethinyl estradiol (Tri-Lo-Soha) 0 18/0 215/0 25 MG-25 MCG per tablet, Take 1 tablet by mouth daily, Disp: 90 tablet, Rfl: 0    furosemide (LASIX) 20 mg tablet, Take 1 tablet (20 mg total) by mouth daily (Patient not taking: Reported on 3/1/2021), Disp: 90 tablet, Rfl: 3    Urea 45 % CREA, Apply topically 2 (two) times a day (Patient not taking: Reported on 2020), Disp: 1 Tube, Rfl: 1    No Known Allergies    Social History     Tobacco Use    Smoking status: Former Smoker     Quit date: 3/17/2019     Years since quittin 0    Smokeless tobacco: Former User   Substance Use Topics    Alcohol use: Yes     Comment: wine 2 glasses a week    Drug use: No           Objective  /80   Ht 5' 7" (1 702 m)   Wt (!) 145 kg (320 lb)   LMP 2021 (Exact Date)   BMI 50 12 kg/m²      Physical Exam:  Physical Exam  Exam conducted with a chaperone present  Constitutional:       General: She is not in acute distress  Appearance: Normal appearance  She is well-developed  She is not ill-appearing, toxic-appearing or diaphoretic  HENT:      Head: Normocephalic and atraumatic  Eyes:      General: No scleral icterus  Right eye: No discharge  Left eye: No discharge  Conjunctiva/sclera: Conjunctivae normal    Cardiovascular:      Rate and Rhythm: Normal rate  Pulmonary:      Effort: Pulmonary effort is normal  No accessory muscle usage or respiratory distress  Chest:      Breasts:         Right: No inverted nipple, mass, nipple discharge, skin change or tenderness  Left: No inverted nipple, mass, nipple discharge, skin change or tenderness  Abdominal:      General: There is no distension  Palpations: Abdomen is soft  There is no mass  Tenderness: There is no abdominal tenderness  There is no guarding or rebound  Genitourinary:     General: Normal vulva  Exam position: Lithotomy position  Labia:         Right: No rash, tenderness or lesion  Left: No rash, tenderness or lesion  Vagina: No signs of injury  No vaginal discharge, erythema, tenderness or bleeding  Cervix: Friability (scant) present  No cervical motion tenderness, discharge or lesion  Uterus: Not deviated, not enlarged, not fixed and not tender  Adnexa:         Right: No mass (exam limited by body habitus), tenderness or fullness  Left: No mass, tenderness or fullness  Rectum: No external hemorrhoid  Normal anal tone  Comments: Urethral meatus: normal  Skin:     General: Skin is warm and dry  Coloration: Skin is not jaundiced  Findings: No bruising, erythema or rash  Neurological:      Mental Status: She is alert  Psychiatric:         Mood and Affect: Mood normal          Behavior: Behavior normal          Thought Content:  Thought content normal          Judgment: Judgment normal

## 2021-03-03 ENCOUNTER — TELEMEDICINE (OUTPATIENT)
Dept: BEHAVIORAL/MENTAL HEALTH CLINIC | Facility: CLINIC | Age: 36
End: 2021-03-03
Payer: COMMERCIAL

## 2021-03-03 DIAGNOSIS — F41.1 GENERALIZED ANXIETY DISORDER: ICD-10-CM

## 2021-03-03 DIAGNOSIS — F32.5 MAJOR DEPRESSIVE DISORDER IN FULL REMISSION, UNSPECIFIED WHETHER RECURRENT (HCC): ICD-10-CM

## 2021-03-03 PROCEDURE — 90834 PSYTX W PT 45 MINUTES: CPT | Performed by: SOCIAL WORKER

## 2021-03-04 NOTE — PROGRESS NOTES
Heart Failure Outpatient Consult Note - Maria Angeles 30 y o  female MRN: [id number]    @ Encounter: [id number]      Assessment/Plan:    Patient Active Problem List    Diagnosis Date Noted    Essential hypertension 03/17/2019     Priority: Low    History of cervical dysplasia 07/20/2016     Priority: Low    Morbid obesity with BMI of 45 0-49 9, adult ([place] [address] ) 07/07/2015     Priority: Low    Generalized anxiety disorder 07/30/2014     Priority: Low    Depression 05/28/2013     Priority: Low    Body mass index (BMI) of 50-59 9 in adult ([place] [address] ) 05/14/2020    Allergic dermatitis 05/14/2020    Rhus dermatitis 05/14/2020    Mixed hyperlipidemia 10/12/2019    Vitamin D deficiency 10/12/2019    Elevated hemoglobin A1c 10/12/2019       # Chronic HFpEF, Stage C, NYHA 2  Etiology: possible HTN as /100 mmHg on hospital admit, possible viral myocarditis given presentation to [facility] with sinusitis symptoms; no family hx of SCD or cardiomyopathy, no sign etoh use, given age- ischemic less likely   She is morbidly obese and will need screening for SUZAN  Her uncontrolled BP and diet alone with obesity could have contributed to her volume overload and mostly HFpEF  CRP 37 on admit, sed rate 51  MERRICK and RF normal    Weight: on admit 3/17: 358 lbs, 325 lbs today- has been down to 290 lbs  NT pro BNP 10/9/19: 229  6/20/19: 493   EKG - SR with PACs    Studies- personally reviewed by me    CMR 6/3/19: EF: 58%  Normal RV function    Echocardiogram 3/18/19  LVEF: 50-55%  LVIDd: 5 8 cm  RV: mildly enlarged  MR:  PASP: 38 mmHg    Lab Results   Component Value Date    NTBNP 229 10/09/2019        Neurohormonal Blockade:  --Beta-Blocker: coreg 3 125 mg BID  --ACEi, ARB or ARNi: lisinopril 20 mg daily  --Aldosterone Receptor Blocker:  --Diuretic: lasix 20 mg daily    Sudden Cardiac Death Risk Reduction:  --ICD:     # HTN- was 190/100 mmHg on admit 3/17, better controlled now    # morbid obesity- weight 358 lbs on admit 3/17; 332 lbs on follow up 4/3  Weight today 293 lbs  Doing [facility] weight management    # tobacco - 1 ppd- quit in March  # Sleep eval  Home study- mild SUZAN, lowest oxygen sat 79%  Polysomnogram 8/20/19- no significant apnea, lowest oxygen 88%    # generalized anxiety    Today's Plan:  [facility] wt management  Lasix prn  If trying to get pregnant has to be off lisinopril  --2g sodium diet  Weights daily    HPI:      31 yo female who had presented to PCP 3/11 with sinusitis symptoms and started on Augmentin  On 3/17 presented to ED with exertional dyspnea noticed on getting dressed and climbing stairs  She also reported LE edema but no PND or orthopnea but noticed abdominal bloating and weight gain for about a month  Smokes 1 ppd and only has about 2 drinks per week and no drug use  No family hx of SCD or cardiomyopathy  Her edema is what made her go to the hospital    CTA showed small pericardial effusion, mild pulmonary edema  TTE showed EF around 50%, LVEDd 5 8 cm, PASP 38 mmHg  She was diuresed and started on HFrEF specific heart failure therapy  She is enrolled in weight watchers and part of weight management nutritional assessment  Interim:  Wt is up to 325 lbs, gained rather easily  Talked a lot about her anxieties, wt gain, etc  Review of Systems   Constitutional: Negative for activity change, appetite change, fatigue and unexpected weight change (weight was down to 290 lbs up to 325 lbs)  HENT: Negative for congestion and nosebleeds  Eyes: Negative  Respiratory: Negative for cough, chest tightness and shortness of breath  Cardiovascular: Negative for chest pain, palpitations and leg swelling  Gastrointestinal: Negative for abdominal distention  Endocrine: Negative  Genitourinary: Negative  Musculoskeletal: Negative  Skin: Negative  Neurological: Negative for dizziness, syncope and weakness  Hematological: Negative  Psychiatric/Behavioral: Negative        Past Medical History: Diagnosis Date    Anxiety     Depression     Temporomandibular joint disorder     Resolved 2015      No Known Allergies        Current Outpatient Medications:     carvedilol (COREG) 3 125 mg tablet, Take 1 tablet (3 125 mg total) by mouth 2 (two) times a day with meals, Disp: 180 tablet, Rfl: 2    escitalopram (LEXAPRO) 20 mg tablet, Take 1 tablet (20 mg total) by mouth daily, Disp: 90 tablet, Rfl: 1    hydrOXYzine HCL (ATARAX) 10 mg tablet, Take 3 tablets (30 mg total) by mouth daily at bedtime As needed for itch her sleep, Disp: 90 tablet, Rfl: 0    lisinopril (ZESTRIL) 20 mg tablet, TAKE ONE TABLET BY MOUTH AT BEDTIME, Disp: 90 tablet, Rfl: 0    Multiple Vitamins-Calcium (ONE-A-DAY WOMENS PO), Take by mouth, Disp: , Rfl:     norgestimate-ethinyl estradiol (Tri-Lo-Soha) 0 18/0 215/0 25 MG-25 MCG per tablet, Take 1 tablet by mouth daily, Disp: 90 tablet, Rfl: 0    Urea 45 % CREA, Apply topically 2 (two) times a day (Patient not taking: Reported on 2020), Disp: 1 Tube, Rfl: 1    Social History     Socioeconomic History    Marital status: Single     Spouse name: Not on file    Number of children: Not on file    Years of education: Not on file    Highest education level: Not on file   Occupational History    Not on file   Social Needs    Financial resource strain: Not on file    Food insecurity     Worry: Not on file     Inability: Not on file    Transportation needs     Medical: Not on file     Non-medical: Not on file   Tobacco Use    Smoking status: Former Smoker     Quit date: 3/17/2019     Years since quittin 9    Smokeless tobacco: Former User   Substance and Sexual Activity    Alcohol use: Yes     Comment: wine 2 glasses a week    Drug use: No    Sexual activity: Yes     Partners: Male     Birth control/protection: Pill   Lifestyle    Physical activity     Days per week: Not on file     Minutes per session: Not on file    Stress: Not on file   Relationships    Social connections     Talks on phone: Not on file     Gets together: Not on file     Attends Gnosticism service: Not on file     Active member of club or organization: Not on file     Attends meetings of clubs or organizations: Not on file     Relationship status: Not on file    Intimate partner violence     Fear of current or ex partner: Not on file     Emotionally abused: Not on file     Physically abused: Not on file     Forced sexual activity: Not on file   Other Topics Concern    Not on file   Social History Narrative    Current every day smoker - As per [employer]    Daily caffeinated coffee consumption        Family History   Problem Relation Age of Onset    Diabetes Mother     Hypertension Father     Obesity Father     OCD Father     Colon cancer Maternal Grandmother     Diabetes Paternal Grandmother     Stroke Paternal Grandmother         TIA    Diabetes Paternal Grandfather     Heart disease Paternal Grandfather     Alcohol abuse Brother     Depression Maternal Grandfather     Anxiety disorder Maternal Grandfather     Hypothyroidism Paternal Aunt     Substance Abuse Neg Hx     Thyroid cancer Neg Hx        Physical Exam:    Vitals: Last menstrual period 02/23/2021, not currently breastfeeding  , There is no height or weight on file to calculate BMI ,   Wt Readings from Last 3 Encounters:   03/01/21 (!) 145 kg (320 lb)   07/28/20 133 kg (293 lb)   05/26/20 134 kg (295 lb)       Physical Exam:    Physical Exam   Constitutional: She is oriented to person, place, and time  She appears well-developed and well-nourished  HENT:   Head: Normocephalic and atraumatic  Eyes: Pupils are equal, round, and reactive to light  EOM are normal    Neck: Normal range of motion  No JVD present  Cardiovascular: Normal rate, regular rhythm and normal heart sounds  No murmur heard  Pulmonary/Chest: Effort normal and breath sounds normal  She has no rales  Abdominal: Soft   Bowel sounds are normal  She exhibits no distension  Musculoskeletal: Normal range of motion  General: No edema  Neurological: She is alert and oriented to person, place, and time  Skin: Skin is warm and dry  She is not diaphoretic  Psychiatric: She has a normal mood and affect  Labs & Results:    Lab Results   Component Value Date    WBC 9 90 03/18/2019    HGB 12 7 04/29/2019    HCT 39 8 04/29/2019    MCV 79 (L) 03/18/2019     03/18/2019     Lab Results   Component Value Date    CALCIUM 8 8 06/20/2019    SODIUM 137 06/20/2019    K 4 2 06/20/2019    CO2 29 06/20/2019     06/20/2019    BUN 18 06/20/2019    CREATININE 0 56 (L) 06/20/2019     Lab Results   Component Value Date    NTBNP 229 10/09/2019      No results found for: CHOL  Lab Results   Component Value Date    HDL 44 04/29/2019    HDL 63 (H) 01/24/2017     Lab Results   Component Value Date    LDLCALC 111 04/29/2019    LDLCALC 87 01/24/2017     Lab Results   Component Value Date    TRIG 128 04/29/2019    TRIG 111 01/24/2017     No results found for: CHOLHDL    EKG personally reviewed by Satinder Conroy DO  Counseling / Coordination of Care  Time spent today 25 minutes  Greater than 50% of total time was spent with the patient and / or family counseling and / or coordination of care  We discussed diagnoses, most recent studies, tests and any changes in treatment plan    Thank you for the opportunity to participate in the care of this patient        [address] [facility]ULMON[facility]  MEDICAL DIRECTOR OF [facility]ol Ramirez
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
## 2021-03-09 ENCOUNTER — OFFICE VISIT (OUTPATIENT)
Dept: CARDIOLOGY CLINIC | Facility: CLINIC | Age: 36
End: 2021-03-09
Payer: COMMERCIAL

## 2021-03-09 ENCOUNTER — TELEPHONE (OUTPATIENT)
Dept: PSYCHIATRY | Facility: CLINIC | Age: 36
End: 2021-03-09

## 2021-03-09 VITALS
WEIGHT: 293 LBS | DIASTOLIC BLOOD PRESSURE: 80 MMHG | HEART RATE: 63 BPM | BODY MASS INDEX: 50.15 KG/M2 | OXYGEN SATURATION: 98 % | SYSTOLIC BLOOD PRESSURE: 116 MMHG

## 2021-03-09 DIAGNOSIS — E78.2 MIXED HYPERLIPIDEMIA: ICD-10-CM

## 2021-03-09 DIAGNOSIS — G47.33 OSA (OBSTRUCTIVE SLEEP APNEA): ICD-10-CM

## 2021-03-09 DIAGNOSIS — I50.32 CHRONIC DIASTOLIC CHF (CONGESTIVE HEART FAILURE), NYHA CLASS 2 (HCC): Primary | ICD-10-CM

## 2021-03-09 DIAGNOSIS — I10 HTN (HYPERTENSION), BENIGN: ICD-10-CM

## 2021-03-09 PROCEDURE — 99214 OFFICE O/P EST MOD 30 MIN: CPT | Performed by: INTERNAL MEDICINE

## 2021-03-09 RX ORDER — FUROSEMIDE 20 MG/1
20 TABLET ORAL DAILY PRN
Qty: 30 TABLET | Refills: 5 | Status: SHIPPED | OUTPATIENT
Start: 2021-03-09 | End: 2021-05-24 | Stop reason: ALTCHOICE

## 2021-03-09 NOTE — TELEPHONE ENCOUNTER
Spoke with patient regarding collection of copay for DOS: 3/3/2021 with [facility]  Patient declined payment over the phone and requested to be billed  Normally pays via bill

## 2021-03-11 DIAGNOSIS — Z30.41 SURVEILLANCE FOR BIRTH CONTROL, ORAL CONTRACEPTIVES: ICD-10-CM

## 2021-03-11 RX ORDER — NORGESTIMATE AND ETHINYL ESTRADIOL 7DAYSX3 LO
1 KIT ORAL DAILY
Qty: 90 TABLET | Refills: 3 | Status: SHIPPED | OUTPATIENT
Start: 2021-03-11 | End: 2021-05-12

## 2021-04-02 DIAGNOSIS — N92.6 MISSED MENSES: Primary | ICD-10-CM

## 2021-04-05 ENCOUNTER — LAB (OUTPATIENT)
Dept: LAB | Facility: CLINIC | Age: 36
End: 2021-04-05
Payer: COMMERCIAL

## 2021-04-05 DIAGNOSIS — N92.6 MISSED MENSES: ICD-10-CM

## 2021-04-05 LAB
ABO GROUP BLD: NORMAL
B-HCG SERPL-ACNC: 6448 MIU/ML
BLD GP AB SCN SERPL QL: NEGATIVE
PROGEST SERPL-MCNC: 15.1 NG/ML
RH BLD: POSITIVE
SPECIMEN EXPIRATION DATE: NORMAL

## 2021-04-05 PROCEDURE — 86900 BLOOD TYPING SEROLOGIC ABO: CPT

## 2021-04-05 PROCEDURE — 86850 RBC ANTIBODY SCREEN: CPT

## 2021-04-05 PROCEDURE — 36415 COLL VENOUS BLD VENIPUNCTURE: CPT

## 2021-04-05 PROCEDURE — 86901 BLOOD TYPING SEROLOGIC RH(D): CPT

## 2021-04-05 PROCEDURE — 84144 ASSAY OF PROGESTERONE: CPT

## 2021-04-05 PROCEDURE — 84702 CHORIONIC GONADOTROPIN TEST: CPT

## 2021-04-05 RX ORDER — METHYLDOPA 250 MG/1
250 TABLET, FILM COATED ORAL 2 TIMES DAILY
Qty: 60 TABLET | Refills: 3 | Status: SHIPPED | OUTPATIENT
Start: 2021-04-05 | End: 2021-04-06 | Stop reason: SDUPTHER

## 2021-04-05 NOTE — RESULT ENCOUNTER NOTE
Please call patient with results  Labs consistent with early pregnancy  6w0d by LMP  Needs US for pregnancy dating/location in 1-3wks  Can complete here or with radiology

## 2021-04-06 ENCOUNTER — TELEPHONE (OUTPATIENT)
Dept: CARDIOLOGY CLINIC | Facility: CLINIC | Age: 36
End: 2021-04-06

## 2021-04-06 DIAGNOSIS — I10 HTN (HYPERTENSION), BENIGN: ICD-10-CM

## 2021-04-06 RX ORDER — METHYLDOPA 250 MG/1
250 TABLET, FILM COATED ORAL 2 TIMES DAILY
Qty: 60 TABLET | Refills: 3 | Status: SHIPPED | OUTPATIENT
Start: 2021-04-06 | End: 2021-04-07

## 2021-04-06 NOTE — TELEPHONE ENCOUNTER
[facility] is unable to fill patient's methyldopa  It has been on back order for the last few months   They are looking for an alternative medication

## 2021-04-07 ENCOUNTER — TELEPHONE (OUTPATIENT)
Dept: CARDIOLOGY CLINIC | Facility: CLINIC | Age: 36
End: 2021-04-07

## 2021-04-07 DIAGNOSIS — I10 HTN (HYPERTENSION), BENIGN: Primary | ICD-10-CM

## 2021-04-07 DIAGNOSIS — I10 HTN (HYPERTENSION), BENIGN: ICD-10-CM

## 2021-04-07 DIAGNOSIS — I10 ESSENTIAL HYPERTENSION: Primary | ICD-10-CM

## 2021-04-07 RX ORDER — LABETALOL 100 MG/1
100 TABLET, FILM COATED ORAL 2 TIMES DAILY
Qty: 60 TABLET | Refills: 1 | Status: SHIPPED | OUTPATIENT
Start: 2021-04-07 | End: 2021-04-09

## 2021-04-07 RX ORDER — METHYLDOPA 250 MG/1
TABLET, FILM COATED ORAL
Qty: 60 TABLET | Refills: 3 | Status: SHIPPED | OUTPATIENT
Start: 2021-04-07 | End: 2021-04-07 | Stop reason: ALTCHOICE

## 2021-04-07 RX ORDER — NIFEDIPINE 30 MG/1
30 TABLET, EXTENDED RELEASE ORAL DAILY
Qty: 30 TABLET | Refills: 3 | Status: SHIPPED | OUTPATIENT
Start: 2021-04-07 | End: 2021-10-01 | Stop reason: SDUPTHER

## 2021-04-07 NOTE — TELEPHONE ENCOUNTER
Follow up phone call -Patient explained she called [facility] and they been out of stock for a year   [facility] not in stock Methyldopa

## 2021-04-07 NOTE — TELEPHONE ENCOUNTER
Patient is asking if she should stay on Lisinopril  Patient has had a positive pregnancy test  pressure today  is 148/85

## 2021-04-07 NOTE — TELEPHONE ENCOUNTER
I called patient back explained you have called in Procardia     Patient  Explained she will be taking labetalol from her PCP

## 2021-04-09 ENCOUNTER — TELEPHONE (OUTPATIENT)
Dept: FAMILY MEDICINE CLINIC | Facility: CLINIC | Age: 36
End: 2021-04-09

## 2021-04-09 DIAGNOSIS — I10 ESSENTIAL HYPERTENSION: ICD-10-CM

## 2021-04-09 RX ORDER — LABETALOL 100 MG/1
TABLET, FILM COATED ORAL
Qty: 60 TABLET | Refills: 1 | Status: SHIPPED | OUTPATIENT
Start: 2021-04-09 | End: 2021-05-24 | Stop reason: ALTCHOICE

## 2021-04-09 NOTE — TELEPHONE ENCOUNTER
Please let her know absolutely no problem  I am very glad that they got in touch with her and will follow her

## 2021-04-09 NOTE — TELEPHONE ENCOUNTER
Pt called because she wanted to let you know that she has decided not to take the labetalol that you prescribed due to the potential issues with her low heart rate  She is going to just stick with th procardia that her cardiologist prescribed  She said to tell you thank you so much for your help, she really appreciates it!

## 2021-04-12 ENCOUNTER — TELEPHONE (OUTPATIENT)
Dept: OBGYN CLINIC | Facility: MEDICAL CENTER | Age: 36
End: 2021-04-12

## 2021-04-13 ENCOUNTER — TELEPHONE (OUTPATIENT)
Dept: OBGYN CLINIC | Facility: CLINIC | Age: 36
End: 2021-04-13

## 2021-04-13 NOTE — TELEPHONE ENCOUNTER
Call returned to patient  Reports she is pregnant and unsure if she wants to be  She got pregnant much earlier than she had intended, and she had lifestyle and health changes that she had wanted to make prior to considering conception  She wants to know about   We reviewed medical and surgical TAb  Discussed with current EGA, medical options are available and would likely be safer for her from a cardiac perspective compared to having anesthesia  We discussed that at an outpatient center, anesthesia may not be an option for her based on her history  If this is the case (and she prefers surgical TAb), this can be provided for her here  Unfortunately, based on restrictions I cannot offer her medical Ab  She has an appointment at [facility] on Friday to discuss this  We discussed risks, expected outcomes, and follow up of both  I discussed with her we are happy to see her for post-Ab care and contraception prn  I reviewed with her that if she is uncertain on her decision and a consult with MFM may be helpful, that she can still pursue this  Questions answered to patients satisfaction

## 2021-04-27 ENCOUNTER — TELEPHONE (OUTPATIENT)
Dept: OBGYN CLINIC | Facility: MEDICAL CENTER | Age: 36
End: 2021-04-27

## 2021-04-27 DIAGNOSIS — Z34.91 ENCOUNTER FOR PREGNANCY RELATED EXAMINATION IN FIRST TRIMESTER: Primary | ICD-10-CM

## 2021-04-27 NOTE — TELEPHONE ENCOUNTER
Pt called needs new referral sent to [facility] pt found out she is having twins  Please review

## 2021-04-27 NOTE — PROGRESS NOTES
Had [address] [facility]  Twin [facility]    Referral to  [facility]  updated Advised to still keep [address] appointment with our office

## 2021-04-29 ENCOUNTER — TELEMEDICINE (OUTPATIENT)
Dept: BEHAVIORAL/MENTAL HEALTH CLINIC | Facility: CLINIC | Age: 36
End: 2021-04-29
Payer: COMMERCIAL

## 2021-04-29 DIAGNOSIS — F32.5 MAJOR DEPRESSIVE DISORDER IN FULL REMISSION, UNSPECIFIED WHETHER RECURRENT (HCC): ICD-10-CM

## 2021-04-29 DIAGNOSIS — F41.1 GENERALIZED ANXIETY DISORDER: ICD-10-CM

## 2021-04-29 PROCEDURE — 90834 PSYTX W PT 45 MINUTES: CPT | Performed by: SOCIAL WORKER

## 2021-04-29 NOTE — PSYCH
Virtual Regular Visit      Assessment/Plan:    Problem List Items Addressed This Visit     None               Reason for visit is No chief complaint on file  Encounter provider [facility]    Provider located at [address] [place] [address]  [address] [facility] [address] [phone]      Recent Visits  No visits were found meeting these conditions  Showing recent visits within past 7 days and meeting all other requirements     Today's Visits  Date Type Provider Dept   04/29/21 [address] today's visits and meeting all other requirements     Future Appointments  No visits were found meeting these conditions  Showing future appointments within next 150 days and meeting all other requirements        The patient was identified by name and date of birth  Chika Purcell was informed that this is a telemedicine visit and that the visit is being conducted through [employer] and patient was informed that this is a secure, HIPAA-compliant platform  She agrees to proceed     My office door was closed  No one else was in the room  She acknowledged consent and understanding of privacy and security of the video platform  The patient has agreed to participate and understands they can discontinue the visit at any time  Patient is aware this is a billable service  Antionette Keene is a 30 y o  female          HPI     Past Medical History:   Diagnosis Date    Anxiety     Depression     Temporomandibular joint disorder     Resolved 7/7/2015        Past Surgical History:   Procedure Laterality Date    CERVICAL BIOPSY  W/ LOOP ELECTRODE EXCISION  2009    DENTAL SURGERY      Grawn teeth extraction    TONSILLECTOMY  1990       Current Outpatient Medications   Medication Sig Dispense Refill    escitalopram (LEXAPRO) 20 mg tablet Take 1 tablet (20 mg total) by mouth daily 90 tablet 1    furosemide (LASIX) 20 mg tablet Take 1 tablet (20 mg total) by mouth daily as needed (weight gain 3 lbs) 30 tablet 5    hydrOXYzine HCL (ATARAX) 10 mg tablet Take 3 tablets (30 mg total) by mouth daily at bedtime As needed for itch her sleep (Patient not taking: Reported on 3/9/2021) 90 tablet 0    labetalol (NORMODYNE) 100 mg tablet 1 tablet in the morning and 2 tablets in the evening 60 tablet 1    Multiple Vitamins-Calcium (ONE-A-DAY WOMENS PO) Take by mouth      NIFEdipine (PROCARDIA XL) 30 mg 24 hr tablet Take 1 tablet (30 mg total) by mouth daily 30 tablet 3    norgestimate-ethinyl estradiol (Tri-Lo-Soha) 0 18/0 215/0 25 MG-25 MCG per tablet Take 1 tablet by mouth daily 90 tablet 3    Urea 45 % CREA Apply topically 2 (two) times a day (Patient not taking: Reported on 5/26/2020) 1 Tube 1     No current facility-administered medications for this visit  No Known Allergies      Goals: 1  Pain: 0  Level of Suicidality: Low     D:  Safia spoke with this worker today about her feelings re: her  pregnancy with twins, temporary discontinuation of her SSRI, and struggle with uncertainty re: accepting the changes associated with her life being newly  and already pregnant with multiples  A:  Safia continues to struggle with accepting what she wants and what "everybody else" says  However, she wants to be more trusting and accepting of the messages that "the universe sends her "      P: Jazzy Barr will  be supported in addressing the above as she makes changes for her cardiac, physical and emotional health        I spent 50minutes with the patient during this visit      This note was not shared with the patient due to this is a psychotherapy note    Encounter Diagnoses   Name Primary?     Generalized anxiety disorder     Major depressive disorder in full remission, unspecified whether recurrent ([facility] )      VIRTUAL VISIT DISCLAIMER    Selam Abdi acknowledges that she has consented to an online visit or consultation  She understands that the online visit is based solely on information provided by her, and that, in the absence of a face-to-face physical evaluation by the physician, the diagnosis she receives is both limited and provisional in terms of accuracy and completeness  This is not intended to replace a full medical face-to-face evaluation by the physician  Nikki Crain understands and accepts these terms

## 2021-05-07 ENCOUNTER — ULTRASOUND (OUTPATIENT)
Dept: OBGYN CLINIC | Facility: MEDICAL CENTER | Age: 36
End: 2021-05-07
Payer: COMMERCIAL

## 2021-05-07 VITALS — BODY MASS INDEX: 45.99 KG/M2 | WEIGHT: 293 LBS | HEIGHT: 67 IN

## 2021-05-07 DIAGNOSIS — O34.41 HISTORY OF LOOP ELECTROSURGICAL EXCISION PROCEDURE (LEEP) OF CERVIX AFFECTING PREGNANCY IN FIRST TRIMESTER: ICD-10-CM

## 2021-05-07 DIAGNOSIS — O30.041 DICHORIONIC DIAMNIOTIC TWIN PREGNANCY IN FIRST TRIMESTER: ICD-10-CM

## 2021-05-07 DIAGNOSIS — F41.1 GENERALIZED ANXIETY DISORDER: ICD-10-CM

## 2021-05-07 DIAGNOSIS — I51.9 HEART DISEASE DURING PREGNANCY, ANTEPARTUM: ICD-10-CM

## 2021-05-07 DIAGNOSIS — I10 ESSENTIAL HYPERTENSION: ICD-10-CM

## 2021-05-07 DIAGNOSIS — N92.6 MISSED MENSES: Primary | ICD-10-CM

## 2021-05-07 DIAGNOSIS — O99.419 HEART DISEASE DURING PREGNANCY, ANTEPARTUM: ICD-10-CM

## 2021-05-07 DIAGNOSIS — Z98.890 HISTORY OF LOOP ELECTROSURGICAL EXCISION PROCEDURE (LEEP) OF CERVIX AFFECTING PREGNANCY IN FIRST TRIMESTER: ICD-10-CM

## 2021-05-07 PROCEDURE — 99204 OFFICE O/P NEW MOD 45 MIN: CPT | Performed by: STUDENT IN AN ORGANIZED HEALTH CARE EDUCATION/TRAINING PROGRAM

## 2021-05-07 PROCEDURE — 76801 OB US < 14 WKS SINGLE FETUS: CPT | Performed by: STUDENT IN AN ORGANIZED HEALTH CARE EDUCATION/TRAINING PROGRAM

## 2021-05-07 NOTE — LETTER
May 7, 2021     Patient: Dorothea Roger   YOB: 1985   Date of Visit: 5/7/2021       To Whom it May Concern:    Dorothea Roger is under my professional care  She was seen in my office on 5/7/2021  She may return to work on 5/7/21  If you have any questions or concerns, please don't hesitate to call           Sincerely,          Beryle Simmers, MD        CC: No Recipients

## 2021-05-07 NOTE — PROGRESS NOTES
Pregnancy Confirmation Visit  [facility] [address] [place] [address]    Assessment/Plan:  30 y o  [place] presenting with missed menses  Viable dichorionic diamniotic twin pregnancy 10w4d by LMP consistent with ultrasound today  - Continue/start prenatal vitamin  - We reviewed her current medications and discussed which are safe to continue in pregnancy  - Schedule prenatal intake with RN and initial prenatal visit; prenatal labs will be ordered during the prenatal intake  - Has MFM referral for genetic counseling and NT scan  - We reviewed her medical problems: obesity, chronic hypertension on nifedipine XL 30 mg daily, anxiety on lexapro, and history of transient CHF after hypertensive urgency that resolved  I recommended baseline echocardiogram   I recommended starting aspirin at 12 weeks  I recommended baselien pre-eclampsia labs and glucose tolerance testing which will be ordered next week by our nurse at the prenatal intake  1  Missed menses  -     AMB US OB < 14 weeks single or first gestation level 1    2  Dichorionic diamniotic twin pregnancy in first trimester    3  Heart disease during pregnancy, antepartum  -     Echo complete with contrast if indicated; Future; Expected date: 05/07/2021    4  History of loop electrosurgical excision procedure (LEEP) of cervix affecting pregnancy in first trimester    5  Generalized anxiety disorder    6  Essential hypertension            Subjective:    CC: Missed period    David Hogan is a 30 y o  [place] who presents with missed menses  Patient's last menstrual period was 02/22/2021  Patient notes that this pregnancy was unplanned but ultimately desired  She was not using contraception at the time of conception  She reports she is certain of her LMP and that she has regular menses  She has has no vaginal bleeding since her LMP      Objective:  Ht 5' 7" (1 702 m)   Wt (!) 144 kg (317 lb)   LMP 02/22/2021   BMI 49 65 kg/m²     Physical Exam:  General: Well appearing, no distress  CV: Regular rate  Respiratory: Unlabored breathing  Abdomen: Soft, nontender  Extremities: Without edema  Mood and Affect: Appropriate    Transvaginal Pelvic Ultrasound  Dichorionic Diamniotic Twin Gestation  Yolk sac: Present x 2  Fetal Pole: Present x 2  CRL consistent with EGA 10w4d (A) and 10w3d (B)  Cardiac activity: Present   and 177 bpm  No adnexal masses appreciated    Robbie Newell MD  [address] [place] [address]  5/7/2021 4:33 PM

## 2021-05-10 ENCOUNTER — TELEPHONE (OUTPATIENT)
Dept: PERINATAL CARE | Facility: CLINIC | Age: 36
End: 2021-05-10

## 2021-05-10 NOTE — PATIENT INSTRUCTIONS
Pregnancy at 7 to [address]:   Changes happening to your body:  Pregnancy hormones may cause your body to go through many changes during this stage of your pregnancy  You may feel more tired than usual, and have mood swings, nausea and vomiting, and headaches  Your breasts may feel tender and swollen and you may urinate more frequently  Seek care immediately if:   · You have pain or cramping in your abdomen or low back  · You have heavy vaginal bleeding or clotting  · You pass material that looks like tissue or large clots  Collect the material and bring it with you  Call your doctor or obstetrician if:   · You have light bleeding  · You have chills or a fever  · You have vaginal itching, burning, or pain  · You have yellow, green, white, or foul-smelling vaginal discharge  · You have pain or burning when you urinate, less urine than usual, or pink or bloody urine  · You have questions or concerns about your condition or care  How to care for yourself at this stage of your pregnancy:   · Manage nausea and vomiting  Avoid fatty and spicy foods  Eat small meals throughout the day instead of large meals  Elaine may help to decrease nausea  Ask your healthcare provider about other ways of decreasing nausea and vomiting  · Eat a variety of healthy foods  Healthy foods include fruits, vegetables, whole-grain breads, low-fat dairy foods, beans, lean meats, and fish  Drink liquids as directed  Ask how much liquid to drink each day and which liquids are best for you  Limit caffeine to less than 200 milligrams each day  Limit your intake of fish to 2 servings each week  Choose fish low in mercury such as canned light tuna, shrimp, salmon, cod, or tilapia  Do not  eat fish high in mercury such as swordfish, tilefish, russ mackerel, and shark  · Take prenatal vitamins as directed    Your need for certain vitamins and minerals, such as folic acid, increases during pregnancy  Prenatal vitamins provide some of the extra vitamins and minerals you need  Prenatal vitamins may also help to decrease the risk of certain birth defects  · Ask how much weight you should gain each month  Too much or too little weight gain can be unhealthy for you and your baby  · Do not smoke  Smoking increases your risk of a miscarriage and other health problems during your pregnancy  Smoking can cause your baby to be born too early or weigh less at birth  Quit smoking as soon as you think you might be pregnant  Ask your healthcare provider for information if you need help quitting  · Do not drink alcohol  Alcohol passes from your body to your baby through the placenta  It can affect your baby's brain development and cause fetal alcohol syndrome (FAS)  FAS is a group of conditions that causes mental, behavior, and growth problems  · Talk to your healthcare provider before you take any medicines  Many medicines may harm your baby if you take them when you are pregnant  Do not take any medicines, vitamins, herbs, or supplements without first talking to your healthcare provider  Never use illegal or street drugs (such as marijuana or cocaine) while you are pregnant  Safety tips during pregnancy:   · Avoid hot tubs and saunas  Do not use a hot tub or sauna while you are pregnant, especially during your first trimester  Hot tubs and saunas may raise your baby's temperature and increase the risk of birth defects  · Avoid toxoplasmosis  This is an infection caused by eating raw meat or being around infected cat feces  It can cause birth defects, miscarriages, and other problems  Wash your hands after you touch raw meat  Make sure any meat is well-cooked before you eat it  Avoid raw eggs and unpasteurized milk  Use gloves or ask someone else to clean your cat's litter box while you are pregnant      Changes that are happening with your baby:  By 10 weeks, your baby will be about 2½ inches long from the top of the head to the rump (baby's bottom)  Your baby weighs about ½ ounce  Major body organs, such as the brain, heart, and lungs, are forming  Your baby's facial features are also starting to form  Prenatal care:  Prenatal care is a series of visits with your healthcare provider throughout your pregnancy  During the first 28 weeks of your pregnancy, you will see your healthcare provider 1 time each month  Prenatal care can help prevent problems during pregnancy and childbirth  Your healthcare provider will check your blood pressure and weight  Your baby's heart rate will also be checked  You may also need the following at some visits:  · A pelvic exam  allows your healthcare provider to see your cervix (the bottom part of your uterus)  Your healthcare provider will use a speculum to open your vagina  He or she will check the size and shape of your uterus  You may also have a Pap smear at your first prenatal visit  This is a test to check your cervix for abnormal cells  · Blood tests  may be done to check for any of the following:     ? Gestational diabetes or anemia (low iron level)    ? Blood type or Rh factor, or certain birth defects    ? Immunity to certain diseases, such as chickenpox or rubella    ? An infection, such as a sexually transmitted infection, HIV, or hepatitis B    · Hepatitis B  may need to be prevented or treated  Hepatitis B is inflammation of the liver caused by the hepatitis B virus (HBV)  HBV can spread from a mother to her baby during delivery  You will be checked for HBV as early as possible in the first trimester of each pregnancy  You need the test even if you received the hepatitis B vaccine or were tested before  You may need to have an HBV infection treated before you give birth  · Urine tests  may also be done to check for sugar and protein  These can be signs of gestational diabetes or preeclampsia   Urine tests may also be done to check for signs of infection  · A fetal ultrasound  shows pictures of your baby inside your uterus  The pictures are used to check your baby's development, movement, and position  · Genetic disorder screening tests  may be offered to you  These screening tests check your baby's risk for genetic disorders such as Down syndrome  A screening test includes a blood test and ultrasound  Follow up with your doctor or obstetrician as directed:  Go to all prenatal visits  Write down your questions so you remember to ask them during your visits  © Copyright [address] Information is for End User's use only and may not be sold, redistributed or otherwise used for commercial purposes  All illustrations and images included in CareNotes® are the copyrighted property of [employer] D A M , Inc  or [address] [place], [place] [address]dinesh   The above information is an  only  It is not intended as medical advice for individual conditions or treatments  Talk to your doctor, nurse or pharmacist before following any medical regimen to see if it is safe and effective for you  Pregnancy at 11 to 14 Weeks   AMBULATORY CARE:   Changes happening to your body: You are now at the end of your first trimester and entering your second trimester  Morning sickness usually goes away by this time  You may have other symptoms such as fatigue, frequent urination, and headaches  You may have gained 2 to 4 pounds by now  Seek care immediately if:   · You have pain or cramping in your abdomen or low back  · You have heavy vaginal bleeding or clotting  · You pass material that looks like tissue or large clots  Collect the material and bring it with you  Call your doctor or obstetrician if:   · You cannot keep food or drinks down, and you are losing weight  · You have light vaginal bleeding  · You have chills or a fever  · You have vaginal itching, burning, or pain  · You have yellow, green, white, or foul-smelling vaginal discharge      · You have pain or burning when you urinate, less urine than usual, or pink or bloody urine  · You have questions or concerns about your condition or care  How to care for yourself at this stage of your pregnancy:   · Get plenty of rest   You may feel more tired than normal  You may need to take naps or go to bed earlier  · Manage nausea and vomiting  Avoid fatty and spicy foods  Eat small meals throughout the day instead of large meals  Elaine may help to decrease nausea  Ask your healthcare provider about other ways of decreasing nausea and vomiting  · Eat a variety of healthy foods  Healthy foods include fruits, vegetables, whole-grain breads, low-fat dairy foods, beans, lean meats, and fish  Drink liquids as directed  Ask how much liquid to drink each day and which liquids are best for you  Limit caffeine to less than 200 milligrams each day  Limit your intake of fish to 2 servings each week  Choose fish low in mercury such as canned light tuna, shrimp, salmon, cod, or tilapia  Do not  eat fish high in mercury such as swordfish, tilefish, russ mackerel, and shark  · Take prenatal vitamins as directed  Your need for certain vitamins and minerals, such as folic acid, increases during pregnancy  Prenatal vitamins provide some of the extra vitamins and minerals you need  Prenatal vitamins may also help to decrease the risk of certain birth defects  · Do not smoke  Smoking increases your risk of a miscarriage and other health problems during your pregnancy  Smoking can cause your baby to be born too early or weigh less at birth  Ask your healthcare provider for information if you need help quitting  · Do not drink alcohol  Alcohol passes from your body to your baby through the placenta  It can affect your baby's brain development and cause fetal alcohol syndrome (FAS)  FAS is a group of conditions that causes mental, behavior, and growth problems       · Talk to your healthcare provider before you take any medicines  Many medicines may harm your baby if you take them when you are pregnant  Do not take any medicines, vitamins, herbs, or supplements without first talking to your healthcare provider  Never use illegal or street drugs (such as marijuana or cocaine) while you are pregnant  Safety tips during pregnancy:   · Avoid hot tubs and saunas  Do not use a hot tub or sauna while you are pregnant, especially during your first trimester  Hot tubs and saunas may raise your baby's temperature and increase the risk of birth defects  · Avoid toxoplasmosis  This is an infection caused by eating raw meat or being around infected cat feces  It can cause birth defects, miscarriages, and other problems  Wash your hands after you touch raw meat  Make sure any meat is well-cooked before you eat it  Avoid raw eggs and unpasteurized milk  Use gloves or ask someone else to clean your cat's litter box while you are pregnant  Changes happening with your baby: Your baby has fully formed fingernails and toenails  Your baby's heartbeat can now be heard  Ask your healthcare provider if you can listen to your baby's heartbeat  By week 14, your baby is over 4 inches long from the top of the head to the rump (baby's bottom)  Your baby weighs over 3 ounces  Prenatal care:  Prenatal care is a series of visits with your healthcare provider throughout your pregnancy  During the first 28 weeks of your pregnancy, you will see your healthcare provider 1 time each month  Prenatal care can help prevent problems during pregnancy and childbirth  Your healthcare provider will check your blood pressure and weight  Your baby's heart rate will also be checked  You may also need the following at some visits:  · A pelvic exam  allows your healthcare provider to see your cervix (the bottom part of your uterus)  Your healthcare provider will use a speculum to open your vagina   He or she will check the size and shape of your uterus  · Blood tests  may be done to check for any of the following:     ? Gestational diabetes or anemia (low iron level)    ? Blood type or Rh factor, or certain birth defects    ? Immunity to certain diseases, such as chickenpox or rubella    ? An infection, such as a sexually transmitted infection, HIV, or hepatitis B    · Hepatitis B  may need to be prevented or treated  Hepatitis B is inflammation of the liver caused by the hepatitis B virus (HBV)  HBV can spread from a mother to her baby during delivery  You will be checked for HBV as early as possible in the first trimester of each pregnancy  You need the test even if you received the hepatitis B vaccine or were tested before  You may need to have an HBV infection treated before you give birth  · Urine tests  may also be done to check for sugar and protein  These can be signs of gestational diabetes or preeclampsia  Urine tests may also be done to check for signs of infection  · A fetal ultrasound  shows pictures of your baby inside your uterus  The pictures are used to check your baby's development, movement, and position  · Genetic disorder screening tests  may be offered to you  These tests check your baby's risk for genetic disorders such as Down syndrome  A screening test includes a blood test and ultrasound  Follow up with your doctor or obstetrician as directed:  Go to all prenatal visits  Write down your questions so you remember to ask them during your visits  © Copyright [address] Information is for End User's use only and may not be sold, redistributed or otherwise used for commercial purposes  All illustrations and images included in CareNotes® are the copyrighted property of [employer] A M , Inc  or [address] [place] [address] Reza   The above information is an  only  It is not intended as medical advice for individual conditions or treatments   Talk to your doctor, nurse or pharmacist before following any medical regimen to see if it is safe and effective for you

## 2021-05-10 NOTE — TELEPHONE ENCOUNTER
Called patient to confirm Maternal Fetal Medicine virtual appt scheduled for 5/11/21  8:00  FUAD LESTER  Confirmed with patient that she will receive a prompt, by her preference of text  Explained procedure for virtual visit and requested she be ready to log into appointment approximately 10 minutes prior to scheduled start time  Reminder the [facility] Provider will send her the link to join virtual appt near the scheduled appointment time  Also confirmed with pt current text info and current insurance information  Patient instructed to call [facility] office @ #[phone] for technical support issues or any questions regarding the procedure for virtual appt       LEFT VOICEMAIL FOR PT WITH INFORMATION ABOVE

## 2021-05-11 ENCOUNTER — TELEMEDICINE (OUTPATIENT)
Dept: PERINATAL CARE | Facility: CLINIC | Age: 36
End: 2021-05-11

## 2021-05-11 DIAGNOSIS — O09.519 ADVANCED MATERNAL AGE, PRIMIGRAVIDA, ANTEPARTUM: Primary | ICD-10-CM

## 2021-05-11 DIAGNOSIS — Z31.5 ENCOUNTER FOR PROCREATIVE GENETIC COUNSELING: ICD-10-CM

## 2021-05-11 DIAGNOSIS — O30.049 DICHORIONIC DIAMNIOTIC TWIN PREGNANCY, ANTEPARTUM: ICD-10-CM

## 2021-05-11 PROCEDURE — NC001 PR NO CHARGE

## 2021-05-12 NOTE — PROGRESS NOTES
Genetic Counseling   High-Risk Gestation Note    Appointment Date:  2021  Referred By: Candance Breaker, MD  YOB: 1985  Partner:  Chaparrita Mccullough  Indication for Visit:  advanced maternal age  Pregnancy History:   Estimated Date of Delivery: 21  Estimated Gestational Age: 11w1d    Virtual Regular Visit      Assessment/Plan:    Problem List Items Addressed This Visit     None      Visit Diagnoses     Advanced maternal age, primigravida, antepartum    -  Primary    Dichorionic diamniotic twin pregnancy, antepartum        Encounter for procreative genetic counseling                   Reason for visit is   Chief Complaint   Patient presents with    Virtual Regular Visit        Encounter provider Preston Freitas    Provider located at [address] [place] [id number] [phone]      Recent Visits  No visits were found meeting these conditions  Showing recent visits within past 7 days and meeting all other requirements     Future Appointments  No visits were found meeting these conditions  Showing future appointments within next 150 days and meeting all other requirements        The patient was identified by name and date of birth  he Morales was informed that this is a telemedicine visit and that the visit is being conducted through [address] [place] [address] Now and patient was informed that this is a secure, HIPAA-compliant platform  She agrees to proceed     My office door was closed  No one else was in the room  She acknowledged consent and understanding of privacy and security of the video platform  The patient has agreed to participate and understands they can discontinue the visit at any time  Patient is aware this is a billable service  Cleveland Hill is a 30 y o  female who presented for genetic counseling to discuss maternal age related risks for chromosome abnormalities    We reviewed the type of twin gestation the patient has (dichroionic-diamniotic) and that each fetus has it's own DNA  We reviewed that the risk of Down syndrome at age 29 at delivery is 1/214 for one or both fetuses, and 1905 for both to be affected  The risk for any chromosomal abnormality at this age is 1/114 for one or both fetuses, and 1000 for both to be affected  The risks, benefits, and limitations of amniocentesis were discussed with the patient  Amniocentesis is performed under direct real time ultrasound visualization to avoid both the fetus and the placenta  Once amniotic fluid is withdrawn, laboratory analysis is performed and amniotic fluid alpha-fetoprotein, as well as chromosome and/or microarray analysis is undertaken  The risk of genetic amniocentesis includes, but is not limited to less than 1 in 300 pregnancy loss rate or  delivery rate if 23 weeks or greater, infection, bleeding, rupture of membranes, failure of cells to grow, karyotype error, laboratory error, etc   Occasionally a repeat amniocentesis is necessary due to cell culture failure  Chromosome/microarray analysis from amniocentesis is 99 9% accurate and alpha-fetoprotein analysis can detect approximately 95% of open neural tube defects  Chorionic villus sampling (CVS) is another diagnostic testing option that is available earlier than amniocentesis, between 10-14 weeks gestation  Like amniocentesis, CVS is 99% accurate for detecting chromosomal problems  Unlike amniocentesis, CVS cannot detect alpha-fetoprotein levels in order to determine the risk for open neural tube defects  MSAFP testing would need to be performed at 15-20 weeks gestation for this purpose  The risk of CVS includes, but is not limited to, less than a 1 in 300 risk for pregnancy loss  There is also a 1% risk for maternal cell contamination and cell culture failure, in which case the CVS would need to be followed-up with amniocentesis      We reviewed the testing option of cell free fetal DNA screening (also known as noninvasive prenatal testing or NIPT)  We discussed that it is a serum test to identify fragments of fetal DNA in maternal blood  We reviewed the benefits and limitations of cell free fetal DNA screening in detecting Down syndrome, Trisomy 13, Trisomy 18 in twin gestations  We also discussed that cell free fetal DNA screening does not detect additional chromosomal abnormalities and the possibility of a failed test result  As cell free fetal DNA screening does not detect open neural tube defects, MSAFP screening is available at 15-20 weeks gestation  Sequential screening consists of first trimester measurement of nuchal translucency combined with first trimester biochemical analysis, as well as second trimester biochemical analysis  In sanabria gestations it is able to detect approximately 95% of pregnancies in which the fetus has Down syndrome, 90% of pregnancies in which the fetus has trisomy 25 and 80% of pregnancies which the fetus has an open neural tube defect  It can also indirectly identify other chromosomal abnormalities, copy number variants, genetic syndromes, or adverse pregnancy outcomes if serum analyte levels are abnormal     We discussed the availability of an ultrasound between 11-14 weeks gestation to measure the nuchal translucency (NT), which can assess for chromosome abnormalities, cardiac defects, and other adverse pregnancy outcomes  We reviewed that level II anatomy ultrasound is typically performed at approximately 20 weeks gestation  Level II ultrasound evaluation is between 60-80% accurate in detecting major physical birth defects and variations in fetal development that may be associated with chromosome abnormalities  Level II ultrasound evaluation is not able to detect all birth defects or health problems      After discussing the available prenatal screening and testing options Melinda Bar elected to pursue cell free fetal DNA screening  She was informed that the results will disclose fetal sex chromosome information and will be available in her MyChart to review  The patient opted to  a [address] lab slip and kit at our [facility] location to be drawn at the hospital lab  Results take approximately 7-10 days  The patient declined CVS and amniocentesis secondary to procedural related complications  She may reconsider diagnostic testing should the cell free fetal DNA screening come back abnormal   Alexandra Cortes is also planning on pursuing NT ultrasound, MSAFP screening and Level II ultrasound at the appropriate times  Histories for the patient and her partner's family were taken during our session and was noncontributory  The family history was not significant for genetic diseases or disorders, intellectual disability, birth defects, fetal loss, or consanguinity  Patient reports being of Occitan/Indonesian/  descent and that her  is of   descent  She denies either of them having known Ashkenazi Episcopal ancestry  The benefits and limitations of Cystic fibrosis (CF), Spinal muscular atrophy (SMA), hemoglobinopathy, Fragile X, and expanded carrier screening was discussed  The patient was unsure of pursuing carrier screening at this time and elected to further consider the options  Should she decide to have any of the blood work performed she will contact our office  Lastly, we discussed the fact that everyone in the general population regardless of age, family history, or medical background has approximately a 3-5% risk of having a child with some type of congenital anomaly, genetic disease or intellectual disability  Currently there are no tests available to rule out all birth defects or health problems  Alexandra Cortes was provided with our contact information  I encouraged her to call with any questions or concerns      Plan/Tests Ordered:  1) Patient declined CVS, amniocentesis, and Sequential screen  2) Patient elected NIPT - Will  Selena Santizo and kit at [facility] location on 5/13/21  3) NT ultrasound scheduled for 5/25/21  4) MSAFP screening at 15-20 weeks gestation  5) Level II anatomy ultrasound at approximately 20 weeks gestation  HPI     Past Medical History:   Diagnosis Date    Anxiety     Depression     Temporomandibular joint disorder     Resolved 7/7/2015        Past Surgical History:   Procedure Laterality Date    CERVICAL BIOPSY  W/ LOOP ELECTRODE EXCISION  2009    DENTAL SURGERY      [place] teeth extraction    TONSILLECTOMY  1990       Current Outpatient Medications   Medication Sig Dispense Refill    escitalopram (LEXAPRO) 20 mg tablet Take 1 tablet (20 mg total) by mouth daily 90 tablet 1    furosemide (LASIX) 20 mg tablet Take 1 tablet (20 mg total) by mouth daily as needed (weight gain 3 lbs) (Patient not taking: Reported on 5/7/2021) 30 tablet 5    hydrOXYzine HCL (ATARAX) 10 mg tablet Take 3 tablets (30 mg total) by mouth daily at bedtime As needed for itch her sleep (Patient not taking: Reported on 3/9/2021) 90 tablet 0    labetalol (NORMODYNE) 100 mg tablet 1 tablet in the morning and 2 tablets in the evening (Patient not taking: Reported on 5/7/2021) 60 tablet 1    Multiple Vitamins-Calcium (ONE-A-DAY WOMENS PO) Take by mouth      NIFEdipine (PROCARDIA XL) 30 mg 24 hr tablet Take 1 tablet (30 mg total) by mouth daily 30 tablet 3    Urea 45 % CREA Apply topically 2 (two) times a day (Patient not taking: Reported on 5/26/2020) 1 Tube 1     No current facility-administered medications for this visit  No Known Allergies    Review of Systems    Video Exam    There were no vitals filed for this visit  Physical Exam      I spent 60 minutes directly with the patient during this visit      VIRTUAL VISIT DISCLAIMER    Lucia Costello acknowledges that she has consented to an online visit or consultation   She understands that the online visit is based solely on information provided by her, and that, in the absence of a face-to-face physical evaluation by the physician, the diagnosis she receives is both limited and provisional in terms of accuracy and completeness  This is not intended to replace a full medical face-to-face evaluation by the physician  Sheree Salgado understands and accepts these terms

## 2021-05-13 ENCOUNTER — INITIAL PRENATAL (OUTPATIENT)
Dept: OBGYN CLINIC | Facility: MEDICAL CENTER | Age: 36
End: 2021-05-13

## 2021-05-13 DIAGNOSIS — O16.1 HYPERTENSION AFFECTING PREGNANCY IN FIRST TRIMESTER: ICD-10-CM

## 2021-05-13 DIAGNOSIS — Z34.91 ENCOUNTER FOR PREGNANCY RELATED EXAMINATION IN FIRST TRIMESTER: Primary | ICD-10-CM

## 2021-05-13 PROCEDURE — OBC: Performed by: OBSTETRICS & GYNECOLOGY

## 2021-05-13 NOTE — LETTER
May 13, 2021     Patient: Angle Finnegan   YOB: 1985   Date of Visit: 5/13/2021       To Whom it May Concern:    Angle Finnegan is under my professional care  She was seen in my office on 5/13/2021  If you have any questions or concerns, please don't hesitate to call           Sincerely,          Mamta Berry RN        CC: Angle Finnegan

## 2021-05-13 NOTE — PROGRESS NOTES
OB INTAKE INTERVIEW      Pt presents for OB intake  Pre pregnancy weight= 317 pounds  Di-Di Twin gestation      OB History    Para Term  AB Living   1 0 0 0 0 0   SAB TAB Ectopic Multiple Live Births   0 0 0 0 0      # Outcome Date GA Lbr Mario/2nd Weight Sex Delivery Anes PTL Lv   1 Current                  Hx of  delivery prior to 36 weeks 6 days:  NO     Last Menstrual Period:   Patient's last menstrual period was 2021 (exact date)  Ultrasound date:   2021 10 weeks 3 days and 10 weeks 4 days  Estimated date of delivery:   Estimated Date of Delivery: 2021  confirmed by LMP   ? History of Diabetes: denies  Will complete early GTT at next appointment  History of Hypertension: yes  Baseline eclamptic labs ordered  H/O CHF: has ECHO appointment scheduled      Infection Screening: Does the pt have a hx of MRSA? denies    H&P visit scheduled  ?  Interview education  Information on [facility] Pregnancy Essentials reviewed  Handouts given: How to Access Pregnancy Essentials Guide  [facility] Me support center  [facility] information sheet  COVID-19: Completed 2 doses of COVID-19 vaccine  Interview education    [facility]  Discussed genetic testing-    - Desires NIPT  Has NT scheduled  Completed appointment with genetic counselor       - Information on CF and SMA carrier screening reviewed  Extended screening panel discussed with genetic counselor  Discussed Tdap and Influenza vaccines         Depression Screening Follow-up Plan: Patient's depression screening was Positive  with an [place] score of  17  Denies thoughts of harming self  States she is feeling overwhelmed with pregnancy  Abruptly stopped lexapro when she discovered she was pregnant, recently started taking again but is only taking 10mg  States she was advised by Dr Ginger Puente that she could resume her usual dose of 20mg    Has appointment scheduled with therapist-encouraged her to reach out and see if could be seen sooner  The patient was oriented to our practice and all questions were answered    Interviewed by: Sebas Ashby RN 05/13/21

## 2021-05-15 ENCOUNTER — TRANSCRIBE ORDERS (OUTPATIENT)
Dept: LAB | Facility: HOSPITAL | Age: 36
End: 2021-05-15

## 2021-05-15 ENCOUNTER — APPOINTMENT (OUTPATIENT)
Dept: LAB | Facility: HOSPITAL | Age: 36
End: 2021-05-15
Attending: STUDENT IN AN ORGANIZED HEALTH CARE EDUCATION/TRAINING PROGRAM
Payer: COMMERCIAL

## 2021-05-15 DIAGNOSIS — O09.529 ANTEPARTUM MULTIGRAVIDA OF ADVANCED MATERNAL AGE: Primary | ICD-10-CM

## 2021-05-15 DIAGNOSIS — Z34.91 ENCOUNTER FOR PREGNANCY RELATED EXAMINATION IN FIRST TRIMESTER: ICD-10-CM

## 2021-05-15 LAB
ABO GROUP BLD: NORMAL
BACTERIA UR QL AUTO: NORMAL /HPF
BASOPHILS # BLD AUTO: 0.03 THOUSANDS/ΜL (ref 0–0.1)
BASOPHILS NFR BLD AUTO: 0 % (ref 0–1)
BILIRUB UR QL STRIP: NEGATIVE
BLD GP AB SCN SERPL QL: NEGATIVE
CLARITY UR: ABNORMAL
COLOR UR: ABNORMAL
EOSINOPHIL # BLD AUTO: 0.12 THOUSAND/ΜL (ref 0–0.61)
EOSINOPHIL NFR BLD AUTO: 1 % (ref 0–6)
ERYTHROCYTE [DISTWIDTH] IN BLOOD BY AUTOMATED COUNT: 13.7 % (ref 11.6–15.1)
GLUCOSE UR STRIP-MCNC: NEGATIVE MG/DL
HBV SURFACE AG SER QL: NORMAL
HCT VFR BLD AUTO: 39.3 % (ref 34.8–46.1)
HGB BLD-MCNC: 12.8 G/DL (ref 11.5–15.4)
HGB UR QL STRIP.AUTO: NEGATIVE
IMM GRANULOCYTES # BLD AUTO: 0.04 THOUSAND/UL (ref 0–0.2)
IMM GRANULOCYTES NFR BLD AUTO: 0 % (ref 0–2)
KETONES UR STRIP-MCNC: NEGATIVE MG/DL
LEUKOCYTE ESTERASE UR QL STRIP: ABNORMAL
LYMPHOCYTES # BLD AUTO: 3.54 THOUSANDS/ΜL (ref 0.6–4.47)
LYMPHOCYTES NFR BLD AUTO: 25 % (ref 14–44)
MCH RBC QN AUTO: 27.4 PG (ref 26.8–34.3)
MCHC RBC AUTO-ENTMCNC: 32.6 G/DL (ref 31.4–37.4)
MCV RBC AUTO: 84 FL (ref 82–98)
MONOCYTES # BLD AUTO: 0.81 THOUSAND/ΜL (ref 0.17–1.22)
MONOCYTES NFR BLD AUTO: 6 % (ref 4–12)
NEUTROPHILS # BLD AUTO: 9.69 THOUSANDS/ΜL (ref 1.85–7.62)
NEUTS SEG NFR BLD AUTO: 68 % (ref 43–75)
NITRITE UR QL STRIP: NEGATIVE
NON-SQ EPI CELLS URNS QL MICRO: NORMAL /HPF
NRBC BLD AUTO-RTO: 0 /100 WBCS
PH UR STRIP.AUTO: 6 [PH]
PLATELET # BLD AUTO: 415 THOUSANDS/UL (ref 149–390)
PMV BLD AUTO: 9.5 FL (ref 8.9–12.7)
PROT UR STRIP-MCNC: NEGATIVE MG/DL
RBC # BLD AUTO: 4.67 MILLION/UL (ref 3.81–5.12)
RBC #/AREA URNS AUTO: NORMAL /HPF
RH BLD: POSITIVE
RUBV IGG SERPL IA-ACNC: 161 IU/ML
SP GR UR STRIP.AUTO: 1.02 (ref 1–1.03)
SPECIMEN EXPIRATION DATE: NORMAL
UROBILINOGEN UR QL STRIP.AUTO: 0.2 E.U./DL
WBC # BLD AUTO: 14.23 THOUSAND/UL (ref 4.31–10.16)
WBC #/AREA URNS AUTO: NORMAL /HPF

## 2021-05-15 PROCEDURE — 36415 COLL VENOUS BLD VENIPUNCTURE: CPT

## 2021-05-15 PROCEDURE — 80081 OBSTETRIC PANEL INC HIV TSTG: CPT

## 2021-05-15 PROCEDURE — 81001 URINALYSIS AUTO W/SCOPE: CPT

## 2021-05-15 PROCEDURE — 87086 URINE CULTURE/COLONY COUNT: CPT

## 2021-05-17 LAB
BACTERIA UR CULT: NORMAL
HIV 1+2 AB+HIV1 P24 AG SERPL QL IA: NORMAL
RPR SER QL: NORMAL

## 2021-05-21 ENCOUNTER — TELEPHONE (OUTPATIENT)
Dept: PERINATAL CARE | Facility: CLINIC | Age: 36
End: 2021-05-21

## 2021-05-21 NOTE — TELEPHONE ENCOUNTER
Debra Fontanez was ordered with [facility]  Called [facility] and spoke with Sidney to changed to Antione Gould  Updated requisition was faxed over ([phone])

## 2021-05-23 ENCOUNTER — NURSE TRIAGE (OUTPATIENT)
Dept: OTHER | Facility: OTHER | Age: 36
End: 2021-05-23

## 2021-05-23 ENCOUNTER — APPOINTMENT (OUTPATIENT)
Dept: LAB | Facility: HOSPITAL | Age: 36
End: 2021-05-23
Attending: STUDENT IN AN ORGANIZED HEALTH CARE EDUCATION/TRAINING PROGRAM
Payer: COMMERCIAL

## 2021-05-23 DIAGNOSIS — Z34.91 ENCOUNTER FOR PREGNANCY RELATED EXAMINATION IN FIRST TRIMESTER: ICD-10-CM

## 2021-05-23 DIAGNOSIS — O16.1 HYPERTENSION AFFECTING PREGNANCY IN FIRST TRIMESTER: ICD-10-CM

## 2021-05-23 LAB
ALBUMIN SERPL BCP-MCNC: 2.7 G/DL (ref 3.5–5)
ALP SERPL-CCNC: 94 U/L (ref 46–116)
ALT SERPL W P-5'-P-CCNC: 22 U/L (ref 12–78)
ANION GAP SERPL CALCULATED.3IONS-SCNC: 7 MMOL/L (ref 4–13)
AST SERPL W P-5'-P-CCNC: 8 U/L (ref 5–45)
BILIRUB SERPL-MCNC: 0.43 MG/DL (ref 0.2–1)
BUN SERPL-MCNC: 9 MG/DL (ref 5–25)
CALCIUM ALBUM COR SERPL-MCNC: 9.9 MG/DL (ref 8.3–10.1)
CALCIUM SERPL-MCNC: 8.9 MG/DL (ref 8.3–10.1)
CHLORIDE SERPL-SCNC: 107 MMOL/L (ref 100–108)
CO2 SERPL-SCNC: 25 MMOL/L (ref 21–32)
CREAT SERPL-MCNC: 0.41 MG/DL (ref 0.6–1.3)
GFR SERPL CREATININE-BSD FRML MDRD: 135 ML/MIN/1.73SQ M
GLUCOSE SERPL-MCNC: 87 MG/DL (ref 65–140)
POTASSIUM SERPL-SCNC: 3.8 MMOL/L (ref 3.5–5.3)
PROT 24H UR-MCNC: <144 MG/24 HRS (ref 40–150)
PROT SERPL-MCNC: 6.7 G/DL (ref 6.4–8.2)
SODIUM SERPL-SCNC: 139 MMOL/L (ref 136–145)
SPECIMEN VOL UR: 2400 ML
URATE SERPL-MCNC: 3.9 MG/DL (ref 2–6.8)

## 2021-05-23 PROCEDURE — 84550 ASSAY OF BLOOD/URIC ACID: CPT

## 2021-05-23 PROCEDURE — 36415 COLL VENOUS BLD VENIPUNCTURE: CPT

## 2021-05-23 PROCEDURE — 84156 ASSAY OF PROTEIN URINE: CPT

## 2021-05-23 PROCEDURE — 80053 COMPREHEN METABOLIC PANEL: CPT

## 2021-05-23 NOTE — TELEPHONE ENCOUNTER
Regarding: bloody discharge/pregnant   ----- Message from Mei Wesley sent at 5/23/2021  2:50 PM EDT -----  "I am about 13 weeks pregnant, and I just had a large gush of blood "

## 2021-05-23 NOTE — TELEPHONE ENCOUNTER
Reason for Disposition   MILD vaginal bleeding (i e , less than 1 pad / hour; less than patient's usual menstrual bleeding; not just spotting)    Answer Assessment - Initial Assessment Questions  1  ONSET: "When did this bleeding start?"        This afternoon     2  DESCRIPTION: "Describe the bleeding that you are having " "How much bleeding is there?"     - SPOTTING: spotting, or pinkish / brownish mucous discharge; does not fill panti-liner or pad     - MILD:  less than 1 pad / hour; less than patient's usual menstrual bleeding    - MODERATE: 1-2 pads / hour; small-medium blood clots (e g , pea, grape, small coin)     - SEVERE: soaking 2 or more pads/hour for 2 or more hours; bleeding not contained by pads or continuous red blood from vagina; large blood clots (e g , golf ball, large coin)       Small pink spotting this morning, gush of blood this afternoon, states soaked through pants but happened once so far  Not wearing pad/panty liner, states when wiping there is a little blood there     3  ABDOMINAL PAIN SEVERITY: If present, ask: "How bad is it?"  (e g , Scale 1-10; mild, moderate, or severe)    - MILD (1-3): doesn't interfere with normal activities, abdomen soft and not tender to touch     - MODERATE (4-7): interferes with normal activities or awakens from sleep, tender to touch     - SEVERE (8-10): excruciating pain, doubled over, unable to do any normal activities      Reports the last 24 hours having pressure in labia      4  PREGNANCY: "Do you know how many weeks or months pregnant you are?" "When was the first day of your last normal menstrual period?"      13 weeks     5  HEMODYNAMIC STATUS: "Are you weak or feeling lightheaded?" If so, ask: "Can you stand and walk normally?"       States had a brief episode of "feeling woozy" once     6   OTHER SYMPTOMS: "What other symptoms are you having with the bleeding?" (e g , passed tissue, vaginal discharge, fever, menstrual-type cramps) Denies  Reports UTI showing from urine results    Protocols used: PREGNANCY - VAGINAL BLEEDING LESS THAN 20 WEEKS EGA-ADULT-AH

## 2021-05-24 ENCOUNTER — TELEPHONE (OUTPATIENT)
Dept: OBGYN CLINIC | Facility: CLINIC | Age: 36
End: 2021-05-24

## 2021-05-24 ENCOUNTER — INITIAL PRENATAL (OUTPATIENT)
Dept: OBGYN CLINIC | Facility: CLINIC | Age: 36
End: 2021-05-24
Payer: COMMERCIAL

## 2021-05-24 ENCOUNTER — HOSPITAL ENCOUNTER (OUTPATIENT)
Dept: RADIOLOGY | Age: 36
Discharge: HOME | End: 2021-05-24
Payer: COMMERCIAL

## 2021-05-24 VITALS — WEIGHT: 293 LBS | SYSTOLIC BLOOD PRESSURE: 150 MMHG | BODY MASS INDEX: 49.65 KG/M2 | DIASTOLIC BLOOD PRESSURE: 82 MMHG

## 2021-05-24 DIAGNOSIS — Z34.91 FIRST TRIMESTER PREGNANCY: ICD-10-CM

## 2021-05-24 DIAGNOSIS — O20.9 FIRST TRIMESTER BLEEDING: ICD-10-CM

## 2021-05-24 DIAGNOSIS — O09.91 HIGH-RISK PREGNANCY IN FIRST TRIMESTER: Primary | ICD-10-CM

## 2021-05-24 DIAGNOSIS — R73.09 ELEVATED HEMOGLOBIN A1C: ICD-10-CM

## 2021-05-24 DIAGNOSIS — O20.9 FIRST TRIMESTER BLEEDING: Primary | ICD-10-CM

## 2021-05-24 PROCEDURE — 83036 HEMOGLOBIN GLYCOSYLATED A1C: CPT | Performed by: OBSTETRICS & GYNECOLOGY

## 2021-05-24 PROCEDURE — 85025 COMPLETE CBC W/AUTO DIFF WBC: CPT | Performed by: OBSTETRICS & GYNECOLOGY

## 2021-05-24 PROCEDURE — 76816 OB US FOLLOW-UP PER FETUS: CPT

## 2021-05-24 PROCEDURE — 87591 N.GONORRHOEAE DNA AMP PROB: CPT | Performed by: OBSTETRICS & GYNECOLOGY

## 2021-05-24 PROCEDURE — PNV: Performed by: OBSTETRICS & GYNECOLOGY

## 2021-05-24 PROCEDURE — 36415 COLL VENOUS BLD VENIPUNCTURE: CPT | Performed by: OBSTETRICS & GYNECOLOGY

## 2021-05-24 PROCEDURE — 82950 GLUCOSE TEST: CPT | Performed by: OBSTETRICS & GYNECOLOGY

## 2021-05-24 PROCEDURE — 87491 CHLMYD TRACH DNA AMP PROBE: CPT | Performed by: OBSTETRICS & GYNECOLOGY

## 2021-05-24 NOTE — TELEPHONE ENCOUNTER
The patient had a stat u/s and everything is fine and she is set for an appt later today with Dr Nicholas Hidalgo

## 2021-05-24 NOTE — TELEPHONE ENCOUNTER
----- Message from Darlene Juarez sent at 5/24/2021 10:23 AM EDT -----  Regarding: Visit Follow-Up Question  Contact: [phone]  Can I please have a callback regarding vaginal bleeding discussed with [facility] yesterday? Is there anyway I can have sooner appt? I called out of work and am available all day for any location  Thanks

## 2021-05-24 NOTE — PROGRESS NOTES
Nisha Dodson is a 30y o  year old  at [address] for first prenatal visit  Pregnancy was desired  She is currently taking PNV    Nausea No Vomiting No   Exam done today - see OB flowsheet  Pap done No-  normal   Gonorrhea and Chlamydia sent  Labs reviewed  - all within normal / baseline PIH labs reviewed and normal/ early 1 hr gtt and cbc collected today   Added TSH and repeat urine culture - order given to have this done    Genetic testing Had NIPT - results pending   Has US at [place] [id number] tomorrow    OB complications   1  First trimester bleeding - US performed Today / live IUP x 2 / no blood collection seen , on exam only evidence of old blood no active bleeding noted , no lesions on cervix - has follow up at [facility] scheduled tomorrow / patient was supposed to leave for [place] on Thursday - I do not recommend traveling at this time with high risk pregnancy and bleeding   2  Elevated BMI - early 1 hr gtt drawn today / we discussed weight gain in pregnancy   3  History of congestive heart failure in 2019- Has echo ordered / normal PIH labs / currently on Procardia 30 mg - BP today 150/82 - will continue to monitor / aware may need medication adjustment as pregnancy progresses/ currently on ASA   4  Di-Di twin gestation - currently on appropriate supplements , aware of risks of twin gestation / we discussed timing of delivery aware this can change with other medical co morbidities           Pt has been counseled re diet, exercise, weight gain, foods to avoid, vaccines in pregnancy, trisomy screening, travel precautions to include seat belt use and VTE risk reduction  She has been provided our pregnancy packet which includes how and when to contact providers, medication recommendations, dietary suggestions, breastfeeding information as well as websites for additional information, hospital and delivery concerns

## 2021-05-25 ENCOUNTER — TELEPHONE (OUTPATIENT)
Dept: OBGYN CLINIC | Facility: MEDICAL CENTER | Age: 36
End: 2021-05-25

## 2021-05-25 ENCOUNTER — ROUTINE PRENATAL (OUTPATIENT)
Dept: PERINATAL CARE | Facility: OTHER | Age: 36
End: 2021-05-25
Payer: COMMERCIAL

## 2021-05-25 VITALS
HEART RATE: 83 BPM | HEIGHT: 67 IN | WEIGHT: 293 LBS | BODY MASS INDEX: 45.99 KG/M2 | DIASTOLIC BLOOD PRESSURE: 84 MMHG | SYSTOLIC BLOOD PRESSURE: 138 MMHG

## 2021-05-25 DIAGNOSIS — R73.09 GLUCOSE TOLERANCE TEST ABNORMAL: ICD-10-CM

## 2021-05-25 DIAGNOSIS — O30.041 DICHORIONIC DIAMNIOTIC TWIN PREGNANCY IN FIRST TRIMESTER: ICD-10-CM

## 2021-05-25 DIAGNOSIS — O10.911 CHRONIC HYPERTENSION IN OBSTETRIC CONTEXT IN FIRST TRIMESTER: ICD-10-CM

## 2021-05-25 DIAGNOSIS — O09.511 PRIMIGRAVIDA OF ADVANCED MATERNAL AGE IN FIRST TRIMESTER: ICD-10-CM

## 2021-05-25 DIAGNOSIS — Z3A.13 13 WEEKS GESTATION OF PREGNANCY: ICD-10-CM

## 2021-05-25 DIAGNOSIS — Z34.91 ENCOUNTER FOR PREGNANCY RELATED EXAMINATION IN FIRST TRIMESTER: ICD-10-CM

## 2021-05-25 DIAGNOSIS — Z36.82 ENCOUNTER FOR NUCHAL TRANSLUCENCY TESTING: ICD-10-CM

## 2021-05-25 DIAGNOSIS — O99.210 OBESITY AFFECTING PREGNANCY, ANTEPARTUM: ICD-10-CM

## 2021-05-25 DIAGNOSIS — R73.09 ELEVATED HEMOGLOBIN A1C: Primary | ICD-10-CM

## 2021-05-25 LAB
BASOPHILS # BLD AUTO: 0.04 THOUSANDS/ΜL (ref 0–0.1)
BASOPHILS NFR BLD AUTO: 0 % (ref 0–1)
EOSINOPHIL # BLD AUTO: 0.14 THOUSAND/ΜL (ref 0–0.61)
EOSINOPHIL NFR BLD AUTO: 1 % (ref 0–6)
ERYTHROCYTE [DISTWIDTH] IN BLOOD BY AUTOMATED COUNT: 13.7 % (ref 11.6–15.1)
EST. AVERAGE GLUCOSE BLD GHB EST-MCNC: 111 MG/DL
GLUCOSE 1H P 50 G GLC PO SERPL-MCNC: 158 MG/DL (ref 40–134)
HBA1C MFR BLD: 5.5 %
HCT VFR BLD AUTO: 37.8 % (ref 34.8–46.1)
HGB BLD-MCNC: 12.3 G/DL (ref 11.5–15.4)
IMM GRANULOCYTES # BLD AUTO: 0.06 THOUSAND/UL (ref 0–0.2)
IMM GRANULOCYTES NFR BLD AUTO: 0 % (ref 0–2)
LYMPHOCYTES # BLD AUTO: 3.4 THOUSANDS/ΜL (ref 0.6–4.47)
LYMPHOCYTES NFR BLD AUTO: 21 % (ref 14–44)
MCH RBC QN AUTO: 27 PG (ref 26.8–34.3)
MCHC RBC AUTO-ENTMCNC: 32.5 G/DL (ref 31.4–37.4)
MCV RBC AUTO: 83 FL (ref 82–98)
MONOCYTES # BLD AUTO: 0.71 THOUSAND/ΜL (ref 0.17–1.22)
MONOCYTES NFR BLD AUTO: 4 % (ref 4–12)
NEUTROPHILS # BLD AUTO: 11.77 THOUSANDS/ΜL (ref 1.85–7.62)
NEUTS SEG NFR BLD AUTO: 74 % (ref 43–75)
NRBC BLD AUTO-RTO: 0 /100 WBCS
PLATELET # BLD AUTO: 414 THOUSANDS/UL (ref 149–390)
PMV BLD AUTO: 10.1 FL (ref 8.9–12.7)
RBC # BLD AUTO: 4.55 MILLION/UL (ref 3.81–5.12)
WBC # BLD AUTO: 16.12 THOUSAND/UL (ref 4.31–10.16)

## 2021-05-25 PROCEDURE — 76813 OB US NUCHAL MEAS 1 GEST: CPT | Performed by: OBSTETRICS & GYNECOLOGY

## 2021-05-25 PROCEDURE — 99242 OFF/OP CONSLTJ NEW/EST SF 20: CPT | Performed by: OBSTETRICS & GYNECOLOGY

## 2021-05-25 PROCEDURE — 76801 OB US < 14 WKS SINGLE FETUS: CPT | Performed by: OBSTETRICS & GYNECOLOGY

## 2021-05-25 PROCEDURE — 76802 OB US < 14 WKS ADDL FETUS: CPT | Performed by: OBSTETRICS & GYNECOLOGY

## 2021-05-25 PROCEDURE — 76814 OB US NUCHAL MEAS ADD-ON: CPT | Performed by: OBSTETRICS & GYNECOLOGY

## 2021-05-25 RX ORDER — PNV NO.95/FERROUS FUM/FOLIC AC 28MG-0.8MG
TABLET ORAL
COMMUNITY
End: 2021-12-16 | Stop reason: ALTCHOICE

## 2021-05-25 NOTE — TELEPHONE ENCOUNTER
The patient was already made aware of the need for a 3 hour test due to her gtt results and the script is already in the system as well  She will be going to [place] for the testing

## 2021-05-25 NOTE — PROGRESS NOTES
Calista Pickering presents for a genetic screening ultrasound  She had formal genetic counseling and underwent noninvasive prenatal testing utilizing [facility]   Results are not yet available for review  This is her 1st pregnancy  Pregnancy is a spontaneous twin pregnancy complicated by a history of chronic hypertension currently on Procardia XL 30 mg daily  She has massive obesity with a current BMI approaching 50  She has a history of heart failure 2 years ago of unclear etiology  Her most recent echocardiogram demonstrated ejection fraction of 49%  She had hypertensive urgency at that time and her clinical symptoms have resolved and improved with better blood pressure control  She also lost approximately 50 lb which likely also helped  She has depression currently on Lexapro  She has periodontal disease and has a history of a LEEP procedure in 2009  She currently takes vitamins, vitamin I88, folic acid, iron, 332 mg of aspirin, and to coax as well as calcium as needed  We discussed today's findings in detail and answered all of her questions to apparent satisfaction  Today's early ultrasound is overall reassuring without concern for fetal growth discrepancy or abnormal nuchal translucency is  We reviewed the patient's cardiac history and discussed risks associated with a history of cardiac failure  She has improved clinical symptoms and has no significant limitations at this time  Outcomes will likely be dependent upon her follow-up echocardiography which is planned within the next couple of weeks  The fact that she has clinically improved from her symptoms 2 years ago and that she lost weight and blood pressure is under better control indicates that she likely will have lower risks of complications      We discussed the implications of hypertension pregnancy in that there is increased risk for adverse pregnancy outcome, particularly related to hypertensive disorders of pregnancy such as preeclampsia as well as ischemic placental disease which can lead to fetal growth restriction  Unfortunately, antihypertensive therapy does not mitigate this risk of the development of preeclampsia or adverse pregnancy outcome and is reserved for maternal benefit only  I recommend adjustment of antihypertensive therapy when there is persistent hypertension greater than or approaching 160/105 primarily for maternal benefit in the setting of chronic hypertension   surveillance is recommended starting at 32 weeks if the patient's blood pressures are greater than 140/90 or if she is on medications  We discussed the increased maternal and fetal risks with multiple gestations  We reviewed typical monitoring plan for dichorionic diamniotic twins which is, once the anatomy ultrasound is done, every 4-6 week growth ultrasounds, due to an increased risk of discordant growth abnormalities and twins  We recommend weekly antepartum surveillance starting at 32 weeks gestation  We reviewed the increased medical maternal complications of pregnancy including hypertensive disorders, gestational diabetes, peripartum cardiomyopathy, postpartum hemorrhage, malpresentation, and  delivery  We discussed increased fetal risks in pregnancy including discordant anomalies,  birth, and fetal growth restriction  We discussed that the mean gestational age of delivery of twins is 35 3 weeks, with 58 8% delivering less than 37 weeks gestation  I recommend delivery between 37-38 6/7 weeks in appropriately grown dichorionic twin pregnancies given the increased  morbidity and mortality outside of this range  Regarding micronutrient supplementation, I advise supplemental folic acid (1mg) as well as ferrous sulfate (extra 60-80mg iron) and calcium (1500mg)    For twin pregnancy, the IOM recommends a gestational weight gain of 37-54 lb for women of normal weight (BMI 18 5-24 9), 31-50 lb for overweight women (BMI 25-29 9), and 25-42 lb for obese women (BMI >30)  Glucola screening is traditionally recommended between 24 and 28 weeks gestation  There is an increased risk for ischemic placental disease and preeclampsia in twin pregnancies; therefore, recommend initiating low-dose aspirin therapy (162mg) in the 1st trimester which has been demonstrated to mitigate the risk for preeclampsia, fetal growth restriction, and in some cases,  birh  Patient had questions regarding Universal carrier screening  We discussed the risks, benefits, and alternatives  She likely will undergo cystic fibrosis screening, spinal muscular atrophy screening, and fragile X screening  We discussed follow-up in detail and recommended detailed fetal anatomic evaluation at 20 weeks  Thank you very much for allowing us to participate in the care of this very nice patient  Should you have any questions, please do not hesitate to contact our office  Please note, medical decision making complexity:  Moderate

## 2021-05-25 NOTE — TELEPHONE ENCOUNTER
----- Message from Elana Alvarez MD sent at 5/25/2021 12:43 PM EDT -----  Please inform patientelevated 1 hr gtt , 3 hr gtt indicated thanks

## 2021-05-26 LAB
C TRACH DNA SPEC QL NAA+PROBE: NEGATIVE
N GONORRHOEA DNA SPEC QL NAA+PROBE: NEGATIVE

## 2021-05-27 ENCOUNTER — TRANSCRIBE ORDERS (OUTPATIENT)
Dept: LAB | Facility: HOSPITAL | Age: 36
End: 2021-05-27

## 2021-05-27 ENCOUNTER — APPOINTMENT (OUTPATIENT)
Dept: LAB | Facility: HOSPITAL | Age: 36
End: 2021-05-27
Payer: COMMERCIAL

## 2021-05-27 ENCOUNTER — LAB (OUTPATIENT)
Dept: LAB | Facility: HOSPITAL | Age: 36
End: 2021-05-27
Attending: OBSTETRICS & GYNECOLOGY
Payer: COMMERCIAL

## 2021-05-27 DIAGNOSIS — R73.09 GLUCOSE TOLERANCE TEST ABNORMAL: ICD-10-CM

## 2021-05-27 DIAGNOSIS — Z00.8 HEALTH EXAMINATION IN POPULATION SURVEYS: ICD-10-CM

## 2021-05-27 DIAGNOSIS — Z00.8 HEALTH EXAMINATION IN POPULATION SURVEYS: Primary | ICD-10-CM

## 2021-05-27 DIAGNOSIS — Z34.91 FIRST TRIMESTER PREGNANCY: ICD-10-CM

## 2021-05-27 LAB
CHOLEST SERPL-MCNC: 154 MG/DL (ref 50–200)
GLUCOSE P FAST SERPL-MCNC: 97 MG/DL (ref 65–99)
HDLC SERPL-MCNC: 56 MG/DL
LDLC SERPL CALC-MCNC: 73 MG/DL (ref 0–100)
NONHDLC SERPL-MCNC: 98 MG/DL
TRIGL SERPL-MCNC: 125 MG/DL
TSH SERPL DL<=0.05 MIU/L-ACNC: 0.8 UIU/ML (ref 0.36–3.74)

## 2021-05-27 PROCEDURE — 80061 LIPID PANEL: CPT

## 2021-05-27 PROCEDURE — 84443 ASSAY THYROID STIM HORMONE: CPT

## 2021-05-27 PROCEDURE — 82951 GLUCOSE TOLERANCE TEST (GTT): CPT

## 2021-05-27 PROCEDURE — 36415 COLL VENOUS BLD VENIPUNCTURE: CPT

## 2021-05-27 PROCEDURE — 87086 URINE CULTURE/COLONY COUNT: CPT

## 2021-05-28 ENCOUNTER — TELEPHONE (OUTPATIENT)
Dept: PERINATAL CARE | Facility: CLINIC | Age: 36
End: 2021-05-28

## 2021-05-28 LAB
BACTERIA UR CULT: NORMAL
MISCELLANEOUS LAB TEST RESULT: NORMAL

## 2021-05-28 NOTE — TELEPHONE ENCOUNTER
Received in coming telephone call from patient requesting a call back to discuss her cell free fetal DNA test results  Telephone call to patient  Reported results were screen negative for trisomy 24, 25 and 15  Patient informed of fetal sex (female) at her request  This is a twin pregnancy therefore she was advised that determination of fetal sex may be less accurate  Patient stated that she had discussed carrier screening and would like to perform CF, SMA and Fragile X  Was not able to determine from the genetic counseling note if a prior authorization has already been started therefore I advised the patient I would forward the message to Kell Velásquez for follow-up

## 2021-06-01 ENCOUNTER — TELEMEDICINE (OUTPATIENT)
Dept: BEHAVIORAL/MENTAL HEALTH CLINIC | Facility: CLINIC | Age: 36
End: 2021-06-01
Payer: COMMERCIAL

## 2021-06-01 DIAGNOSIS — F32.5 MAJOR DEPRESSIVE DISORDER IN FULL REMISSION, UNSPECIFIED WHETHER RECURRENT (HCC): ICD-10-CM

## 2021-06-01 DIAGNOSIS — Z13.71 TESTING OF FEMALE FOR GENETIC DISEASE CARRIER STATUS: Primary | ICD-10-CM

## 2021-06-01 DIAGNOSIS — F41.1 GENERALIZED ANXIETY DISORDER: ICD-10-CM

## 2021-06-01 PROCEDURE — 90834 PSYTX W PT 45 MINUTES: CPT | Performed by: SOCIAL WORKER

## 2021-06-01 NOTE — RESULT ENCOUNTER NOTE
Please inform patient elevated FBS on 3 hr gtt , will need referral to diabetic educator in pregnancy

## 2021-06-01 NOTE — PSYCH
Virtual Regular Visit      Assessment/Plan:    Problem List Items Addressed This Visit        Other    Depression    Generalized anxiety disorder               Reason for visit is No chief complaint on file  Encounter provider [facility]    Provider located at [address] [place] [id number] [phone]      Recent Visits  No visits were found meeting these conditions  Showing recent visits within past 7 days and meeting all other requirements     Today's Visits  Date Type Provider Dept   06/01/21 [address] today's visits and meeting all other requirements     Future Appointments  No visits were found meeting these conditions  Showing future appointments within next 150 days and meeting all other requirements        The patient was identified by name and date of birth  Natalee Love was informed that this is a telemedicine visit and that the visit is being conducted through [address] [place] [address] and patient was informed that this is a secure, HIPAA-compliant platform  She agrees to proceed     My office door was closed  No one else was in the room  She acknowledged consent and understanding of privacy and security of the video platform  The patient has agreed to participate and understands they can discontinue the visit at any time  Patient is aware this is a billable service  Subjective  Safia Jacky Martinez is a 30 y o  female          HPI     Past Medical History:   Diagnosis Date    Abnormal Pap smear of cervix     Anxiety     CHF [facility]congestive heart failure) ([facility] [address] ) 2019    Chlamydia     Depression     Heart failure (HCC)     CHF in past    Hypertension     Temporomandibular joint disorder     Resolved 7/7/2015     Varicella        Past Surgical History:   Procedure Laterality Date    CERVICAL BIOPSY  W/ LOOP ELECTRODE EXCISION  2009    DENTAL SURGERY      [place] teeth extraction    TONSILLECTOMY  1990       Current Outpatient Medications   Medication Sig Dispense Refill    ASPIRIN PO Take 162 mg by mouth daily      escitalopram (LEXAPRO) 20 mg tablet Take 1 tablet (20 mg total) by mouth daily 90 tablet 1    Multiple Vitamins-Calcium (ONE-A-DAY WOMENS PO) Take by mouth      NIFEdipine (PROCARDIA XL) 30 mg 24 hr tablet Take 1 tablet (30 mg total) by mouth daily 30 tablet 3    Prenatal Vit-Fe Fumarate-FA (Prenatal Vitamin) 27-0 8 MG TABS Take by mouth      Urea 45 % CREA Apply topically 2 (two) times a day (Patient not taking: Reported on 5/26/2020) 1 Tube 1     No current facility-administered medications for this visit  No Known Allergies      Goals: 1  Pain: 0  Level of Suicidality: Low     D:  Safia spoke with this worker today about her conflicted feelings re: her  pregnancy with twins, re-start and dosage increase of her SSRI, and struggle with feelings of guilt associated with her spotting, bleeding pregnancy with multiples  A:  Safia continues to struggle with accepting what she wants and what "everybody else" says  She admits that the pregnancy is "not magical," but everyone says how "tre" she is and how "wnderful" this is for her which compounds her feelings of uncertainty    P: Ina Anguiano will  be supported in addressing the above as she prepares for the birth of her children     I spent 50minutes with the patient during this visit      This note was not shared with the patient due to this is a psychotherapy note    Encounter Diagnoses   Name Primary?  Generalized anxiety disorder     Major depressive disorder in full remission, unspecified whether recurrent [facility] )      VIRTUAL VISIT DISCLAIMER    Vincent Wellscolo acknowledges that she has consented to an online visit or consultation   She understands that the online visit is based solely on information provided by her, and that, in the absence of a face-to-face physical evaluation by the physician, the diagnosis she receives is both limited and provisional in terms of accuracy and completeness  This is not intended to replace a full medical face-to-face evaluation by the physician  Ailin Laura understands and accepts these terms

## 2021-06-01 NOTE — PROGRESS NOTES
Patient elected CF, SMA and Fragile X carrier screening  CF and SMA ordered while Fragile X prior auth will be investigated

## 2021-06-01 NOTE — BH TREATMENT PLAN
Urban Aguilera  1985         Date of Initial Treatment Plan: 5/8/19   Date of Current Treatment Plan: 6/1/21   Treatment Plan Number 5     Strengths/Personal Resources for Self Care: Intelligent, honest, insightful     Diagnosis:   1  MDD (major depressive disorder), recurrent episode, moderate (HCC)      2  Generalized anxiety disorder            Area of Needs: I am feeling very conflicted about my pregnancy  Long Term Goal 1: AI want to increase self-compassion and self care  Target Date:12/1/21  Completion Date: na          Short Term Objectives for Goal 1: AI will follow my own advice--I will work on being present    I will be open and honest in therapy sessions in order to release my feelings of guilt        GOAL 1: Modality: Individual 2x per month   Completion Date na and The person(s) responsible for carrying out the plan is  Sailaja Solares [facility] Dr Jeison Mayfield: Diagnosis and Treatment Plan explained to Safia Baig relates understanding diagnosis and is agreeable to Treatment Plan       Treatment Plan done but not signed at time of office visit due to:  Plan reviewed by phone or in person  and verbal consent given due to Kalyani social williams       Client Comments : Please share your thoughts, feelings, need and/or experiences regarding your treatment plan:         __________________________________________________________________

## 2021-06-02 ENCOUNTER — TELEPHONE (OUTPATIENT)
Dept: PERINATAL CARE | Facility: CLINIC | Age: 36
End: 2021-06-02

## 2021-06-02 DIAGNOSIS — O24.410 DIET CONTROLLED GESTATIONAL DIABETES MELLITUS (GDM) IN SECOND TRIMESTER: Primary | ICD-10-CM

## 2021-06-02 NOTE — TELEPHONE ENCOUNTER
I left a message for Tri Walker to review the result of her MaterniT 24  I informed her that if she wishes to know the gender of her baby, she can either view it on the [facility] james or she can call me back  Call back number ([phone]) left  Also, I notified her of the recommendation to have an MSAFP ordered by her OB at her next appointment

## 2021-06-02 NOTE — TELEPHONE ENCOUNTER
----- Message from Marie Mederos MD sent at 5/28/2021 12:42 PM EDT -----  I reviewed the lab study today and the results revealed low risks for trisomy 21, 25, 15, and sex chromosome aneuploidies

## 2021-06-09 ENCOUNTER — TELEMEDICINE (OUTPATIENT)
Dept: PERINATAL CARE | Facility: CLINIC | Age: 36
End: 2021-06-09
Payer: COMMERCIAL

## 2021-06-09 VITALS — BODY MASS INDEX: 45.99 KG/M2 | WEIGHT: 293 LBS | HEIGHT: 67 IN

## 2021-06-09 DIAGNOSIS — O10.912 CHRONIC HYPERTENSION IN OBSTETRIC CONTEXT IN SECOND TRIMESTER: ICD-10-CM

## 2021-06-09 DIAGNOSIS — O24.419 GESTATIONAL DIABETES MELLITUS (GDM) IN SECOND TRIMESTER, GESTATIONAL DIABETES METHOD OF CONTROL UNSPECIFIED: Primary | ICD-10-CM

## 2021-06-09 DIAGNOSIS — O99.210 OBESITY AFFECTING PREGNANCY, ANTEPARTUM: ICD-10-CM

## 2021-06-09 DIAGNOSIS — E66.01 MORBID OBESITY WITH BMI OF 45.0-49.9, ADULT (HCC): ICD-10-CM

## 2021-06-09 PROBLEM — O24.410 DIET CONTROLLED GESTATIONAL DIABETES MELLITUS (GDM) IN SECOND TRIMESTER: Status: ACTIVE | Noted: 2021-06-09

## 2021-06-09 PROCEDURE — 99215 OFFICE O/P EST HI 40 MIN: CPT | Performed by: NURSE PRACTITIONER

## 2021-06-09 RX ORDER — LANCETS 33 GAUGE
EACH MISCELLANEOUS
Qty: 100 EACH | Refills: 5 | Status: SHIPPED | OUTPATIENT
Start: 2021-06-09 | End: 2021-11-17

## 2021-06-09 RX ORDER — UBIDECARENONE 75 MG
CAPSULE ORAL DAILY
COMMUNITY
End: 2021-12-16 | Stop reason: ALTCHOICE

## 2021-06-09 RX ORDER — BLOOD-GLUCOSE METER
EACH MISCELLANEOUS
Qty: 1 KIT | Refills: 0 | Status: SHIPPED | OUTPATIENT
Start: 2021-06-09 | End: 2021-11-17

## 2021-06-09 RX ORDER — DIPHENHYDRAMINE HYDROCHLORIDE 25 MG/1
25 CAPSULE ORAL DAILY
COMMUNITY
End: 2021-11-22

## 2021-06-09 RX ORDER — BLOOD SUGAR DIAGNOSTIC
STRIP MISCELLANEOUS
Qty: 100 EACH | Refills: 5 | Status: SHIPPED | OUTPATIENT
Start: 2021-06-09 | End: 2021-09-27 | Stop reason: SDUPTHER

## 2021-06-09 RX ORDER — LANOLIN ALCOHOL/MO/W.PET/CERES
400 CREAM (GRAM) TOPICAL DAILY
COMMUNITY
End: 2021-11-22

## 2021-06-09 NOTE — ASSESSMENT & PLAN NOTE
-Pre-pregnancy weight 317 lbs  -Current weight 316 lbs  -Current BMI 49 49   -Start GDM diet and walk up to 30 minutes a day

## 2021-06-09 NOTE — PROGRESS NOTES
Virtual Regular Visit      Assessment/Plan:    Problem List Items Addressed This Visit        Endocrine    Gestational diabetes mellitus (GDM) in second trimester - Primary     -Start GDM diet and SMBG  -Report every Wednesday via glucose flowsheet   -Walk up to 30 minutes a day  -Stay in close contact with diabetes education team    Lab Results   Component Value Date    HGBA1C 5 5 05/24/2021            Relevant Medications    glucose blood (Contour Next Test) test strip    Blood Glucose Monitoring Suppl (Contour Next EZ) w/Device KIT    OneTouch Delica Lancets 95M MISC    Other Relevant Orders    Mychart glucose flowsheet       Cardiovascular and Mediastinum    Chronic hypertension in obstetric context in second trimester     -On baby aspirin and Procardia   -Followed by OB    -Last CMP within normal          Relevant Medications    glucose blood (Contour Next Test) test strip    Blood Glucose Monitoring Suppl (Contour Next EZ) w/Device KIT    OneTouch Delica Lancets 77F MISC    Other Relevant Orders    Mychart glucose flowsheet       Other    Morbid obesity with BMI of 45 0-49 9, adult (HCC)    Relevant Medications    glucose blood (Contour Next Test) test strip    Blood Glucose Monitoring Suppl (Contour Next EZ) w/Device KIT    OneTouch Delica Lancets 28J MISC    Other Relevant Orders    Mychart glucose flowsheet    Obesity affecting pregnancy, antepartum     -Pre-pregnancy weight 317 lbs  -Current weight 316 lbs  -Current BMI 49 49   -Start GDM diet and walk up to 30 minutes a day            Relevant Medications    glucose blood (Contour Next Test) test strip    Blood Glucose Monitoring Suppl (Contour Next EZ) w/Device KIT    OneTouch Delica Lancets 14M MISC    Other Relevant Orders    Mychart glucose flowsheet        -A1c 5 5% at goal   - glucose meter and watch educational video online    -Keep dietitian appointment as scheduled and keep 3 day food log   -Stay in close contact with diabetes education team   -Insulin requirements during pregnancy; basal/bolus concept and Metformin discussed  -Very important to maintain tight glucose control during pregnancy to decrease risk factors including fetal macrosomia; birth injury; risk of ; polyhydramnios; pre-term labor; pre-eclampsia;  hypoglycemia; jaundice and stillbirth  -[employer]ia [employer] diabetes and pregnancy booklet; diet plan and hypoglycemia patient education  1  Start self monitoring blood glucose fasting and 2 hours after start of each meal  Keep glucose log  Glucose goals: fasting 60-90 mg/dL, 140 mg/dL or less 1 hour post meals, and 120 mg/dL or less 2 hours post meal    2  Report glucose readings weekly via [employer] every Wednesday  3  Start GDM diet with 3 meals and 3 snacks including recommended combination of carb, protein and fat per meal/snack  4  Please eat meal or snack every 2-3 5 hours while awake  5  No more than 8 to 10 hours of fasting overnight  6  Stay active if no restriction from your OB, walk up to 30 minutes a day  7  Always have glucose available to treat hypoglycemia  Use 15:15 rule  Refer to hypoglycemia patient education sheet  Test blood sugar when experiencing signs and symptoms of hypoglycemia and prior to driving  8  Continue prenatal and baby aspirin vitamin as recommended  9  Continue follow-up with your OB and MFM as recommended  10  Follow up in: 2 months  Reason for visit is   Chief Complaint   Patient presents with    Virtual Regular Visit    Gestational Diabetes    Patient Education        Encounter provider Ángel Chawla    Provider located at [address] [place] [address]  [address] [facility] [address] [phone]      Recent Visits  No visits were found meeting these conditions     Showing recent visits within past 7 days and meeting all other requirements     Today's Visits  Date Type Provider Dept   21 Telemedicine Trang Gabriela Munroe, [facility] today's visits and meeting all other requirements     Future Appointments  No visits were found meeting these conditions  Showing future appointments within next 150 days and meeting all other requirements        The patient was identified by name and date of birth  Marcos Parker was informed that this is a telemedicine visit and that the visit is being conducted through [address] [place] [address] Now and patient was informed that this is a secure, HIPAA-compliant platform  She agrees to proceed  My office door was closed  No one else was in the room  She acknowledged consent and understanding of privacy and security of the video platform  The patient has agreed to participate and understands they can discontinue the visit at any time  Patient is aware this is a billable service  Subjective  Jillian Vladimir Castleman is a 30 y o  female  13 1/7 weeks twin pregnancy gestation GDM  History of pre-diabetes             Past Medical History:   Diagnosis Date    Abnormal Pap smear of cervix     Anxiety     CHF (congestive heart failure) ([facility] [address] )     Chlamydia     Depression     Diet controlled gestational diabetes mellitus (GDM) in second trimester 2021    Heart failure ([facility] )     CHF in past    Hypertension     Temporomandibular joint disorder     Resolved 2015     Varicella        Past Surgical History:   Procedure Laterality Date    CERVICAL BIOPSY  W/ LOOP ELECTRODE EXCISION      DENTAL SURGERY      [place] teeth extraction    TONSILLECTOMY         Current Outpatient Medications   Medication Sig Dispense Refill    ASPIRIN PO Take 162 mg by mouth daily      cyanocobalamin (VITAMIN B-12) 100 mcg tablet Take by mouth daily      escitalopram (LEXAPRO) 20 mg tablet Take 1 tablet (20 mg total) by mouth daily 90 tablet 1    Ferrous Sulfate (IRON SUPPLEMENT PO) Take by mouth      folic acid (FOLVITE) 419 mcg tablet Take 400 mcg by mouth daily      Multiple Vitamins-Calcium (ONE-A-DAY WOMENS PO) Take by mouth      NIFEdipine (PROCARDIA XL) 30 mg 24 hr tablet Take 1 tablet (30 mg total) by mouth daily 30 tablet 3    Prenatal Vit-Fe Fumarate-FA (Prenatal Vitamin) 27-0 8 MG TABS Take by mouth      Pyridoxine HCl (vitamin B-6) 25 MG tablet Take 25 mg by mouth daily      Blood Glucose Monitoring Suppl (Contour Next EZ) w/Device KIT Gestational diabetes  1 kit 0    glucose blood (Contour Next Test) test strip Test 4 times a day  Gestational diabetes  100 each 5    OneTouch Delica Lancets 18X MISC Use 4 a day  100 each 5     No current facility-administered medications for this visit  No Known Allergies    Review of Systems   Constitutional: Negative for fatigue and fever  HENT: Negative for congestion, sore throat and trouble swallowing  Eyes: Negative for visual disturbance  Respiratory: Negative for cough and shortness of breath  Cardiovascular: Negative for chest pain, palpitations and leg swelling  Gastrointestinal: Positive for constipation  Negative for nausea and vomiting  Endocrine: Positive for polyuria  Negative for polydipsia and polyphagia  Genitourinary: Negative for difficulty urinating and vaginal bleeding (spotting)  Musculoskeletal: Negative for back pain  Neurological: Negative for numbness and headaches  Psychiatric/Behavioral: Negative for sleep disturbance  Video Exam  Labs reviewed  Vitals:    06/09/21 0840   Weight: (!) 143 kg (316 lb)   Height: 5' 7" (1 702 m)       Physical Exam  Constitutional:       Appearance: She is obese  HENT:      Head: Normocephalic  Nose: Nose normal    Eyes:      Conjunctiva/sclera: Conjunctivae normal    Neck:      Musculoskeletal: Normal range of motion  Pulmonary:      Effort: Pulmonary effort is normal    Neurological:      Mental Status: She is alert and oriented to person, place, and time     Psychiatric:         Mood and Affect: Mood normal  Behavior: Behavior normal          Thought Content: Thought content normal          Judgment: Judgment normal           I spent 70 minutes with patient today in which greater than 50% of the time was spent in counseling/coordination of care regarding reviewing chart, GDM diagnosis, plan of care and education  VIRTUAL VISIT DISCLAIMER    Meghan Bocanegras acknowledges that she has consented to an online visit or consultation  She understands that the online visit is based solely on information provided by her, and that, in the absence of a face-to-face physical evaluation by the physician, the diagnosis she receives is both limited and provisional in terms of accuracy and completeness  This is not intended to replace a full medical face-to-face evaluation by the physician  Meghan Alva understands and accepts these terms

## 2021-06-09 NOTE — ASSESSMENT & PLAN NOTE
-Start GDM diet and SMBG  -Report every Wednesday via glucose flowsheet   -Walk up to 30 minutes a day    -Stay in close contact with diabetes education team    Lab Results   Component Value Date    HGBA1C 5 5 05/24/2021

## 2021-06-09 NOTE — PATIENT INSTRUCTIONS
-A1c 5 5% at goal   - glucose meter and watch educational video online    -Keep dietitian appointment as scheduled and keep 3 day food log   -Stay in close contact with diabetes education team   -Insulin requirements during pregnancy; basal/bolus concept and Metformin discussed  -Very important to maintain tight glucose control during pregnancy to decrease risk factors including fetal macrosomia; birth injury; risk of ; polyhydramnios; pre-term labor; pre-eclampsia;  hypoglycemia; jaundice and stillbirth  -[employer]ia [employer] diabetes and pregnancy booklet; diet plan and hypoglycemia patient education  1  Start self monitoring blood glucose fasting and 2 hours after start of each meal  Keep glucose log  Glucose goals: fasting 60-90 mg/dL, 140 mg/dL or less 1 hour post meals, and 120 mg/dL or less 2 hours post meal    2  Report glucose readings weekly via [employer] every Wednesday  3  Start GDM diet with 3 meals and 3 snacks including recommended combination of carb, protein and fat per meal/snack  4  Please eat meal or snack every 2-3 5 hours while awake  5  No more than 8 to 10 hours of fasting overnight  6  Stay active if no restriction from your OB, walk up to 30 minutes a day  7  Always have glucose available to treat hypoglycemia  Use 15:15 rule  Refer to hypoglycemia patient education sheet  Test blood sugar when experiencing signs and symptoms of hypoglycemia and prior to driving  8  Continue prenatal and baby aspirin vitamin as recommended  9  Continue follow-up with your OB and MFM as recommended  10  Follow up in: 2 months

## 2021-06-11 ENCOUNTER — TELEPHONE (OUTPATIENT)
Dept: PERINATAL CARE | Facility: CLINIC | Age: 36
End: 2021-06-11

## 2021-06-11 NOTE — TELEPHONE ENCOUNTER
Spoke with pt  about her glucose supplies I did call [facility] and per Mars Quintana they sent her supplies to mail order she stated that she will call pt to let her know when delivery date will be I did let pt know if it takes to long we have samples      Thank you

## 2021-06-20 PROBLEM — Z3A.16 16 WEEKS GESTATION OF PREGNANCY: Status: ACTIVE | Noted: 2021-06-20

## 2021-06-20 NOTE — ASSESSMENT & PLAN NOTE
Lab Results   Component Value Date    HGBA1C 5 5 05/24/2021       Currently being followed in [facility] for DM  She needs to report on wednesdays

## 2021-06-20 NOTE — ASSESSMENT & PLAN NOTE
BMI is 49  With twins the recommend weight gain is between 25-*42 pounds   Currently she has TWG of -0 181 kg (-6 4 oz)

## 2021-06-20 NOTE — ASSESSMENT & PLAN NOTE
History of CHF-has echo on 6/22  Last EF was 49%    Currently on Procardia xl 30   And  mg daily     NST 2 x week starting at 32 weeks  Delivery between 37-38 weeks

## 2021-06-21 ENCOUNTER — ROUTINE PRENATAL (OUTPATIENT)
Dept: OBGYN CLINIC | Facility: MEDICAL CENTER | Age: 36
End: 2021-06-21
Payer: COMMERCIAL

## 2021-06-21 VITALS — BODY MASS INDEX: 49.49 KG/M2 | WEIGHT: 293 LBS

## 2021-06-21 DIAGNOSIS — Z3A.16 16 WEEKS GESTATION OF PREGNANCY: Primary | ICD-10-CM

## 2021-06-21 DIAGNOSIS — E66.01 MORBID OBESITY WITH BMI OF 45.0-49.9, ADULT (HCC): ICD-10-CM

## 2021-06-21 DIAGNOSIS — O10.912 CHRONIC HYPERTENSION IN OBSTETRIC CONTEXT IN SECOND TRIMESTER: ICD-10-CM

## 2021-06-21 DIAGNOSIS — O24.410 DIET CONTROLLED GESTATIONAL DIABETES MELLITUS (GDM) IN SECOND TRIMESTER: ICD-10-CM

## 2021-06-21 DIAGNOSIS — O30.041 DICHORIONIC DIAMNIOTIC TWIN PREGNANCY IN FIRST TRIMESTER: ICD-10-CM

## 2021-06-21 PROCEDURE — PNV: Performed by: OBSTETRICS & GYNECOLOGY

## 2021-06-21 PROCEDURE — 36415 COLL VENOUS BLD VENIPUNCTURE: CPT | Performed by: OBSTETRICS & GYNECOLOGY

## 2021-06-21 PROCEDURE — 82105 ALPHA-FETOPROTEIN SERUM: CPT | Performed by: OBSTETRICS & GYNECOLOGY

## 2021-06-22 ENCOUNTER — HOSPITAL ENCOUNTER (OUTPATIENT)
Dept: NON INVASIVE DIAGNOSTICS | Facility: HOSPITAL | Age: 36
Discharge: HOME | End: 2021-06-22
Attending: STUDENT IN AN ORGANIZED HEALTH CARE EDUCATION/TRAINING PROGRAM
Payer: COMMERCIAL

## 2021-06-22 DIAGNOSIS — O99.419 HEART DISEASE DURING PREGNANCY, ANTEPARTUM: ICD-10-CM

## 2021-06-22 DIAGNOSIS — I51.9 HEART DISEASE DURING PREGNANCY, ANTEPARTUM: ICD-10-CM

## 2021-06-22 PROCEDURE — 93306 TTE W/DOPPLER COMPLETE: CPT | Performed by: INTERNAL MEDICINE

## 2021-06-22 PROCEDURE — 93306 TTE W/DOPPLER COMPLETE: CPT

## 2021-06-24 ENCOUNTER — TELEMEDICINE (OUTPATIENT)
Dept: PERINATAL CARE | Facility: CLINIC | Age: 36
End: 2021-06-24
Payer: COMMERCIAL

## 2021-06-24 ENCOUNTER — DOCUMENTATION (OUTPATIENT)
Dept: PERINATAL CARE | Facility: CLINIC | Age: 36
End: 2021-06-24

## 2021-06-24 DIAGNOSIS — O30.042 DICHORIONIC DIAMNIOTIC TWIN PREGNANCY IN SECOND TRIMESTER: ICD-10-CM

## 2021-06-24 DIAGNOSIS — Z3A.17 17 WEEKS GESTATION OF PREGNANCY: ICD-10-CM

## 2021-06-24 DIAGNOSIS — O24.410 DIET CONTROLLED GESTATIONAL DIABETES MELLITUS (GDM) IN SECOND TRIMESTER: Primary | ICD-10-CM

## 2021-06-24 DIAGNOSIS — E66.01 MORBID OBESITY WITH BMI OF 45.0-49.9, ADULT (HCC): ICD-10-CM

## 2021-06-24 PROCEDURE — G0108 DIAB MANAGE TRN  PER INDIV: HCPCS | Performed by: DIETITIAN, REGISTERED

## 2021-06-24 NOTE — PROGRESS NOTES
Demographics:  Language: English  Ethnicity: White/   Country of Origin: [place]    Education/Occupation:  Highest grade completed: Post Graduate   Occupation: Professional/ Managerial Pyschotherapist  Employer Name:[employer] worked per week: Full Time (40 hours/week)  Shift worked: Morning ( 8 AM-5 PM)  May we contact you at work? Yes between 12-1 PM during her lunch break  Personal & Family History:  Personal history of diabetes? no  Family members with diabetes: Mother & Maternal grandparents    Pregnancy History:  How many total pregnancies have you had? 1; is expecting twins  How many children do you have? 0  Are you having swelling or fluid retention? no  Have you been placed on bedrest? no    Physical Activity:  Do you exercise? yes  Type of Exercise: Walking  Has a treadmill, but is not using it  Frequency of Exercise: 2 x per week    Nutrition Questionnaire: How many meals do you eat daily? 3  List times of meals: Breakfast: 8 AM/ Lunch: 12 PM/ Dinner: 5-5:30 PM  How many snacks do you eat daily? 2  List times of snacks: AM Snack: 10 AM/ PM Snack: 2 PM/ Bedtime Snack: None as goes to bed at 8:30 PM  What type of diet are you following at home? Low Carbohydrate--GDM diet  Do you have special or ethnic dietary preferences? no  Do you have any food allergies or intolerances? no    Learning preferences: How do you learn best? Reading  How do you rate your health? Good  Who is your primary support person? Spouse  How do you cope with stress? Speaks with , therapist or friends & gradens  Do you have any cultural or Latter-day beliefs we should be aware of? no  How do you feel the diabetes diagnosis will affect the rest of your pregnancy? Saw it coming, but not happy about it     Do you receive WIC or food stamps? no

## 2021-06-24 NOTE — PROGRESS NOTES
Virtual Brief Visit    Assessment/Plan:    Problem List Items Addressed This Visit     None                Reason for visit is   Chief Complaint   Patient presents with    Virtual Brief Visit        Encounter provider Dakota Zhang    Provider located at [address] [place] [id number] [phone]    Recent Visits  No visits were found meeting these conditions  Showing recent visits within past 7 days and meeting all other requirements  Today's Visits  Date Type Provider Dept   06/24/21 1501 [facility]   Showing today's visits and meeting all other requirements  Future Appointments  No visits were found meeting these conditions  Showing future appointments within next 150 days and meeting all other requirements       After connecting through telephone, the patient was identified by name and date of birth  Danitza Avery was informed that this is a telemedicine visit and that the visit is being conducted through telephone  My office door was closed  No one else was in the room  She acknowledged consent and understanding of privacy and security of the platform  The patient has agreed to participate and understands she can discontinue the visit at any time  Patient is aware this is a billable service  Subjective    Safiaanay Anguiano is a 30 y o  female pregnant patient    HPI     Past Medical History:   Diagnosis Date    Abnormal Pap smear of cervix     Anxiety     CHF (congestive heart failure) ([address] ) 2019    Chlamydia     Depression     Diet controlled gestational diabetes mellitus (GDM) in second trimester 6/9/2021    Heart failure ([address] )     CHF in past    Hypertension     Temporomandibular joint disorder     Resolved 7/7/2015     Varicella        Past Surgical History:   Procedure Laterality Date    CERVICAL BIOPSY  W/ LOOP ELECTRODE EXCISION  2009   [facility] DENTAL SURGERY [place] teeth extraction    TONSILLECTOMY  1990       Current Outpatient Medications   Medication Sig Dispense Refill    ASPIRIN PO Take 162 mg by mouth daily      Blood Glucose Monitoring Suppl (Contour Next EZ) w/Device KIT Gestational diabetes  1 kit 0    cyanocobalamin (VITAMIN B-12) 100 mcg tablet Take by mouth daily      escitalopram (LEXAPRO) 20 mg tablet Take 1 tablet (20 mg total) by mouth daily 90 tablet 1    Ferrous Sulfate (IRON SUPPLEMENT PO) Take by mouth      folic acid (FOLVITE) 363 mcg tablet Take 400 mcg by mouth daily      glucose blood (Contour Next Test) test strip Test 4 times a day  Gestational diabetes  100 each 5    Multiple Vitamins-Calcium (ONE-A-DAY WOMENS PO) Take by mouth      NIFEdipine (PROCARDIA XL) 30 mg 24 hr tablet Take 1 tablet (30 mg total) by mouth daily 30 tablet 3    OneTouch Delica Lancets 61W MISC Use 4 a day  100 each 5    Prenatal Vit-Fe Fumarate-FA (Prenatal Vitamin) 27-0 8 MG TABS Take by mouth      Pyridoxine HCl (vitamin B-6) 25 MG tablet Take 25 mg by mouth daily       No current facility-administered medications for this visit  No Known Allergies    Review of Systems  --not performed  There were no vitals filed for this visit  Time spent with the patient: 60 minutes  VIRTUAL VISIT DISCLAIMER    Ildefonso Linares acknowledges that she has consented to an online visit or consultation  She understands that the online visit is based solely on information provided by her, and that, in the absence of a face-to-face physical evaluation by the physician, the diagnosis she receives is both limited and provisional in terms of accuracy and completeness  This is not intended to replace a full medical face-to-face evaluation by the physician  Ildefonso Linares understands and accepts these terms  06/24/21  Ildefonso Linares   1985  Estimated Date of Delivery: 11/30/21   EGA: 17w2d    Dear [facility] at [facility]:     Thank you for referring your patient to the Diabetes and Pregnancy Program at [address] [place] [address]  The patient's pregnancy is complicated with a twin pregnancy & Morbid obesity The patient attended Individual class 1 and patient received the following education via telephone ( unable to connect for virtual telemedicine)     Pathophysiology of diabetes and pregnancy  This includes maternal-fetal complications such as fetal macrosomia,  hypoglycemia, polyhydramnios, increased incidence of  section, pre-term labor and in severe cases, fetal demise and stillbirth   Instruction on diet and glucometer use was provided  Self-monitoring of blood glucose levels: fasting (goal 60mg/dl to 90mg/dl) and two hours after the start of the meal less (goal less than 120mg/dl)  The patient iIs already monitoring with  Contour Next blood glucose meter and supplies  Patient is reporting her B results on the Glucose Flow Sheet & the results were discussed at Class 1 today   Medical Nutrition Therapy for diabetes and pregnancy  The patient was provided with a 2400 calorie meal plan and the following was reviewed:     o Basic review of macronutrients  C/o not much of an appetite with the twin pregnancy & drinks Regular [place] Instant Breakfast ( 1 scoop serving added to 12-16 oz milk & ads a string cheese to this breakfast  Reports this breakfast does not hold her till the mid-morning snack  Advised patient to eat a breakafst sandwich instead for breakfast       o Meal pattern should consist of three small meals and three snacks daily  Patient is currently trying to follow the GDM diet but is not eating after her dinner at 5:30 PM  C/O being very hungry during the middle of the night at 2 AM & tries to avoid eating at that time  Stressed the importance of having only 10 hours between bedtime snack & FBS  Presently, she has 14 hours between those times   Will stay up 1 hour longer & will take her FBS at 6:30 AM when she gets up    o Carbohydrate gram amounts per meal   o Instructions on how to read a food label  o Appropriate serving sizes for carbohydrates and proteins  Is familiar with portion sizes a had attended a weight management program in the past    o Incorporating protein at each meal and snack  o Maintain a three day food diary and bring to class 2    Report blood glucose levels to the [address] weekly or as directed  Desires to try the recommended meal changes first to lower her FBS  before beginning medication  o Phone : [phone]  If no response in 24 hours, call [phone]   o [employer] Glucose Flow Sheet   The patient is scheduled to attend class 2 on Thursday, 7/1/21  Additionally, fetal ultrasound evaluation by the Perinatologist has been scheduled to assure continuity of care  Patient Stated Goal: "I will walk 20-30 minutes after dinner daily"  Diabetes Self Management Support Plan outside of ongoing care: Spouse/Family    Please contact the Diabetes and Pregnancy Program at [phone] if you have any questions  Time spent with patient 12-1 PM; time spent face to face counseling greater than 50% of the appointment      Sincerely,   Antoni Lou  Diabetes Educator   Diabetes and Pregnancy Program

## 2021-06-24 NOTE — PROGRESS NOTES
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; bus prepared foods for both lunch & dinner  States she skips the bedtime snack as goes to bed at 8:30 PM but then is starving at 2 AM & does not eat at that time      Self-Blood Glucose monitoring 4 times daily with a Contour Next glucose meter  Has 14 hours between dinner & her FBS the next morning  Walks 2 times weekly for 30 minutes  Assessment / Plan:  Continue current regimen  Desires to change her diet for 1 week before beginning insulin  Agreed to eat a bedtime snack  Advised to change to [place] or try a breakfast sandwich for breakfast    Continue monitoring     21 VuO2q-3 5%  20 week ultrasound scheduled for 21  Class 2 scheduled for Thursday, 21    Patient Stated Goal: "I will walk 20-30 minutes after dinner daily"    Diabetes Self Management Support Plan outside of ongoing care: Spouse/Family    Date due to report next:  Thursday, 21 at Class 2 when will be determined if needs to begin insulin       Dinora Davis, MS,RD,CDE  [facility] Maternal Fetal Medicine   Diabetes and Pregnancy Program

## 2021-06-25 ENCOUNTER — TELEPHONE (OUTPATIENT)
Dept: PERINATAL CARE | Facility: CLINIC | Age: 36
End: 2021-06-25

## 2021-06-25 LAB
2ND TRIMESTER 4 SCREEN SERPL-IMP: NORMAL
AFP ADJ MOM SERPL: 3.46
AFP INTERP AMN-IMP: NORMAL
AFP INTERP SERPL-IMP: NORMAL
AFP INTERP SERPL-IMP: NORMAL
AFP SERPL-MCNC: 88.1 NG/ML
AGE AT DELIVERY: 35.2 YR
GA METHOD: NORMAL
GA: 16.9 WEEKS
IDDM PATIENT QL: NO
MULTIPLE PREGNANCY: NORMAL
NEURAL TUBE DEFECT RISK FETUS: 229 %

## 2021-06-25 NOTE — TELEPHONE ENCOUNTER
Called patient and discussed adjust MSAFP result  Reported as screen negative with 1/229 risk for ONTD  Patient relieved but still a little nervous  Reviewed that Level II anatomy ultrasound will still assess fetal spines and hopefully provide more reassurance  Patient had no further questions

## 2021-06-25 NOTE — TELEPHONE ENCOUNTER
Marylin Hurd called [facility] nurse line understandably very upset that she viewed a positive results for AFP screen on her MyChart and [facility] office did not mariza her with results  Patient is aware of the federal rule to release lab reports directly to patients  Offered patient support and told patient I would ask  [facility] Genetic Counselor Donnell Baldwin  (currently with another patient appointment)  to follow up with her when available  Patient verbalized understanding

## 2021-06-25 NOTE — TELEPHONE ENCOUNTER
Called [facility] to have MSAFP adjusted as it is a twin gestation (multiple gestation field listed as NO)  Result will be recalculated and faxed to [facility] office  Should also result to Epic  Called patient and explained the above  Briefly reviewed next steps if adjusted risk is a screen positive  She has Level II anatomy ultrasound scheduled for 7/13/21 at our [place] location  Also informed patient that CF and SMA carrier screening orders have been placed for her and she can go to the [facility]'s lab to have them drawn  Will check policy for Fragile X carrier screening coverage  Patient expressed verbal understanding and had no further questions  Made a plan to call her when the new results are available

## 2021-07-01 ENCOUNTER — TELEMEDICINE (OUTPATIENT)
Dept: PERINATAL CARE | Facility: CLINIC | Age: 36
End: 2021-07-01
Payer: COMMERCIAL

## 2021-07-01 ENCOUNTER — DOCUMENTATION (OUTPATIENT)
Dept: PERINATAL CARE | Facility: CLINIC | Age: 36
End: 2021-07-01

## 2021-07-01 ENCOUNTER — PATIENT MESSAGE (OUTPATIENT)
Dept: PERINATAL CARE | Facility: CLINIC | Age: 36
End: 2021-07-01

## 2021-07-01 DIAGNOSIS — O99.210 OBESITY AFFECTING PREGNANCY, ANTEPARTUM: ICD-10-CM

## 2021-07-01 DIAGNOSIS — Z3A.18 18 WEEKS GESTATION OF PREGNANCY: ICD-10-CM

## 2021-07-01 DIAGNOSIS — O24.414 INSULIN CONTROLLED GESTATIONAL DIABETES MELLITUS (GDM) IN SECOND TRIMESTER: Primary | ICD-10-CM

## 2021-07-01 DIAGNOSIS — O30.042 DICHORIONIC DIAMNIOTIC TWIN PREGNANCY IN SECOND TRIMESTER: ICD-10-CM

## 2021-07-01 PROCEDURE — G0108 DIAB MANAGE TRN  PER INDIV: HCPCS | Performed by: DIETITIAN, REGISTERED

## 2021-07-01 RX ORDER — INSULIN GLARGINE 100 [IU]/ML
INJECTION, SOLUTION SUBCUTANEOUS
Qty: 15 ML | Refills: 0 | Status: SHIPPED | OUTPATIENT
Start: 2021-07-01 | End: 2021-09-01 | Stop reason: SDUPTHER

## 2021-07-01 NOTE — PROGRESS NOTES
Virtual Regular Visit      Assessment/Plan:    Problem List Items Addressed This Visit     None               Reason for visit is   Chief Complaint   Patient presents with    Virtual Regular Visit        Encounter provider Fiona Martinez    Provider located at [facility] [address] [address] [phone]      Recent Visits  Date Type Provider Dept   06/25/21 Telephone Gemma Bhatt   06/25/21 Telephone Gemma [address] Bhatt   06/25/21 Telephone Cory Roth, [address]   06/24/21 [address] [place] [address]   Showing recent visits within past 7 days and meeting all other requirements  Future Appointments  No visits were found meeting these conditions  Showing future appointments within next 150 days and meeting all other requirements       The patient was identified by name and date of birth  Jon Crouch was informed that this is a telemedicine visit and that the visit is being conducted through [address] [facility] and patient was informed that this is a secure, HIPAA-compliant platform  She agrees to proceed     My office door was closed  No one else was in the room  She acknowledged consent and understanding of privacy and security of the video platform  The patient has agreed to participate and understands they can discontinue the visit at any time  Patient is aware this is a billable service  Subjective  Safia Demarco is a 30 y o  female pregnant patient         HPI     Past Medical History:   Diagnosis Date    Abnormal Pap smear of cervix     Anxiety     CHF (congestive heart failure) ([address] ) 2019    Chlamydia     Depression     Diet controlled gestational diabetes mellitus (GDM) in second trimester 6/9/2021    Heart failure ([address] )     CHF in past    Hypertension     Temporomandibular joint disorder     Resolved 7/7/2015     Varicella Past Surgical History:   Procedure Laterality Date    CERVICAL BIOPSY  W/ LOOP ELECTRODE EXCISION  2009    DENTAL SURGERY      [place] teeth extraction    TONSILLECTOMY  1990       Current Outpatient Medications   Medication Sig Dispense Refill    ASPIRIN PO Take 162 mg by mouth daily      Blood Glucose Monitoring Suppl (Contour Next EZ) w/Device KIT Gestational diabetes  1 kit 0    cyanocobalamin (VITAMIN B-12) 100 mcg tablet Take by mouth daily      escitalopram (LEXAPRO) 20 mg tablet Take 1 tablet (20 mg total) by mouth daily 90 tablet 1    Ferrous Sulfate (IRON SUPPLEMENT PO) Take by mouth      folic acid (FOLVITE) 463 mcg tablet Take 400 mcg by mouth daily      glucose blood (Contour Next Test) test strip Test 4 times a day  Gestational diabetes  100 each 5    Multiple Vitamins-Calcium (ONE-A-DAY WOMENS PO) Take by mouth      NIFEdipine (PROCARDIA XL) 30 mg 24 hr tablet Take 1 tablet (30 mg total) by mouth daily 30 tablet 3    OneTouch Delica Lancets 55P MISC Use 4 a day  100 each 5    Prenatal Vit-Fe Fumarate-FA (Prenatal Vitamin) 27-0 8 MG TABS Take by mouth      Pyridoxine HCl (vitamin B-6) 25 MG tablet Take 25 mg by mouth daily       No current facility-administered medications for this visit  No Known Allergies    Review of Systems    Video Exam    There were no vitals filed for this visit  Physical Exam --not performed  Time spent with the patient: 60 minutes  VIRTUAL VISIT DISCLAIMER    Yogi Cameron acknowledges that she has consented to an online visit or consultation  She understands that the online visit is based solely on information provided by her, and that, in the absence of a face-to-face physical evaluation by the physician, the diagnosis she receives is both limited and provisional in terms of accuracy and completeness  This is not intended to replace a full medical face-to-face evaluation by the physician   Autumn Calvert Caitlyn Ortiz understands and accepts these terms  DATE:  21  RE: Lucia Post    : 1985    XIAO: Estimated Date of Delivery: 21    EGA: 18w2d    Dear Drs at [facility][de-identified]    Thank you for referring your patient to the Diabetes and Pregnancy Program at [address] [place] [address]  The patient attended Individual Class 2 (1/2 portions)/Insulin Education received the following education via virtual telemedicine:    Weight gain during in pregnancy  Based on the patients height of 67  inches, pre-pregnancy weight of 144 kg (317 lb) pounds (BMI- 49 64), we would recommend a total weight gain of 15-25 pounds for the pregnancy   The patients current weight is 316  pounds, and she lost 1 pound to date  Based on this, we recommend a weight gain of up to 24  pounds for the remainder of the pregnancy   Medical Nutrition Therapy for diabetes and pregnancy  The patients 24 hour diet recall was reviewed and discussed  The patient was instructed on the following:  o Individualized meal plan  Misses ice cream for the summer; discussed how to include no sugar added ice cream   o Use of food diary to maintain a meal plan  Is eating 3 meals & 2 snacks  Skips the bedtime snack most of the time  Reports her appetite is decreased with the pregnancy & is eating less than prior to the pregnancy  Stressed the importance of eating a bedtime snack reza since starting insulin    o Importance of protein as it relates to blood glucose control   Review of blood glucose log  Reinforcement of blood glucose goals and reporting guidelines   Ultrasounds every four weeks in the [address] to evaluate fetal growth   Exercise Guidelines:   o Walking up to thirty minutes daily can reduce blood glucose levels     o Monitor for greater than four contractions per hour     o The patient has been instructed not to begin physical activity if she has been instructed not to exercise by your office   Hypoglycemia with treatment   Report blood glucose levels to [address] weekly or as directed  o Phone: [phone]  If no response in 24 hours, call [phone]    o MyChart Glucose Flow Sheet    Follow-up scheduled Tuesday, 8/3/21 to complete Class 2  Begin 20 units Lantus at 9 PM  Reviewed injection procedure, refill process, storage, potential allergic reaction, obtaining co-pay discount card, need for antepartum monitoring & routine blood work  Prescriptions were sent to her pharmacy  Diabetes Self Management Support Plan outside of ongoing care: Spouse/Family    Please contact the Diabetes and Pregnancy Program at [phone] if you have questions  Time spent with patient 12:05-1:05 PM time spent face to face counseling greater than 50% of the appointment      Sincerely,     Brandon Pappas  Diabetes Educator  Diabetes and Pregnancy Program

## 2021-07-06 ENCOUNTER — TELEPHONE (OUTPATIENT)
Dept: PSYCHIATRY | Facility: CLINIC | Age: 36
End: 2021-07-06

## 2021-07-06 NOTE — TELEPHONE ENCOUNTER
Patient cancelled appointment for 7/8 at 5pm (with sufficient notice) with [facility]  The reason is she is having family coming to visit

## 2021-07-08 NOTE — PROGRESS NOTES
Routine Prenatal Visit  [facility] [address] [place] [address]    Assessment/Plan:  Lashay Chin is a 30y o  year old  at 19w2d who presents for routine prenatal visit  1  16 weeks gestation of pregnancy  Assessment & Plan:  NIPT low risk and AFP today     Orders:  -     Alpha fetoprotein, maternal    2  Diet controlled gestational diabetes mellitus (GDM) in second trimester  Assessment & Plan:    Lab Results   Component Value Date    HGBA1C 5 5 2021       Currently being followed in [facility] for DM  She needs to report on            3  Morbid obesity with BMI of 45 0-49 9, adult [facility])  Assessment & Plan:  BMI is 52  With twins the recommend weight gain is between 25-*42 pounds   Currently she has TWG of -0 181 kg (-6 4 oz)             4  Chronic hypertension in obstetric context in second trimester  Assessment & Plan:  History of CHF-has echo on   Last EF was 49%    Currently on Procardia xl 30   And  mg daily     NST 2 x week starting at 32 weeks  Delivery between 37-38 weeks       5  Dichorionic diamniotic twin pregnancy in first trimester  Assessment & Plan:  will get growth g 4 weeks  2 x weekly NST               Subjective:     CC: Prenatal care    Kim Grimaldo is a 30 y o  Rockne Punch female who presents for routine prenatal care at 19w2d  Pregnancy ROS: no leakage of fluid, pelvic pain, or vaginal bleeding  yes fetal movement      The following portions of the patient's history were reviewed and updated as appropriate: allergies, current medications, past family history, past medical history, obstetric history, gynecologic history, past social history, past surgical history and problem list       Objective:  Wt (!) 143 kg (316 lb)   LMP 2021 (Exact Date)   BMI 49 49 kg/m²   Pregravid Weight/BMI: 144 kg (317 lb) (BMI 49 64)  Current Weight: (!) 143 kg (316 lb)   Total Weight Gain: -0 454 kg (-1 lb)   Pre- Vitals      Most Recent Value   Prenatal Assessment   Prenatal Vitals   Weight - Scale  (!) 143 kg (316 lb)   Urine Albumin/Glucose   Dilation/Effacement/Station   Vaginal Drainage   Edema           General: Well appearing, no distress  Respiratory: Unlabored breathing  Cardiovascular: Regular rate  Abdomen: Soft, gravid, nontender  Fundal Height: Appropriate for gestational age  Extremities: Warm and well perfused  Non tender

## 2021-07-09 ENCOUNTER — DOCUMENTATION (OUTPATIENT)
Dept: PERINATAL CARE | Facility: CLINIC | Age: 36
End: 2021-07-09

## 2021-07-09 NOTE — PROGRESS NOTES
Date:  21  RE: Michael Vicente    : 1985  Estimated Date of Delivery: 21  EGA: 19w3d  [facility]N: [facility]  Insulin controlled gestational diabetes;  Lolis TWIN Pregnancy          Reports on the Glucose Flow Sheet     Assessment / Plan:  Lantus- continue 20 units at 9 PM (agreed to stay till then to take her insulin  Continue current regimen  Continue monitoring     21 [id number] 5%; reorder week of 21 US; Normal Fetal growth and NEVIN for both fetus A and Fetus B  20 week ultrasound scheduled for 21  Remainder of Class 2 scheduled for Tuesday, 8/3/21  Walks 2 times weekly for 30 minutes       Patient Stated Goal: "I will walk 20-30 minutes after dinner daily"    Diabetes Self Management Support Plan outside of ongoing care: Spouse/Family    Date due to report next:  Tuesday, 21    Ari Moyer RD, LDN  [facility] Maternal Fetal Medicine   Diabetes and Pregnancy Program

## 2021-07-12 ENCOUNTER — LAB (OUTPATIENT)
Dept: LAB | Facility: CLINIC | Age: 36
End: 2021-07-12
Payer: COMMERCIAL

## 2021-07-12 DIAGNOSIS — Z13.71 TESTING OF FEMALE FOR GENETIC DISEASE CARRIER STATUS: ICD-10-CM

## 2021-07-12 PROCEDURE — 36415 COLL VENOUS BLD VENIPUNCTURE: CPT

## 2021-07-12 PROCEDURE — 81220 CFTR GENE COM VARIANTS: CPT

## 2021-07-12 PROCEDURE — 81329 SMN1 GENE DOS/DELETION ALYS: CPT

## 2021-07-13 ENCOUNTER — ULTRASOUND (OUTPATIENT)
Dept: PERINATAL CARE | Facility: CLINIC | Age: 36
End: 2021-07-13
Payer: COMMERCIAL

## 2021-07-13 VITALS
WEIGHT: 293 LBS | DIASTOLIC BLOOD PRESSURE: 75 MMHG | SYSTOLIC BLOOD PRESSURE: 123 MMHG | BODY MASS INDEX: 45.99 KG/M2 | HEART RATE: 93 BPM | HEIGHT: 67 IN

## 2021-07-13 DIAGNOSIS — O30.042 DICHORIONIC DIAMNIOTIC TWIN PREGNANCY IN SECOND TRIMESTER: ICD-10-CM

## 2021-07-13 DIAGNOSIS — O09.512 PRIMIGRAVIDA OF ADVANCED MATERNAL AGE IN SECOND TRIMESTER: ICD-10-CM

## 2021-07-13 DIAGNOSIS — Z3A.20 20 WEEKS GESTATION OF PREGNANCY: ICD-10-CM

## 2021-07-13 DIAGNOSIS — Z36.86 ENCOUNTER FOR ANTENATAL SCREENING FOR CERVICAL LENGTH: ICD-10-CM

## 2021-07-13 DIAGNOSIS — O24.414 INSULIN CONTROLLED GESTATIONAL DIABETES MELLITUS (GDM) IN SECOND TRIMESTER: Primary | ICD-10-CM

## 2021-07-13 DIAGNOSIS — O10.912 CHRONIC HYPERTENSION IN OBSTETRIC CONTEXT IN SECOND TRIMESTER: ICD-10-CM

## 2021-07-13 PROCEDURE — 99213 OFFICE O/P EST LOW 20 MIN: CPT | Performed by: OBSTETRICS & GYNECOLOGY

## 2021-07-13 PROCEDURE — 76811 OB US DETAILED SNGL FETUS: CPT | Performed by: OBSTETRICS & GYNECOLOGY

## 2021-07-13 PROCEDURE — 76812 OB US DETAILED ADDL FETUS: CPT | Performed by: OBSTETRICS & GYNECOLOGY

## 2021-07-13 PROCEDURE — 76817 TRANSVAGINAL US OBSTETRIC: CPT | Performed by: OBSTETRICS & GYNECOLOGY

## 2021-07-13 NOTE — PROGRESS NOTES
The patient was seen today for an ultrasound  Please see ultrasound report (located under Ob Procedures) for additional details  Thank you very much for allowing us to participate in the care of this very nice patient  Should you have any questions, please do not hesitate to contact me  Juvencio Wadsworth MD [place]  Attending Physician, [address]

## 2021-07-13 NOTE — PROGRESS NOTES
Ultrasound Probe Disinfection    A transvaginal ultrasound was performed  Prior to use, disinfection was performed with High Level Disinfection Process (Trophon)  Probe serial number [id number]: [id number] was used        Sherron Meyer  07/13/21  7:46 AM

## 2021-07-16 ENCOUNTER — DOCUMENTATION (OUTPATIENT)
Dept: PERINATAL CARE | Facility: CLINIC | Age: 36
End: 2021-07-16

## 2021-07-16 DIAGNOSIS — O24.414 INSULIN CONTROLLED GESTATIONAL DIABETES MELLITUS (GDM) IN SECOND TRIMESTER: Primary | ICD-10-CM

## 2021-07-16 NOTE — PROGRESS NOTES
Date:  21  RE: Adriana Bell    : 1985  Estimated Date of Delivery: 21  EGA: 20w3d  [facility]N: [facility]  Insulin controlled gestational diabetes;  Lolis TWIN Pregnancy                Plan:  Lantus- continue 20 units at 9 PM    -Class 2 scheduled for Tuesday, 8/3/21   -ARYA appointment on 08/10/2021    Diet: 2400 Gestational diabetes meal plan; 3 meals and 3 snacks  SMBG: Checks blood sugar 4 times per day; fasting and 2 hour start of each meal    Meter: Contour Next EZ glucose meter  Activity: Walk daily, follow OB recommendations  -Walks 2 times weekly for 30 minutes  2021 -Dr Fifi Garza reviewed recent echocardiogram which demonstrated an excellent improvement in her ejection fraction  Ejection fraction was 65%  This is  much improved compared to her prior echocardiogram 2 years ago of 49%  Support System: Significant Other  Patient Goal: I will walk 20-30 minutes after dinner daily      Labs  21 [id number]-2 5%   Labs ordered  2021    Ultrasounds  21 US; Normal Fetal growth and NEVIN for both fetus A and Fetus B  2021 Level 2 - Normal growth for Baby A and baby B  Next US scheduled for 2021    Further fetal surveillance  Beginning at 32 weeks, NST / NEVIN twice a week     Nicolasa Bermeo RN

## 2021-07-17 NOTE — ASSESSMENT & PLAN NOTE
Currently on procardia xl 30     Echo 6/22-    SUMMARY     LEFT VENTRICLE:  The ventricle was mildly to moderately dilated  Systolic function was normal  Ejection fraction was estimated to be 65 %  There were no regional wall motion abnormalities  Wall thickness was increased    The changes were consistent with eccentric hypertrophy      LEFT ATRIUM:  The atrium was mildly dilated      MITRAL VALVE:  There was trace regurgitation      TRICUSPID VALVE:  There was mild regurgitation      Asa 162 mg daily - stop at 36 weeks  NST 2 x week starting at 32 weeks

## 2021-07-17 NOTE — PROGRESS NOTES
Routine Prenatal Visit  [facility] [address] [place] [address]    Assessment/Plan:  Juve Lorenzo is a 30y o  year old  at [address]  who presents for routine prenatal visit  1  Dichorionic diamniotic twin pregnancy in second trimester  Assessment & Plan:  21 sonogram     Fetus # 1 of 2  Transverse presentation  Fetal growth appeared normal  Fetal position = Inferior, Maternal Left  Placenta Location = Anterior  No placenta previa  Placenta Grade = I  Chorionicity = Dichorionic, Diamniotic     Fetus # 2 of 2  Transverse presentation  Fetal growth appeared normal  Fetal position = Superior, Maternal Right  Placenta Location = Anterior  No placenta previa  Placenta Grade = I  Chorionicity = Dichorionic, Diamniotic     MEASUREMENTS (* Included In Average GA)     FETUS A  AC              15 3 cm        20 weeks 4 days* (62%)  BPD              4 8 cm        20 weeks 4 days* (70%)  HC              17 6 cm        20 weeks 1 day * (46%)  Femur            3 1 cm        19 weeks 4 days* (27%)     NBL              5 8 mm     Humerus          3 4 cm        21 weeks 3 days  (88%)     Cerebellum       2 1 cm        19 weeks 6 days  Biorbit          3 1 cm        20 weeks 1 day  CisternaMagna    4 3 mm     HC/AC           1 15 [1 09 - 1 26]                 (36%)  FL/AC             20 [20 - 24]  FL/BPD            65  EFW Hadlock 4    332 grams - 0 lbs 12 oz     Fetus A AVERAGE G  A  is 20 weeks 1 day +/- 10 days      FETUS B  AC              14 6 cm        19 weeks 6 days* (41%)  BPD              4 6 cm        20 weeks 0 days* (50%)  HC              17 0 cm        19 weeks 4 days* (26%)  Femur            3 2 cm        19 weeks 6 days* (36%)     Nuchal Fold      4 3 mm  NBL              6 8 mm     Humerus          3 4 cm        21 weeks 4 days  (91%)     Cerebellum       2 0 cm        19 weeks 2 days  Biorbit          3 1 cm        19 weeks 6 days  CisternaMagna    4 1 mm     HC/AC 1 17 [1 09 - 1 26]                 (48%)  FL/AC             22 [20 - 24]  FL/BPD            68  EFW Hadlock 4    317 grams - 0 lbs 11 oz     Fetus B AVERAGE G  A  is 19 weeks 6 days +/- 10 days      AMNIOTIC FLUID     Fetus A     Largest Vertical Pocket = 3 9 cm  Amniotic Fluid: Normal        Fetus B     Largest Vertical Pocket = 5 3 cm  Amniotic Fluid: Normal       At this point month growth scans  Will increase in 3rd trimester      2  Insulin controlled gestational diabetes mellitus (GDM) in second trimester  Assessment & Plan:    Lab Results   Component Value Date    HGBA1C 5 5 2021       pregestation DM most likely    Currently on lantus 20 units at bedtime  Being followed by MFM     Will start NST at 32 weeks bid       3  Chronic hypertension in obstetric context in second trimester  Assessment & Plan:  Currently on procardia xl 30     Echo -    SUMMARY     LEFT VENTRICLE:  The ventricle was mildly to moderately dilated  Systolic function was normal  Ejection fraction was estimated to be 65 %  There were no regional wall motion abnormalities  Wall thickness was increased  The changes were consistent with eccentric hypertrophy      LEFT ATRIUM:  The atrium was mildly dilated      MITRAL VALVE:  There was trace regurgitation      TRICUSPID VALVE:  There was mild regurgitation      Asa 162 mg daily - stop at 36 weeks  NST 2 x week starting at 32 weeks       4  Primigravida of advanced maternal age in second trimester  Assessment & Plan: Will be 36 at delivery   NIPT low risk   afp negative       5  20 weeks gestation of pregnancy        Subjective:     CC: Prenatal care    Annika Boss is a 30 y o   female who presents for routine prenatal care at 20w3d  Pregnancy ROS: no leakage of fluid, pelvic pain, or vaginal bleeding  yes fetal movement      The following portions of the patient's history were reviewed and updated as appropriate: allergies, current medications, past family history, past medical history, obstetric history, gynecologic history, past social history, past surgical history and problem list       Objective:  /70   Wt (!) 146 kg (321 lb)   LMP 2021 (Exact Date)   BMI 50 28 kg/m²   Pregravid Weight/BMI: 144 kg (317 lb) (BMI 49 64)  Current Weight: (!) 146 kg (321 lb)   Total Weight Gain: 1 814 kg (4 lb)   Pre- Vitals      Most Recent Value   Prenatal Assessment   Prenatal Vitals   Blood Pressure  120/70   Weight - Scale  (!) 146 kg (321 lb)   Urine Albumin/Glucose   Dilation/Effacement/Station   Vaginal Drainage   Edema           General: Well appearing, no distress  Respiratory: Unlabored breathing  Cardiovascular: Regular rate  Abdomen: Soft, gravid, nontender  Fundal Height: Appropriate for gestational age  Extremities: Warm and well perfused  Non tender

## 2021-07-17 NOTE — ASSESSMENT & PLAN NOTE
Lab Results   Component Value Date    HGBA1C 5 5 05/24/2021       pregestation DM most likely    Currently on lantus 20 units at bedtime  Being followed by MFM     Will start NST at 32 weeks bid

## 2021-07-17 NOTE — ASSESSMENT & PLAN NOTE
7/13/21 sonogram     Fetus # 1 of 2  Transverse presentation  Fetal growth appeared normal  Fetal position = Inferior, Maternal Left  Placenta Location = Anterior  No placenta previa  Placenta Grade = I  Chorionicity = Dichorionic, Diamniotic     Fetus # 2 of 2  Transverse presentation  Fetal growth appeared normal  Fetal position = Superior, Maternal Right  Placenta Location = Anterior  No placenta previa  Placenta Grade = I  Chorionicity = Dichorionic, Diamniotic     MEASUREMENTS (* Included In Average GA)     FETUS A  AC              15 3 cm        20 weeks 4 days* (62%)  BPD              4 8 cm        20 weeks 4 days* (70%)  HC              17 6 cm        20 weeks 1 day * (46%)  Femur            3 1 cm        19 weeks 4 days* (27%)     NBL              5 8 mm     Humerus          3 4 cm        21 weeks 3 days  (88%)     Cerebellum       2 1 cm        19 weeks 6 days  Biorbit          3 1 cm        20 weeks 1 day  CisternaMagna    4 3 mm     HC/AC           1 15 [1 09 - 1 26]                 (36%)  FL/AC             20 [20 - 24]  FL/BPD            65  EFW Hadlock 4    332 grams - 0 lbs 12 oz     Fetus A AVERAGE G  A  is 20 weeks 1 day +/- 10 days      FETUS B  AC              14 6 cm        19 weeks 6 days* (41%)  BPD              4 6 cm        20 weeks 0 days* (50%)  HC              17 0 cm        19 weeks 4 days* (26%)  Femur            3 2 cm        19 weeks 6 days* (36%)     Nuchal Fold      4 3 mm  NBL              6 8 mm     Humerus          3 4 cm        21 weeks 4 days  (91%)     Cerebellum       2 0 cm        19 weeks 2 days  Biorbit          3 1 cm        19 weeks 6 days  CisternaMagna    4 1 mm     HC/AC           1 17 [1 09 - 1 26]                 (48%)  FL/AC             22 [20 - 24]  FL/BPD            68  EFW Hadlock 4    317 grams - 0 lbs 11 oz     Fetus B AVERAGE G  A  is 19 weeks 6 days +/- 10 days      AMNIOTIC FLUID     Fetus A     Largest Vertical Pocket = 3 9 cm  Amniotic Fluid: Normal        Fetus B     Largest Vertical Pocket = 5 3 cm  Amniotic Fluid: Normal       At this point month growth scans  Will increase in 3rd trimester

## 2021-07-19 ENCOUNTER — TELEPHONE (OUTPATIENT)
Dept: PERINATAL CARE | Facility: OTHER | Age: 36
End: 2021-07-19

## 2021-07-19 ENCOUNTER — ROUTINE PRENATAL (OUTPATIENT)
Dept: OBGYN CLINIC | Facility: MEDICAL CENTER | Age: 36
End: 2021-07-19

## 2021-07-19 VITALS — BODY MASS INDEX: 50.28 KG/M2 | SYSTOLIC BLOOD PRESSURE: 120 MMHG | WEIGHT: 293 LBS | DIASTOLIC BLOOD PRESSURE: 70 MMHG

## 2021-07-19 DIAGNOSIS — O09.512 PRIMIGRAVIDA OF ADVANCED MATERNAL AGE IN SECOND TRIMESTER: ICD-10-CM

## 2021-07-19 DIAGNOSIS — O24.414 INSULIN CONTROLLED GESTATIONAL DIABETES MELLITUS (GDM) IN SECOND TRIMESTER: ICD-10-CM

## 2021-07-19 DIAGNOSIS — O10.912 CHRONIC HYPERTENSION IN OBSTETRIC CONTEXT IN SECOND TRIMESTER: ICD-10-CM

## 2021-07-19 DIAGNOSIS — Z3A.20 20 WEEKS GESTATION OF PREGNANCY: ICD-10-CM

## 2021-07-19 DIAGNOSIS — O30.042 DICHORIONIC DIAMNIOTIC TWIN PREGNANCY IN SECOND TRIMESTER: Primary | ICD-10-CM

## 2021-07-19 LAB
CF COMMENT: NORMAL
CFTR MUT ANL BLD/T: NORMAL
CLINICAL INFO: NORMAL
ETHNIC BACKGROUND STATED: NORMAL
GENE MUT TESTED BLD/T: NORMAL
GENERAL COMMENTS:: NORMAL
LAB DIRECTOR NAME PROVIDER: NORMAL
REASON FOR REFERRAL (NARRATIVE): NORMAL
REF LAB TEST METHOD: NORMAL
SL AMB DISCLAIMER: NORMAL
SL AMB GENETIC COUNSELOR: NORMAL
SMN1 GENE MUT ANL BLD/T: NORMAL
SPECIMEN SOURCE: NORMAL

## 2021-07-19 PROCEDURE — PNV: Performed by: OBSTETRICS & GYNECOLOGY

## 2021-07-19 NOTE — TELEPHONE ENCOUNTER
Spoke to pt today and she was made aware of her twin fetal echo & missed anatomy on 7/26 @ 2:15 in [facility], Pt was okay with this appt date and time

## 2021-07-20 ENCOUNTER — TELEPHONE (OUTPATIENT)
Dept: PERINATAL CARE | Facility: OTHER | Age: 36
End: 2021-07-20

## 2021-07-20 NOTE — TELEPHONE ENCOUNTER
Called patient and provided her with Cystic Fibrosis Gene test results  She offers no questions or concerns at this time

## 2021-07-20 NOTE — TELEPHONE ENCOUNTER
----- Message from Joe Kasper MD sent at 7/19/2021  3:28 PM EDT -----  I reviewed the lab study today and the results are normal

## 2021-07-20 NOTE — TELEPHONE ENCOUNTER
Called patient and provided her with the Spinal Muscular atrophy lab results  She offers no questions or concerns at this time

## 2021-07-20 NOTE — TELEPHONE ENCOUNTER
----- Message from Ching Hernandez MD sent at 7/20/2021  8:13 AM EDT -----  I reviewed the lab study today and the results are normal

## 2021-07-23 ENCOUNTER — DOCUMENTATION (OUTPATIENT)
Dept: PERINATAL CARE | Facility: CLINIC | Age: 36
End: 2021-07-23

## 2021-07-23 NOTE — PROGRESS NOTES
Date:  21  RE: Barbara Cailin    : 1985  Estimated Date of Delivery: 21  EGA: 21w3d  [facility]: [facility]  Insulin controlled gestational diabetes;  Lolis TWIN Pregnancy      Plan:  Lantus- continue 20 units at 9 PM    -Class 2 scheduled for Tuesday, 8/3/21   -ARYA appointment on 08/10/2021  Diet: 2400 Gestational diabetes meal plan; 3 meals and 3 snacks  SMBG: Checks blood sugar 4 times per day; fasting and 2 hour start of each meal    Meter: Contour Next EZ glucose meter  Activity: Walk daily, follow OB recommendations  -Walks 2 times weekly for 30 minutes  2021 -Dr Fazal Milton reviewed recent echocardiogram which demonstrated an excellent improvement in her ejection fraction  Ejection fraction was 65%  This is much improved compared to her prior echocardiogram 2 years ago of 49%  Support System: Significant Other  Patient Goal: I will walk 20-30 minutes after dinner daily      Labs  21 MyO0c-4 5%   Labs ordered  2021    Ultrasounds  21 US; Normal Fetal growth and NEVIN for both fetus A and Fetus B  2021 Level 2 - Normal growth for Baby A and baby B  Next US scheduled for 2021    Further fetal surveillance  Beginning at 32 weeks, NST / NEVIN twice a week     Emanule Del Cid RN

## 2021-07-26 ENCOUNTER — ROUTINE PRENATAL (OUTPATIENT)
Dept: PERINATAL CARE | Facility: CLINIC | Age: 36
End: 2021-07-26
Payer: COMMERCIAL

## 2021-07-26 VITALS
BODY MASS INDEX: 45.99 KG/M2 | DIASTOLIC BLOOD PRESSURE: 70 MMHG | HEART RATE: 80 BPM | SYSTOLIC BLOOD PRESSURE: 118 MMHG | HEIGHT: 67 IN | WEIGHT: 293 LBS

## 2021-07-26 DIAGNOSIS — Z36.89 ENCOUNTER FOR FETAL ANATOMIC SURVEY: ICD-10-CM

## 2021-07-26 DIAGNOSIS — Z3A.21 21 WEEKS GESTATION OF PREGNANCY: Primary | ICD-10-CM

## 2021-07-26 DIAGNOSIS — O24.414 INSULIN CONTROLLED GESTATIONAL DIABETES MELLITUS (GDM) IN SECOND TRIMESTER: ICD-10-CM

## 2021-07-26 PROCEDURE — 76816 OB US FOLLOW-UP PER FETUS: CPT | Performed by: OBSTETRICS & GYNECOLOGY

## 2021-07-26 PROCEDURE — 93325 DOPPLER ECHO COLOR FLOW MAPG: CPT | Performed by: OBSTETRICS & GYNECOLOGY

## 2021-07-26 PROCEDURE — 76825 ECHO EXAM OF FETAL HEART: CPT | Performed by: OBSTETRICS & GYNECOLOGY

## 2021-07-26 PROCEDURE — 76827 ECHO EXAM OF FETAL HEART: CPT | Performed by: OBSTETRICS & GYNECOLOGY

## 2021-07-26 NOTE — LETTER
July 29, 2021     Radha Mohan MD  [address]  [place] [address]    Patient: Gemma Weiss   YOB: 1985   Date of Visit: 7/26/2021       Dear Dr Farooq Marquez: Thank you for referring Thor Toure to me for evaluation  Below are my notes for this consultation  If you have questions, please do not hesitate to call me  I look forward to following your patient along with you  Sincerely,        Lewis Sharpe MD        CC: No Recipients  Lewis Sharpe MD  7/26/2021  2:53 PM  Sign when Signing Visit   Please refer to the [facility] ultrasound report in Ob Procedures for additional information regarding today's visit

## 2021-07-28 ENCOUNTER — TELEPHONE (OUTPATIENT)
Dept: PERINATAL CARE | Facility: CLINIC | Age: 36
End: 2021-07-28

## 2021-07-28 NOTE — TELEPHONE ENCOUNTER
Pt called office stating she does not see report in her My Chart for her appointment from Monday with DR Lukas Zaman  She would like to review the report for her ultrasound and fetal echo  She also stated concerns she has with the ultrasound pictures  She stated it appears that baby A has "club hands"  She is unsure if it is the position of the babies hands or if it is concerning to have "club hands"  She is requesting a call from the physician to discuss her concerns  Pt advised that call would be routed to DR Agrawal

## 2021-07-30 ENCOUNTER — DOCUMENTATION (OUTPATIENT)
Dept: PERINATAL CARE | Facility: CLINIC | Age: 36
End: 2021-07-30

## 2021-07-30 NOTE — PROGRESS NOTES
Date:  21  RE: Gemma Weiss    : 1985  Estimated Date of Delivery: 21  EGA: 22w3d  [facility]N: [facility]  Insulin controlled gestational diabetes;  Lolis TWIN Pregnancy      Plan:  Lantus- continue 20 units at 9 PM    -Class 2 scheduled for Tuesday, 8/3/21   -ARYA appointment on 08/10/2021  Diet: 2400 Gestational diabetes meal plan; 3 meals and 3 snacks  SMBG: Checks blood sugar 4 times per day; fasting and 2 hour start of each meal    Meter: Contour Next EZ glucose meter  Activity: Walk daily, follow OB recommendations  -Walks 2 times weekly for 30 minutes  2021 -Dr Pham Saldivar reviewed recent echocardiogram which demonstrated an excellent improvement in her ejection fraction  Ejection fraction was 65%  This is much improved compared to her prior echocardiogram 2 years ago of 49%  Support System: Significant Other  Patient Goal: I will walk 20-30 minutes after dinner daily      Labs  21 IlO5w-2 5%   Labs ordered  2021, encouraged to complete   Ultrasounds  21 US; Normal Fetal growth and NEVIN for both fetus A and Fetus B  2021 Level 2 - Normal growth for Baby A and baby B  Next US scheduled for 2021    Further fetal surveillance  Beginning at 32 weeks, NST / NEVIN twice a week     Kavon Houston RN

## 2021-08-02 ENCOUNTER — LAB (OUTPATIENT)
Dept: LAB | Facility: CLINIC | Age: 36
End: 2021-08-02
Payer: COMMERCIAL

## 2021-08-02 DIAGNOSIS — O24.414 INSULIN CONTROLLED GESTATIONAL DIABETES MELLITUS (GDM) IN SECOND TRIMESTER: ICD-10-CM

## 2021-08-02 DIAGNOSIS — O24.414 INSULIN CONTROLLED GESTATIONAL DIABETES MELLITUS IN SECOND TRIMESTER: ICD-10-CM

## 2021-08-02 LAB
ALBUMIN SERPL BCP-MCNC: 2.4 G/DL (ref 3.5–5)
ALP SERPL-CCNC: 101 U/L (ref 46–116)
ALT SERPL W P-5'-P-CCNC: 22 U/L (ref 12–78)
ANION GAP SERPL CALCULATED.3IONS-SCNC: 6 MMOL/L (ref 4–13)
AST SERPL W P-5'-P-CCNC: 12 U/L (ref 5–45)
BILIRUB SERPL-MCNC: 0.23 MG/DL (ref 0.2–1)
BUN SERPL-MCNC: 10 MG/DL (ref 5–25)
CALCIUM ALBUM COR SERPL-MCNC: 10.1 MG/DL (ref 8.3–10.1)
CALCIUM SERPL-MCNC: 8.8 MG/DL (ref 8.3–10.1)
CHLORIDE SERPL-SCNC: 106 MMOL/L (ref 100–108)
CO2 SERPL-SCNC: 22 MMOL/L (ref 21–32)
CREAT SERPL-MCNC: 0.4 MG/DL (ref 0.6–1.3)
EST. AVERAGE GLUCOSE BLD GHB EST-MCNC: 111 MG/DL
GFR SERPL CREATININE-BSD FRML MDRD: 136 ML/MIN/1.73SQ M
GLUCOSE SERPL-MCNC: 97 MG/DL (ref 65–140)
HBA1C MFR BLD: 5.5 %
POTASSIUM SERPL-SCNC: 3.5 MMOL/L (ref 3.5–5.3)
PROT SERPL-MCNC: 6.5 G/DL (ref 6.4–8.2)
SODIUM SERPL-SCNC: 134 MMOL/L (ref 136–145)

## 2021-08-02 PROCEDURE — 36415 COLL VENOUS BLD VENIPUNCTURE: CPT

## 2021-08-02 PROCEDURE — 80053 COMPREHEN METABOLIC PANEL: CPT

## 2021-08-02 PROCEDURE — 83036 HEMOGLOBIN GLYCOSYLATED A1C: CPT

## 2021-08-03 ENCOUNTER — HOSPITAL ENCOUNTER (OUTPATIENT)
Facility: HOSPITAL | Age: 36
Discharge: HOME | End: 2021-08-03
Attending: OBSTETRICS & GYNECOLOGY | Admitting: OBSTETRICS & GYNECOLOGY
Payer: COMMERCIAL

## 2021-08-03 ENCOUNTER — TELEMEDICINE (OUTPATIENT)
Dept: PERINATAL CARE | Facility: CLINIC | Age: 36
End: 2021-08-03
Payer: COMMERCIAL

## 2021-08-03 ENCOUNTER — DOCUMENTATION (OUTPATIENT)
Dept: PERINATAL CARE | Facility: CLINIC | Age: 36
End: 2021-08-03

## 2021-08-03 ENCOUNTER — TELEMEDICINE (OUTPATIENT)
Dept: BEHAVIORAL/MENTAL HEALTH CLINIC | Facility: CLINIC | Age: 36
End: 2021-08-03
Payer: COMMERCIAL

## 2021-08-03 ENCOUNTER — TELEPHONE (OUTPATIENT)
Dept: OBGYN CLINIC | Facility: MEDICAL CENTER | Age: 36
End: 2021-08-03

## 2021-08-03 VITALS
OXYGEN SATURATION: 96 % | DIASTOLIC BLOOD PRESSURE: 68 MMHG | SYSTOLIC BLOOD PRESSURE: 132 MMHG | TEMPERATURE: 98.5 F | HEART RATE: 80 BPM

## 2021-08-03 DIAGNOSIS — Z3A.23 23 WEEKS GESTATION OF PREGNANCY: ICD-10-CM

## 2021-08-03 DIAGNOSIS — F32.5 MAJOR DEPRESSIVE DISORDER IN FULL REMISSION, UNSPECIFIED WHETHER RECURRENT (HCC): ICD-10-CM

## 2021-08-03 DIAGNOSIS — E66.01 MORBID OBESITY WITH BMI OF 45.0-49.9, ADULT (HCC): ICD-10-CM

## 2021-08-03 DIAGNOSIS — F41.1 GENERALIZED ANXIETY DISORDER: ICD-10-CM

## 2021-08-03 DIAGNOSIS — O24.414 INSULIN CONTROLLED GESTATIONAL DIABETES MELLITUS (GDM) IN SECOND TRIMESTER: Primary | ICD-10-CM

## 2021-08-03 PROCEDURE — 90834 PSYTX W PT 45 MINUTES: CPT | Performed by: SOCIAL WORKER

## 2021-08-03 PROCEDURE — 99202 OFFICE O/P NEW SF 15 MIN: CPT

## 2021-08-03 PROCEDURE — NC001 PR NO CHARGE: Performed by: OBSTETRICS & GYNECOLOGY

## 2021-08-03 PROCEDURE — G0108 DIAB MANAGE TRN  PER INDIV: HCPCS | Performed by: DIETITIAN, REGISTERED

## 2021-08-03 NOTE — PROGRESS NOTES
Date:  21  RE: Michael Vicente    : 1985  Estimated Date of Delivery: 21  EGA: 23w0d  [facility]: [facility]  Insulin controlled gestational diabetes;  Lolis TWIN Pregnancy    Date Fasting Post-  breakfast Post-  lunch Post-  dinner Before bedtime Comments   21` 79 80 89 121     21 87 118 89 92     21 77 77 125-dined out 100     21 84 98    Bedtime snacks-strawberries   21 86 82 100 109  Bedtime snacks--2 pks fruit snacks   8/3/21 92 90 118        Reported at Class 2 today    Plan:  Lantus- continue 20 units at 9 PM     -CRNP appointment on 08/10/2021  Diet: 2400 Gestational diabetes meal plan; 3 meals and 3 snacks  Diet recall indictees she is following the diet well  Craving milk, but dislikes it--mixes with Gluocerna for breakfast    SMBG: Checks blood sugar 4 times per day; fasting and 2 hour start of each meal    Meter: Contour Next EZ glucose meter  Activity: Walk daily, follow OB recommendations  -Walks 2 times weekly for 30 minutes  2021 -Dr Bertin Maya reviewed recent echocardiogram which demonstrated an excellent improvement in her ejection fraction  Ejection fraction was 65%  This is much improved compared to her prior echocardiogram 2 years ago of 49%  Support System:   Patient Goal: I will walk 20-30 minutes after dinner daily  Labs  21 HoL4r-3 5% --stable      Ultrasounds  21 US; Normal Fetal growth and NEVIN for both fetus A and Fetus B  2021 Level 2 - Normal growth for Baby A and baby B  Next US scheduled for 2021    Further fetal surveillance  Beginning at 32 weeks, NST / NEVIN twice a week     Date to report next:  Weekly    Karen Rubio, MS, RD, CDE  Diabetes Educator  [facility]
done

## 2021-08-03 NOTE — PSYCH
Virtual Regular Visit      Assessment/Plan:    Problem List Items Addressed This Visit     None               Reason for visit is No chief complaint on file  Encounter provider [facility]    Provider located at [address] [place] [address] [address] [facility] [address] [phone]      Recent Visits  No visits were found meeting these conditions  Showing recent visits within past 7 days and meeting all other requirements  Today's Visits  Date Type Provider Dept   08/03/21 [address] today's visits and meeting all other requirements  Future Appointments  No visits were found meeting these conditions  Showing future appointments within next 150 days and meeting all other requirements       The patient was identified by name and date of birth  Michael Cinthia was informed that this is a telemedicine visit and that the visit is being conducted through [address] [place] [address] and patient was informed that this is a secure, HIPAA-compliant platform  She agrees to proceed     My office door was closed  No one else was in the room  She acknowledged consent and understanding of privacy and security of the video platform  The patient has agreed to participate and understands they can discontinue the visit at any time  Patient is aware this is a billable service  Subjective  Safia Cristóbal Lugo is a 30 y o  female          HPI     Past Medical History:   Diagnosis Date    Abnormal Pap smear of cervix     Anxiety     CHF (congestive heart failure) ([address] ) 2019    Chlamydia     Depression     Diet controlled gestational diabetes mellitus (GDM) in second trimester 6/9/2021    Heart failure ([address] )     CHF in past    Hypertension     Temporomandibular joint disorder     Resolved 7/7/2015     Varicella        Past Surgical History:   Procedure Laterality Date    CERVICAL BIOPSY  W/ LOOP ELECTRODE EXCISION  2009    DENTAL SURGERY      [place] teeth extraction    TONSILLECTOMY  1990       Current Outpatient Medications   Medication Sig Dispense Refill    ASPIRIN PO Take 162 mg by mouth daily      Blood Glucose Monitoring Suppl (Contour Next EZ) w/Device KIT Gestational diabetes  1 kit 0    cyanocobalamin (VITAMIN B-12) 100 mcg tablet Take by mouth daily      escitalopram (LEXAPRO) 20 mg tablet Take 1 tablet (20 mg total) by mouth daily 90 tablet 1    Ferrous Sulfate (IRON SUPPLEMENT PO) Take by mouth      folic acid (FOLVITE) 767 mcg tablet Take 400 mcg by mouth daily      glucose blood (Contour Next Test) test strip Test 4 times a day  Gestational diabetes  100 each 5    Insulin Pen Needle 31G X 5 MM MISC Use 1 daily 100 each 3    Lantus SoloStar 100 units/mL injection pen Inject 20 units at 9 PM 15 mL 0    Multiple Vitamins-Calcium (ONE-A-DAY WOMENS PO) Take by mouth      NIFEdipine (PROCARDIA XL) 30 mg 24 hr tablet Take 1 tablet (30 mg total) by mouth daily 30 tablet 3    OneTouch Delica Lancets 00F MISC Use 4 a day  100 each 5    Prenatal Vit-Fe Fumarate-FA (Prenatal Vitamin) 27-0 8 MG TABS Take by mouth      Pyridoxine HCl (vitamin B-6) 25 MG tablet Take 25 mg by mouth daily       No current facility-administered medications for this visit  No Known Allergies      Goals: 1  Pain: 0  Level of Suicidality: Low     D:  Safia spoke with this worker today about her feelings re: her ER visit earlier today as a result of a lack of fetal movement during the last 48 hours re: her 21 week-pregnancy with twins,  She stated that her anxiety has been completely overwhelming and that she feels like no one takes it seriously  She reported that she has been unable to enjoy one moment of the pregnancy and, as a result of this feels very isolated and alone  A:  Safia believes that all of these feelings will dissipate following the twins' births    She doubts her body, but not her mothering capability      Stephanie Little will  continue to be supported in addressing the above as she prepares for the birth of her children         I spent 50minutes with the patient during this visit      This note was not shared with the patient due to this is a psychotherapy note    Encounter Diagnoses   Name Primary?  Major depressive disorder in full remission, unspecified whether recurrent ([facility] [address] )     Generalized anxiety disorder        VIRTUAL VISIT DISCLAIMER    Safiaanay Orosco Steve acknowledges that she has consented to an online visit or consultation  She understands that the online visit is based solely on information provided by her, and that, in the absence of a face-to-face physical evaluation by the physician, the diagnosis she receives is both limited and provisional in terms of accuracy and completeness  This is not intended to replace a full medical face-to-face evaluation by the physician  Harmony Dean understands and accepts these terms

## 2021-08-03 NOTE — PROGRESS NOTES
Triage Note - OB  Anish Villanueva 30 y o  female MRN: [id number]  Unit/Bed#: L&D 328-01 Encounter: [id number]    OB TRIAGE NOTE  Anish Villanueva  [id number]  8/3/2021  12:46 PM  L&D 328/L&D 328-01    Assessment/Plan:  30 y o  Jerri Neil 23w0d presented for evaluation of absent fetal movement x2 days in the setting of di-di twin gestation  Bedside ultrasound performed to guide placement of doppler  Baby A's heart rate is in the 150s with LVP of 4 19 cm  Baby B's heart rate is in the 140s with LVP of 3 81 cm  Both babies extremely reactive on ultrasound; this was observed but could not be felt by the patient  Reassurance provided  Discharge instructions  Patient instructed to call if experiencing contractions, vaginal bleeding, loss of fluid  She will follow up with her OBGYN on 8/19/2021  Plan of care discussed with Dr Brianna Rodriguez   ______________________________________  SUBJECTIVE    XIAO: Estimated Date of Delivery: 11/30/21    HPI Chronology:  30 y o  Jerri Neil 23w0d presents with complaint of absent fetal movement x2 days  She was previously feeling movement  She does not feel contractions or any abdominal pain  She denies leakage of fluid or vaginal bleeding  She overall feels well today and offered no other complaints on ROS  Vitals:   /68 (BP Location: Right arm)   Pulse 80   Temp 98 5 °F (36 9 °C) (Axillary)   LMP 02/23/2021 (Exact Date)   SpO2 96%   There is no height or weight on file to calculate BMI  Review of Systems  12-point ROS negative unless states in HPI  Physical Exam  Vitals reviewed  Constitutional:       Appearance: She is obese  Abdominal:      Palpations: Abdomen is soft  Tenderness: There is no abdominal tenderness  There is no guarding  Musculoskeletal:         General: Normal range of motion  Skin:     General: Skin is warm and dry  Neurological:      Mental Status: She is alert and oriented to person, place, and time     Psychiatric: Mood and Affect: Mood normal          Behavior: Behavior normal        Baby A: FHR 150s    Baby B: FHR 140s    Lab, Imaging and other studies: I have personally reviewed pertinent reports  Ernestine Swenson MD  PGY II, OB/GYN  8/3/2021, 12:46 PM

## 2021-08-03 NOTE — TELEPHONE ENCOUNTER
Patient is 23 weeks with twins  Patient has had movement all weekend but the last couple days she has not felt any movement  Throughout the weekend she has had some ligement pain but during the week she has not felt any sort of pain   Patient was sent over to Labor and delivery

## 2021-08-03 NOTE — PROGRESS NOTES
Virtual Regular Visit    Verification of patient location: [place], [place]    Patient is located in the following state in which I hold an active license [place]      Assessment/Plan:    Problem List Items Addressed This Visit     None               Reason for visit is   Chief Complaint   Patient presents with    Virtual Regular Visit        Encounter provider Mayur Patricia    Provider located at [address] [place] [address] Dr Kedar Hobbs [place] [address] [phone]      Recent Visits  Date Type Provider Dept   07/28/21 Telephone Kirit Westbrook, [address]   Showing recent visits within past 7 days and meeting all other requirements  Future Appointments  No visits were found meeting these conditions  Showing future appointments within next 150 days and meeting all other requirements       The patient was identified by name and date of birth  Lawrence Sabillon was informed that this is a telemedicine visit and that the visit is being conducted through [address] [place] [address] Road Now and patient was informed that this is a secure, HIPAA-compliant platform  She agrees to proceed     My office door was closed  No one else was in the room  She acknowledged consent and understanding of privacy and security of the video platform  The patient has agreed to participate and understands they can discontinue the visit at any time  Patient is aware this is a billable service  Subjective  Safia Braun is a 30 y o  female pregnant patient         HPI     Past Medical History:   Diagnosis Date    Abnormal Pap smear of cervix     Anxiety     CHF (congestive heart failure) ([address] ) 2019    Chlamydia     Depression     Diet controlled gestational diabetes mellitus (GDM) in second trimester 6/9/2021    Heart failure ([address] )     CHF in past    Hypertension     Temporomandibular joint disorder     Resolved 7/7/2015     Varicella        Past Surgical History:   Procedure Laterality Date    CERVICAL BIOPSY  W/ LOOP ELECTRODE EXCISION  2009    DENTAL SURGERY      [place] teeth extraction    TONSILLECTOMY  1990       Current Outpatient Medications   Medication Sig Dispense Refill    ASPIRIN PO Take 162 mg by mouth daily      Blood Glucose Monitoring Suppl (Contour Next EZ) w/Device KIT Gestational diabetes  1 kit 0    cyanocobalamin (VITAMIN B-12) 100 mcg tablet Take by mouth daily      escitalopram (LEXAPRO) 20 mg tablet Take 1 tablet (20 mg total) by mouth daily 90 tablet 1    Ferrous Sulfate (IRON SUPPLEMENT PO) Take by mouth      folic acid (FOLVITE) 635 mcg tablet Take 400 mcg by mouth daily      glucose blood (Contour Next Test) test strip Test 4 times a day  Gestational diabetes  100 each 5    Insulin Pen Needle 31G X 5 MM MISC Use 1 daily 100 each 3    Lantus SoloStar 100 units/mL injection pen Inject 20 units at 9 PM 15 mL 0    Multiple Vitamins-Calcium (ONE-A-DAY WOMENS PO) Take by mouth      NIFEdipine (PROCARDIA XL) 30 mg 24 hr tablet Take 1 tablet (30 mg total) by mouth daily 30 tablet 3    OneTouch Delica Lancets 80U MISC Use 4 a day  100 each 5    Prenatal Vit-Fe Fumarate-FA (Prenatal Vitamin) 27-0 8 MG TABS Take by mouth      Pyridoxine HCl (vitamin B-6) 25 MG tablet Take 25 mg by mouth daily       No current facility-administered medications for this visit  No Known Allergies    Review of Systems    Video Exam    There were no vitals filed for this visit  Physical Exam --not performed    I spent 30 minutes directly with the patient during this visit  VIRTUAL VISIT DISCLAIMER      Safia Rios verbally agrees to participate in [employer]   Pt is aware that [employer] could be limited without vital signs or the ability to perform a full hands-on physical exam  Safia Rios understands she or the provider may request at any time to terminate the video visit and request the patient to seek care or treatment in person  DATE:  21  RE: Mane Ford    : 1985    XIAO: Estimated Date of Delivery: 21    EGA: 23w0d    Dear Drs  At [address] [facility]: Thank you for referring your patient to the Diabetes and Pregnancy Program at [address] [place] [address]  The patient attended Individual Class 2 (remaining half) received the following education via virtual telemedicine:    Weight gain during in pregnancy  Based on the patients height of 67   inches, pre-pregnancy weight of 144 kg (317 lb) pounds (BMI- 49 64), we would recommend a total weight gain of 16-25 pounds for the TWIN pregnancy   The patients current weight is 322  pounds, and her weight gain to date is 5 pounds  Based on this, we recommend a weight gain of 20 pounds for the remainder of the TWIN pregnancy   Medical Nutrition Therapy for diabetes and pregnancy  The patients 24 hour diet recall was reviewed and discussed  The patient was instructed on the following:  o Individualized meal plan  Reports she is craving milk but she dislikes milk--ads to 1 bottle Glucerna for breakfast  Discussed using [employer] milk  Admits to skipping bedtime snack sometimes  Stressed the importance of eating the bedtime snack with bedtime insulin  Diet recall indicates she is following the diet well  Satisfied with the amount she is eating     o Use of food diary to maintain a meal plan    o Importance of protein as it relates to blood glucose control   Review of blood glucose log  Reinforcement of blood glucose goals and reporting guidelines   Ultrasounds every four weeks in the [address] to evaluate fetal growth   Exercise Guidelines:   o Walking up to thirty minutes daily can reduce blood glucose levels     o Monitor for greater than four contractions per hour     o The patient has been instructed not to begin physical activity if she has been instructed not to exercise by your office   Sick day guidelines and hypoglycemia with treatment  Reported she had 1-2 episodes initially of hypoglycemia when in the 60's, but s/s resolved with food  v   Post-partum guidelines:  o Completion of a 75 gram glucose tolerance test at 6 weeks post-partum to check for type 2 diabetes  o 20% weight loss and 30 minutes of exercise 5 times per week reduces the risk of type 2 diabetes   Breastfeeding guidelines  Plans to try nurse the twins   Report blood glucose levels to [address] weekly or as directed  o Phone: [phone]  If no response in 24 hours, call [phone]    o "[employer] Glucose Flow Sheet    Diabetes Self Management Support Plan outside of ongoing care: Spouse/Family    Please contact the Diabetes and Pregnancy Program at [phone] if you have questions  Time spent with patient 5-5:40 PM; time spent face to face counseling greater than 50% of the appointment      Sincerely,     Sheldon Morin  Diabetes Educator  Diabetes and Pregnancy Program

## 2021-08-03 NOTE — TELEPHONE ENCOUNTER
Patient called office upset that she was given wrong address for hospital  Patient is pregnant  with twins and has been experiencing decreased movement  Patient informed of correct address for hospital  Patient on her way and doc on call notified

## 2021-08-10 ENCOUNTER — TELEPHONE (OUTPATIENT)
Dept: PERINATAL CARE | Facility: CLINIC | Age: 36
End: 2021-08-10

## 2021-08-10 ENCOUNTER — TELEMEDICINE (OUTPATIENT)
Dept: PERINATAL CARE | Facility: CLINIC | Age: 36
End: 2021-08-10
Payer: COMMERCIAL

## 2021-08-10 VITALS — WEIGHT: 293 LBS | HEIGHT: 67 IN | BODY MASS INDEX: 45.99 KG/M2

## 2021-08-10 DIAGNOSIS — O99.210 OBESITY AFFECTING PREGNANCY, ANTEPARTUM: ICD-10-CM

## 2021-08-10 DIAGNOSIS — O30.042 DICHORIONIC DIAMNIOTIC TWIN PREGNANCY IN SECOND TRIMESTER: ICD-10-CM

## 2021-08-10 DIAGNOSIS — Z3A.24 24 WEEKS GESTATION OF PREGNANCY: ICD-10-CM

## 2021-08-10 DIAGNOSIS — O24.414 INSULIN CONTROLLED GESTATIONAL DIABETES MELLITUS (GDM) IN SECOND TRIMESTER: Primary | ICD-10-CM

## 2021-08-10 DIAGNOSIS — O10.912 CHRONIC HYPERTENSION IN OBSTETRIC CONTEXT IN SECOND TRIMESTER: ICD-10-CM

## 2021-08-10 PROCEDURE — 99213 OFFICE O/P EST LOW 20 MIN: CPT | Performed by: NURSE PRACTITIONER

## 2021-08-10 NOTE — PROGRESS NOTES
Virtual Regular Visit    Verification of patient location: [place]    Patient is located in the following state in which I hold an active license [place]      Assessment/Plan:    Problem List Items Addressed This Visit        Endocrine    Insulin controlled gestational diabetes mellitus (GDM) in second trimester - Primary     -A1c 5 5% at goal   -Due to FBS>90, increase Lantus 20 to 22 units daily at 9 PM   -Continue GDM diet and SMBG  -Report via glucose flowsheet every Wednesday   -Continue walking 15 minutes daily and work up to 30 minutes a day  -Fetal growth ultrasound every 4 weeks or as recommended   -Starting at 32 weeks gestation, NST twice a week and NEVIN weekly   -Continue follow up with OB and MFM as recommended   -Continue close contact with diabetes team   -Schedule follow up in 8 weeks  Lab Results   Component Value Date    HGBA1C 5 5 08/02/2021               Cardiovascular and Mediastinum    Chronic hypertension in obstetric context in second trimester     -On Procardia daily   -Reports BP within normal   -Followed by OB  Other    Dichorionic diamniotic twin pregnancy in second trimester    Obesity affecting pregnancy, antepartum     -Pre-pregnancy weight 317 lbs  -Current weight 322 lbs  -Current BMI 50 43   -Recommended weight gain 11 to 20 lbs  -Continue GDM diet and walk up to 30 minutes a day            24 weeks gestation of pregnancy    BMI 50 0-59 9, adult [facility])               Reason for visit is   Chief Complaint   Patient presents with    Virtual Regular Visit    Gestational Diabetes        Encounter provider Ángel Albarran    Provider located at [address] [place] [address] [phone]      Recent Visits  Date Type Provider Dept   08/03/21 [facility]   Showing recent visits within past 7 days and meeting all other requirements  Today's Visits  Date Type Provider Dept 08/10/21 Telephone Maggie Schroeder, [address] [facility]   08/10/21 [address] [facility], [facility] today's visits and meeting all other requirements  Future Appointments  No visits were found meeting these conditions  Showing future appointments within next 150 days and meeting all other requirements       The patient was identified by name and date of birth  Mirna Arizmendi was informed that this is a telemedicine visit and that the visit is being conducted through [address] [place] [address] Road Now and patient was informed that this is a secure, HIPAA-compliant platform  She agrees to proceed  My office door was closed  No one else was in the room  She acknowledged consent and understanding of privacy and security of the video platform  The patient has agreed to participate and understands they can discontinue the visit at any time  Patient is aware this is a billable service  Alexis Ely is a 30 y o  female  24 0/7 weeks GDM on Lantus 20 units daily at 9 PM  Following GDM diet, reports eating ice cream the last couple of nights  Testing 4 times a day, has noted FBS>90 over the last 2 days  Walks up to 15 minutes a day during the week and more active on the weekends   Reports BP within normal      Past Medical History:   Diagnosis Date    Abnormal Pap smear of cervix     Anxiety     CHF (congestive heart failure) ([address] ) 2019    Chlamydia     Depression     Diet controlled gestational diabetes mellitus (GDM) in second trimester 2021    Heart failure ([address] )     CHF in past    Hypertension     Temporomandibular joint disorder     Resolved 2015     Varicella        Past Surgical History:   Procedure Laterality Date    CERVICAL BIOPSY  W/ LOOP ELECTRODE EXCISION      DENTAL SURGERY      [place] teeth extraction    TONSILLECTOMY         Current Outpatient Medications   Medication Sig Dispense Refill    ASPIRIN PO Take 162 mg by mouth daily  Blood Glucose Monitoring Suppl (Contour Next EZ) w/Device KIT Gestational diabetes  1 kit 0    cyanocobalamin (VITAMIN B-12) 100 mcg tablet Take by mouth daily      escitalopram (LEXAPRO) 20 mg tablet Take 1 tablet (20 mg total) by mouth daily 90 tablet 1    Ferrous Sulfate (IRON SUPPLEMENT PO) Take by mouth      folic acid (FOLVITE) 689 mcg tablet Take 400 mcg by mouth daily      glucose blood (Contour Next Test) test strip Test 4 times a day  Gestational diabetes  100 each 5    Insulin Pen Needle 31G X 5 MM MISC Use 1 daily 100 each 3    Lantus SoloStar 100 units/mL injection pen Inject 20 units at 9 PM 15 mL 0    NIFEdipine (PROCARDIA XL) 30 mg 24 hr tablet Take 1 tablet (30 mg total) by mouth daily 30 tablet 3    OneTouch Delica Lancets 89P MISC Use 4 a day  100 each 5    Prenatal Vit-Fe Fumarate-FA (Prenatal Vitamin) 27-0 8 MG TABS Take by mouth      Pyridoxine HCl (vitamin B-6) 25 MG tablet Take 25 mg by mouth daily       No current facility-administered medications for this visit  No Known Allergies    Review of Systems   Constitutional: Negative for fatigue and fever  Eyes: Negative for visual disturbance  Respiratory: Negative for cough and shortness of breath  Cardiovascular: Negative for chest pain and palpitations  Gastrointestinal: Negative for nausea and vomiting  Endocrine: Negative for polydipsia, polyphagia and polyuria  Genitourinary: Negative for vaginal bleeding  Neurological: Negative for headaches  Psychiatric/Behavioral: Negative for sleep disturbance  Video Exam    Vitals:    08/10/21 1115   Weight: (!) 146 kg (322 lb)   Height: 5' 7" (1 702 m)       Physical Exam  Constitutional:       Appearance: She is obese  HENT:      Head: Normocephalic  Nose: Nose normal    Eyes:      Conjunctiva/sclera: Conjunctivae normal    Pulmonary:      Effort: Pulmonary effort is normal    Musculoskeletal:      Cervical back: Normal range of motion  Neurological:      Mental Status: She is alert and oriented to person, place, and time  Psychiatric:         Mood and Affect: Mood normal          Behavior: Behavior normal          Thought Content: Thought content normal          Judgment: Judgment normal           I spent 30 minutes directly with the patient during this visit  VIRTUAL VISIT DISCLAIMER      Betito Thomas verbally agrees to participate in [employer]  Pt is aware that [employer] could be limited without vital signs or the ability to perform a full hands-on physical exam  Safia Hanna understands she or the provider may request at any time to terminate the video visit and request the patient to seek care or treatment in person

## 2021-08-10 NOTE — ASSESSMENT & PLAN NOTE
-Pre-pregnancy weight 317 lbs  -Current weight 322 lbs  -Current BMI 50 43   -Recommended weight gain 11 to 20 lbs  -Continue GDM diet and walk up to 30 minutes a day

## 2021-08-10 NOTE — ASSESSMENT & PLAN NOTE
-A1c 5 5% at goal   -Due to FBS>90, increase Lantus 20 to 22 units daily at 9 PM   -Continue GDM diet and SMBG  -Report via glucose flowsheet every Wednesday   -Continue walking 15 minutes daily and work up to 30 minutes a day  -Fetal growth ultrasound every 4 weeks or as recommended   -Starting at 32 weeks gestation, NST twice a week and NEVIN weekly   -Continue follow up with OB and MFM as recommended   -Continue close contact with diabetes team   -Schedule follow up in 8 weeks     Lab Results   Component Value Date    HGBA1C 5 5 08/02/2021

## 2021-08-10 NOTE — PATIENT INSTRUCTIONS
-A1c 5 5% at goal   -Due to FBS>90, increase Lantus 20 to 22 units daily at 9 PM   -Continue GDM diet and SMBG  -Report via glucose flowsheet every Wednesday   -Continue walking 15 minutes daily and work up to 30 minutes a day  -Fetal growth ultrasound every 4 weeks or as recommended   -Starting at 32 weeks gestation, NST twice a week and NEVIN weekly   -Continue follow up with OB and MFM as recommended   -Continue close contact with diabetes team   -Schedule follow up in 8 weeks

## 2021-08-13 DIAGNOSIS — K06.8 BLEEDING GUMS: Primary | ICD-10-CM

## 2021-08-13 RX ORDER — AMOXICILLIN 500 MG/1
500 CAPSULE ORAL EVERY 12 HOURS SCHEDULED
Qty: 10 CAPSULE | Refills: 0 | Status: SHIPPED | OUTPATIENT
Start: 2021-08-13 | End: 2021-08-13

## 2021-08-13 RX ORDER — AMOXICILLIN 500 MG/1
500 CAPSULE ORAL EVERY 12 HOURS SCHEDULED
Qty: 10 CAPSULE | Refills: 0 | Status: SHIPPED | OUTPATIENT
Start: 2021-08-13 | End: 2021-08-18

## 2021-08-16 ENCOUNTER — APPOINTMENT (OUTPATIENT)
Dept: LAB | Facility: CLINIC | Age: 36
End: 2021-08-16
Payer: COMMERCIAL

## 2021-08-16 DIAGNOSIS — K06.8 BLEEDING GUMS: ICD-10-CM

## 2021-08-16 LAB
ERYTHROCYTE [DISTWIDTH] IN BLOOD BY AUTOMATED COUNT: 14.4 % (ref 11.6–15.1)
FERRITIN SERPL-MCNC: 44 NG/ML (ref 8–388)
HCT VFR BLD AUTO: 35 % (ref 34.8–46.1)
HGB BLD-MCNC: 11.1 G/DL (ref 11.5–15.4)
MCH RBC QN AUTO: 27.8 PG (ref 26.8–34.3)
MCHC RBC AUTO-ENTMCNC: 31.7 G/DL (ref 31.4–37.4)
MCV RBC AUTO: 88 FL (ref 82–98)
PLATELET # BLD AUTO: 456 THOUSANDS/UL (ref 149–390)
PMV BLD AUTO: 10.3 FL (ref 8.9–12.7)
RBC # BLD AUTO: 3.99 MILLION/UL (ref 3.81–5.12)
WBC # BLD AUTO: 16.59 THOUSAND/UL (ref 4.31–10.16)

## 2021-08-16 PROCEDURE — 36415 COLL VENOUS BLD VENIPUNCTURE: CPT

## 2021-08-16 PROCEDURE — 85610 PROTHROMBIN TIME: CPT

## 2021-08-16 PROCEDURE — 85730 THROMBOPLASTIN TIME PARTIAL: CPT

## 2021-08-16 PROCEDURE — 85384 FIBRINOGEN ACTIVITY: CPT

## 2021-08-16 PROCEDURE — 82728 ASSAY OF FERRITIN: CPT

## 2021-08-16 PROCEDURE — 85027 COMPLETE CBC AUTOMATED: CPT

## 2021-08-17 LAB
APTT PPP: 32 SECONDS (ref 23–37)
FIBRINOGEN PPP-MCNC: 748 MG/DL (ref 227–495)
INR PPP: 0.93 (ref 0.84–1.19)
PROTHROMBIN TIME: 12.5 SECONDS (ref 11.6–14.5)

## 2021-08-19 ENCOUNTER — ROUTINE PRENATAL (OUTPATIENT)
Dept: OBGYN CLINIC | Facility: MEDICAL CENTER | Age: 36
End: 2021-08-19

## 2021-08-19 VITALS — DIASTOLIC BLOOD PRESSURE: 66 MMHG | BODY MASS INDEX: 50.67 KG/M2 | WEIGHT: 293 LBS | SYSTOLIC BLOOD PRESSURE: 108 MMHG

## 2021-08-19 DIAGNOSIS — O24.414 INSULIN CONTROLLED GESTATIONAL DIABETES MELLITUS (GDM) IN SECOND TRIMESTER: ICD-10-CM

## 2021-08-19 DIAGNOSIS — O30.042 DICHORIONIC DIAMNIOTIC TWIN PREGNANCY IN SECOND TRIMESTER: ICD-10-CM

## 2021-08-19 DIAGNOSIS — O99.210 OBESITY AFFECTING PREGNANCY, ANTEPARTUM: ICD-10-CM

## 2021-08-19 DIAGNOSIS — O10.912 CHRONIC HYPERTENSION IN OBSTETRIC CONTEXT IN SECOND TRIMESTER: ICD-10-CM

## 2021-08-19 DIAGNOSIS — Z3A.25 25 WEEKS GESTATION OF PREGNANCY: Primary | ICD-10-CM

## 2021-08-19 PROCEDURE — PNV: Performed by: ADVANCED PRACTICE MIDWIFE

## 2021-08-23 NOTE — PROGRESS NOTES
Routine Prenatal Visit  [facility] [address] [place] [address]    Assessment/Plan:  Lashay Chin is a 30y o  year old  at 25w2d who presents for routine prenatal visit  1  25 weeks gestation of pregnancy    2  Dichorionic diamniotic twin pregnancy in second trimester    3  Insulin controlled gestational diabetes mellitus (GDM) in second trimester    4  Chronic hypertension in obstetric context in second trimester    5  Obesity affecting pregnancy, antepartum    - Reviewed 2nd trimester precautions, recent labs  - Reviewed diet and exercise recommendations  - reports sugars to diabetes in pregnancy- States that she is currently on 22 units of Lantus  - Taking Procardia 30 XL mg daily- B/P 108/  - Increased anxiety in recent weeks, taking Lexapro 20 mg daily  - MFM on - normal echos   - Worried about delivery, breastfeeding, time off  Would like to take time off prior to delivery  - next visit 2 weeks      Subjective:     CC: Prenatal care    Kim Grimaldo is a 30 y o   female who presents for routine prenatal care at 25w2d  Pregnancy ROS: no leakage of fluid, pelvic pain, or vaginal bleeding  Good fetal movement      The following portions of the patient's history were reviewed and updated as appropriate: allergies, current medications, past family history, past medical history, obstetric history, gynecologic history, past social history, past surgical history and problem list       Objective:  /66   Wt (!) 147 kg (323 lb 8 oz)   LMP 2021 (Exact Date)   BMI 50 67 kg/m²   Pregravid Weight/BMI: 144 kg (317 lb) (BMI 49 64)  Current Weight: (!) 147 kg (323 lb 8 oz)   Total Weight Gain: 2 948 kg (6 lb 8 oz)   Pre- Vitals      Most Recent Value   Prenatal Assessment   Fetal Heart Rate  [id number]   Fundal Height (cm)  28 cm   Prenatal Vitals   Blood Pressure  108/66   Weight - Scale  (!) 147 kg (323 lb 8 oz)   Urine Albumin/Glucose Dilation/Effacement/Station   Vaginal Drainage   Edema   LLE Edema  None   RLE Edema  None           General: Well appearing, no distress  Respiratory: Unlabored breathing  Cardiovascular: Regular rate  Abdomen: Soft, gravid, nontender  Fundal Height: Appropriate for gestational age  Extremities: Warm and well perfused  Non tender

## 2021-08-24 ENCOUNTER — ULTRASOUND (OUTPATIENT)
Dept: PERINATAL CARE | Facility: CLINIC | Age: 36
End: 2021-08-24
Payer: COMMERCIAL

## 2021-08-24 VITALS
SYSTOLIC BLOOD PRESSURE: 127 MMHG | DIASTOLIC BLOOD PRESSURE: 67 MMHG | HEART RATE: 94 BPM | HEIGHT: 67 IN | BODY MASS INDEX: 45.99 KG/M2 | WEIGHT: 293 LBS

## 2021-08-24 DIAGNOSIS — O30.043 DICHORIONIC DIAMNIOTIC TWIN PREGNANCY IN THIRD TRIMESTER: ICD-10-CM

## 2021-08-24 DIAGNOSIS — O09.513 PRIMIGRAVIDA OF ADVANCED MATERNAL AGE IN THIRD TRIMESTER: ICD-10-CM

## 2021-08-24 DIAGNOSIS — O99.210 OBESITY AFFECTING PREGNANCY, ANTEPARTUM: ICD-10-CM

## 2021-08-24 DIAGNOSIS — Z3A.26 26 WEEKS GESTATION OF PREGNANCY: ICD-10-CM

## 2021-08-24 DIAGNOSIS — O24.414 INSULIN CONTROLLED GESTATIONAL DIABETES MELLITUS (GDM) IN THIRD TRIMESTER: ICD-10-CM

## 2021-08-24 DIAGNOSIS — O10.913 CHRONIC HYPERTENSION IN OBSTETRIC CONTEXT IN THIRD TRIMESTER: ICD-10-CM

## 2021-08-24 PROCEDURE — 99213 OFFICE O/P EST LOW 20 MIN: CPT | Performed by: OBSTETRICS & GYNECOLOGY

## 2021-08-24 PROCEDURE — 76816 OB US FOLLOW-UP PER FETUS: CPT | Performed by: OBSTETRICS & GYNECOLOGY

## 2021-08-24 NOTE — LETTER
September 2, 2021     [address] [place] [id number]    Patient: Gemma Weiss   YOB: 1985   Date of Visit: 8/24/2021 September 2nd 2021  [address] [address]  To whom it may concern:    If there is an opportunity to reduce person to person exposure and complete tasks virtually, I am recommending that such an alternative be considered, as a strategy to reduce risk of COVID transmission and infection  It is also recommended that all pregnant patients be vaccinatesd against COVID unless their primary physician or general Ob Gyn considers there is a reason not to recommend vaccination            Alana Porras MD    Maternal Fetal Medicine    CC: Yosi Cm RN

## 2021-08-24 NOTE — LETTER
August 29, 2021     NETTE Hadley  [address]  [address] [facility]    Patient: Aristides Garza   YOB: 1985   Date of Visit: 8/24/2021       Dear Dr Mackenzie Thomson: Thank you for referring Jose Muñiz to me for evaluation  Below are my notes for this consultation  If you have questions, please do not hesitate to call me  I look forward to following your patient along with you  Sincerely,        Andrew Alamo MD        CC: No Recipients  Andrew Alamo MD  8/29/2021  2:07 PM  Sign when Signing Visit  I reviewed the note and management plan and agree with it  Nisreen Lopez MD, [facility]

## 2021-08-24 NOTE — LETTER
2021     [address] [place] [id number]    Patient: Ash Rushing   YOB: 1985   Date of Visit: 2021       To whom it may concern: This letter is written at the request of Ms Eber Simon  [phone]      2021  [address] [facility] [address]  To whom it may concern:    If there is an opportunity to reduce person to person exposure and complete tasks virtually, I am recommending that such an alternative be considered, as a strategy to reduce risk of COVID transmission and infection  It is also recommended that all pregnant patients be vaccinated against COVID unless their primary physician or general Ob Gyn considers there is a reason not to recommend vaccination      Sincerely,        Ventura Sands MD  Maternal Fetal Medicine      CC: DOMENICO Salinas Res, MD

## 2021-08-26 ENCOUNTER — TELEPHONE (OUTPATIENT)
Dept: PERINATAL CARE | Facility: CLINIC | Age: 36
End: 2021-08-26

## 2021-08-26 NOTE — TELEPHONE ENCOUNTER
Patient called the office today to ask if Dr Popeye Beth would write a letter explaining it is recommended she work virtually if possible due to high risk twin pregnancy and for Covid precautions  She said this was discussed at her appointment on 08/24 and she spoke with her employer and her employer has agreed to have her work virtually but is requesting a letter from her physician  She said the letter can be addressed to [facility]  E and could be sent through [employer] if possible  Any questions she can be reached at [phone]

## 2021-08-27 ENCOUNTER — DOCUMENTATION (OUTPATIENT)
Dept: PERINATAL CARE | Facility: CLINIC | Age: 36
End: 2021-08-27

## 2021-08-27 NOTE — PROGRESS NOTES
Date:  21  RE: Ayah Portillo    : 1985  Estimated Date of Delivery: 21  EGA: 26w3d  [facility]N: [facility]  Insulin controlled gestational diabetes;  Lolis TWIN Pregnancy        Plan:  Lantus- continue 22 units at 9 PM   Diet: 2400 Gestational diabetes meal plan; 3 meals and 3 snacks  Diet recall indictees she is following the diet well  Craving milk, but dislikes it--mixes with Gluocerna for breakfast    SMBG: Checks blood sugar 4 times per day; fasting and 2 hour start of each meal    Meter: Contour Next EZ glucose meter  Activity: Walk daily, follow OB recommendations  -Walks 2 times weekly for 30 minutes  2021 -Dr Ravinder Gil reviewed recent echocardiogram which demonstrated an excellent improvement in her ejection fraction  Ejection fraction was 65%  This is much improved compared to her prior echocardiogram 2 years ago of 49%  Support System:   Patient Goal: I will walk 20-30 minutes after dinner daily  Labs  21 [id number] 5% --stable      Ultrasounds  21 US; Normal Fetal growth and NEVIN for both fetus A and Fetus B  2021 Level 2 - Normal growth for Baby A and baby B  2021 Fetal ECHO completed for twins   Next US scheduled for 2021    Further fetal surveillance  Beginning at 32 weeks, NST / NEVIN twice a week     Adam Marc RN

## 2021-08-29 PROBLEM — O30.043 DICHORIONIC DIAMNIOTIC TWIN PREGNANCY IN THIRD TRIMESTER: Status: ACTIVE | Noted: 2021-05-25

## 2021-08-29 PROBLEM — Z3A.26 26 WEEKS GESTATION OF PREGNANCY: Status: ACTIVE | Noted: 2021-06-20

## 2021-08-29 PROBLEM — O09.513 PRIMIGRAVIDA OF ADVANCED MATERNAL AGE IN THIRD TRIMESTER: Status: ACTIVE | Noted: 2021-05-25

## 2021-08-29 NOTE — PROGRESS NOTES
I reviewed the note and management plan and agree with it  Ramey Staple Simon Hamman MD, [address] [facility]

## 2021-09-02 ENCOUNTER — SOCIAL WORK (OUTPATIENT)
Dept: BEHAVIORAL/MENTAL HEALTH CLINIC | Facility: CLINIC | Age: 36
End: 2021-09-02
Payer: COMMERCIAL

## 2021-09-02 DIAGNOSIS — F32.5 MAJOR DEPRESSIVE DISORDER IN FULL REMISSION, UNSPECIFIED WHETHER RECURRENT (HCC): ICD-10-CM

## 2021-09-02 DIAGNOSIS — F41.1 GENERALIZED ANXIETY DISORDER: ICD-10-CM

## 2021-09-02 PROCEDURE — 90834 PSYTX W PT 45 MINUTES: CPT | Performed by: SOCIAL WORKER

## 2021-09-03 ENCOUNTER — DOCUMENTATION (OUTPATIENT)
Dept: PERINATAL CARE | Facility: CLINIC | Age: 36
End: 2021-09-03

## 2021-09-03 NOTE — PROGRESS NOTES
Date:  21  RE: Annika Boss    : 1985  Estimated Date of Delivery: 21  EGA: 27w3d  [facility]N: [facility]  Insulin controlled gestational diabetes;  [place]olis TWIN Pregnancy        Plan:  Lantus- increase from 22 units up to 24 units at 9 PM   Diet: 2400 Gestational diabetes meal plan; 3 meals and 3 snacks  SMBG: Checks blood sugar 4 times per day; fasting and 2 hour start of each meal    Meter: Contour Next EZ glucose meter  Activity: Walk daily, follow OB recommendations  -Walks 2 times weekly for 30 minutes  2021 -Dr Dwayne Hermosillo reviewed recent echocardiogram which demonstrated an excellent improvement in her ejection fraction  Ejection fraction was 65%  This is much improved compared to her prior echocardiogram 2 years ago of 49%  Support System:   Patient Goal: I will walk 20-30 minutes after dinner daily  Labs  21 [id number] 5% --stable      Ultrasounds  21 US; Normal Fetal growth and NEVIN for both fetus A and Fetus B  2021 Level 2 - Normal growth for Baby A and baby B  2021 Fetal ECHO completed for twins   Next US scheduled for 2021    Further fetal surveillance  Beginning at 32 weeks, NST / NEVIN twice a week     Summer Seals RN

## 2021-09-10 ENCOUNTER — DOCUMENTATION (OUTPATIENT)
Dept: PERINATAL CARE | Facility: CLINIC | Age: 36
End: 2021-09-10

## 2021-09-10 NOTE — PROGRESS NOTES
Date:  09/10/21  RE: Mane Ford    : 1985  Estimated Date of Delivery: 21  EGA: 28w3d  [facility]N: [facility]  Insulin controlled gestational diabetes;  [place] TWIN Pregnancy        Plan:  Lantus- increase from 24 units up to 26 units at 9 PM   Schedule follow up with dietician to review diet for next week and schedule 8 week follow-up with Caleb Malave around 2021  Diet: 2400 Gestational diabetes meal plan; 3 meals and 3 snacks  SMBG: Checks blood sugar 4 times per day; fasting and 2 hour start of each meal    Meter: Contour Next EZ glucose meter  Activity: Walk daily, follow OB recommendations  -Walks 2 times weekly for 30 minutes  2021 -Dr Karly Jung reviewed recent echocardiogram which demonstrated an excellent improvement in her ejection fraction  Ejection fraction was 65%  This is much improved compared to her prior echocardiogram 2 years ago of 49%  Support System:   Patient Goal: I will walk 20-30 minutes after dinner daily      Labs  21 [id number] 5%     Ultrasounds  21 US; Normal Fetal growth and NEVIN for both fetus A and Fetus B  2021 Level 2 - Normal growth for Baby A and baby B  2021 Fetal ECHO completed for twins   Next US scheduled for 2021    Further fetal surveillance  Beginning at 32 weeks, NST / NEVIN twice a week     Nia Patel RN

## 2021-09-13 ENCOUNTER — ROUTINE PRENATAL (OUTPATIENT)
Dept: OBGYN CLINIC | Facility: MEDICAL CENTER | Age: 36
End: 2021-09-13
Payer: COMMERCIAL

## 2021-09-13 VITALS — SYSTOLIC BLOOD PRESSURE: 122 MMHG | BODY MASS INDEX: 51.69 KG/M2 | DIASTOLIC BLOOD PRESSURE: 66 MMHG | WEIGHT: 293 LBS

## 2021-09-13 DIAGNOSIS — Z3A.28 28 WEEKS GESTATION OF PREGNANCY: ICD-10-CM

## 2021-09-13 DIAGNOSIS — Z23 NEED FOR TDAP VACCINATION: ICD-10-CM

## 2021-09-13 DIAGNOSIS — O99.210 OBESITY AFFECTING PREGNANCY, ANTEPARTUM: ICD-10-CM

## 2021-09-13 DIAGNOSIS — O30.043 DICHORIONIC DIAMNIOTIC TWIN PREGNANCY IN THIRD TRIMESTER: ICD-10-CM

## 2021-09-13 DIAGNOSIS — O10.913 CHRONIC HYPERTENSION IN OBSTETRIC CONTEXT IN THIRD TRIMESTER: ICD-10-CM

## 2021-09-13 DIAGNOSIS — O09.513 PRIMIGRAVIDA OF ADVANCED MATERNAL AGE IN THIRD TRIMESTER: ICD-10-CM

## 2021-09-13 DIAGNOSIS — O24.414 INSULIN CONTROLLED GESTATIONAL DIABETES MELLITUS (GDM) IN THIRD TRIMESTER: Primary | ICD-10-CM

## 2021-09-13 PROCEDURE — 90471 IMMUNIZATION ADMIN: CPT | Performed by: NURSE PRACTITIONER

## 2021-09-13 PROCEDURE — PNV: Performed by: NURSE PRACTITIONER

## 2021-09-13 PROCEDURE — 90715 TDAP VACCINE 7 YRS/> IM: CPT | Performed by: NURSE PRACTITIONER

## 2021-09-13 NOTE — LETTER
September 13, 2021     Patient: Mirna Arizmendi   YOB: 1985   Date of Visit: 9/13/2021       To Whom it May Concern:    Freeborn Annabelle is under my professional care  She was seen in my office on 9/13/2021  She may return to work on 9/13/21  Patient is currently pregnant [facility] 11/30/21  Patient prefers to continue working from home to limit exposure to COVID 19  If you have any questions or concerns, please don't hesitate to call           Sincerely,          ARYA Kern        CC: No Recipients

## 2021-09-13 NOTE — PROGRESS NOTES
Denies loss of fluid, vaginal bleeding and abdominal pain  Confirms frequent fetal movement x2  Tolerating aspirin, Lexapro, iron, folic acid and Procardia well  Denies worsening symptoms of depression, thoughts of self-harm or harm to others  Gestational diabetes is currently taking Lantus 24 units nightly  Fasting blood sugars 80 to 90s and 2 hour post prandials 106-127  Patient submit blood sugars every Wednesday  Reviewed recommendation for Tdap vaccine, patient is agreeable to administration at today's visit  Denies other questions or concerns at today's visit   BP:122/66   Weight: +13lb    Plan:  -  Continue prenatal vitamins daily  - fetal kick counts reviewed, encouraged daily and written information provided  - depression in pregnancy encouraged to continue Lexapro as ordered  Encouraged to call office with worsening symptoms, thoughts of self-harm or harm to others  -cHTN continue Procardia as ordered  Signs and symptoms of preeclampsia reviewed  - GDM encouraged to continue checking blood sugars, following diet and insulin as ordered  Encouraged to continue close contact with diabetes in pregnancy program  - follow-up  Center scheduled for 21  - Tdap vaccine today  - patient is currently working from home and prefers to remain working from home until delivery  Reviewed with patient there is no medical reason she needs to be work from home however note was provided stating patient prefers to remain  Working from home   - common discomforts of pregnancy and precautions including  labor reviewed  Signs and symptoms report reviewed  Written information provided about COVID-19    RTO 2 weeks

## 2021-09-13 NOTE — PATIENT INSTRUCTIONS
COVID-19 and Pregnancy   AMBULATORY CARE:   What you need to know about coronavirus disease 2019 (COVID-19) and pregnancy:  Pregnancy increases your risk for severe COVID-19 illness  COVID-19 can also lead to  delivery of your baby  Most babies who become infected with the new virus do not develop serious effects, but some do  It is important for you and your baby to stay safe during pregnancy and delivery  Signs and symptoms of COVID-19 in newborns: The following signs and symptoms may be from COVID-19, but they are also common in newborns  Your 's healthcare provider may recommend testing to confirm or rule out COVID-19  Your  may need a second test if the first is negative  · Fever    · Not moving arms or legs much, or being too sleepy to feed    · A runny nose or cough    · Fast breathing, or trouble breathing    · Vomiting, diarrhea, or not feeding well    If you think you, your baby, or someone in your home may be infected:  Do the following to protect others:  · If emergency care is needed,  tell the  about the possible infection, or call ahead and tell the emergency department  · Call a healthcare provider  for instructions if symptoms are mild  Anyone who may be infected should not  arrive without calling first  The provider will need to protect staff members and other patients  · The person who may be infected needs to wear a face covering  while getting medical care  This will help lower the risk of infecting others  Coverings are not used for anyone who is younger than 2 years, has breathing problems, or cannot remove it  The provider can give you instructions for anyone who cannot wear a covering  Call your local emergency number (911 in the [address] [place] [address],[address]) or go to the emergency department if:   · You have trouble breathing or shortness of breath at rest     · You have chest pain or pressure that lasts longer than 5 minutes      · You become confused or hard to wake     · Your lips or face are blue  · You have a fever of 104°F (40°C) or higher  Call your doctor if:   · You have signs or symptoms of COVID-19  Try to call within 24 hours of when you start to feel sick  · You do not  have symptoms of COVID-19 but had close physical contact within 14 days with someone who tested positive  · You have questions or concerns about your condition or care  How the 2019 coronavirus spreads: The virus spreads quickly and easily  The virus can be passed starting 2 days before symptoms begin or before a positive test if symptoms never begin  The following are ways the virus is thought to spread, but more information may be coming:  · Droplets are the main way all coronaviruses spread  The virus travels in droplets that form when a person talks, coughs, or sneezes  The droplets can also float in the air for minutes or hours  Infection happens when you breathe in the droplets or get them in your eyes or nose  Close personal contact with an infected person increases your risk for infection  This means being within 6 feet (2 meters) of the person for at least 15 minutes over 24 hours  · Person-to-person contact can spread the virus  For example, a person with the virus on his or her hands can spread it by shaking hands with someone  · The virus can stay on objects and surfaces for a short time  You may become infected by touching the object or surface and then touching your eyes or mouth  · An infected animal may be able to infect a person who touches it  This may happen at live markets or on a farm  Protect yourself and your baby while you are pregnant: If you have COVID-19 during your pregnancy, healthcare providers will monitor you and your baby closely  Work with your healthcare provider or obstetrician  If you do not have either, experts recommend you contact a local community health center or health department   The best way to prevent infection is to avoid anyone who is infected, but this can be hard to do  An infected person can spread the virus before signs or symptoms develop, or even if signs or symptoms never develop  The following can help keep you and your baby safe:     · Wash your hands throughout the day  Use soap and water  Rub your soapy hands together, lacing your fingers  Wash the front and back of each hand, and in between your fingers  Use the fingers of one hand to scrub under the fingernails of the other hand  Wash for at least 20 seconds  Rinse with warm, running water for several seconds  Dry your hands with a clean towel or paper towel  Use hand  that contains alcohol if soap and water are not available  If you must go out, wash your hands before you leave your home and when you get home  Wash your hands after you put items away  Be careful about what you touch while you are out  · Protect yourself from sneezes and coughs  Turn your face away and cover your mouth and nose if you are around someone who is sneezing or coughing  This helps protect you from the person's droplets  Cover your mouth and nose with a tissue when you need to sneeze or cough  Use the bend of your arm if you do not have a tissue  Throw the tissue away  Then wash your hands or use hand   · Make a habit of not touching your face  If you get the virus on your hands, you can transfer it to your eyes, nose, or mouth and become infected  · Follow worldwide, national, and local social distancing guidelines  Social distancing means staying far enough away physically from others that the virus cannot spread from one person to another  If you must go out, avoid crowds and large gatherings  Gatherings or crowds of 10 or more individuals can cause the virus to spread  Avoid places such as [place], beaches, sporting events, and tourist attractions   For events such as parties, holiday meals, Buddhist services, and conferences, attend virtually (on a computer), if possible  · Wear a face covering (mask) around anyone who does not live in your home  A covering helps protect the person wearing it from being infected or passing the virus to others  Do not  wear a plastic face shield instead of a covering  You can use both together for extra protection  Use a disposable non-medical mask, or make a cloth covering with at least 2 layers  You can also create layers by putting a cloth covering over a disposable non-medical mask  Cover your mouth and your nose  Securely fasten it under your chin and on the sides of your face  A face covering is not a substitute for other safety measures  Continue social distancing and washing your hands often  Do not put a face shield or covering on your   These increase the risk for sudden infant death syndrome (SIDS)  · Stay at least 6 feet (2 meters) away from anyone who does not live in your home  Keep this distance every time you go out of your home and are around another person  Do not shake hands with, hug, or kiss a person as a greeting  Stand or walk as far from others as possible, especially around anyone who is sneezing or coughing  If you must use public transportation (such as a bus or subway), try to sit or stand away from others  Do not go to someone else's home unless it is necessary  Do not go over to visit, even if you are lonely, or the person is  Only go if you need to help him or her  · Stay safe if you must go out to work  Keep physical distance between you and other workers as much as possible  Follow your employer's rules so everyone stays safe  · Clean and disinfect high-touch surfaces and objects in your home often  Use disinfecting wipes or make a solution of 4 teaspoons of bleach in 1 quart (4 cups) of water  Clean surfaces and objects in the room where your baby will be sleeping, especially right before you give birth  Wash your hands after you clean and disinfect   Be careful with cleaning products  Read the labels to make sure they are safe to use during pregnancy  Open windows to make sure you have good ventilation  What you can do to have a healthy pregnancy during the COVID-19 outbreak:   · Keep all prenatal and  appointments  You may be able to have certain prenatal appointments without having to go into the provider's office  Some providers offer phone, video, or other types of appointments  You may also be able to get prescriptions for a few months at a time  This will help lower the number of trips you need to make to the pharmacy for refills  If you do need to go into your provider's office, take precautions  Put a face covering on before you go into the office  Do not stand or sit within 6 feet (2 meters) of anyone in the waiting room, if possible  Do not stand or sit near anyone who is not wearing a face covering  · Get recommended vaccines  Your healthcare provider can tell you if you need vaccines not listed below, and when to get them  ? Ask about the COVID-19 vaccine  Your healthcare provider may recommend that you get the vaccine now if you are at high risk for COVID-19  Make sure you understand the risks and benefits of getting the vaccine during pregnancy  Do not get a COVID-19 vaccine until you and your healthcare provider decide it is right for you  Even after you get the vaccine, continue wearing a face covering, handwashing, and social distancing  These are still the best ways to prevent infection  ? Get the influenza (flu) vaccine  Try to get the vaccine as soon as recommended, usually starting in September or October  ? Get the Tdap vaccine  The Tdap vaccine protects you from tetanus, diphtheria, and pertussis  If possible, get the vaccine during weeks 27 to 36 of your pregnancy  You should get a dose of Tdap with each pregnancy  · Take prenatal vitamins as directed  Your prenatal vitamins should contain folic acid   You need about 600 micrograms (mcg) of folic acid each day during pregnancy  Folic acid helps to form your baby's brain and spinal cord in early pregnancy  · Eat a variety of healthy foods  Healthy foods are important, even if you take a prenatal vitamin  Healthy foods contain nutrients that help keep your immune system strong  Examples of healthy foods include vegetables, fruits, whole-grain breads and cereals, lean meats and poultry, fish, low-fat dairy products, and cooked beans  Do not have raw, undercooked, or unpasteurized food or drinks  Unpasteurized foods are foods that have not gone through the heating process (pasteurization) that destroys bacteria  Your healthcare provider or a dietitian can help you create healthy meal plans  · Talk to your healthcare provider about exercise  Moderate exercise can help keep your immune system strong  Your healthcare provider can help you plan an exercise program that is safe for you during pregnancy  You may need to exercise at home if you cannot exercise outdoors, such as walking in a park  If you want to do pregnancy yoga or other group activities, be safe  Stay at least 6 feet (2 meters) away from others in the class, and the instructor  Wash your hands before you leave the building  Follow the facility's instructions for preventing infections  · Try to lower your stress  You may be feeling more stressed than usual because of the COVID-19 outbreak  You may also feel stress from not being able to share your pregnancy with others  For example, you may not be able to have someone with you during prenatal visits or ultrasounds  Talk to your healthcare providers about ways to manage stress during this time  Pick 1 or 2 times a day to watch the news  Constant news watching about COVID-19 can increase your stress levels  Set a sleep schedule to go to bed and wake up at the same times each day  · Do not smoke cigarettes, drink alcohol, or use drugs    Nicotine and other chemicals in cigarettes and cigars can harm your baby and your health  Alcohol can increase your risk for a miscarriage  Your baby may also be born too small or have other health problems  Certain drugs can be passed to your baby before he or she is born  Some can be passed through breast milk  It is best to quit cigarettes, alcohol, and drugs before you become pregnant and not start again after your baby is born  Ask your healthcare provider for information if you currently use any of these and need help to quit  Protect your  during delivery and while you are in the hospital:  It is not known for sure if an unborn baby can be infected with the virus that causes COVID-19  Some newborns have tested positive for the virus  The newborns may have been infected before, during, or after birth  The greatest risk is for a  to be in close contact with an infected person  Your baby may be tested for the virus soon after being born if you have COVID-23  He or she may be tested again before you leave the hospital  This depends on whether your baby has any signs or symptoms of COVID-19  You will be able to make choices for you and your baby during your hospital stay  Talk to healthcare providers about the following:  · Ask about temporary separation if you have COVID-19  Temporary separation means your  is moved to a different room from you  You will be able to make the decision if you want to do this  Separation will help lower your 's risk for being infected  You will still be able to give your  breast milk  You may need to pump the milk from your breasts  Someone who does not have COVID-19 will then feed the pumped milk to your   You may instead choose to have your baby brought to you when you want to breastfeed  Take precautions to keep your baby safe  Wash your hands and the skin around your nipples before you hold your baby  Wear a face covering while you breastfeed      · Be careful if you have COVID-19 and do not choose temporary separation  Healthcare providers will keep your  at least 6 feet (2 meters) away from you as much as possible  Your  may be placed in an incubator  The incubator will help protect your  from infection  Always wash your hands and put on a face covering when you hold, touch, or have close contact with your   · Ask about visitors  The facility may not be allowing any visitors to newborns during this time  If you are allowed visitors, you may need to limit how many you can have at a time  Do not allow anyone who has known or suspected COVID-19 to visit  Even without signs or symptoms, the person can infect your  or others in the room  All visitors need to wash their hands and put on clean face coverings before entering your room  The face covering needs to stay on during the whole visit  Do not let anyone take the face covering down to make faces at your baby, talk, sneeze, or cough  Do not let anyone kiss you or your baby  Protect your  at home:   · You can choose to continue temporary separation if you have COVID-19  You can do this if an adult who does not have COVID-19 can care for your   Your healthcare provider can give you instructions on how to do this safely at home  Only have close contact with your  when needed  Remember to wash your hands and put on a clean face covering first  You may need to continue pumping your breast milk  A healthy adult can feed the pumped breast milk to your   You may instead choose to have your baby brought to you when you want to breastfeed  Take precautions to keep your baby safe  Wash your hands and the skin around your nipples before you hold your baby  You will also need to wear a face covering while you breastfeed  · Use face coverings safely    Everyone who has COVID-19 needs to wear a clean face covering while being within 6 feet (2 meters) of your   This includes other children in your home who are 2 years or older  Do not put a face covering or plastic face shield on your   Any covering increases your 's risk for sudden infant death syndrome (SIDS)  Do not use coverings on children younger than 2 years or on anyone who has breathing problems or cannot remove it  · Be careful about visitors  Continue precautions you used in the hospital  Do not allow anyone who has known or suspected COVID-19 to come over to see your   Have visitors put on clean face coverings before they enter your home  Have them wash their hands as soon as they come in  The face covering needs to stay on during the whole visit  · Keep all checkup appointments  You may be able to have some appointments by phone or video meeting  Other appointments will need to be in person, such as for vaccines  Vaccines are normally given to babies at certain ages  Until COVID-19 is under control, your 's provider will give you a vaccine schedule  It is important for your  to get all recommended vaccines  What you need to know about breastfeeding:  Breastfeeding for the first 6 months decreases your baby's risk for respiratory (lung) infections, allergies, asthma, and stomach problems  Breast milk also helps your baby develop a strong immune system  Breast milk is considered safe, even if you have COVID-19  Experts currently believe the virus that causes COVID-19 does not spread in breast milk  Do the following to help protect your baby:  · Wash your hands before every breastfeeding or pumping session  Even if you do not have COVID-19, you can transfer the virus from your hands to your baby or the pump  Use soap and water to wash your hands whenever possible  Use hand  that contains alcohol if soap and water are not available  · Clean and sanitize your breast pump after each use    Follow the 's directions for cleaning and sanitizing the pump  It is important not to use it until it is clean and sanitized  · If you have COVID-19:      ? Wear a face covering while you breastfeed or pump  This will help prevent you from passing the virus through droplets when you talk, cough, sneeze, or laugh  The virus can stay on surfaces such as a breast pump for hours to days  ? Have someone who is not infected bottle feed your baby, if possible  Have the person wash his or her hands with soap and water before each feeding  The person can feed your  pumped breast milk or formula  Follow up with your doctor or obstetrician as directed:  Write down your questions so you remember to ask them during your visits  For more information:   · Centers for Disease Control and Prevention  [address] , [address]  Phone: 3- 749 - [phone]  Web Address: [employer]     © [address] [place] [address]  Information is for End User's use only and may not be sold, redistributed or otherwise used for commercial purposes  All illustrations and images included in CareNotes® are the copyrighted property of A D A M , Inc  or [facility] [facility]  The above information is an  only  It is not intended as medical advice for individual conditions or treatments  Talk to your doctor, nurse or pharmacist before following any medical regimen to see if it is safe and effective for you  Kick Counts in Pregnancy   AMBULATORY CARE:   Kick counts  measure how much your baby is moving in your womb  A kick from your baby can be felt as a twist, turn, swish, roll, or jab  It is common to feel your baby kicking at 26 to 28 weeks of pregnancy  You may feel your baby kick as early as 20 weeks of pregnancy  You may want to start counting at 28 weeks  Contact your healthcare provider immediately if:   · You feel a change in the number of kicks or movements of your baby  · You feel fewer than 10 kicks within 2 hours       · You have questions or concerns about your baby's movements  Why measure kick counts:  Your baby's movement may provide information about your baby's health  He or she may move less, or not at all, if there are problems  Your baby may move less if he or she is not getting enough oxygen or nutrition from the placenta  Do not smoke while you are pregnant  Smoking decreases the amount of oxygen that gets to your baby  Talk to your healthcare provider if you need help to quit smoking  Tell your healthcare provider as soon as you feel a change in your baby's movements  When to measure kick counts:   · Measure kick counts at the same time every day  · Measure kick counts when your baby is awake and most active  Your baby may be most active in the evening  How to measure kick counts:  Check that your baby is awake before you measure kick counts  You can wake up your baby by lightly pushing on your belly, walking, or drinking something cold  Your healthcare provider may tell you different ways to measure kick counts  You may be told to do the following:  · Use a chart or clock to keep track of the time you start and finish counting  · Sit in a chair or lie on your left side  · Place your hands on the largest part of your belly  · Count until you reach 10 kicks  Write down how much time it takes to count 10 kicks  · It may take 30 minutes to 2 hours to count 10 kicks  It should not take more than 2 hours to count 10 kicks  Follow up with your healthcare provider as directed:  Write down your questions so you remember to ask them during your visits  © Copyright [employer] 2021 Information is for End User's use only and may not be sold, redistributed or otherwise used for commercial purposes  All illustrations and images included in CareNotes® are the copyrighted property of A D A M , Inc  or [facility]   The above information is an  only   It is not intended as medical advice for individual conditions or treatments  Talk to your doctor, nurse or pharmacist before following any medical regimen to see if it is safe and effective for you  Pregnancy at 32 to 30 [address]:   What changes are happening in my body? You may notice new symptoms such as shortness of breath, heartburn, or swelling of your ankles and feet  You may also have trouble sleeping or contractions  How do I care for myself at this stage of my pregnancy? · Eat a variety of healthy foods  Healthy foods include fruits, vegetables, whole-grain breads, low-fat dairy foods, beans, lean meats, and fish  Drink liquids as directed  Ask how much liquid to drink each day and which liquids are best for you  Limit caffeine to less than 200 milligrams each day  Limit your intake of fish to 2 servings each week  Choose fish low in mercury such as canned light tuna, shrimp, salmon, cod, or tilapia  Do not  eat fish high in mercury such as swordfish, tilefish, russ mackerel, and shark  · Manage heartburn  by eating 4 or 5 small meals each day instead of large meals  Avoid spicy food  · Manage swelling  by lying down and putting your feet up  · Take prenatal vitamins as directed  Your need for certain vitamins and minerals, such as folic acid, increases during pregnancy  Prenatal vitamins provide some of the extra vitamins and minerals you need  Prenatal vitamins may also help to decrease the risk of certain birth defects  · Talk to your healthcare provider about exercise  Moderate exercise can help you stay fit  Your healthcare provider will help you plan an exercise program that is safe for you during pregnancy  · Do not smoke  Smoking increases your risk of a miscarriage and other health problems during your pregnancy  Smoking can cause your baby to be born too early or weigh less at birth  Ask your healthcare provider for information if you need help quitting      · Do not drink alcohol  Alcohol passes from your body to your baby through the placenta  It can affect your baby's brain development and cause fetal alcohol syndrome (FAS)  FAS is a group of conditions that causes mental, behavior, and growth problems  · Talk to your healthcare provider before you take any medicines  Many medicines may harm your baby if you take them when you are pregnant  Do not take any medicines, vitamins, herbs, or supplements without first talking to your healthcare provider  Never use illegal or street drugs (such as marijuana or cocaine) while you are pregnant  What are some safety tips during pregnancy? · Avoid hot tubs and saunas  Do not use a hot tub or sauna while you are pregnant, especially during your first trimester  Hot tubs and saunas may raise your baby's temperature and increase the risk of birth defects  · Avoid toxoplasmosis  This is an infection caused by eating raw meat or being around infected cat feces  It can cause birth defects, miscarriages, and other problems  Wash your hands after you touch raw meat  Make sure any meat is well-cooked before you eat it  Avoid raw eggs and unpasteurized milk  Use gloves or ask someone else to clean your cat's litter box while you are pregnant  What changes are happening with my baby? By 30 weeks, your baby may weigh more than 3 pounds  Your baby may be about 11 inches long from the top of the head to the rump (baby's bottom)  Your baby's eyes open and close now  Your baby's kicks and movements are more forceful at this time  What do I need to know about prenatal care? Your healthcare provider will check your blood pressure and weight  You may also need the following:  · Blood tests  may be done to check for anemia or blood type  · A urine test  may also be done to check for sugar and protein  These can be signs of gestational diabetes or infection  Protein in your urine may also be a sign of preeclampsia   Preeclampsia is a condition that can develop during week 20 or later of your pregnancy  It causes high blood pressure, and it can cause problems with your kidneys and other organs  · A Tdap vaccine and flu vaccine  may be recommended by your healthcare provider  · A gestational diabetes screen  will be done using an oral glucose tolerance test (OGTT)  An OGTT starts with a blood sugar level check after you have not eaten for 8 hours  You are then given a glucose drink  Your blood sugar level is checked after 1 hour, 2 hours, and sometimes 3 hours  Healthcare providers look at how much your blood sugar level increases from the first check  · Fundal height  is a measurement of your uterus to check your baby's growth  This number is usually the same as the number of weeks that you have been pregnant  Your healthcare provider may also check your baby's position  · Your baby's heart rate  will be checked  When should I seek immediate care? · You develop a severe headache that does not go away  · You have new or increased vision changes, such as blurred or spotted vision  · You have new or increased swelling in your face or hands  · You have vaginal spotting or bleeding  · Your water broke or you feel warm water gushing or trickling from your vagina  When should I contact my healthcare provider? · You have more than 5 contractions in 1 hour  · You notice any changes in your baby's movements  · You have abdominal cramps, pressure, or tightening  · You have a change in vaginal discharge  · You have chills or a fever  · You have vaginal itching, burning, or pain  · You have yellow, green, white, or foul-smelling vaginal discharge  · You have pain or burning when you urinate, less urine than usual, or pink or bloody urine  · You have questions or concerns about your condition or care  CARE AGREEMENT:   You have the right to help plan your care   Learn about your health condition and how it may be treated  Discuss treatment options with your healthcare providers to decide what care you want to receive  You always have the right to refuse treatment  The above information is an  only  It is not intended as medical advice for individual conditions or treatments  Talk to your doctor, nurse or pharmacist before following any medical regimen to see if it is safe and effective for you  © Copyright [employer] 2021 Information is for End User's use only and may not be sold, redistributed or otherwise used for commercial purposes   All illustrations and images included in CareNotes® are the copyrighted property of A D A M , Inc  or [address] [place] [address] [employer]

## 2021-09-15 ENCOUNTER — TELEPHONE (OUTPATIENT)
Dept: PERINATAL CARE | Facility: CLINIC | Age: 36
End: 2021-09-15

## 2021-09-15 ENCOUNTER — DOCUMENTATION (OUTPATIENT)
Dept: PERINATAL CARE | Facility: CLINIC | Age: 36
End: 2021-09-15

## 2021-09-15 NOTE — PROGRESS NOTES
Date:  09/15/21  RE: Abdoulaye Wheat    : 1985  Estimated Date of Delivery: 21  EGA: 29w1d  [facility]N: [facility]  Insulin controlled gestational diabetes;  Lolis TWIN Pregnancy      Patient provided just fasting glucose over the telephone today, stated she will upload all numbers later tonight  9/10- 86  9/11- 79  9/12- 87*patient birthday dinner  9/13-96  9/14- 85  9/15- 82- 2 hr PP breakfast 89    Plan:  Lantus- patient did NOT increase from 24 units up to 26 units at 9 PM as of 9/10 note like recommended  She stated over the telephone today that she wanted to change her dinner meal first and was able to do so and has had fasting glucose < 90 mg/dL over the last 5 mornings  Advised to continue 24 units for now but advised that insulin will be titrated each week based on her numbers given increase in insulin resistance as the pregnancy progresses  Sent message to EUGENE Quinonez to call patient and schedule follow up with dietician to review diet for next week    Diet: 2400 Gestational diabetes meal plan; 3 meals and 3 snacks  SMBG: Checks blood sugar 4 times per day; fasting and 2 hour start of each meal    Meter: Contour Next EZ glucose meter  Activity: Walk daily, follow OB recommendations  -Walks 2 times weekly for 30 minutes  Support System:   Patient Goal: I will walk 20-30 minutes after dinner daily      Labs  21 [id number] 5%     Ultrasounds  21 US; Normal Fetal growth and NEVIN for both fetus A and Fetus B  2021 Level 2 - Normal growth for Baby A and baby B  2021 Fetal ECHO completed for twins   Next US scheduled for 2021    Further fetal surveillance  Beginning at 32 weeks, NST / NEVIN twice a week     Angie Barr RD, LDN

## 2021-09-20 ENCOUNTER — TELEPHONE (OUTPATIENT)
Dept: OBGYN CLINIC | Facility: MEDICAL CENTER | Age: 36
End: 2021-09-20

## 2021-09-20 DIAGNOSIS — O09.513 PRIMIGRAVIDA OF ADVANCED MATERNAL AGE IN THIRD TRIMESTER: ICD-10-CM

## 2021-09-20 DIAGNOSIS — O10.913 CHRONIC HYPERTENSION IN OBSTETRIC CONTEXT IN THIRD TRIMESTER: ICD-10-CM

## 2021-09-20 DIAGNOSIS — O24.414 INSULIN CONTROLLED GESTATIONAL DIABETES MELLITUS (GDM) IN THIRD TRIMESTER: ICD-10-CM

## 2021-09-20 DIAGNOSIS — O30.043 DICHORIONIC DIAMNIOTIC TWIN PREGNANCY IN THIRD TRIMESTER: ICD-10-CM

## 2021-09-20 DIAGNOSIS — Z3A.28 28 WEEKS GESTATION OF PREGNANCY: ICD-10-CM

## 2021-09-20 DIAGNOSIS — F32.5 MAJOR DEPRESSIVE DISORDER IN FULL REMISSION, UNSPECIFIED WHETHER RECURRENT (HCC): ICD-10-CM

## 2021-09-20 RX ORDER — ESCITALOPRAM OXALATE 20 MG/1
20 TABLET ORAL DAILY
Qty: 90 TABLET | Refills: 1 | Status: SHIPPED | OUTPATIENT
Start: 2021-09-20 | End: 2022-04-18 | Stop reason: SDUPTHER

## 2021-09-20 NOTE — TELEPHONE ENCOUNTER
Patient called and stated that primary care provider is hesitant to refill depression medication as she is pregnant  She stated that she would like to know if we could let her know if her medication is safe to take so her pcp can refill it for her or if we could refill it ourselves for her  Please review, thank you

## 2021-09-21 ENCOUNTER — ROUTINE PRENATAL (OUTPATIENT)
Dept: PERINATAL CARE | Facility: CLINIC | Age: 36
End: 2021-09-21

## 2021-09-21 ENCOUNTER — ULTRASOUND (OUTPATIENT)
Dept: PERINATAL CARE | Facility: CLINIC | Age: 36
End: 2021-09-21
Payer: COMMERCIAL

## 2021-09-21 VITALS
SYSTOLIC BLOOD PRESSURE: 138 MMHG | HEART RATE: 101 BPM | HEIGHT: 67 IN | WEIGHT: 293 LBS | DIASTOLIC BLOOD PRESSURE: 67 MMHG | BODY MASS INDEX: 45.99 KG/M2

## 2021-09-21 DIAGNOSIS — O24.414 INSULIN CONTROLLED GESTATIONAL DIABETES MELLITUS (GDM) IN THIRD TRIMESTER: ICD-10-CM

## 2021-09-21 DIAGNOSIS — O10.913 CHRONIC HYPERTENSION IN OBSTETRIC CONTEXT IN THIRD TRIMESTER: ICD-10-CM

## 2021-09-21 DIAGNOSIS — O30.043 DICHORIONIC DIAMNIOTIC TWIN PREGNANCY IN THIRD TRIMESTER: ICD-10-CM

## 2021-09-21 DIAGNOSIS — O30.043 DICHORIONIC DIAMNIOTIC TWIN PREGNANCY IN THIRD TRIMESTER: Primary | ICD-10-CM

## 2021-09-21 DIAGNOSIS — O10.913 CHRONIC HYPERTENSION IN OBSTETRIC CONTEXT IN THIRD TRIMESTER: Primary | ICD-10-CM

## 2021-09-21 DIAGNOSIS — Z3A.30 30 WEEKS GESTATION OF PREGNANCY: ICD-10-CM

## 2021-09-21 PROCEDURE — 76816 OB US FOLLOW-UP PER FETUS: CPT | Performed by: OBSTETRICS & GYNECOLOGY

## 2021-09-21 PROCEDURE — 59025 FETAL NON-STRESS TEST: CPT | Performed by: OBSTETRICS & GYNECOLOGY

## 2021-09-21 PROCEDURE — NC001 PR NO CHARGE

## 2021-09-21 PROCEDURE — 99214 OFFICE O/P EST MOD 30 MIN: CPT | Performed by: OBSTETRICS & GYNECOLOGY

## 2021-09-21 NOTE — PROGRESS NOTES
Please refer to the [facility] ultrasound report in Ob Procedures for additional information regarding today's visit

## 2021-09-21 NOTE — LETTER
NST sleeve cover sheet    Patient name: Nedra Aguiar  : 1985  MRN: [id number]    XIAO: Estimated Date of Delivery: 21    Obstetrician:  [facility]     Reason(s) for testing: Di/Di twins                                       CHTN                                      GEST DM  Testing frequency:    __X_ 2x/wk  ___ 1x/wk  ___ Dopplers  ___ BPP?       Last growth scan: __________________________________________

## 2021-09-21 NOTE — PROGRESS NOTES
Nonstress testing was performed for the indications of a dichorionic twin pregnancy complicated by insulin-requiring gestational diabetes and hypertension, along with decreased fetal movement

## 2021-09-21 NOTE — LETTER
September 21, 2021     Radha Mohan MD  [address]  [place] [address]    Patient: Gemma Weiss   YOB: 1985   Date of Visit: 9/21/2021       Dear Dr Farooq Marquez: Thank you for referring Thor Toure to me for evaluation  Below are my notes for this consultation  If you have questions, please do not hesitate to call me  I look forward to following your patient along with you  Sincerely,        Lewis Sharpe MD        CC: No Recipients  Lewis Sharpe MD  9/21/2021  7:51 AM  Sign when Signing Visit  Please refer to the [facility] ultrasound report in Ob Procedures for additional information regarding today's visit

## 2021-09-21 NOTE — PATIENT INSTRUCTIONS
Kick Counts in Pregnancy   AMBULATORY CARE:   Kick counts  measure how much your baby is moving in your womb  A kick from your baby can be felt as a twist, turn, swish, roll, or jab  It is common to feel your baby kicking at 26 to 28 weeks of pregnancy  You may feel your baby kick as early as 20 weeks of pregnancy  You may want to start counting at 28 weeks  Contact your healthcare provider immediately if:   · You feel a change in the number of kicks or movements of your baby  · You feel fewer than 10 kicks within 2 hours  · You have questions or concerns about your baby's movements  Why measure kick counts:  Your baby's movement may provide information about your baby's health  He or she may move less, or not at all, if there are problems  Your baby may move less if he or she is not getting enough oxygen or nutrition from the placenta  Do not smoke while you are pregnant  Smoking decreases the amount of oxygen that gets to your baby  Talk to your healthcare provider if you need help to quit smoking  Tell your healthcare provider as soon as you feel a change in your baby's movements  When to measure kick counts:   · Measure kick counts at the same time every day  · Measure kick counts when your baby is awake and most active  Your baby may be most active in the evening  How to measure kick counts:  Check that your baby is awake before you measure kick counts  You can wake up your baby by lightly pushing on your belly, walking, or drinking something cold  Your healthcare provider may tell you different ways to measure kick counts  You may be told to do the following:  · Use a chart or clock to keep track of the time you start and finish counting  · Sit in a chair or lie on your left side  · Place your hands on the largest part of your belly  · Count until you reach 10 kicks  Write down how much time it takes to count 10 kicks  · It may take 30 minutes to 2 hours to count 10 kicks  It should not take more than 2 hours to count 10 kicks  Follow up with your healthcare provider as directed:  Write down your questions so you remember to ask them during your visits  © Copyright [employer] 2021 Information is for End User's use only and may not be sold, redistributed or otherwise used for commercial purposes  All illustrations and images included in CareNotes® are the copyrighted property of A D A M , Inc  or [facility] John Cobb   The above information is an  only  It is not intended as medical advice for individual conditions or treatments  Talk to your doctor, nurse or pharmacist before following any medical regimen to see if it is safe and effective for you

## 2021-09-21 NOTE — PROGRESS NOTES
Non-Stress Testing:    Non-Stress test, equipment, procedure, and expected outcomes reviewed  Reviewed fetal kick counts and when to call OB  Rosella Goldmann Verified patient understanding of fetal kick counts with teach back method  Patient reports feeling daily fetal movements  Patient has no questions or concerns            Dr Sari Gilford viewed NST strip prior to completion of visit

## 2021-09-21 NOTE — PATIENT INSTRUCTIONS
Nonstress Test for Pregnancy   WHAT YOU NEED TO KNOW:   What do I need to know about a nonstress test?  A nonstress test measures your baby's heart rate and movements  Nonstress means that no stress will be placed on your baby during the test   How do I prepare for a nonstress test?  Your healthcare provider will talk to you about how to prepare for this test  He or she may tell you to eat and drink plenty of fluids before your test  If you smoke, you may be asked not to smoke within 2 hours before the test  He or she will also tell you which medicines to take or not take on the day of your test   What will happen during a nonstress test?  You may be asked to lie down or recline back for the test on a bed  One or 2 belts with sensors will be placed around your abdomen  Your baby's heart rate will be recorded with a machine  If your baby does not move, your baby may be asleep  Your healthcare provider may make a noise near your abdomen to try to wake your baby  The test usually takes about 20 minutes, but can take longer if your baby needs to be awakened  What do I need to know about the test results? Your baby will be expected to move at least 2 times for a certain amount of time  Your baby's heart rate will be expected to go up by a certain number of beats per minute during movement  If your baby does not move as expected, the test may need to be repeated or you may need other tests  CARE AGREEMENT:   You have the right to help plan your care  Learn about your health condition and how it may be treated  Discuss treatment options with your healthcare providers to decide what care you want to receive  You always have the right to refuse treatment  The above information is an  only  It is not intended as medical advice for individual conditions or treatments  Talk to your doctor, nurse or pharmacist before following any medical regimen to see if it is safe and effective for you    © Copyright [employer] 2021 Information is for [employer] use only and may not be sold, redistributed or otherwise used for commercial purposes  All illustrations and images included in CareNotes® are the copyrighted property of [employer] M , Inc  or [address] [place] [address] Counts in Pregnancy   AMBULATORY CARE:   Kick counts  measure how much your baby is moving in your womb  A kick from your baby can be felt as a twist, turn, swish, roll, or jab  It is common to feel your baby kicking at 26 to 28 weeks of pregnancy  You may feel your baby kick as early as 20 weeks of pregnancy  You may want to start counting at 28 weeks  Contact your healthcare provider immediately if:   · You feel a change in the number of kicks or movements of your baby  · You feel fewer than 10 kicks within 2 hours  · You have questions or concerns about your baby's movements  Why measure kick counts:  Your baby's movement may provide information about your baby's health  He or she may move less, or not at all, if there are problems  Your baby may move less if he or she is not getting enough oxygen or nutrition from the placenta  Do not smoke while you are pregnant  Smoking decreases the amount of oxygen that gets to your baby  Talk to your healthcare provider if you need help to quit smoking  Tell your healthcare provider as soon as you feel a change in your baby's movements  When to measure kick counts:   · Measure kick counts at the same time every day  · Measure kick counts when your baby is awake and most active  Your baby may be most active in the evening  How to measure kick counts:  Check that your baby is awake before you measure kick counts  You can wake up your baby by lightly pushing on your belly, walking, or drinking something cold  Your healthcare provider may tell you different ways to measure kick counts   You may be told to do the following:  · Use a chart or clock to keep track of the time you start and finish counting  · Sit in a chair or lie on your left side  · Place your hands on the largest part of your belly  · Count until you reach 10 kicks  Write down how much time it takes to count 10 kicks  · It may take 30 minutes to 2 hours to count 10 kicks  It should not take more than 2 hours to count 10 kicks  Follow up with your healthcare provider as directed:  Write down your questions so you remember to ask them during your visits  © Copyright [employer] 2021 Information is for End User's use only and may not be sold, redistributed or otherwise used for commercial purposes  All illustrations and images included in CareNotes® are the copyrighted property of A D A M , Inc  or [facility][note 3[facility] John Cobb   The above information is an  only  It is not intended as medical advice for individual conditions or treatments  Talk to your doctor, nurse or pharmacist before following any medical regimen to see if it is safe and effective for you

## 2021-09-23 ENCOUNTER — DOCUMENTATION (OUTPATIENT)
Dept: PERINATAL CARE | Facility: CLINIC | Age: 36
End: 2021-09-23

## 2021-09-23 NOTE — PROGRESS NOTES
Date:  21  RE: Lucia Post    : 1985  Estimated Date of Delivery: 21  EGA: 30w2d  OB[facility]N: [facility]  Insulin controlled gestational diabetes;  Lolis TWIN Pregnancy      Plan:  Lantus- patient did NOT increase from 24 units up to 26 units at 9 PM as of 9/10 note like recommended  Patient reported wanting to change her dinner meal first and majority of fasting glucose < 90 mg/dL  Advised to continue 24 units for now but advised that insulin will be titrated each week based on her numbers given increase in insulin resistance as the pregnancy progresses  Noted several 2 hr pp measurements missing  Advised to set a phone alarm as a reminder to check blood sugars on time  Noted patient has not met with a dietitian  Attempted to call patient and left a voicemail offering class 2 on  at 1:30PM  Suggested patient call IRIS Sewell MA to schedule this appointment, if needed for another day depending on patient's availability  Diet: 2400 Gestational diabetes meal plan; 3 meals and 3 snacks  SMBG: Checks blood sugar 4 times per day; fasting and 2 hour start of each meal    Meter: Contour Next EZ glucose meter  Activity: Walk daily, follow OB recommendations  -Walks 2 times weekly for 30 minutes  Support System:   Patient Goal: I will walk 20-30 minutes after dinner daily  Labs  21 [id number] 5%     Ultrasounds  21 US; Normal Fetal growth and NEVIN for both fetus A and Fetus B  2021 Level 2 - Normal growth for Baby A and baby B  2021 Fetal ECHO completed for twins   2021 US: Normal fetal growth and NEVIN for both fetus A and Fetus B  Patient will need an US scheduled within the next 4 weeks as per Dr Aaron Knight note      Further fetal surveillance  Beginning at 32 weeks, NST / NEVIN twice a week     Bales Fees RD, LDN, CDE  Diabetes Educator  [facility]

## 2021-09-24 ENCOUNTER — ROUTINE PRENATAL (OUTPATIENT)
Dept: OBGYN CLINIC | Facility: MEDICAL CENTER | Age: 36
End: 2021-09-24
Payer: COMMERCIAL

## 2021-09-24 VITALS — BODY MASS INDEX: 52.16 KG/M2 | DIASTOLIC BLOOD PRESSURE: 70 MMHG | SYSTOLIC BLOOD PRESSURE: 118 MMHG | WEIGHT: 293 LBS

## 2021-09-24 DIAGNOSIS — O10.913 CHRONIC HYPERTENSION IN OBSTETRIC CONTEXT IN THIRD TRIMESTER: ICD-10-CM

## 2021-09-24 DIAGNOSIS — O24.414 INSULIN CONTROLLED WHITE CLASSIFICATION A2 GESTATIONAL DIABETES MELLITUS (GDM): ICD-10-CM

## 2021-09-24 DIAGNOSIS — Z3A.30 30 WEEKS GESTATION OF PREGNANCY: ICD-10-CM

## 2021-09-24 DIAGNOSIS — O30.043 DICHORIONIC DIAMNIOTIC TWIN PREGNANCY IN THIRD TRIMESTER: Primary | ICD-10-CM

## 2021-09-24 DIAGNOSIS — O09.513 PRIMIGRAVIDA OF ADVANCED MATERNAL AGE IN THIRD TRIMESTER: ICD-10-CM

## 2021-09-24 DIAGNOSIS — O99.210 OBESITY AFFECTING PREGNANCY, ANTEPARTUM: ICD-10-CM

## 2021-09-24 PROCEDURE — 59025 FETAL NON-STRESS TEST: CPT | Performed by: NURSE PRACTITIONER

## 2021-09-24 PROCEDURE — PNV: Performed by: NURSE PRACTITIONER

## 2021-09-24 NOTE — PATIENT INSTRUCTIONS
COVID-19 and Pregnancy   AMBULATORY CARE:   What you need to know about coronavirus disease 2019 (COVID-19) and pregnancy:  Pregnancy increases your risk for severe COVID-19 illness  COVID-19 can also lead to  delivery of your baby  Most babies who become infected with the new virus do not develop serious effects, but some do  It is important for you and your baby to stay safe during pregnancy and delivery  Signs and symptoms of COVID-19 in newborns: The following signs and symptoms may be from COVID-19, but they are also common in newborns  Your 's healthcare provider may recommend testing to confirm or rule out COVID-19  Your  may need a second test if the first is negative  · Fever    · Not moving arms or legs much, or being too sleepy to feed    · A runny nose or cough    · Fast breathing, or trouble breathing    · Vomiting, diarrhea, or not feeding well    If you think you, your baby, or someone in your home may be infected:  Do the following to protect others:  · If emergency care is needed,  tell the  about the possible infection, or call ahead and tell the emergency department  · Call a healthcare provider  for instructions if symptoms are mild  Anyone who may be infected should not  arrive without calling first  The provider will need to protect staff members and other patients  · The person who may be infected needs to wear a face covering  while getting medical care  This will help lower the risk of infecting others  Coverings are not used for anyone who is younger than 2 years, has breathing problems, or cannot remove it  The provider can give you instructions for anyone who cannot wear a covering  Call your local emergency number (911 in the [address] [place] [address],[address]) or go to the emergency department if:   · You have trouble breathing or shortness of breath at rest     · You have chest pain or pressure that lasts longer than 5 minutes      · You become confused or hard to wake     · Your lips or face are blue  · You have a fever of 104°F (40°C) or higher  Call your doctor if:   · You have signs or symptoms of COVID-19  Try to call within 24 hours of when you start to feel sick  · You do not  have symptoms of COVID-19 but had close physical contact within 14 days with someone who tested positive  · You have questions or concerns about your condition or care  How the 2019 coronavirus spreads: The virus spreads quickly and easily  The virus can be passed starting 2 days before symptoms begin or before a positive test if symptoms never begin  The following are ways the virus is thought to spread, but more information may be coming:  · Droplets are the main way all coronaviruses spread  The virus travels in droplets that form when a person talks, coughs, or sneezes  The droplets can also float in the air for minutes or hours  Infection happens when you breathe in the droplets or get them in your eyes or nose  Close personal contact with an infected person increases your risk for infection  This means being within 6 feet (2 meters) of the person for at least 15 minutes over 24 hours  · Person-to-person contact can spread the virus  For example, a person with the virus on his or her hands can spread it by shaking hands with someone  · The virus can stay on objects and surfaces for a short time  You may become infected by touching the object or surface and then touching your eyes or mouth  · An infected animal may be able to infect a person who touches it  This may happen at live markets or on a farm  Protect yourself and your baby while you are pregnant: If you have COVID-19 during your pregnancy, healthcare providers will monitor you and your baby closely  Work with your healthcare provider or obstetrician  If you do not have either, experts recommend you contact a local community health center or health department   The best way to prevent infection is to avoid anyone who is infected, but this can be hard to do  An infected person can spread the virus before signs or symptoms develop, or even if signs or symptoms never develop  The following can help keep you and your baby safe:     · Wash your hands throughout the day  Use soap and water  Rub your soapy hands together, lacing your fingers  Wash the front and back of each hand, and in between your fingers  Use the fingers of one hand to scrub under the fingernails of the other hand  Wash for at least 20 seconds  Rinse with warm, running water for several seconds  Dry your hands with a clean towel or paper towel  Use hand  that contains alcohol if soap and water are not available  If you must go out, wash your hands before you leave your home and when you get home  Wash your hands after you put items away  Be careful about what you touch while you are out  · Protect yourself from sneezes and coughs  Turn your face away and cover your mouth and nose if you are around someone who is sneezing or coughing  This helps protect you from the person's droplets  Cover your mouth and nose with a tissue when you need to sneeze or cough  Use the bend of your arm if you do not have a tissue  Throw the tissue away  Then wash your hands or use hand   · Make a habit of not touching your face  If you get the virus on your hands, you can transfer it to your eyes, nose, or mouth and become infected  · Follow worldwide, national, and local social distancing guidelines  Social distancing means staying far enough away physically from others that the virus cannot spread from one person to another  If you must go out, avoid crowds and large gatherings  Gatherings or crowds of 10 or more individuals can cause the virus to spread  Avoid places such as [place], beaches, sporting events, and tourist attractions   For events such as parties, holiday meals, [employer] services, and conferences, attend virtually (on a computer), if possible  · Wear a face covering (mask) around anyone who does not live in your home  A covering helps protect the person wearing it from being infected or passing the virus to others  Do not  wear a plastic face shield instead of a covering  You can use both together for extra protection  Use a disposable non-medical mask, or make a cloth covering with at least 2 layers  You can also create layers by putting a cloth covering over a disposable non-medical mask  Cover your mouth and your nose  Securely fasten it under your chin and on the sides of your face  A face covering is not a substitute for other safety measures  Continue social distancing and washing your hands often  Do not put a face shield or covering on your   These increase the risk for sudden infant death syndrome (SIDS)  · Stay at least 6 feet (2 meters) away from anyone who does not live in your home  Keep this distance every time you go out of your home and are around another person  Do not shake hands with, hug, or kiss a person as a greeting  Stand or walk as far from others as possible, especially around anyone who is sneezing or coughing  If you must use public transportation (such as a bus or subway), try to sit or stand away from others  Do not go to someone else's home unless it is necessary  Do not go over to visit, even if you are lonely, or the person is  Only go if you need to help him or her  · Stay safe if you must go out to work  Keep physical distance between you and other workers as much as possible  Follow your employer's rules so everyone stays safe  · Clean and disinfect high-touch surfaces and objects in your home often  Use disinfecting wipes or make a solution of 4 teaspoons of bleach in 1 quart (4 cups) of water  Clean surfaces and objects in the room where your baby will be sleeping, especially right before you give birth  Wash your hands after you clean and disinfect   Be careful with cleaning products  Read the labels to make sure they are safe to use during pregnancy  Open windows to make sure you have good ventilation  What you can do to have a healthy pregnancy during the COVID-19 outbreak:   · Keep all prenatal and  appointments  You may be able to have certain prenatal appointments without having to go into the provider's office  Some providers offer phone, video, or other types of appointments  You may also be able to get prescriptions for a few months at a time  This will help lower the number of trips you need to make to the pharmacy for refills  If you do need to go into your provider's office, take precautions  Put a face covering on before you go into the office  Do not stand or sit within 6 feet (2 meters) of anyone in the waiting room, if possible  Do not stand or sit near anyone who is not wearing a face covering  · Get recommended vaccines  Your healthcare provider can tell you if you need vaccines not listed below, and when to get them  ? Ask about the COVID-19 vaccine  Your healthcare provider may recommend that you get the vaccine now if you are at high risk for COVID-19  Make sure you understand the risks and benefits of getting the vaccine during pregnancy  Do not get a COVID-19 vaccine until you and your healthcare provider decide it is right for you  Even after you get the vaccine, continue wearing a face covering, handwashing, and social distancing  These are still the best ways to prevent infection  ? Get the influenza (flu) vaccine  Try to get the vaccine as soon as recommended, usually starting in September or October  ? Get the Tdap vaccine  The Tdap vaccine protects you from tetanus, diphtheria, and pertussis  If possible, get the vaccine during weeks 27 to 36 of your pregnancy  You should get a dose of Tdap with each pregnancy  · Take prenatal vitamins as directed  Your prenatal vitamins should contain folic acid   You need about 600 micrograms (mcg) of folic acid each day during pregnancy  Folic acid helps to form your baby's brain and spinal cord in early pregnancy  · Eat a variety of healthy foods  Healthy foods are important, even if you take a prenatal vitamin  Healthy foods contain nutrients that help keep your immune system strong  Examples of healthy foods include vegetables, fruits, whole-grain breads and cereals, lean meats and poultry, fish, low-fat dairy products, and cooked beans  Do not have raw, undercooked, or unpasteurized food or drinks  Unpasteurized foods are foods that have not gone through the heating process (pasteurization) that destroys bacteria  Your healthcare provider or a dietitian can help you create healthy meal plans  · Talk to your healthcare provider about exercise  Moderate exercise can help keep your immune system strong  Your healthcare provider can help you plan an exercise program that is safe for you during pregnancy  You may need to exercise at home if you cannot exercise outdoors, such as walking in a park  If you want to do pregnancy yoga or other group activities, be safe  Stay at least 6 feet (2 meters) away from others in the class, and the instructor  Wash your hands before you leave the building  Follow the facility's instructions for preventing infections  · Try to lower your stress  You may be feeling more stressed than usual because of the COVID-19 outbreak  You may also feel stress from not being able to share your pregnancy with others  For example, you may not be able to have someone with you during prenatal visits or ultrasounds  Talk to your healthcare providers about ways to manage stress during this time  Pick 1 or 2 times a day to watch the news  Constant news watching about COVID-19 can increase your stress levels  Set a sleep schedule to go to bed and wake up at the same times each day  · Do not smoke cigarettes, drink alcohol, or use drugs    Nicotine and other chemicals in cigarettes and cigars can harm your baby and your health  Alcohol can increase your risk for a miscarriage  Your baby may also be born too small or have other health problems  Certain drugs can be passed to your baby before he or she is born  Some can be passed through breast milk  It is best to quit cigarettes, alcohol, and drugs before you become pregnant and not start again after your baby is born  Ask your healthcare provider for information if you currently use any of these and need help to quit  Protect your  during delivery and while you are in the hospital:  It is not known for sure if an unborn baby can be infected with the virus that causes COVID-19  Some newborns have tested positive for the virus  The newborns may have been infected before, during, or after birth  The greatest risk is for a  to be in close contact with an infected person  Your baby may be tested for the virus soon after being born if you have COVID-23  He or she may be tested again before you leave the hospital  This depends on whether your baby has any signs or symptoms of COVID-19  You will be able to make choices for you and your baby during your hospital stay  Talk to healthcare providers about the following:  · Ask about temporary separation if you have COVID-19  Temporary separation means your  is moved to a different room from you  You will be able to make the decision if you want to do this  Separation will help lower your 's risk for being infected  You will still be able to give your  breast milk  You may need to pump the milk from your breasts  Someone who does not have COVID-19 will then feed the pumped milk to your   You may instead choose to have your baby brought to you when you want to breastfeed  Take precautions to keep your baby safe  Wash your hands and the skin around your nipples before you hold your baby  Wear a face covering while you breastfeed      · Be careful if you have COVID-19 and do not choose temporary separation  Healthcare providers will keep your  at least 6 feet (2 meters) away from you as much as possible  Your  may be placed in an incubator  The incubator will help protect your  from infection  Always wash your hands and put on a face covering when you hold, touch, or have close contact with your   · Ask about visitors  The facility may not be allowing any visitors to newborns during this time  If you are allowed visitors, you may need to limit how many you can have at a time  Do not allow anyone who has known or suspected COVID-19 to visit  Even without signs or symptoms, the person can infect your  or others in the room  All visitors need to wash their hands and put on clean face coverings before entering your room  The face covering needs to stay on during the whole visit  Do not let anyone take the face covering down to make faces at your baby, talk, sneeze, or cough  Do not let anyone kiss you or your baby  Protect your  at home:   · You can choose to continue temporary separation if you have COVID-19  You can do this if an adult who does not have COVID-19 can care for your   Your healthcare provider can give you instructions on how to do this safely at home  Only have close contact with your  when needed  Remember to wash your hands and put on a clean face covering first  You may need to continue pumping your breast milk  A healthy adult can feed the pumped breast milk to your   You may instead choose to have your baby brought to you when you want to breastfeed  Take precautions to keep your baby safe  Wash your hands and the skin around your nipples before you hold your baby  You will also need to wear a face covering while you breastfeed  · Use face coverings safely    Everyone who has COVID-19 needs to wear a clean face covering while being within 6 feet (2 meters) of your   This includes other children in your home who are 2 years or older  Do not put a face covering or plastic face shield on your   Any covering increases your 's risk for sudden infant death syndrome (SIDS)  Do not use coverings on children younger than 2 years or on anyone who has breathing problems or cannot remove it  · Be careful about visitors  Continue precautions you used in the hospital  Do not allow anyone who has known or suspected COVID-19 to come over to see your   Have visitors put on clean face coverings before they enter your home  Have them wash their hands as soon as they come in  The face covering needs to stay on during the whole visit  · Keep all checkup appointments  You may be able to have some appointments by phone or video meeting  Other appointments will need to be in person, such as for vaccines  Vaccines are normally given to babies at certain ages  Until COVID-19 is under control, your 's provider will give you a vaccine schedule  It is important for your  to get all recommended vaccines  What you need to know about breastfeeding:  Breastfeeding for the first 6 months decreases your baby's risk for respiratory (lung) infections, allergies, asthma, and stomach problems  Breast milk also helps your baby develop a strong immune system  Breast milk is considered safe, even if you have COVID-19  Experts currently believe the virus that causes COVID-19 does not spread in breast milk  Do the following to help protect your baby:  · Wash your hands before every breastfeeding or pumping session  Even if you do not have COVID-19, you can transfer the virus from your hands to your baby or the pump  Use soap and water to wash your hands whenever possible  Use hand  that contains alcohol if soap and water are not available  · Clean and sanitize your breast pump after each use    Follow the 's directions for cleaning and sanitizing the pump  It is important not to use it until it is clean and sanitized  · If you have COVID-19:      ? Wear a face covering while you breastfeed or pump  This will help prevent you from passing the virus through droplets when you talk, cough, sneeze, or laugh  The virus can stay on surfaces such as a breast pump for hours to days  ? Have someone who is not infected bottle feed your baby, if possible  Have the person wash his or her hands with soap and water before each feeding  The person can feed your  pumped breast milk or formula  Follow up with your doctor or obstetrician as directed:  Write down your questions so you remember to ask them during your visits  For more information:   · Centers for Disease Control and Prevention  [address] , [address]  Phone: 2- [phone] [phone] [phone]  Web Address: [employer]     © [address] [place] [address]  Information is for End User's use only and may not be sold, redistributed or otherwise used for commercial purposes  All illustrations and images included in CareNotes® are the copyrighted property of A D A M , Inc  or [facility]   The above information is an  only  It is not intended as medical advice for individual conditions or treatments  Talk to your doctor, nurse or pharmacist before following any medical regimen to see if it is safe and effective for you  Kick Counts in Pregnancy   AMBULATORY CARE:   Kick counts  measure how much your baby is moving in your womb  A kick from your baby can be felt as a twist, turn, swish, roll, or jab  It is common to feel your baby kicking at 26 to 28 weeks of pregnancy  You may feel your baby kick as early as 20 weeks of pregnancy  You may want to start counting at 28 weeks  Contact your healthcare provider immediately if:   · You feel a change in the number of kicks or movements of your baby  · You feel fewer than 10 kicks within 2 hours       · You have questions or concerns about your baby's movements  Why measure kick counts:  Your baby's movement may provide information about your baby's health  He or she may move less, or not at all, if there are problems  Your baby may move less if he or she is not getting enough oxygen or nutrition from the placenta  Do not smoke while you are pregnant  Smoking decreases the amount of oxygen that gets to your baby  Talk to your healthcare provider if you need help to quit smoking  Tell your healthcare provider as soon as you feel a change in your baby's movements  When to measure kick counts:   · Measure kick counts at the same time every day  · Measure kick counts when your baby is awake and most active  Your baby may be most active in the evening  How to measure kick counts:  Check that your baby is awake before you measure kick counts  You can wake up your baby by lightly pushing on your belly, walking, or drinking something cold  Your healthcare provider may tell you different ways to measure kick counts  You may be told to do the following:  · Use a chart or clock to keep track of the time you start and finish counting  · Sit in a chair or lie on your left side  · Place your hands on the largest part of your belly  · Count until you reach 10 kicks  Write down how much time it takes to count 10 kicks  · It may take 30 minutes to 2 hours to count 10 kicks  It should not take more than 2 hours to count 10 kicks  Follow up with your healthcare provider as directed:  Write down your questions so you remember to ask them during your visits  © Copyright [employer] 2021 Information is for End User's use only and may not be sold, redistributed or otherwise used for commercial purposes  All illustrations and images included in CareNotes® are the copyrighted property of A D A M , Inc  or [facility] [employer]   The above information is an  only   It is not intended as medical advice for individual conditions or treatments  Talk to your doctor, nurse or pharmacist before following any medical regimen to see if it is safe and effective for you  Pregnancy at [address]:   What changes are happening in my body? You may continue to have symptoms such as shortness of breath, heartburn, contractions, or swelling of your ankles and feet  You may be gaining about 1 pound a week now  How do I care for myself at this stage of my pregnancy? · Eat a variety of healthy foods  Healthy foods include fruits, vegetables, whole-grain breads, low-fat dairy foods, beans, lean meats, and fish  Drink liquids as directed  Ask how much liquid to drink each day and which liquids are best for you  Limit caffeine to less than 200 milligrams each day  Limit your intake of fish to 2 servings each week  Choose fish low in mercury such as canned light tuna, shrimp, salmon, cod, or tilapia  Do not  eat fish high in mercury such as swordfish, tilefish, russ mackerel, and shark  · Manage heartburn  by eating 4 or 5 small meals each day instead of large meals  Avoid spicy food  · Manage swelling  by lying down and putting your feet up  · Take prenatal vitamins as directed  Your need for certain vitamins and minerals, such as folic acid, increases during pregnancy  Prenatal vitamins provide some of the extra vitamins and minerals you need  Prenatal vitamins may also help to decrease the risk of certain birth defects  · Talk to your healthcare provider about exercise  Moderate exercise can help you stay fit  Your healthcare provider will help you plan an exercise program that is safe for you during pregnancy  · Do not smoke  Smoking increases your risk of a miscarriage and other health problems during your pregnancy  Smoking can cause your baby to be born too early or weigh less at birth   Ask your healthcare provider for information if you need help quitting  · Do not drink alcohol  Alcohol passes from your body to your baby through the placenta  It can affect your baby's brain development and cause fetal alcohol syndrome (FAS)  FAS is a group of conditions that causes mental, behavior, and growth problems  · Talk to your healthcare provider before you take any medicines  Many medicines may harm your baby if you take them when you are pregnant  Do not take any medicines, vitamins, herbs, or supplements without first talking to your healthcare provider  Never use illegal or street drugs (such as marijuana or cocaine) while you are pregnant  What are some safety tips during pregnancy? · Avoid hot tubs and saunas  Do not use a hot tub or sauna while you are pregnant, especially during your first trimester  Hot tubs and saunas may raise your baby's temperature and increase the risk of birth defects  · Avoid toxoplasmosis  This is an infection caused by eating raw meat or being around infected cat feces  It can cause birth defects, miscarriages, and other problems  Wash your hands after you touch raw meat  Make sure any meat is well-cooked before you eat it  Avoid raw eggs and unpasteurized milk  Use gloves or ask someone else to clean your cat's litter box while you are pregnant  What changes are happening with my baby? By 34 weeks, your baby may weigh more than 5 pounds  Your baby will be about 12 ½ inches long from the top of the head to the rump (baby's bottom)  Your baby is gaining about ½ pound a week  Your baby's eyes open and close now  Your baby's kicks and movements are more forceful at this time  What do I need to know about prenatal care? Your healthcare provider will check your blood pressure and weight  You may also need the following:  · A urine test  may also be done to check for sugar and protein  These can be signs of gestational diabetes or infection  Protein in your urine may also be a sign of preeclampsia   Preeclampsia is a condition that can develop during week 20 or later of your pregnancy  It causes high blood pressure, and it can cause problems with your kidneys and other organs  · A Tdap vaccine  may be recommended by your healthcare provider  · Fundal height  is a measurement of your uterus to check your baby's growth  This number is usually the same as the number of weeks that you have been pregnant  Your healthcare provider may also check your baby's position  · Your baby's heart rate  will be checked  When should I seek immediate care? · You develop a severe headache that does not go away  · You have new or increased vision changes, such as blurred or spotted vision  · You have new or increased swelling in your face or hands  · You have vaginal spotting or bleeding  · Your water broke or you feel warm water gushing or trickling from your vagina  When should I contact my healthcare provider? · You have more than 5 contractions in 1 hour  · You notice any changes in your baby's movements  · You have abdominal cramps, pressure, or tightening  · You have a change in vaginal discharge  · You have chills or a fever  · You have vaginal itching, burning, or pain  · You have yellow, green, white, or foul-smelling vaginal discharge  · You have pain or burning when you urinate, less urine than usual, or pink or bloody urine  · You have questions or concerns about your condition or care  CARE AGREEMENT:   You have the right to help plan your care  Learn about your health condition and how it may be treated  Discuss treatment options with your healthcare providers to decide what care you want to receive  You always have the right to refuse treatment  The above information is an  only  It is not intended as medical advice for individual conditions or treatments   Talk to your doctor, nurse or pharmacist before following any medical regimen to see if it is safe and effective for you  © Copyright [employer] 2021 Information is for End User's use only and may not be sold, redistributed or otherwise used for commercial purposes   All illustrations and images included in CareNotes® are the copyrighted property of A D A M , Inc  or Magda Fernández

## 2021-09-24 NOTE — PROGRESS NOTES
Denies loss of fluid, vaginal bleeding and abdominal pain  Confirms fetal movement x2  Tolerating aspirin, Lexapro, iron, folic acid and Procardia well  Denies worsening symptoms of depression, thoughts of self-harm or harm to others  Gestational diabetes on Lantus 24 units nightly  Fasting blood sugars 80s and 2 hour post prandials 100-120  Initiation of  fetal surveillance early her  Center twice weekly NSTs   BP:118/70    Weight: +16lb    Plan:  -  Continue prenatal vitamins daily  - encouraged to continue fetal kick counts daily  - depression in pregnancy encouraged to continue Lexapro as ordered  Call office with worsening symptoms, thoughts of self-harm or harm to others  - follow-up  Center scheduled for 21  - NST difficult due to body habitus and increased fetal movement reassuring  - cHTN continue Procardia as ordered  Signs and symptoms of preeclampsia reviewed  - recommendation by  Center for delivery at 37 weeks  Will submit information to surgical scheduler  - common discomforts of pregnancy and precautions including  labor reviewed  Signs and symptoms to report reviewed    Written information provided about COVID-19  RTO 2 weeks

## 2021-09-27 ENCOUNTER — ULTRASOUND (OUTPATIENT)
Dept: PERINATAL CARE | Facility: CLINIC | Age: 36
End: 2021-09-27
Payer: COMMERCIAL

## 2021-09-27 VITALS
BODY MASS INDEX: 45.99 KG/M2 | DIASTOLIC BLOOD PRESSURE: 77 MMHG | HEART RATE: 68 BPM | HEIGHT: 67 IN | SYSTOLIC BLOOD PRESSURE: 137 MMHG | WEIGHT: 293 LBS

## 2021-09-27 DIAGNOSIS — E66.01 MORBID OBESITY WITH BMI OF 45.0-49.9, ADULT (HCC): ICD-10-CM

## 2021-09-27 DIAGNOSIS — O99.213 OBESITY AFFECTING PREGNANCY IN THIRD TRIMESTER: ICD-10-CM

## 2021-09-27 DIAGNOSIS — O10.912 CHRONIC HYPERTENSION IN OBSTETRIC CONTEXT IN SECOND TRIMESTER: ICD-10-CM

## 2021-09-27 DIAGNOSIS — O99.210 OBESITY AFFECTING PREGNANCY, ANTEPARTUM: ICD-10-CM

## 2021-09-27 DIAGNOSIS — Z3A.30 30 WEEKS GESTATION OF PREGNANCY: ICD-10-CM

## 2021-09-27 DIAGNOSIS — O24.419 GESTATIONAL DIABETES MELLITUS (GDM) IN SECOND TRIMESTER, GESTATIONAL DIABETES METHOD OF CONTROL UNSPECIFIED: ICD-10-CM

## 2021-09-27 DIAGNOSIS — O24.414 INSULIN CONTROLLED GESTATIONAL DIABETES MELLITUS (GDM) IN THIRD TRIMESTER: ICD-10-CM

## 2021-09-27 DIAGNOSIS — O30.043 DICHORIONIC DIAMNIOTIC TWIN PREGNANCY IN THIRD TRIMESTER: Primary | ICD-10-CM

## 2021-09-27 DIAGNOSIS — O10.913 CHRONIC HYPERTENSION IN OBSTETRIC CONTEXT IN THIRD TRIMESTER: ICD-10-CM

## 2021-09-27 PROCEDURE — 76815 OB US LIMITED FETUS(S): CPT | Performed by: OBSTETRICS & GYNECOLOGY

## 2021-09-27 PROCEDURE — 59025 FETAL NON-STRESS TEST: CPT | Performed by: OBSTETRICS & GYNECOLOGY

## 2021-09-27 NOTE — PROGRESS NOTES
NST is reactive x2    During her NEVIN both babies had biophysical profiles that were normal,    Shani Hardy MD

## 2021-09-27 NOTE — PATIENT INSTRUCTIONS
Kick Counts in Pregnancy   AMBULATORY CARE:   Kick counts  measure how much your baby is moving in your womb  A kick from your baby can be felt as a twist, turn, swish, roll, or jab  It is common to feel your baby kicking at 26 to 28 weeks of pregnancy  You may feel your baby kick as early as 20 weeks of pregnancy  You may want to start counting at 28 weeks  Contact your healthcare provider immediately if:   · You feel a change in the number of kicks or movements of your baby  · You feel fewer than 10 kicks within 2 hours  · You have questions or concerns about your baby's movements  Why measure kick counts:  Your baby's movement may provide information about your baby's health  He or she may move less, or not at all, if there are problems  Your baby may move less if he or she is not getting enough oxygen or nutrition from the placenta  Do not smoke while you are pregnant  Smoking decreases the amount of oxygen that gets to your baby  Talk to your healthcare provider if you need help to quit smoking  Tell your healthcare provider as soon as you feel a change in your baby's movements  When to measure kick counts:   · Measure kick counts at the same time every day  · Measure kick counts when your baby is awake and most active  Your baby may be most active in the evening  How to measure kick counts:  Check that your baby is awake before you measure kick counts  You can wake up your baby by lightly pushing on your belly, walking, or drinking something cold  Your healthcare provider may tell you different ways to measure kick counts  You may be told to do the following:  · Use a chart or clock to keep track of the time you start and finish counting  · Sit in a chair or lie on your left side  · Place your hands on the largest part of your belly  · Count until you reach 10 kicks  Write down how much time it takes to count 10 kicks  · It may take 30 minutes to 2 hours to count 10 kicks  It should not take more than 2 hours to count 10 kicks  Follow up with your healthcare provider as directed:  Write down your questions so you remember to ask them during your visits  © Copyright [employer] 2021 Information is for End User's use only and may not be sold, redistributed or otherwise used for commercial purposes  All illustrations and images included in CareNotes® are the copyrighted property of A D A M , Inc  or [facility] [place]   The above information is an  only  It is not intended as medical advice for individual conditions or treatments  Talk to your doctor, nurse or pharmacist before following any medical regimen to see if it is safe and effective for you

## 2021-09-27 NOTE — LETTER
September 27, 2021     NET[place]  [address]  [address]    Patient: Mar Jama   YOB: 1985   Date of Visit: 9/27/2021       Dear Dr Zamudio Dural: Thank you for referring Kalie Sam to me for evaluation  Below are my notes for this consultation  If you have questions, please do not hesitate to call me  I look forward to following your patient along with you  Sincerely,        Arturo Finch MD        CC: No Recipients  Arturo Finch MD  9/27/2021  6:32 PM  Sign when Signing Visit   NST is reactive x2    During her NEVIN both babies had biophysical profiles that were normal,    Arturo Finch MD

## 2021-09-28 RX ORDER — BLOOD SUGAR DIAGNOSTIC
STRIP MISCELLANEOUS
Qty: 100 EACH | Refills: 0 | Status: SHIPPED | OUTPATIENT
Start: 2021-09-28 | End: 2021-11-17

## 2021-09-29 ENCOUNTER — DOCUMENTATION (OUTPATIENT)
Dept: PERINATAL CARE | Facility: CLINIC | Age: 36
End: 2021-09-29

## 2021-09-29 ENCOUNTER — TELEPHONE (OUTPATIENT)
Dept: OBGYN CLINIC | Facility: MEDICAL CENTER | Age: 36
End: 2021-09-29

## 2021-09-29 NOTE — PROGRESS NOTES
Date:  21  RE: Galindo Guajardo    : 1985  Estimated Date of Delivery: 21  EGA: 31w1d  [facility]  Insulin controlled gestational diabetes;  Lolis TWIN Pregnancy      Plan:  Lantus- 24 units at 9 PM  daily   -encouraged to schedule a follow-up appointment with a dietician    -Last appointment with dietician: 2021  Diet: 2400 Gestational diabetes meal plan; 3 meals and 3 snacks  SMBG: Checks blood sugar 4 times per day; fasting and 2 hour start of each meal    Meter: Contour Next EZ glucose meter  Activity: Walk daily, follow OB recommendations  -Walks 2 times weekly for 30 minutes  Support System:   Patient Goal: I will walk 20-30 minutes after dinner daily      Labs  21 [id number] 5%     Ultrasounds  21 US; Normal Fetal growth and NEVIN for both fetus A and Fetus B  2021 Level 2 - Normal growth for Baby A and baby B  2021 Fetal ECHO completed for twins   2021 US: Normal fetal growth and NEVIN for both fetus A and Fetus B    Further fetal surveillance  NST / NEVIN twice a week (one weekly NST is done at [facility], the other NST/NEVIN is done at [address] [place] [address])    Meng Rivera RN

## 2021-09-30 ENCOUNTER — ROUTINE PRENATAL (OUTPATIENT)
Dept: OBGYN CLINIC | Facility: MEDICAL CENTER | Age: 36
End: 2021-09-30
Payer: COMMERCIAL

## 2021-09-30 VITALS — BODY MASS INDEX: 52.31 KG/M2 | WEIGHT: 293 LBS

## 2021-09-30 DIAGNOSIS — Z23 NEED FOR INFLUENZA VACCINATION: Primary | ICD-10-CM

## 2021-09-30 DIAGNOSIS — O24.414 INSULIN CONTROLLED GESTATIONAL DIABETES MELLITUS (GDM) IN THIRD TRIMESTER: Primary | ICD-10-CM

## 2021-09-30 DIAGNOSIS — O10.913 CHRONIC HYPERTENSION IN OBSTETRIC CONTEXT IN THIRD TRIMESTER: ICD-10-CM

## 2021-09-30 DIAGNOSIS — O09.513 PRIMIGRAVIDA OF ADVANCED MATERNAL AGE IN THIRD TRIMESTER: ICD-10-CM

## 2021-09-30 DIAGNOSIS — O24.414 INSULIN CONTROLLED GESTATIONAL DIABETES MELLITUS (GDM) IN THIRD TRIMESTER: ICD-10-CM

## 2021-09-30 DIAGNOSIS — O30.043 DICHORIONIC DIAMNIOTIC TWIN PREGNANCY IN THIRD TRIMESTER: ICD-10-CM

## 2021-09-30 DIAGNOSIS — O99.213 OBESITY AFFECTING PREGNANCY IN THIRD TRIMESTER: ICD-10-CM

## 2021-09-30 DIAGNOSIS — Z3A.30 30 WEEKS GESTATION OF PREGNANCY: ICD-10-CM

## 2021-09-30 PROCEDURE — 90686 IIV4 VACC NO PRSV 0.5 ML IM: CPT | Performed by: OBSTETRICS & GYNECOLOGY

## 2021-09-30 PROCEDURE — PNV: Performed by: OBSTETRICS & GYNECOLOGY

## 2021-09-30 PROCEDURE — 59025 FETAL NON-STRESS TEST: CPT | Performed by: OBSTETRICS & GYNECOLOGY

## 2021-09-30 PROCEDURE — 90471 IMMUNIZATION ADMIN: CPT | Performed by: OBSTETRICS & GYNECOLOGY

## 2021-09-30 NOTE — PATIENT INSTRUCTIONS
Fetal Movement   WHAT YOU NEED TO KNOW:   Fetal movements are the kicks, rolls, and hiccups of your unborn baby  You may start to feel these movements when you are 20 weeks pregnant  The movements grow stronger and more frequent as your baby grows  Fetal movements show that your unborn baby is getting the oxygen and nutrients he or she needs before birth  Fewer fetal movements may signal a problem with your baby's health  DISCHARGE INSTRUCTIONS:   Follow up with your healthcare provider or obstetrician as directed:  Write down your questions so you remember to ask them during your visits  Normal fetal movement:  Fetal activity can be described by 4 states, from least to most active  During quiet sleep, your unborn baby may be still for up to 2 hours  During active sleep, he or she kicks, rolls, and moves often  During the quiet awake state, he or she may only move his or her eyes  The active awake state includes strong kicks and rolls  What affects fetal movement:  You may feel your baby move more after you eat, or after you drink caffeine  You may feel your baby move less while you are more active, such as when you exercise  You may also feel fewer movements if you are obese  Certain medicines can change your baby's movements  Tell your healthcare provider about the medicines you are taking  Track fetal movements at home:  Fetal movement is most often felt when you lie quietly on your side  Your healthcare provider may ask you to count movements for 2 hours  He or she may ask you to track how long it takes for your baby to move 10 times  Keep a log of your baby's movements  Contact your healthcare provider or obstetrician if:   · It takes longer than usual to feel 10 of your unborn baby's movements  · You do not feel your unborn baby move at least 10 times in 2 hours  · The skin on your hands, feet, and around your eyes is more swollen than usual     · You have a headache for at least 24 hours      · Tiny red dots appear on your skin  · Your belly is tender when you press on it  · You have questions or concerns about your condition or care  Return to the emergency department if:   · You do not feel your unborn baby move for 12 hours  · You feel cramping or constant pain in your abdomen  · You have heavy bleeding from your vagina  · You have a severe headache and cannot see clearly  · You are having trouble breathing or are vomiting  · You have a seizure  © Copyright [employer] 2021 Information is for End User's use only and may not be sold, redistributed or otherwise used for commercial purposes  All illustrations and images included in CareNotes® are the copyrighted property of A D A M , Inc  or [facility] [place]   The above information is an  only  It is not intended as medical advice for individual conditions or treatments  Talk to your doctor, nurse or pharmacist before following any medical regimen to see if it is safe and effective for you  Early Labor Signs   WHAT YOU SHOULD KNOW:   Early labor signs are changes in your body that allow your baby to pass through your birth canal   INSTRUCTIONS:   Signs and symptoms of early labor:   · Lightening  occurs when your baby drops inside your pelvis  You may feel increased pressure in your pelvis  This may happen a few weeks to a few hours before your labor begins  · Contractions  are cramps and tightening that occur in your uterus to help move the baby through your birth canal  Contractions occur regularly and more often each time  Each one lasts about 30 to 70 seconds, and gets stronger and more painful until you deliver your baby  Contractions do not go away with movement  They start in your lower back and move to the front in your abdomen  · Effacement  occurs when your cervix softens and thins, so it can easily open for the baby   Your primary healthcare provider [facility]) or obstetrician will examine your cervix for effacement  · Dilation  is widening of your cervix, also for the baby's passage  Your PHP or obstetrician will examine your cervix for dilation  Your cervix will be fully opened and ready for delivery when it is dilated to 10 centimeters  · Increased discharge  from your vagina may occur  It may be pink, clear, or slightly bloody  This discharge may also be called bloody show  Bloody show is a mucus plug that forms and blocks your cervix during pregnancy  · Rupture of membranes  is a sudden release of clear fluid from your vagina  It is also known as when your water breaks  Your PHP or obstetrician may need to break your water if it does not break on its own  False labor: You may have false labor signs, which are also called [place] contractions  False labor is common and may happen several weeks or days before your actual labor  The contractions are not regular, and do not get closer together  The pain is usually mild, does not worsen, and is felt only in front  [place] contractions may happen later in the day, and stop after you change position, walk, or rest   Contact your PHP or obstetrician if:   · You have pain in your lower back or abdomen  · You have bloody mucus or show  · You have questions or concerns about your condition or care  Return to the emergency department if:   · You have regular, painful contractions that are less than 5 minutes apart, and last 30 to 70 seconds each  · You have heavy vaginal bleeding  · You have a constant trickle or sudden gush of clear fluid from your vagina  · You notice a sudden decrease in your baby's movement  © 2014 [address] is for End User's use only and may not be sold, redistributed or otherwise used for commercial purposes  All illustrations and images included in CareNotes® are the copyrighted property of A D A M , Inc  or Ramesh Colvin    The above information is an educational aid only  It is not intended as medical advice for individual conditions or treatments  Talk to your doctor, nurse or pharmacist before following any medical regimen to see if it is safe and effective for you

## 2021-09-30 NOTE — PROGRESS NOTES
Routine Prenatal Visit  [facility] [address] [place] [address]    Assessment/Plan:  Aminah Horvath is a 29y o  year old  at 31w2d who presents for routine prenatal visit  1  BMI 50 0-59 9, adult (HCC)    2  30 weeks gestation of pregnancy    3  Insulin controlled gestational diabetes mellitus (GDM) in third trimester    4  Dichorionic diamniotic twin pregnancy in third trimester    5  Chronic hypertension in obstetric context in third trimester    6  Primigravida of advanced maternal age in third trimester    7  Obesity affecting pregnancy in third trimester          Subjective:     CC: Prenatal care    Galindo Guajardo is a 29 y o   female who presents for routine prenatal care at 31w2d  Pregnancy ROS: no leakage of fluid, pelvic pain, or vaginal bleeding   + fetal movement  Scheduled for 1LTCS on 2021  GBS at next visit    The following portions of the patient's history were reviewed and updated as appropriate: allergies, current medications, past family history, past medical history, obstetric history, gynecologic history, past social history, past surgical history and problem list       Objective:  Wt (!) 152 kg (334 lb)   LMP 2021 (Exact Date)   BMI 52 31 kg/m²   Pregravid Weight/BMI: 144 kg (317 lb) (BMI 49 64)  Current Weight: (!) 152 kg (334 lb)   Total Weight Gain: 7 711 kg (17 lb)   Pre-Maurice Vitals      Most Recent Value   Prenatal Assessment   Fetal Heart Rate  144 b   Movement  Present   Prenatal Vitals   Weight - Scale  (!) 152 kg (334 lb)   Urine Albumin/Glucose   Dilation/Effacement/Station   Vaginal Drainage   Edema   LLE Edema  Trace   RLE Edema  Trace           General: Well appearing, no distress  Respiratory: Unlabored breathing  Cardiovascular: Regular rate  Abdomen: Soft, gravid, nontender  Fundal Height: Appropriate for gestational age  Extremities: Warm and well perfused  Non tender

## 2021-10-01 DIAGNOSIS — I10 HTN (HYPERTENSION), BENIGN: ICD-10-CM

## 2021-10-04 RX ORDER — NIFEDIPINE 30 MG/1
30 TABLET, EXTENDED RELEASE ORAL DAILY
Qty: 30 TABLET | Refills: 11 | Status: SHIPPED | OUTPATIENT
Start: 2021-10-04

## 2021-10-05 PROBLEM — Z3A.32 32 WEEKS GESTATION OF PREGNANCY: Status: ACTIVE | Noted: 2021-06-20

## 2021-10-06 ENCOUNTER — ULTRASOUND (OUTPATIENT)
Dept: PERINATAL CARE | Facility: OTHER | Age: 36
End: 2021-10-06
Payer: COMMERCIAL

## 2021-10-06 VITALS
DIASTOLIC BLOOD PRESSURE: 67 MMHG | SYSTOLIC BLOOD PRESSURE: 94 MMHG | HEIGHT: 67 IN | HEART RATE: 78 BPM | BODY MASS INDEX: 45.99 KG/M2 | WEIGHT: 293 LBS

## 2021-10-06 DIAGNOSIS — Z3A.32 32 WEEKS GESTATION OF PREGNANCY: ICD-10-CM

## 2021-10-06 DIAGNOSIS — O99.213 OBESITY AFFECTING PREGNANCY IN THIRD TRIMESTER: ICD-10-CM

## 2021-10-06 DIAGNOSIS — O30.043 DICHORIONIC DIAMNIOTIC TWIN PREGNANCY IN THIRD TRIMESTER: Primary | ICD-10-CM

## 2021-10-06 DIAGNOSIS — O10.913 CHRONIC HYPERTENSION IN OBSTETRIC CONTEXT IN THIRD TRIMESTER: ICD-10-CM

## 2021-10-06 DIAGNOSIS — O09.513 PRIMIGRAVIDA OF ADVANCED MATERNAL AGE IN THIRD TRIMESTER: ICD-10-CM

## 2021-10-06 DIAGNOSIS — O24.414 INSULIN CONTROLLED GESTATIONAL DIABETES MELLITUS (GDM) IN THIRD TRIMESTER: ICD-10-CM

## 2021-10-06 PROCEDURE — 76815 OB US LIMITED FETUS(S): CPT | Performed by: OBSTETRICS & GYNECOLOGY

## 2021-10-06 PROCEDURE — 76818 FETAL BIOPHYS PROFILE W/NST: CPT | Performed by: OBSTETRICS & GYNECOLOGY

## 2021-10-07 ENCOUNTER — DOCUMENTATION (OUTPATIENT)
Dept: PERINATAL CARE | Facility: CLINIC | Age: 36
End: 2021-10-07

## 2021-10-07 ENCOUNTER — TELEPHONE (OUTPATIENT)
Dept: OBGYN CLINIC | Facility: MEDICAL CENTER | Age: 36
End: 2021-10-07

## 2021-10-08 ENCOUNTER — ROUTINE PRENATAL (OUTPATIENT)
Dept: OBGYN CLINIC | Facility: MEDICAL CENTER | Age: 36
End: 2021-10-08
Payer: COMMERCIAL

## 2021-10-08 VITALS — WEIGHT: 293 LBS | DIASTOLIC BLOOD PRESSURE: 70 MMHG | BODY MASS INDEX: 53.09 KG/M2 | SYSTOLIC BLOOD PRESSURE: 110 MMHG

## 2021-10-08 DIAGNOSIS — O30.043 DICHORIONIC DIAMNIOTIC TWIN PREGNANCY IN THIRD TRIMESTER: ICD-10-CM

## 2021-10-08 DIAGNOSIS — O10.913 CHRONIC HYPERTENSION IN OBSTETRIC CONTEXT IN THIRD TRIMESTER: ICD-10-CM

## 2021-10-08 DIAGNOSIS — O99.213 OBESITY AFFECTING PREGNANCY IN THIRD TRIMESTER: ICD-10-CM

## 2021-10-08 DIAGNOSIS — Z3A.32 32 WEEKS GESTATION OF PREGNANCY: ICD-10-CM

## 2021-10-08 DIAGNOSIS — O24.414 INSULIN CONTROLLED GESTATIONAL DIABETES MELLITUS (GDM) IN THIRD TRIMESTER: Primary | ICD-10-CM

## 2021-10-08 DIAGNOSIS — O09.513 PRIMIGRAVIDA OF ADVANCED MATERNAL AGE IN THIRD TRIMESTER: ICD-10-CM

## 2021-10-08 PROCEDURE — PNV: Performed by: NURSE PRACTITIONER

## 2021-10-08 PROCEDURE — 59025 FETAL NON-STRESS TEST: CPT | Performed by: NURSE PRACTITIONER

## 2021-10-11 ENCOUNTER — ROUTINE PRENATAL (OUTPATIENT)
Dept: OBGYN CLINIC | Facility: MEDICAL CENTER | Age: 36
End: 2021-10-11
Payer: COMMERCIAL

## 2021-10-11 VITALS
WEIGHT: 293 LBS | BODY MASS INDEX: 53.56 KG/M2 | DIASTOLIC BLOOD PRESSURE: 60 MMHG | HEART RATE: 75 BPM | SYSTOLIC BLOOD PRESSURE: 96 MMHG

## 2021-10-11 DIAGNOSIS — O09.513 PRIMIGRAVIDA OF ADVANCED MATERNAL AGE IN THIRD TRIMESTER: ICD-10-CM

## 2021-10-11 DIAGNOSIS — O09.93 HIGH-RISK PREGNANCY, THIRD TRIMESTER: ICD-10-CM

## 2021-10-11 DIAGNOSIS — Z3A.32 32 WEEKS GESTATION OF PREGNANCY: Primary | ICD-10-CM

## 2021-10-11 DIAGNOSIS — O30.043 DICHORIONIC DIAMNIOTIC TWIN PREGNANCY IN THIRD TRIMESTER: ICD-10-CM

## 2021-10-11 DIAGNOSIS — O10.913 CHRONIC HYPERTENSION IN OBSTETRIC CONTEXT IN THIRD TRIMESTER: ICD-10-CM

## 2021-10-11 DIAGNOSIS — O99.213 OBESITY AFFECTING PREGNANCY IN THIRD TRIMESTER: ICD-10-CM

## 2021-10-11 DIAGNOSIS — O24.414 INSULIN CONTROLLED GESTATIONAL DIABETES MELLITUS (GDM) IN THIRD TRIMESTER: ICD-10-CM

## 2021-10-11 LAB
SL AMB  POCT GLUCOSE, UA: NEGATIVE
SL AMB POCT URINE PROTEIN: NEGATIVE

## 2021-10-11 PROCEDURE — PNV: Performed by: OBSTETRICS & GYNECOLOGY

## 2021-10-11 PROCEDURE — 87150 DNA/RNA AMPLIFIED PROBE: CPT | Performed by: OBSTETRICS & GYNECOLOGY

## 2021-10-11 PROCEDURE — 59025 FETAL NON-STRESS TEST: CPT | Performed by: OBSTETRICS & GYNECOLOGY

## 2021-10-11 PROCEDURE — 81002 URINALYSIS NONAUTO W/O SCOPE: CPT | Performed by: OBSTETRICS & GYNECOLOGY

## 2021-10-13 ENCOUNTER — ULTRASOUND (OUTPATIENT)
Dept: PERINATAL CARE | Facility: CLINIC | Age: 36
End: 2021-10-13
Payer: COMMERCIAL

## 2021-10-13 VITALS
BODY MASS INDEX: 45.99 KG/M2 | HEART RATE: 70 BPM | SYSTOLIC BLOOD PRESSURE: 138 MMHG | HEIGHT: 67 IN | WEIGHT: 293 LBS | DIASTOLIC BLOOD PRESSURE: 75 MMHG

## 2021-10-13 DIAGNOSIS — Z3A.33 33 WEEKS GESTATION OF PREGNANCY: ICD-10-CM

## 2021-10-13 DIAGNOSIS — O09.513 PRIMIGRAVIDA OF ADVANCED MATERNAL AGE IN THIRD TRIMESTER: Primary | ICD-10-CM

## 2021-10-13 DIAGNOSIS — Z86.79 HISTORY OF CONGESTIVE HEART FAILURE: ICD-10-CM

## 2021-10-13 DIAGNOSIS — O24.414 INSULIN CONTROLLED GESTATIONAL DIABETES MELLITUS (GDM) IN THIRD TRIMESTER: ICD-10-CM

## 2021-10-13 DIAGNOSIS — O10.913 CHRONIC HYPERTENSION IN OBSTETRIC CONTEXT IN THIRD TRIMESTER: ICD-10-CM

## 2021-10-13 LAB — GP B STREP DNA SPEC QL NAA+PROBE: NEGATIVE

## 2021-10-13 PROCEDURE — 76815 OB US LIMITED FETUS(S): CPT | Performed by: OBSTETRICS & GYNECOLOGY

## 2021-10-13 PROCEDURE — 59025 FETAL NON-STRESS TEST: CPT | Performed by: OBSTETRICS & GYNECOLOGY

## 2021-10-14 ENCOUNTER — DOCUMENTATION (OUTPATIENT)
Dept: PERINATAL CARE | Facility: CLINIC | Age: 36
End: 2021-10-14

## 2021-10-18 ENCOUNTER — ROUTINE PRENATAL (OUTPATIENT)
Dept: PERINATAL CARE | Facility: CLINIC | Age: 36
End: 2021-10-18
Payer: COMMERCIAL

## 2021-10-18 ENCOUNTER — ULTRASOUND (OUTPATIENT)
Dept: PERINATAL CARE | Facility: CLINIC | Age: 36
End: 2021-10-18
Payer: COMMERCIAL

## 2021-10-18 VITALS
HEIGHT: 67 IN | DIASTOLIC BLOOD PRESSURE: 88 MMHG | SYSTOLIC BLOOD PRESSURE: 136 MMHG | HEART RATE: 63 BPM | BODY MASS INDEX: 45.99 KG/M2 | WEIGHT: 293 LBS

## 2021-10-18 DIAGNOSIS — O30.043 DICHORIONIC DIAMNIOTIC TWIN PREGNANCY IN THIRD TRIMESTER: Primary | ICD-10-CM

## 2021-10-18 DIAGNOSIS — O30.043 DICHORIONIC DIAMNIOTIC TWIN PREGNANCY IN THIRD TRIMESTER: ICD-10-CM

## 2021-10-18 DIAGNOSIS — Z3A.33 33 WEEKS GESTATION OF PREGNANCY: ICD-10-CM

## 2021-10-18 DIAGNOSIS — O24.414 INSULIN CONTROLLED GESTATIONAL DIABETES MELLITUS (GDM) IN THIRD TRIMESTER: ICD-10-CM

## 2021-10-18 DIAGNOSIS — O10.913 CHRONIC HYPERTENSION IN OBSTETRIC CONTEXT IN THIRD TRIMESTER: Primary | ICD-10-CM

## 2021-10-18 PROCEDURE — NC001 PR NO CHARGE: Performed by: OBSTETRICS & GYNECOLOGY

## 2021-10-18 PROCEDURE — 59025 FETAL NON-STRESS TEST: CPT | Performed by: OBSTETRICS & GYNECOLOGY

## 2021-10-18 PROCEDURE — 76816 OB US FOLLOW-UP PER FETUS: CPT | Performed by: OBSTETRICS & GYNECOLOGY

## 2021-10-18 PROCEDURE — 99214 OFFICE O/P EST MOD 30 MIN: CPT | Performed by: OBSTETRICS & GYNECOLOGY

## 2021-10-20 PROBLEM — Z3A.34 34 WEEKS GESTATION OF PREGNANCY: Status: ACTIVE | Noted: 2021-06-20

## 2021-10-21 ENCOUNTER — ROUTINE PRENATAL (OUTPATIENT)
Dept: OBGYN CLINIC | Facility: MEDICAL CENTER | Age: 36
End: 2021-10-21
Payer: COMMERCIAL

## 2021-10-21 ENCOUNTER — DOCUMENTATION (OUTPATIENT)
Dept: PERINATAL CARE | Facility: CLINIC | Age: 36
End: 2021-10-21

## 2021-10-21 VITALS — BODY MASS INDEX: 54.03 KG/M2 | WEIGHT: 293 LBS

## 2021-10-21 DIAGNOSIS — O99.213 OBESITY AFFECTING PREGNANCY IN THIRD TRIMESTER: ICD-10-CM

## 2021-10-21 DIAGNOSIS — Z86.79 HISTORY OF CONGESTIVE HEART FAILURE: ICD-10-CM

## 2021-10-21 DIAGNOSIS — O30.043 DICHORIONIC DIAMNIOTIC TWIN PREGNANCY IN THIRD TRIMESTER: ICD-10-CM

## 2021-10-21 DIAGNOSIS — O10.913 CHRONIC HYPERTENSION IN OBSTETRIC CONTEXT IN THIRD TRIMESTER: ICD-10-CM

## 2021-10-21 DIAGNOSIS — Z3A.34 34 WEEKS GESTATION OF PREGNANCY: Primary | ICD-10-CM

## 2021-10-21 DIAGNOSIS — F32.5 MAJOR DEPRESSIVE DISORDER IN FULL REMISSION, UNSPECIFIED WHETHER RECURRENT (HCC): ICD-10-CM

## 2021-10-21 PROCEDURE — PNV: Performed by: OBSTETRICS & GYNECOLOGY

## 2021-10-21 PROCEDURE — 59025 FETAL NON-STRESS TEST: CPT | Performed by: OBSTETRICS & GYNECOLOGY

## 2021-10-26 ENCOUNTER — NURSE TRIAGE (OUTPATIENT)
Dept: OTHER | Facility: OTHER | Age: 36
End: 2021-10-26

## 2021-10-26 ENCOUNTER — HOSPITAL ENCOUNTER (OUTPATIENT)
Facility: HOSPITAL | Age: 36
Discharge: HOME | End: 2021-10-26
Attending: OBSTETRICS & GYNECOLOGY | Admitting: OBSTETRICS & GYNECOLOGY
Payer: COMMERCIAL

## 2021-10-26 VITALS
SYSTOLIC BLOOD PRESSURE: 122 MMHG | HEART RATE: 73 BPM | RESPIRATION RATE: 18 BRPM | DIASTOLIC BLOOD PRESSURE: 68 MMHG | TEMPERATURE: 98.5 F

## 2021-10-26 PROBLEM — O36.8132: Status: ACTIVE | Noted: 2021-10-26

## 2021-10-26 PROBLEM — Z3A.35 35 WEEKS GESTATION OF PREGNANCY: Status: ACTIVE | Noted: 2021-06-20

## 2021-10-26 PROCEDURE — 99212 OFFICE O/P EST SF 10 MIN: CPT

## 2021-10-26 PROCEDURE — 76815 OB US LIMITED FETUS(S): CPT | Performed by: OBSTETRICS & GYNECOLOGY

## 2021-10-26 PROCEDURE — 59025 FETAL NON-STRESS TEST: CPT | Performed by: OBSTETRICS & GYNECOLOGY

## 2021-10-26 PROCEDURE — NC001 PR NO CHARGE: Performed by: OBSTETRICS & GYNECOLOGY

## 2021-10-28 ENCOUNTER — ROUTINE PRENATAL (OUTPATIENT)
Dept: OBGYN CLINIC | Facility: MEDICAL CENTER | Age: 36
End: 2021-10-28
Payer: COMMERCIAL

## 2021-10-28 ENCOUNTER — TELEMEDICINE (OUTPATIENT)
Dept: BEHAVIORAL/MENTAL HEALTH CLINIC | Facility: CLINIC | Age: 36
End: 2021-10-28
Payer: COMMERCIAL

## 2021-10-28 DIAGNOSIS — O10.913 CHRONIC HYPERTENSION IN OBSTETRIC CONTEXT IN THIRD TRIMESTER: ICD-10-CM

## 2021-10-28 DIAGNOSIS — O99.213 OBESITY AFFECTING PREGNANCY IN THIRD TRIMESTER: ICD-10-CM

## 2021-10-28 DIAGNOSIS — Z3A.35 35 WEEKS GESTATION OF PREGNANCY: ICD-10-CM

## 2021-10-28 DIAGNOSIS — O09.513 PRIMIGRAVIDA OF ADVANCED MATERNAL AGE IN THIRD TRIMESTER: ICD-10-CM

## 2021-10-28 DIAGNOSIS — O30.043 DICHORIONIC DIAMNIOTIC TWIN PREGNANCY IN THIRD TRIMESTER: ICD-10-CM

## 2021-10-28 DIAGNOSIS — F41.1 GENERALIZED ANXIETY DISORDER: ICD-10-CM

## 2021-10-28 DIAGNOSIS — O24.414 INSULIN CONTROLLED GESTATIONAL DIABETES MELLITUS (GDM) IN THIRD TRIMESTER: ICD-10-CM

## 2021-10-28 DIAGNOSIS — O30.043 DICHORIONIC DIAMNIOTIC TWIN PREGNANCY IN THIRD TRIMESTER: Primary | ICD-10-CM

## 2021-10-28 DIAGNOSIS — F32.5 MAJOR DEPRESSIVE DISORDER IN FULL REMISSION, UNSPECIFIED WHETHER RECURRENT (HCC): ICD-10-CM

## 2021-10-28 PROCEDURE — PNV: Performed by: OBSTETRICS & GYNECOLOGY

## 2021-10-28 PROCEDURE — 59025 FETAL NON-STRESS TEST: CPT | Performed by: OBSTETRICS & GYNECOLOGY

## 2021-10-28 PROCEDURE — 90834 PSYTX W PT 45 MINUTES: CPT | Performed by: SOCIAL WORKER

## 2021-11-01 ENCOUNTER — ULTRASOUND (OUTPATIENT)
Dept: PERINATAL CARE | Facility: CLINIC | Age: 36
End: 2021-11-01
Payer: COMMERCIAL

## 2021-11-01 VITALS
HEART RATE: 83 BPM | DIASTOLIC BLOOD PRESSURE: 81 MMHG | SYSTOLIC BLOOD PRESSURE: 119 MMHG | WEIGHT: 293 LBS | BODY MASS INDEX: 45.99 KG/M2 | HEIGHT: 67 IN

## 2021-11-01 DIAGNOSIS — O24.414 INSULIN CONTROLLED GESTATIONAL DIABETES MELLITUS (GDM) IN THIRD TRIMESTER: ICD-10-CM

## 2021-11-01 DIAGNOSIS — Z3A.35 35 WEEKS GESTATION OF PREGNANCY: ICD-10-CM

## 2021-11-01 DIAGNOSIS — O10.913 CHRONIC HYPERTENSION IN OBSTETRIC CONTEXT IN THIRD TRIMESTER: Primary | ICD-10-CM

## 2021-11-01 DIAGNOSIS — O30.043 DICHORIONIC DIAMNIOTIC TWIN PREGNANCY IN THIRD TRIMESTER: ICD-10-CM

## 2021-11-01 PROCEDURE — 59025 FETAL NON-STRESS TEST: CPT | Performed by: OBSTETRICS & GYNECOLOGY

## 2021-11-01 PROCEDURE — 76815 OB US LIMITED FETUS(S): CPT | Performed by: OBSTETRICS & GYNECOLOGY

## 2021-11-04 ENCOUNTER — TELEPHONE (OUTPATIENT)
Dept: OBGYN CLINIC | Facility: MEDICAL CENTER | Age: 36
End: 2021-11-04

## 2021-11-04 ENCOUNTER — ANESTHESIA EVENT (INPATIENT)
Dept: LABOR AND DELIVERY | Facility: HOSPITAL | Age: 36
End: 2021-11-04
Payer: COMMERCIAL

## 2021-11-04 ENCOUNTER — HOSPITAL ENCOUNTER (INPATIENT)
Facility: HOSPITAL | Age: 36
LOS: 4 days | Discharge: HOME | End: 2021-11-08
Attending: OBSTETRICS & GYNECOLOGY | Admitting: OBSTETRICS & GYNECOLOGY
Payer: COMMERCIAL

## 2021-11-04 DIAGNOSIS — Z3A.36 36 WEEKS GESTATION OF PREGNANCY: Primary | ICD-10-CM

## 2021-11-04 DIAGNOSIS — Z98.891 S/P CESAREAN SECTION: ICD-10-CM

## 2021-11-04 LAB
ALBUMIN SERPL BCP-MCNC: 2.4 G/DL (ref 3.5–5)
ALP SERPL-CCNC: 159 U/L (ref 46–116)
ALT SERPL W P-5'-P-CCNC: 24 U/L (ref 12–78)
ANION GAP SERPL CALCULATED.3IONS-SCNC: 11 MMOL/L (ref 4–13)
AST SERPL W P-5'-P-CCNC: 16 U/L (ref 5–45)
BILIRUB SERPL-MCNC: 0.27 MG/DL (ref 0.2–1)
BUN SERPL-MCNC: 12 MG/DL (ref 5–25)
CALCIUM ALBUM COR SERPL-MCNC: 10.3 MG/DL (ref 8.3–10.1)
CALCIUM SERPL-MCNC: 9 MG/DL (ref 8.3–10.1)
CHLORIDE SERPL-SCNC: 102 MMOL/L (ref 100–108)
CO2 SERPL-SCNC: 21 MMOL/L (ref 21–32)
CREAT SERPL-MCNC: 0.64 MG/DL (ref 0.6–1.3)
CREAT UR-MCNC: 41 MG/DL
ERYTHROCYTE [DISTWIDTH] IN BLOOD BY AUTOMATED COUNT: 14.7 % (ref 11.6–15.1)
GFR SERPL CREATININE-BSD FRML MDRD: 116 ML/MIN/1.73SQ M
GLUCOSE SERPL-MCNC: 101 MG/DL (ref 65–140)
GLUCOSE SERPL-MCNC: 136 MG/DL (ref 65–140)
HCT VFR BLD AUTO: 38 % (ref 34.8–46.1)
HGB BLD-MCNC: 12.5 G/DL (ref 11.5–15.4)
MCH RBC QN AUTO: 27.8 PG (ref 26.8–34.3)
MCHC RBC AUTO-ENTMCNC: 32.9 G/DL (ref 31.4–37.4)
MCV RBC AUTO: 85 FL (ref 82–98)
PLATELET # BLD AUTO: 347 THOUSANDS/UL (ref 149–390)
PMV BLD AUTO: 10 FL (ref 8.9–12.7)
POTASSIUM SERPL-SCNC: 3.9 MMOL/L (ref 3.5–5.3)
PROT SERPL-MCNC: 6.6 G/DL (ref 6.4–8.2)
PROT UR-MCNC: <6 MG/DL
PROT/CREAT UR: <0.15 MG/G{CREAT} (ref 0–0.1)
RBC # BLD AUTO: 4.49 MILLION/UL (ref 3.81–5.12)
SODIUM SERPL-SCNC: 134 MMOL/L (ref 136–145)
WBC # BLD AUTO: 13.53 THOUSAND/UL (ref 4.31–10.16)

## 2021-11-04 PROCEDURE — 86592 SYPHILIS TEST NON-TREP QUAL: CPT | Performed by: OBSTETRICS & GYNECOLOGY

## 2021-11-04 PROCEDURE — 82570 ASSAY OF URINE CREATININE: CPT | Performed by: STUDENT IN AN ORGANIZED HEALTH CARE EDUCATION/TRAINING PROGRAM

## 2021-11-04 PROCEDURE — 82948 REAGENT STRIP/BLOOD GLUCOSE: CPT

## 2021-11-04 PROCEDURE — 86900 BLOOD TYPING SEROLOGIC ABO: CPT | Performed by: OBSTETRICS & GYNECOLOGY

## 2021-11-04 PROCEDURE — 80053 COMPREHEN METABOLIC PANEL: CPT | Performed by: STUDENT IN AN ORGANIZED HEALTH CARE EDUCATION/TRAINING PROGRAM

## 2021-11-04 PROCEDURE — 85027 COMPLETE CBC AUTOMATED: CPT | Performed by: STUDENT IN AN ORGANIZED HEALTH CARE EDUCATION/TRAINING PROGRAM

## 2021-11-04 PROCEDURE — 86850 RBC ANTIBODY SCREEN: CPT | Performed by: OBSTETRICS & GYNECOLOGY

## 2021-11-04 PROCEDURE — 99212 OFFICE O/P EST SF 10 MIN: CPT

## 2021-11-04 PROCEDURE — 84156 ASSAY OF PROTEIN URINE: CPT | Performed by: STUDENT IN AN ORGANIZED HEALTH CARE EDUCATION/TRAINING PROGRAM

## 2021-11-04 PROCEDURE — 86901 BLOOD TYPING SEROLOGIC RH(D): CPT | Performed by: OBSTETRICS & GYNECOLOGY

## 2021-11-04 PROCEDURE — NC001 PR NO CHARGE: Performed by: OBSTETRICS & GYNECOLOGY

## 2021-11-04 RX ORDER — SODIUM CHLORIDE, SODIUM LACTATE, POTASSIUM CHLORIDE, CALCIUM CHLORIDE 600; 310; 30; 20 MG/100ML; MG/100ML; MG/100ML; MG/100ML
125 INJECTION, SOLUTION INTRAVENOUS CONTINUOUS
Status: DISCONTINUED | OUTPATIENT
Start: 2021-11-04 | End: 2021-11-05

## 2021-11-04 RX ORDER — INSULIN GLARGINE 100 [IU]/ML
12 INJECTION, SOLUTION SUBCUTANEOUS
Status: DISCONTINUED | OUTPATIENT
Start: 2021-11-04 | End: 2021-11-05

## 2021-11-04 RX ORDER — ONDANSETRON 2 MG/ML
4 INJECTION INTRAMUSCULAR; INTRAVENOUS EVERY 8 HOURS PRN
Status: DISCONTINUED | OUTPATIENT
Start: 2021-11-04 | End: 2021-11-05

## 2021-11-04 RX ORDER — NIFEDIPINE 30 MG/1
30 TABLET, EXTENDED RELEASE ORAL DAILY
Status: DISCONTINUED | OUTPATIENT
Start: 2021-11-05 | End: 2021-11-05

## 2021-11-04 RX ORDER — ESCITALOPRAM OXALATE 10 MG/1
20 TABLET ORAL DAILY
Status: DISCONTINUED | OUTPATIENT
Start: 2021-11-05 | End: 2021-11-08 | Stop reason: HOSPADM

## 2021-11-04 RX ADMIN — INSULIN GLARGINE 12 UNITS: 100 INJECTION, SOLUTION SUBCUTANEOUS at 23:38

## 2021-11-05 ENCOUNTER — ANESTHESIA (INPATIENT)
Dept: LABOR AND DELIVERY | Facility: HOSPITAL | Age: 36
End: 2021-11-05
Payer: COMMERCIAL

## 2021-11-05 LAB
ABO GROUP BLD: NORMAL
ALBUMIN SERPL BCP-MCNC: 2.4 G/DL (ref 3.5–5)
ALP SERPL-CCNC: 159 U/L (ref 46–116)
ALT SERPL W P-5'-P-CCNC: 17 U/L (ref 12–78)
ANION GAP SERPL CALCULATED.3IONS-SCNC: 11 MMOL/L (ref 4–13)
AST SERPL W P-5'-P-CCNC: 18 U/L (ref 5–45)
BASE EXCESS BLDCOA CALC-SCNC: -3.3 MMOL/L (ref 3–11)
BASE EXCESS BLDCOA CALC-SCNC: -7.3 MMOL/L (ref 3–11)
BASE EXCESS BLDCOV CALC-SCNC: -1.5 MMOL/L (ref 1–9)
BASE EXCESS BLDCOV CALC-SCNC: -3.6 MMOL/L (ref 1–9)
BILIRUB SERPL-MCNC: 0.42 MG/DL (ref 0.2–1)
BLD GP AB SCN SERPL QL: NEGATIVE
BUN SERPL-MCNC: 11 MG/DL (ref 5–25)
CALCIUM ALBUM COR SERPL-MCNC: 10 MG/DL (ref 8.3–10.1)
CALCIUM SERPL-MCNC: 8.7 MG/DL (ref 8.3–10.1)
CHLORIDE SERPL-SCNC: 103 MMOL/L (ref 100–108)
CO2 SERPL-SCNC: 23 MMOL/L (ref 21–32)
CREAT SERPL-MCNC: 0.69 MG/DL (ref 0.6–1.3)
CREAT UR-MCNC: 117.2 MG/DL
ERYTHROCYTE [DISTWIDTH] IN BLOOD BY AUTOMATED COUNT: 14.7 % (ref 11.6–15.1)
GFR SERPL CREATININE-BSD FRML MDRD: 113 ML/MIN/1.73SQ M
GLUCOSE SERPL-MCNC: 109 MG/DL (ref 65–140)
GLUCOSE SERPL-MCNC: 113 MG/DL (ref 65–140)
GLUCOSE SERPL-MCNC: 90 MG/DL (ref 65–140)
GLUCOSE SERPL-MCNC: 95 MG/DL (ref 65–140)
HCO3 BLDCOA-SCNC: 20.4 MMOL/L (ref 17.3–27.3)
HCO3 BLDCOA-SCNC: 24 MMOL/L (ref 17.3–27.3)
HCO3 BLDCOV-SCNC: 22.3 MMOL/L (ref 12.2–28.6)
HCO3 BLDCOV-SCNC: 26.1 MMOL/L (ref 12.2–28.6)
HCT VFR BLD AUTO: 38.6 % (ref 34.8–46.1)
HGB BLD-MCNC: 12.4 G/DL (ref 11.5–15.4)
MCH RBC QN AUTO: 27.3 PG (ref 26.8–34.3)
MCHC RBC AUTO-ENTMCNC: 32.1 G/DL (ref 31.4–37.4)
MCV RBC AUTO: 85 FL (ref 82–98)
O2 CT VFR BLDCOA CALC: 4.4 ML/DL
O2 CT VFR BLDCOA CALC: 6.7 ML/DL
OXYHGB MFR BLDCOA: 20.9 %
OXYHGB MFR BLDCOA: 33.4 %
OXYHGB MFR BLDCOV: 29.6 %
OXYHGB MFR BLDCOV: 49.9 %
PCO2 BLDCOA: 49.7 MM[HG] (ref 30–60)
PCO2 BLDCOA: 52.3 MM[HG] (ref 30–60)
PCO2 BLDCOV: 43.4 MM HG (ref 27–43)
PCO2 BLDCOV: 55.1 MM HG (ref 27–43)
PH BLDCOA: 7.23 [PH] (ref 7.23–7.43)
PH BLDCOA: 7.28 [PH] (ref 7.23–7.43)
PH BLDCOV: 7.29 [PH] (ref 7.19–7.49)
PH BLDCOV: 7.33 [PH] (ref 7.19–7.49)
PLATELET # BLD AUTO: 325 THOUSANDS/UL (ref 149–390)
PMV BLD AUTO: 10.3 FL (ref 8.9–12.7)
PO2 BLDCOA: 14.3 MM HG (ref 5–25)
PO2 BLDCOA: 17.7 MM HG (ref 5–25)
PO2 BLDCOV: 15.7 MM HG (ref 15–45)
PO2 BLDCOV: 21.7 MM HG (ref 15–45)
POTASSIUM SERPL-SCNC: 3.8 MMOL/L (ref 3.5–5.3)
PROT SERPL-MCNC: 6.5 G/DL (ref 6.4–8.2)
PROT UR-MCNC: 17 MG/DL
PROT/CREAT UR: 0.15 MG/G{CREAT} (ref 0–0.1)
RBC # BLD AUTO: 4.54 MILLION/UL (ref 3.81–5.12)
RH BLD: POSITIVE
RPR SER QL: NORMAL
SAO2 % BLDCOV: 10.1 ML/DL
SAO2 % BLDCOV: 6.3 ML/DL
SODIUM SERPL-SCNC: 137 MMOL/L (ref 136–145)
SPECIMEN EXPIRATION DATE: NORMAL
WBC # BLD AUTO: 14.04 THOUSAND/UL (ref 4.31–10.16)

## 2021-11-05 PROCEDURE — 4A1HXCZ MONITORING OF PRODUCTS OF CONCEPTION, CARDIAC RATE, EXTERNAL APPROACH: ICD-10-PCS | Performed by: OBSTETRICS & GYNECOLOGY

## 2021-11-05 PROCEDURE — 82570 ASSAY OF URINE CREATININE: CPT | Performed by: OBSTETRICS & GYNECOLOGY

## 2021-11-05 PROCEDURE — 82948 REAGENT STRIP/BLOOD GLUCOSE: CPT

## 2021-11-05 PROCEDURE — 80053 COMPREHEN METABOLIC PANEL: CPT | Performed by: OBSTETRICS & GYNECOLOGY

## 2021-11-05 PROCEDURE — 84156 ASSAY OF PROTEIN URINE: CPT | Performed by: OBSTETRICS & GYNECOLOGY

## 2021-11-05 PROCEDURE — 59510 CESAREAN DELIVERY: CPT | Performed by: OBSTETRICS & GYNECOLOGY

## 2021-11-05 PROCEDURE — 88307 TISSUE EXAM BY PATHOLOGIST: CPT | Performed by: PATHOLOGY

## 2021-11-05 PROCEDURE — 82805 BLOOD GASES W/O2 SATURATION: CPT | Performed by: OBSTETRICS & GYNECOLOGY

## 2021-11-05 PROCEDURE — 85027 COMPLETE CBC AUTOMATED: CPT | Performed by: OBSTETRICS & GYNECOLOGY

## 2021-11-05 RX ORDER — BUPIVACAINE HYDROCHLORIDE 7.5 MG/ML
INJECTION, SOLUTION INTRASPINAL AS NEEDED
Status: DISCONTINUED | OUTPATIENT
Start: 2021-11-05 | End: 2021-11-05

## 2021-11-05 RX ORDER — EPHEDRINE SULFATE 50 MG/ML
INJECTION INTRAVENOUS AS NEEDED
Status: DISCONTINUED | OUTPATIENT
Start: 2021-11-05 | End: 2021-11-05

## 2021-11-05 RX ORDER — OXYCODONE HYDROCHLORIDE AND ACETAMINOPHEN 5; 325 MG/1; MG/1
1 TABLET ORAL EVERY 6 HOURS PRN
Status: ACTIVE | OUTPATIENT
Start: 2021-11-05 | End: 2021-11-06

## 2021-11-05 RX ORDER — OXYTOCIN/RINGER'S LACTATE 30/500 ML
PLASTIC BAG, INJECTION (ML) INTRAVENOUS CONTINUOUS PRN
Status: DISCONTINUED | OUTPATIENT
Start: 2021-11-05 | End: 2021-11-05

## 2021-11-05 RX ORDER — SODIUM CHLORIDE 9 MG/ML
125 INJECTION, SOLUTION INTRAVENOUS CONTINUOUS
Status: DISCONTINUED | OUTPATIENT
Start: 2021-11-05 | End: 2021-11-08 | Stop reason: HOSPADM

## 2021-11-05 RX ORDER — OXYCODONE HYDROCHLORIDE AND ACETAMINOPHEN 5; 325 MG/1; MG/1
2 TABLET ORAL EVERY 4 HOURS PRN
Status: DISCONTINUED | OUTPATIENT
Start: 2021-11-06 | End: 2021-11-08 | Stop reason: HOSPADM

## 2021-11-05 RX ORDER — ONDANSETRON 2 MG/ML
4 INJECTION INTRAMUSCULAR; INTRAVENOUS EVERY 4 HOURS PRN
Status: ACTIVE | OUTPATIENT
Start: 2021-11-05 | End: 2021-11-06

## 2021-11-05 RX ORDER — OXYTOCIN/RINGER'S LACTATE 30/500 ML
62.5 PLASTIC BAG, INJECTION (ML) INTRAVENOUS ONCE
Status: COMPLETED | OUTPATIENT
Start: 2021-11-05 | End: 2021-11-05

## 2021-11-05 RX ORDER — METOCLOPRAMIDE HYDROCHLORIDE 5 MG/ML
5 INJECTION INTRAMUSCULAR; INTRAVENOUS EVERY 6 HOURS PRN
Status: ACTIVE | OUTPATIENT
Start: 2021-11-05 | End: 2021-11-06

## 2021-11-05 RX ORDER — ACETAMINOPHEN 325 MG/1
650 TABLET ORAL EVERY 4 HOURS PRN
Status: DISCONTINUED | OUTPATIENT
Start: 2021-11-06 | End: 2021-11-08 | Stop reason: HOSPADM

## 2021-11-05 RX ORDER — FENTANYL CITRATE 50 UG/ML
INJECTION, SOLUTION INTRAMUSCULAR; INTRAVENOUS AS NEEDED
Status: DISCONTINUED | OUTPATIENT
Start: 2021-11-05 | End: 2021-11-05

## 2021-11-05 RX ORDER — DEXTROSE AND SODIUM CHLORIDE 5; .9 G/100ML; G/100ML
100 INJECTION, SOLUTION INTRAVENOUS CONTINUOUS
Status: DISCONTINUED | OUTPATIENT
Start: 2021-11-05 | End: 2021-11-05

## 2021-11-05 RX ORDER — NALBUPHINE HCL 10 MG/ML
2 AMPUL (ML) INJECTION EVERY 4 HOURS PRN
Status: ACTIVE | OUTPATIENT
Start: 2021-11-05 | End: 2021-11-06

## 2021-11-05 RX ORDER — IBUPROFEN 600 MG/1
600 TABLET ORAL EVERY 6 HOURS SCHEDULED
Status: DISCONTINUED | OUTPATIENT
Start: 2021-11-06 | End: 2021-11-08 | Stop reason: HOSPADM

## 2021-11-05 RX ORDER — FENTANYL CITRATE 50 UG/ML
INJECTION, SOLUTION INTRAMUSCULAR; INTRAVENOUS
Status: COMPLETED
Start: 2021-11-05 | End: 2021-11-05

## 2021-11-05 RX ORDER — HYDROMORPHONE HCL/PF 1 MG/ML
SYRINGE (ML) INJECTION AS NEEDED
Status: DISCONTINUED | OUTPATIENT
Start: 2021-11-05 | End: 2021-11-05

## 2021-11-05 RX ORDER — NALOXONE HYDROCHLORIDE 0.4 MG/ML
0.1 INJECTION, SOLUTION INTRAMUSCULAR; INTRAVENOUS; SUBCUTANEOUS
Status: ACTIVE | OUTPATIENT
Start: 2021-11-05 | End: 2021-11-06

## 2021-11-05 RX ORDER — DOCUSATE SODIUM 100 MG/1
100 CAPSULE, LIQUID FILLED ORAL 2 TIMES DAILY
Status: DISCONTINUED | OUTPATIENT
Start: 2021-11-05 | End: 2021-11-08 | Stop reason: HOSPADM

## 2021-11-05 RX ORDER — NIFEDIPINE 30 MG/1
30 TABLET, EXTENDED RELEASE ORAL DAILY
Status: DISCONTINUED | OUTPATIENT
Start: 2021-11-06 | End: 2021-11-08 | Stop reason: HOSPADM

## 2021-11-05 RX ORDER — MORPHINE SULFATE 0.5 MG/ML
INJECTION, SOLUTION EPIDURAL; INTRATHECAL; INTRAVENOUS
Status: DISPENSED
Start: 2021-11-05 | End: 2021-11-05

## 2021-11-05 RX ORDER — DEXAMETHASONE SODIUM PHOSPHATE 4 MG/ML
8 INJECTION, SOLUTION INTRA-ARTICULAR; INTRALESIONAL; INTRAMUSCULAR; INTRAVENOUS; SOFT TISSUE ONCE AS NEEDED
Status: ACTIVE | OUTPATIENT
Start: 2021-11-05 | End: 2021-11-06

## 2021-11-05 RX ORDER — ONDANSETRON 2 MG/ML
INJECTION INTRAMUSCULAR; INTRAVENOUS AS NEEDED
Status: DISCONTINUED | OUTPATIENT
Start: 2021-11-05 | End: 2021-11-05

## 2021-11-05 RX ORDER — OXYCODONE HYDROCHLORIDE AND ACETAMINOPHEN 5; 325 MG/1; MG/1
1 TABLET ORAL EVERY 4 HOURS PRN
Status: DISCONTINUED | OUTPATIENT
Start: 2021-11-06 | End: 2021-11-08 | Stop reason: HOSPADM

## 2021-11-05 RX ORDER — KETOROLAC TROMETHAMINE 30 MG/ML
30 INJECTION, SOLUTION INTRAMUSCULAR; INTRAVENOUS EVERY 6 HOURS
Status: COMPLETED | OUTPATIENT
Start: 2021-11-05 | End: 2021-11-06

## 2021-11-05 RX ORDER — DIPHENHYDRAMINE HYDROCHLORIDE 50 MG/ML
25 INJECTION INTRAMUSCULAR; INTRAVENOUS EVERY 6 HOURS PRN
Status: ACTIVE | OUTPATIENT
Start: 2021-11-05 | End: 2021-11-06

## 2021-11-05 RX ORDER — CALCIUM CARBONATE 200(500)MG
1000 TABLET,CHEWABLE ORAL DAILY PRN
Status: DISCONTINUED | OUTPATIENT
Start: 2021-11-05 | End: 2021-11-08 | Stop reason: HOSPADM

## 2021-11-05 RX ADMIN — BUPIVACAINE HYDROCHLORIDE IN DEXTROSE 1.7 ML: 7.5 INJECTION, SOLUTION SUBARACHNOID at 08:38

## 2021-11-05 RX ADMIN — ONDANSETRON 4 MG: 2 INJECTION INTRAMUSCULAR; INTRAVENOUS at 09:13

## 2021-11-05 RX ADMIN — ESCITALOPRAM OXALATE 20 MG: 10 TABLET ORAL at 11:06

## 2021-11-05 RX ADMIN — EPHEDRINE SULFATE 10 MG: 50 INJECTION, SOLUTION INTRAVENOUS at 08:44

## 2021-11-05 RX ADMIN — FENTANYL CITRATE 10 MCG: 50 INJECTION INTRAMUSCULAR; INTRAVENOUS at 08:38

## 2021-11-05 RX ADMIN — Medication 62.5 MILLI-UNITS/MIN: at 10:40

## 2021-11-05 RX ADMIN — DOCUSATE SODIUM 100 MG: 100 CAPSULE ORAL at 17:00

## 2021-11-05 RX ADMIN — SODIUM CHLORIDE 999 ML/HR: 0.9 INJECTION, SOLUTION INTRAVENOUS at 06:37

## 2021-11-05 RX ADMIN — CEFAZOLIN SODIUM 3000 MG: 10 INJECTION, POWDER, FOR SOLUTION INTRAVENOUS at 08:14

## 2021-11-05 RX ADMIN — HYDROMORPHONE HYDROCHLORIDE 0.1 MG: 1 INJECTION, SOLUTION INTRAMUSCULAR; INTRAVENOUS; SUBCUTANEOUS at 08:38

## 2021-11-05 RX ADMIN — SODIUM CHLORIDE 1000 ML: 0.9 INJECTION, SOLUTION INTRAVENOUS at 14:31

## 2021-11-05 RX ADMIN — SODIUM CHLORIDE: 0.9 INJECTION, SOLUTION INTRAVENOUS at 09:58

## 2021-11-05 RX ADMIN — SODIUM CHLORIDE 125 ML/HR: 0.9 INJECTION, SOLUTION INTRAVENOUS at 19:07

## 2021-11-05 RX ADMIN — Medication 250 MILLI-UNITS/MIN: at 09:07

## 2021-11-05 RX ADMIN — SODIUM CHLORIDE 125 ML/HR: 0.9 INJECTION, SOLUTION INTRAVENOUS at 07:25

## 2021-11-06 LAB
ERYTHROCYTE [DISTWIDTH] IN BLOOD BY AUTOMATED COUNT: 14.9 % (ref 11.6–15.1)
HCT VFR BLD AUTO: 31.6 % (ref 34.8–46.1)
HGB BLD-MCNC: 10.2 G/DL (ref 11.5–15.4)
MCH RBC QN AUTO: 28.1 PG (ref 26.8–34.3)
MCHC RBC AUTO-ENTMCNC: 32.3 G/DL (ref 31.4–37.4)
MCV RBC AUTO: 87 FL (ref 82–98)
PLATELET # BLD AUTO: 301 THOUSANDS/UL (ref 149–390)
PMV BLD AUTO: 10.1 FL (ref 8.9–12.7)
RBC # BLD AUTO: 3.63 MILLION/UL (ref 3.81–5.12)
WBC # BLD AUTO: 13.31 THOUSAND/UL (ref 4.31–10.16)

## 2021-11-06 PROCEDURE — 99024 POSTOP FOLLOW-UP VISIT: CPT | Performed by: OBSTETRICS & GYNECOLOGY

## 2021-11-06 PROCEDURE — 85027 COMPLETE CBC AUTOMATED: CPT | Performed by: STUDENT IN AN ORGANIZED HEALTH CARE EDUCATION/TRAINING PROGRAM

## 2021-11-06 RX ADMIN — IBUPROFEN 600 MG: 600 TABLET ORAL at 12:23

## 2021-11-06 RX ADMIN — IBUPROFEN 600 MG: 600 TABLET ORAL at 17:59

## 2021-11-06 RX ADMIN — ENOXAPARIN SODIUM 60 MG: 60 INJECTION SUBCUTANEOUS at 20:38

## 2021-11-06 RX ADMIN — ENOXAPARIN SODIUM 60 MG: 60 INJECTION SUBCUTANEOUS at 07:58

## 2021-11-06 RX ADMIN — ACETAMINOPHEN 650 MG: 325 TABLET, FILM COATED ORAL at 20:34

## 2021-11-06 RX ADMIN — IBUPROFEN 600 MG: 600 TABLET ORAL at 23:32

## 2021-11-06 RX ADMIN — ESCITALOPRAM OXALATE 20 MG: 10 TABLET ORAL at 07:58

## 2021-11-06 RX ADMIN — DOCUSATE SODIUM 100 MG: 100 CAPSULE ORAL at 17:59

## 2021-11-06 RX ADMIN — DOCUSATE SODIUM 100 MG: 100 CAPSULE ORAL at 07:57

## 2021-11-06 RX ADMIN — NIFEDIPINE 30 MG: 30 TABLET, FILM COATED, EXTENDED RELEASE ORAL at 07:57

## 2021-11-07 PROCEDURE — 99024 POSTOP FOLLOW-UP VISIT: CPT | Performed by: OBSTETRICS & GYNECOLOGY

## 2021-11-07 RX ADMIN — ENOXAPARIN SODIUM 60 MG: 60 INJECTION SUBCUTANEOUS at 20:12

## 2021-11-07 RX ADMIN — IBUPROFEN 600 MG: 600 TABLET ORAL at 13:56

## 2021-11-07 RX ADMIN — ESCITALOPRAM OXALATE 20 MG: 10 TABLET ORAL at 08:00

## 2021-11-07 RX ADMIN — ACETAMINOPHEN 650 MG: 325 TABLET, FILM COATED ORAL at 07:53

## 2021-11-07 RX ADMIN — IBUPROFEN 600 MG: 600 TABLET ORAL at 05:29

## 2021-11-07 RX ADMIN — NIFEDIPINE 30 MG: 30 TABLET, FILM COATED, EXTENDED RELEASE ORAL at 08:00

## 2021-11-07 RX ADMIN — DOCUSATE SODIUM 100 MG: 100 CAPSULE ORAL at 18:09

## 2021-11-07 RX ADMIN — IBUPROFEN 600 MG: 600 TABLET ORAL at 20:12

## 2021-11-07 RX ADMIN — ENOXAPARIN SODIUM 60 MG: 60 INJECTION SUBCUTANEOUS at 10:21

## 2021-11-07 RX ADMIN — ACETAMINOPHEN 650 MG: 325 TABLET, FILM COATED ORAL at 01:37

## 2021-11-07 RX ADMIN — DOCUSATE SODIUM 100 MG: 100 CAPSULE ORAL at 08:00

## 2021-11-08 VITALS
WEIGHT: 293 LBS | OXYGEN SATURATION: 97 % | RESPIRATION RATE: 18 BRPM | DIASTOLIC BLOOD PRESSURE: 82 MMHG | HEIGHT: 67 IN | BODY MASS INDEX: 45.99 KG/M2 | HEART RATE: 80 BPM | TEMPERATURE: 97.6 F | SYSTOLIC BLOOD PRESSURE: 133 MMHG

## 2021-11-08 PROCEDURE — 99024 POSTOP FOLLOW-UP VISIT: CPT | Performed by: OBSTETRICS & GYNECOLOGY

## 2021-11-08 RX ORDER — OXYCODONE HYDROCHLORIDE 5 MG/1
5 TABLET ORAL EVERY 4 HOURS PRN
Qty: 15 TABLET | Refills: 0 | Status: SHIPPED | OUTPATIENT
Start: 2021-11-08 | End: 2021-11-17

## 2021-11-08 RX ORDER — DOCUSATE SODIUM 100 MG/1
100 CAPSULE, LIQUID FILLED ORAL 2 TIMES DAILY
Refills: 0
Start: 2021-11-08 | End: 2021-11-17

## 2021-11-08 RX ADMIN — ENOXAPARIN SODIUM 60 MG: 60 INJECTION SUBCUTANEOUS at 08:59

## 2021-11-08 RX ADMIN — DOCUSATE SODIUM 100 MG: 100 CAPSULE ORAL at 17:19

## 2021-11-08 RX ADMIN — IBUPROFEN 600 MG: 600 TABLET ORAL at 04:21

## 2021-11-08 RX ADMIN — DOCUSATE SODIUM 100 MG: 100 CAPSULE ORAL at 08:59

## 2021-11-08 RX ADMIN — NIFEDIPINE 30 MG: 30 TABLET, FILM COATED, EXTENDED RELEASE ORAL at 08:59

## 2021-11-08 RX ADMIN — IBUPROFEN 600 MG: 600 TABLET ORAL at 10:10

## 2021-11-08 RX ADMIN — ESCITALOPRAM OXALATE 20 MG: 10 TABLET ORAL at 08:58

## 2021-11-09 ENCOUNTER — TELEPHONE (OUTPATIENT)
Dept: POSTPARTUM | Facility: CLINIC | Age: 36
End: 2021-11-09

## 2021-11-09 ENCOUNTER — TRANSITIONAL CARE MANAGEMENT (OUTPATIENT)
Dept: FAMILY MEDICINE CLINIC | Facility: CLINIC | Age: 36
End: 2021-11-09

## 2021-11-17 ENCOUNTER — OFFICE VISIT (OUTPATIENT)
Dept: POSTPARTUM | Facility: CLINIC | Age: 36
End: 2021-11-17
Payer: COMMERCIAL

## 2021-11-17 ENCOUNTER — OFFICE VISIT (OUTPATIENT)
Dept: OBGYN CLINIC | Facility: MEDICAL CENTER | Age: 36
End: 2021-11-17

## 2021-11-17 VITALS
BODY MASS INDEX: 45.99 KG/M2 | SYSTOLIC BLOOD PRESSURE: 120 MMHG | WEIGHT: 293 LBS | DIASTOLIC BLOOD PRESSURE: 80 MMHG | HEIGHT: 67 IN

## 2021-11-17 VITALS — DIASTOLIC BLOOD PRESSURE: 76 MMHG | SYSTOLIC BLOOD PRESSURE: 126 MMHG

## 2021-11-17 DIAGNOSIS — Z71.89 ENCOUNTER FOR BREAST FEEDING COUNSELING: Primary | ICD-10-CM

## 2021-11-17 DIAGNOSIS — B37.9 CANDIDIASIS: ICD-10-CM

## 2021-11-17 DIAGNOSIS — Z09 POSTOPERATIVE EXAMINATION: Primary | ICD-10-CM

## 2021-11-17 PROCEDURE — 99024 POSTOP FOLLOW-UP VISIT: CPT | Performed by: OBSTETRICS & GYNECOLOGY

## 2021-11-17 PROCEDURE — 99404 PREV MED CNSL INDIV APPRX 60: CPT | Performed by: PEDIATRICS

## 2021-11-17 RX ORDER — NYSTATIN 100000 [USP'U]/G
POWDER TOPICAL 3 TIMES DAILY
Qty: 15 G | Refills: 0 | Status: SHIPPED | OUTPATIENT
Start: 2021-11-17 | End: 2021-12-16 | Stop reason: ALTCHOICE

## 2021-11-17 RX ORDER — FLUCONAZOLE 150 MG/1
150 TABLET ORAL ONCE
Qty: 1 TABLET | Refills: 0 | Status: SHIPPED | OUTPATIENT
Start: 2021-11-17 | End: 2021-11-17

## 2021-11-22 ENCOUNTER — OFFICE VISIT (OUTPATIENT)
Dept: OBGYN CLINIC | Facility: MEDICAL CENTER | Age: 36
End: 2021-11-22

## 2021-11-22 VITALS — DIASTOLIC BLOOD PRESSURE: 66 MMHG | BODY MASS INDEX: 50.12 KG/M2 | SYSTOLIC BLOOD PRESSURE: 114 MMHG | WEIGHT: 293 LBS

## 2021-11-22 DIAGNOSIS — Z98.890 POST-OPERATIVE STATE: Primary | ICD-10-CM

## 2021-11-22 PROCEDURE — 99024 POSTOP FOLLOW-UP VISIT: CPT | Performed by: NURSE PRACTITIONER

## 2021-11-24 ENCOUNTER — TELEPHONE (OUTPATIENT)
Dept: POSTPARTUM | Facility: CLINIC | Age: 36
End: 2021-11-24

## 2021-11-30 ENCOUNTER — OFFICE VISIT (OUTPATIENT)
Dept: POSTPARTUM | Facility: CLINIC | Age: 36
End: 2021-11-30
Payer: COMMERCIAL

## 2021-11-30 VITALS — DIASTOLIC BLOOD PRESSURE: 68 MMHG | SYSTOLIC BLOOD PRESSURE: 110 MMHG

## 2021-11-30 DIAGNOSIS — Z71.89 ENCOUNTER FOR BREAST FEEDING COUNSELING: Primary | ICD-10-CM

## 2021-11-30 PROCEDURE — 99404 PREV MED CNSL INDIV APPRX 60: CPT | Performed by: PEDIATRICS

## 2021-12-16 ENCOUNTER — POSTPARTUM VISIT (OUTPATIENT)
Dept: OBGYN CLINIC | Facility: MEDICAL CENTER | Age: 36
End: 2021-12-16
Payer: COMMERCIAL

## 2021-12-16 VITALS
HEIGHT: 67 IN | BODY MASS INDEX: 45.99 KG/M2 | WEIGHT: 293 LBS | DIASTOLIC BLOOD PRESSURE: 78 MMHG | SYSTOLIC BLOOD PRESSURE: 118 MMHG

## 2021-12-16 DIAGNOSIS — Z86.32 HISTORY OF GESTATIONAL DIABETES MELLITUS (GDM): ICD-10-CM

## 2021-12-16 DIAGNOSIS — I10 CHRONIC HYPERTENSION: ICD-10-CM

## 2021-12-16 DIAGNOSIS — D62 ACUTE BLOOD LOSS ANEMIA: ICD-10-CM

## 2021-12-16 DIAGNOSIS — Z98.891 S/P CESAREAN SECTION: ICD-10-CM

## 2021-12-16 PROCEDURE — 99024 POSTOP FOLLOW-UP VISIT: CPT | Performed by: OBSTETRICS & GYNECOLOGY

## 2022-01-27 ENCOUNTER — DOCUMENTATION (OUTPATIENT)
Dept: OBGYN CLINIC | Facility: MEDICAL CENTER | Age: 37
End: 2022-01-27

## 2022-02-14 ENCOUNTER — TELEPHONE (OUTPATIENT)
Dept: OBGYN CLINIC | Facility: MEDICAL CENTER | Age: 37
End: 2022-02-14

## 2022-02-24 ENCOUNTER — TELEMEDICINE (OUTPATIENT)
Dept: BEHAVIORAL/MENTAL HEALTH CLINIC | Facility: CLINIC | Age: 37
End: 2022-02-24
Payer: COMMERCIAL

## 2022-02-24 DIAGNOSIS — F41.1 GENERALIZED ANXIETY DISORDER: ICD-10-CM

## 2022-02-24 DIAGNOSIS — F32.5 MAJOR DEPRESSIVE DISORDER IN FULL REMISSION, UNSPECIFIED WHETHER RECURRENT (HCC): ICD-10-CM

## 2022-02-24 PROCEDURE — 90834 PSYTX W PT 45 MINUTES: CPT | Performed by: SOCIAL WORKER

## 2022-02-24 NOTE — PSYCH
Goals: 1  Pain: 0  Level of Suicidality: Low     D:  Safia spoke  with this worker today about her feelings re: what it has been like to attempt to parent, care for her babies since they were diagnosed with COVID two months ago  She shared that she has been experiencing intrusive thoughts that include suicidality but denied any intent or plan to act on them  She stated that the babies do not sleep at the same time and that she is focused on "trying to get them to attach in a healthy fashion, so she does not want them to cry "    A:  Safia is extremely hard on herself  Her parents visited once last month and were not helpful in providing respite or care  She is experiencing shame and guilt about feeling inept and overwhelmed by the above  She was fairly resistant to using bouncy seats or swings for assistance       P: Jon Carbajal will  continue to be supported in addressing the above and contact will be made with her ob/gyn to intervene with her PPD           I spent 50minutes with the patient during this visit      This note was not shared with the patient due to this is a psychotherapy note  Encounter Diagnoses   Name Primary?     Postpartum depression associated with first pregnancy Yes    Generalized anxiety disorder     Major depressive disorder in full remission, unspecified whether recurrent ([facility] [address] )

## 2022-02-24 NOTE — PSYCH
Treatment Plan Tracking    # 2Treatment Plan not completed within required time limits due to: Arnold Vidal presented with emotional/behavioral issues that required clinical intervention

## 2022-03-01 ENCOUNTER — POSTPARTUM VISIT (OUTPATIENT)
Dept: OBGYN CLINIC | Facility: CLINIC | Age: 37
End: 2022-03-01
Payer: COMMERCIAL

## 2022-03-01 VITALS
HEIGHT: 67 IN | SYSTOLIC BLOOD PRESSURE: 110 MMHG | WEIGHT: 293 LBS | DIASTOLIC BLOOD PRESSURE: 70 MMHG | BODY MASS INDEX: 45.99 KG/M2

## 2022-03-01 DIAGNOSIS — R10.2 PELVIC PAIN: ICD-10-CM

## 2022-03-01 DIAGNOSIS — F33.1 MODERATE EPISODE OF RECURRENT MAJOR DEPRESSIVE DISORDER (HCC): ICD-10-CM

## 2022-03-01 DIAGNOSIS — F52.8 EXCESSIVE SEXUAL DRIVE: ICD-10-CM

## 2022-03-01 DIAGNOSIS — R39.15 URINARY URGENCY: ICD-10-CM

## 2022-03-01 DIAGNOSIS — Z01.411 ENCOUNTER FOR WELL WOMAN EXAM WITH ABNORMAL FINDINGS: Primary | ICD-10-CM

## 2022-03-01 PROCEDURE — S0612 ANNUAL GYNECOLOGICAL EXAMINA: HCPCS | Performed by: OBSTETRICS & GYNECOLOGY

## 2022-03-01 RX ORDER — BUPROPION HYDROCHLORIDE 150 MG/1
150 TABLET ORAL DAILY
Qty: 30 TABLET | Refills: 6 | Status: SHIPPED | OUTPATIENT
Start: 2022-03-01 | End: 2022-05-03 | Stop reason: SDUPTHER

## 2022-03-01 NOTE — PROGRESS NOTES
Assessment   29 y o  [id number] with regular menses who is sexually active and currently not using contraception presenting for annual exam      Postpartum exacerbation of underlying depression  Improving presently, but not fully  Options reviewed and agreeable to adding 2nd medication  Plan   Diagnoses and all orders for this visit:    Encounter for well woman exam with abnormal findings  - Pap up to date  - Clinicians breast exam done  - Return in 1yr for yearly    Moderate episode of recurrent major depressive disorder ([facility] [address] )  Postpartum depression associated with first pregnancy  -     TSH, 3rd generation with Free T4 reflex; Future  -     Follicle stimulating hormone; Future  -     Luteinizing hormone; Future  -     Testosterone; Future  -     Cortisol Level, AM Specimen; Future  -     DHEA-sulfate; Future  -     buPROPion (Wellbutrin XL) 150 mg 24 hr tablet; Take 1 tablet (150 mg total) by mouth in the morning  - Return in 6-8wks for reassessment    Excessive sexual drive: unusual in setting of depressive symptoms  Recommend eval for hormonal causes  Not distressing to pt, but noted  -     TSH, 3rd generation with Free T4 reflex; Future  -     Follicle stimulating hormone; Future  -     Luteinizing hormone; Future  -     Testosterone; Future  -     Cortisol Level, AM Specimen; Future  -     DHEA-sulfate; Future    Pelvic pain  Urinary urgency  -     US pelvis complete w transvaginal; Future  -     Ambulatory Referral to Physical Therapy; Future  - Discussed symptoms favor postpartum changes   If no abnormality on US, recommend pelvic PT       __________________________________________________________________      Subjective     Carlylekaci Hills is a 29 y o  [id number] with regular menses who is sexually active and currently not using contraception presenting for annual exam      Patient presents a few days ahead of planned at urging of her therapist  She reports acute exacerbation of her symptoms started around 3-4wks prior, around the time her daughter's got COVID  Was already struggling with stressors of childcare (poor sleep, 2 babies), but she noticed she was getting more easily agitated and overwhelmed   duties fall largely to her  Her partner works and both of their parents are elderly and not near by  She endorses intrusive thoughts of self harm  She notes she never had plans and would try to push the thoughts away as soon as they started, but she couldn't stop them from happening  Anxiety also very intrusive  Self care is suffering  Reports appetite is poor if shes not thinking about it, drinking too much coffee/too little water  Sex drive increased, which she thought was unusual   Reports after she saw her therapist, she tried a few things to help (took the babies for a walk, had a friend over)  Also the girls are sleeping better through the night and she got a couple good nights rest  She does feel shes improving compared to last week, but still symptomatic  She has been on lexapro for 16yrs with good tolerance  We reviewed options -- lexapro + therapy alone, lexapro + adjunctive med, lexapro + short acting med, or switching primary medication  She suggests lexapro and wellbutrin  We discussed their synergistic effects and if she is happy on lexapro and agreeable to 2nd agent, this is likely to be the most effective approach  Post pregnancy she continues to have low abdominal tenderness  Just above or under her scar  Noticed when she leans over something (Eg a crib)  Also with inability to hold urine as long as prior  Unsure if postpartum changes       GYN  Complaints: as above, new skin tag on thigh  Denies dyspareunia, genital discharge, irregular/heavy menses and vulvar/vaginal symptoms  Periods are regular every 28-30 days, lasting 4 days  Heavier in flow  Had 1st at 6wks pp  Dysmenorrhea:none     Cyclic symptoms include none  Sexually active: Yes - single partner - male  Hx STI: denies Hx Abnormal pap: denies  Last pap:  - nilm    OB  [id number] ([address] [facility])  Pregnancy complications: [id number], twins, c/s for twins, CHTN and hx CHF  No future plans      Complaints: urgency as above  Denies urinary frequency, hematuria, urinary incontinence and dysuria    BREAST  Complaints: denies  Denies: breast lump, breast tenderness, changed mole, dryness, nipple discharge, pruritus, rash, skin color change and skin lesion(s)  Family hx: denies fhx of breast, uterine, ovarian, or colon cancers  Patient does do regular self-exams    GENERAL  PMH reviewed/updated and is as below  Patient does follow with a PCP  Works as a therapist   Denies domestic violence  Exercise: increasing walks with babies  Diet: nothing specific    SCREENING  Cervical Ca: pap up to date  Breast Ca: clinicians breast exam done  Colon Ca: not indicated by age      Past Medical History:   Diagnosis Date    Abnormal Pap smear of cervix     Anxiety     [facility]     Chlamydia     Coronary artery disease 3/17/19    Depression     Diet controlled gestational diabetes mellitus (GDM) in second trimester 2021    Heart failure ([facility] )     CHF in past    HPV (human papilloma virus) infection     Hypertension     Temporomandibular joint disorder     Resolved 2015     Varicella        Past Surgical History:   Procedure Laterality Date    CERVICAL BIOPSY  W/ LOOP ELECTRODE EXCISION      DENTAL SURGERY      [place] teeth extraction    [place]  DELIVERY ONLY N/A 2021    Procedure:  SECTION ();   Surgeon: Gigi Hoffmann MD;  Location: [facility];  Service: Obstetrics    TONSILLECTOMY           Current Outpatient Medications:     escitalopram (LEXAPRO) 20 mg tablet, Take 1 tablet (20 mg total) by mouth daily, Disp: 90 tablet, Rfl: 1    NIFEdipine (PROCARDIA XL) 30 mg 24 hr tablet, Take 1 tablet (30 mg total) by mouth daily, Disp: 30 tablet, Rfl: 11    No Known Allergies    Social History     Tobacco Use    Smoking status: Former Smoker     Packs/day: 1 00     Years: 15 00     Pack years: 15 00     Types: Cigarettes     Quit date: 3/17/2019     Years since quittin 9    Smokeless tobacco: Never Used   Vaping Use    Vaping Use: Never used   Substance Use Topics    Alcohol use: Yes     Alcohol/week: 2 0 standard drinks     Types: 2 Glasses of wine per week     Comment: wine 2 glasses a week    Drug use: No           Objective  /70 (BP Location: Left arm, Patient Position: Sitting, Cuff Size: Adult)   Ht 5' 7" (1 702 m)   Wt (!) 145 kg (320 lb)   LMP 2022   BMI 50 12 kg/m²      Physical Exam:  Physical Exam  Exam conducted with a chaperone present  Constitutional:       General: She is not in acute distress  Appearance: Normal appearance  She is well-developed  She is not ill-appearing, toxic-appearing or diaphoretic  HENT:      Head: Normocephalic and atraumatic  Eyes:      General: No scleral icterus  Right eye: No discharge  Left eye: No discharge  Conjunctiva/sclera: Conjunctivae normal    Cardiovascular:      Rate and Rhythm: Normal rate  Pulmonary:      Effort: Pulmonary effort is normal  No accessory muscle usage or respiratory distress  Chest:   Breasts:      Right: No inverted nipple, mass, nipple discharge, skin change, tenderness or axillary adenopathy  Left: No inverted nipple, mass, nipple discharge, skin change, tenderness or axillary adenopathy  Abdominal:      General: There is no distension  Palpations: Abdomen is soft  There is no mass  Tenderness: There is no abdominal tenderness  There is no guarding or rebound  Genitourinary:     General: Normal vulva  Exam position: Lithotomy position  Labia:         Right: No rash, tenderness or lesion  Left: No rash, tenderness or lesion  Urethra: No prolapse, urethral swelling or urethral lesion        Vagina: No signs of injury  No vaginal discharge, erythema or tenderness  Cervix: No cervical motion tenderness, discharge, friability, lesion or erythema  Uterus: Not enlarged, not fixed and not tender  Adnexa:         Right: No mass, tenderness or fullness  Left: No mass, tenderness or fullness  Rectum: No external hemorrhoid  Normal anal tone  Comments: Urethral meatus: normal  Lymphadenopathy:      Upper Body:      Right upper body: No axillary or pectoral adenopathy  Left upper body: No axillary or pectoral adenopathy  Skin:     General: Skin is warm and dry  Coloration: Skin is not jaundiced  Findings: No bruising, erythema or rash  Neurological:      Mental Status: She is alert  Psychiatric:         Mood and Affect: Mood normal          Behavior: Behavior normal          Thought Content:  Thought content normal          Judgment: Judgment normal

## 2022-03-03 ENCOUNTER — APPOINTMENT (OUTPATIENT)
Dept: LAB | Facility: HOSPITAL | Age: 37
End: 2022-03-03
Attending: OBSTETRICS & GYNECOLOGY
Payer: COMMERCIAL

## 2022-03-03 DIAGNOSIS — F52.8 EXCESSIVE SEXUAL DRIVE: ICD-10-CM

## 2022-03-03 DIAGNOSIS — D62 ACUTE BLOOD LOSS ANEMIA: ICD-10-CM

## 2022-03-03 DIAGNOSIS — I10 CHRONIC HYPERTENSION: ICD-10-CM

## 2022-03-03 DIAGNOSIS — Z86.32 HISTORY OF GESTATIONAL DIABETES MELLITUS (GDM): ICD-10-CM

## 2022-03-03 DIAGNOSIS — F33.1 MODERATE EPISODE OF RECURRENT MAJOR DEPRESSIVE DISORDER (HCC): ICD-10-CM

## 2022-03-03 LAB
ALBUMIN SERPL BCP-MCNC: 3.4 G/DL (ref 3.5–5)
ALP SERPL-CCNC: 85 U/L (ref 46–116)
ALT SERPL W P-5'-P-CCNC: 21 U/L (ref 12–78)
ANION GAP SERPL CALCULATED.3IONS-SCNC: 5 MMOL/L (ref 4–13)
AST SERPL W P-5'-P-CCNC: 13 U/L (ref 5–45)
BILIRUB SERPL-MCNC: 0.63 MG/DL (ref 0.2–1)
BUN SERPL-MCNC: 16 MG/DL (ref 5–25)
CALCIUM ALBUM COR SERPL-MCNC: 9.4 MG/DL (ref 8.3–10.1)
CALCIUM SERPL-MCNC: 8.9 MG/DL (ref 8.3–10.1)
CHLORIDE SERPL-SCNC: 107 MMOL/L (ref 100–108)
CO2 SERPL-SCNC: 25 MMOL/L (ref 21–32)
CORTIS AM PEAK SERPL-MCNC: 14.9 UG/DL (ref 4.2–22.4)
CREAT SERPL-MCNC: 0.62 MG/DL (ref 0.6–1.3)
ERYTHROCYTE [DISTWIDTH] IN BLOOD BY AUTOMATED COUNT: 15.7 % (ref 11.6–15.1)
FSH SERPL-ACNC: 2.7 MIU/ML
GFR SERPL CREATININE-BSD FRML MDRD: 117 ML/MIN/1.73SQ M
GLUCOSE 2H P 75 G GLC PO SERPL-MCNC: 79 MG/DL (ref 65–139)
GLUCOSE P FAST SERPL-MCNC: 109 MG/DL (ref 65–99)
GLUCOSE P FAST SERPL-MCNC: 112 MG/DL (ref 65–99)
HCT VFR BLD AUTO: 42.5 % (ref 34.8–46.1)
HGB BLD-MCNC: 13.1 G/DL (ref 11.5–15.4)
LH SERPL-ACNC: 5.8 MIU/ML
MCH RBC QN AUTO: 23.9 PG (ref 26.8–34.3)
MCHC RBC AUTO-ENTMCNC: 30.8 G/DL (ref 31.4–37.4)
MCV RBC AUTO: 78 FL (ref 82–98)
PLATELET # BLD AUTO: 418 THOUSANDS/UL (ref 149–390)
PMV BLD AUTO: 9.7 FL (ref 8.9–12.7)
POTASSIUM SERPL-SCNC: 3.9 MMOL/L (ref 3.5–5.3)
PROT SERPL-MCNC: 7.6 G/DL (ref 6.4–8.2)
RBC # BLD AUTO: 5.47 MILLION/UL (ref 3.81–5.12)
SODIUM SERPL-SCNC: 137 MMOL/L (ref 136–145)
TESTOST SERPL-MCNC: 24 NG/DL
TSH SERPL DL<=0.05 MIU/L-ACNC: 1.82 UIU/ML (ref 0.36–3.74)
WBC # BLD AUTO: 10.88 THOUSAND/UL (ref 4.31–10.16)

## 2022-03-03 PROCEDURE — 85027 COMPLETE CBC AUTOMATED: CPT

## 2022-03-03 PROCEDURE — 82627 DEHYDROEPIANDROSTERONE: CPT

## 2022-03-03 PROCEDURE — 82950 GLUCOSE TEST: CPT

## 2022-03-03 PROCEDURE — 80053 COMPREHEN METABOLIC PANEL: CPT

## 2022-03-03 PROCEDURE — 83001 ASSAY OF GONADOTROPIN (FSH): CPT

## 2022-03-03 PROCEDURE — 82533 TOTAL CORTISOL: CPT

## 2022-03-03 PROCEDURE — 83002 ASSAY OF GONADOTROPIN (LH): CPT

## 2022-03-03 PROCEDURE — 36415 COLL VENOUS BLD VENIPUNCTURE: CPT

## 2022-03-03 PROCEDURE — 84403 ASSAY OF TOTAL TESTOSTERONE: CPT

## 2022-03-03 PROCEDURE — 84443 ASSAY THYROID STIM HORMONE: CPT

## 2022-03-03 PROCEDURE — 82947 ASSAY GLUCOSE BLOOD QUANT: CPT

## 2022-03-04 LAB — DHEA-S SERPL-MCNC: 149 UG/DL (ref 57.3–279.2)

## 2022-03-08 ENCOUNTER — TELEMEDICINE (OUTPATIENT)
Dept: BEHAVIORAL/MENTAL HEALTH CLINIC | Facility: CLINIC | Age: 37
End: 2022-03-08
Payer: COMMERCIAL

## 2022-03-08 ENCOUNTER — TELEPHONE (OUTPATIENT)
Dept: PSYCHIATRY | Facility: CLINIC | Age: 37
End: 2022-03-08

## 2022-03-08 DIAGNOSIS — F41.1 GENERALIZED ANXIETY DISORDER: ICD-10-CM

## 2022-03-08 PROCEDURE — 90834 PSYTX W PT 45 MINUTES: CPT | Performed by: SOCIAL WORKER

## 2022-03-08 NOTE — TELEPHONE ENCOUNTER
LVM for patient to call resident line to schedule a NP appointment with Dr Charla Serrano  Per Beauford Felty, Dr Charla Serrano has agreed to see her

## 2022-03-08 NOTE — PSYCH
Goals: 1  Pain: 0  Level of Suicidality: Low     D:  Safia spoke  with this worker today about her feelings re: the decrease in intrusive thoughts since the babies have started to sleep   She admitted that she only started the Wellbutrin that was prescribed to her "today," and that she is "still super anxious," particularly as her parents are coming for a visit this weekend  Reasons for her concerns were discussed for the remainder of the session   A: Adonis Mathur used to be very close with Her parents however since their relocation to [place], it seems that their alcohol intake has increased which is a concern for Bruno Min  She no longer a relationship with her brother who "used to be her best friend" as a result of his alcoholism  The concept of attunement was discussed and its role in parenting as Bruno Min continues to struggle with feelings of guilt         P: Adonis Mathur will  continue to be supported in addressing the above and a referral for psychiatry has been made with her agreement        I spent 50minutes with the patient during this visit      This note was not shared with the patient due to this is a psychotherapy note  Encounter Diagnoses   Name Primary?     Generalized anxiety disorder     Postpartum depression associated with first pregnancy

## 2022-03-19 ENCOUNTER — HOSPITAL ENCOUNTER (OUTPATIENT)
Dept: ULTRASOUND IMAGING | Facility: HOSPITAL | Age: 37
Discharge: HOME | End: 2022-03-19
Attending: OBSTETRICS & GYNECOLOGY
Payer: COMMERCIAL

## 2022-03-19 DIAGNOSIS — R39.15 URINARY URGENCY: ICD-10-CM

## 2022-03-19 DIAGNOSIS — R10.2 PELVIC PAIN: ICD-10-CM

## 2022-03-19 PROCEDURE — 76830 TRANSVAGINAL US NON-OB: CPT

## 2022-03-19 PROCEDURE — 76856 US EXAM PELVIC COMPLETE: CPT

## 2022-03-23 ENCOUNTER — TELEPHONE (OUTPATIENT)
Dept: OBGYN CLINIC | Facility: MEDICAL CENTER | Age: 37
End: 2022-03-23

## 2022-03-24 ENCOUNTER — EVALUATION (OUTPATIENT)
Dept: PHYSICAL THERAPY | Facility: REHABILITATION | Age: 37
End: 2022-03-24
Payer: COMMERCIAL

## 2022-03-24 DIAGNOSIS — R39.15 URINARY URGENCY: ICD-10-CM

## 2022-03-24 DIAGNOSIS — R10.2 PELVIC PAIN: ICD-10-CM

## 2022-03-24 PROCEDURE — 97161 PT EVAL LOW COMPLEX 20 MIN: CPT | Performed by: PHYSICAL THERAPIST

## 2022-03-24 PROCEDURE — 97112 NEUROMUSCULAR REEDUCATION: CPT | Performed by: PHYSICAL THERAPIST

## 2022-03-24 NOTE — PROGRESS NOTES
PT Evaluation     Today's date: 3/24/2022  Patient name: Na Garcia  : 1985  MRN: [id number]  Referring provider: Jonah Bauer MD  Dx:   Encounter Diagnosis     ICD-10-CM    1  Pelvic pain  R10 2 Ambulatory Referral to Physical Therapy     PT plan of care cert/re-cert   2  Urinary urgency  R39 15 Ambulatory Referral to Physical Therapy     PT plan of care cert/re-cert       Start Time: 832  Stop Time: 932  Total time in clinic (min): 60 minutes    Assessment  Assessment details: Patient is a 29 y o  female who presents to physical therapy with diagnosis of urinary urgency and pelvic pain  Internal assessment will be deferred secondary to time constraints and performed prn  Functional impairments include pain with lifting, transfers, and childcare as well as urinry urgency and urge and stress incontinence  Physical findings include proximal hip weakness, impaired balance and posture and poor coordination among breathing, core, and pelvic floor muscles  Patient would benefit from formal physical therapy to address impairments as detailed, decrease pain, and restore maximal level of function for all home, work, and mobility tasks  Thank you for this referral     Impairments: abnormal muscle tone, abnormal or restricted ROM, activity intolerance, impaired balance, impaired physical strength, lacks appropriate home exercise program and poor body mechanics  Understanding of Dx/Px/POC: good   Prognosis: good    Goals  Short term goals:  Pt will report instance of urinary incontinence less than 2 days/week in 6 weeks  Pt will perform Kegel consistently prior to cough/sneeze in 6 weeks  Pt will be able to delay urge at least 5 minutes in 6 weeks  Pt will demonstrate good understanding of urge delay techniques in 6 weeks  Pt will present with MMT 4/5 b/l LE all planes    Long term goals:  Pt will be able to cough, sneeze, lift, and transfer without leakage by discharge    Pt will be compliant with HEP by discharge  Pt will be able to delay urge for greater than 10 minutes by discharge to be able to complete childcare tasks  Pt will report <3 instances of leakage per month  Plan  Plan details: Cont per POC  Assess bladder diary  Progres strengthening  Possible internal assessment following pt's consent  Patient would benefit from: skilled physical therapy  Planned modality interventions: low level laser therapy, biofeedback, cryotherapy and thermotherapy: hydrocollator packs  Planned therapy interventions: joint mobilization, manual therapy, neuromuscular re-education, patient education, postural training, strengthening, stretching, therapeutic exercise, therapeutic activities, abdominal trunk stabilization, balance, flexibility, functional ROM exercises and home exercise program  Frequency: 1x week  Duration in weeks: 12  Treatment plan discussed with: patient        PT Pelvic Floor Subjective:   History of Present Illness:   Pt reports she had twins in 2021 by   Pt reports since she has delivered she has issues with increased urinary urgency with leakage on the way to the bathroom  Pt reports she noticed urinary leakage in pregnancy at times unprompted and also with coughs and sneezes  Pt still gets leakage with cough and sneezing but will also get leakage with sudden urge to go  Pt does not feel like she can hold her urine when she has urge to pee  Pt also feels weakness in her lower abdomen and causes pain with sitting, transferring in bed, floor to stand transfers, leaning over crib or washing machine  She report sit hurts form the outside, not from the inside  She does not have pain with interocourse   She repots the core weakness is causing back problems as welll  Pelvic ultrasound completed but pt has not had her results yet   Social Support:     Lives in:  Multiple-level home    Lives with:  Spouse and young children    Relationship status: /committed    Work status: employed part time (psychotherapist)    Life stress severity: severe    History of Depression: yes (pt has post partum depression right now and she feels like th ephysical weakness is contribuiting to it as well as pain)  Diet and Exercise:    Diet:balanced nutrition    Exercise type: walking    Exercise frequency: once to twice a week    30 mins while pushing a double stroller  No pain reproduction or urinary leakage with this activity   OB/ gyn History    Gestational History:     Prior Pregnancy: Yes      Number of prior pregnancies: 1    Number of term pregnancies: 1    Delivery Type:  section      Delivery Complications:  Scheduled    no delivery complications    Menstrual History:    Date of last menstrual period: 3/3/2022    Menstrual irregularities regular menses    Painful periods:  Difficulty managing menstrual pain (typical cramping)    Tolerates tampons: yes  Bladder Function:     Voiding Difficulties positive for: urgency and incomplete emptying (pt reports she is a "nervous pee-er" and will pee multiple times before bed)      Voiding Difficulties negative for: frequent urination, hesitancy and straining      Voiding Difficulties comments:     Voiding frequency: every 31-60 minutes    Urinary leakage: urine leakage    Urinary leakage aggravated by: coughing, sneezing, walking to the bathroom, hearing running water, key-in-the-door syndrome, seeing a toilet and anxiety    Nocturia (episodes per night): 0    Painful urination: No      Fluid Intake Type: Water, coffee and tea    Intake (ounces):      Intake (ounces) comment: Coffee: 32 oz, spaced throughout the day   Water: 32 oz  Tea: 16 oz    Incontinence Management:     Pads/Diaper Use:  None  Bowel Function:     Bowel Function comments:  Pt will go more than once a day the past few weeks secondary to increased coffee intake     Bowel frequency: daily and multiple times a day    Dare Stool Scale: type 4    Stool softener use: no stool softeners    Enema use: no enema    Uses "squatty potty": no Squatty Potty  Sexual Function:     Sexually Active:  Non-contributory  Pain:     At best pain ratin    At worst pain ratin    Location:  Lower abdominal pain     Onset:  1-3 months ago    Quality:  Dull ache    Duration of symptoms:  Brief    Relieving factors:  Rest  Treatments:     Current treatment: physical therapy    Patient Goals: Other patient goals:  Strengthen my core and reduce pain       Objective    Posture:    Tenderness:  Piriformis: R>L  Obturator Internus: neg  Adductors: neg  Thoracolumbar fascia: R>L    Diastasis Recti  Negative    Tenderness in linea alba: distal 1/3 rd  Tissue integrity in linea alba: boggy  Lower L and R abdominal tenderness  Good scar mobility over  scar         Lumbar AROM   WNL except lumbar ext causes abdominal discomfort, R lateral flexion and R trunk rotation causes R SI jt pain       SLS  L: 30 secs  R:30 secs  (+) trendelenburg L>R with poor load transfer     Other Comments:  Able to squat without pain, unable to squat to parallel  Fair TAC, improved with cueing to avoid compensation with abdominal brace  Good PFM contraction  Decreased pain/increased strength with resisted SLR with TAC/PFM activation    MMT:   Left Right   Hip flex 4-, abdominal pain 4   Hip ABD 3, abdominal pain 3+, abdominal pain    Hip ext 4 4+   Hip IR 4- 4+   Hip ER 4- 4+   Knee flex 4+ 4+   Knee ext 4+ 4+   Ankle DF 4+ 4+               Special Tests: not fully completed secondary to time constraints    Left Right   MCKENZIE PERERAIR     Post Thigh Thrust     H[place] - -   ASLR - -   SI compression     SI distraction                                 Precautions: 4 months postpartum, PPD, hx of CHF  Daily Treatment Diary    Manuals 3/24         External assess Prn, nv         Internal assess Prn, nv         TLF STM/skin rolling          Lower abdominal skin rolling           Neuro Re-Ed          TAC review PF review         TAC+PF review         TAC+PF with march          TAC+PF with alt knee fall out          TAC+PF with ball squeeze          TAC+PF with SLR          TAC+PF with s/l ABD          TAC+PF with bridge                              Diaphragmatic breathing          Ther Ex          Supine piri stretch          LTR          Seated piri stretch          Supine butterfly stretch                                                  Ther Activity          Bladder Diary  review         Urge delay:QF nv         Urge delay: HR nv         Freeze, breathe, squeeze  nv         The ke nv                   Gait Training                              Modalities

## 2022-03-29 ENCOUNTER — TELEMEDICINE (OUTPATIENT)
Dept: BEHAVIORAL/MENTAL HEALTH CLINIC | Facility: CLINIC | Age: 37
End: 2022-03-29
Payer: COMMERCIAL

## 2022-03-29 DIAGNOSIS — F32.5 MAJOR DEPRESSIVE DISORDER IN FULL REMISSION, UNSPECIFIED WHETHER RECURRENT (HCC): ICD-10-CM

## 2022-03-29 DIAGNOSIS — F41.1 GENERALIZED ANXIETY DISORDER: ICD-10-CM

## 2022-03-29 PROCEDURE — 90834 PSYTX W PT 45 MINUTES: CPT | Performed by: SOCIAL WORKER

## 2022-04-05 ENCOUNTER — OFFICE VISIT (OUTPATIENT)
Dept: PHYSICAL THERAPY | Facility: REHABILITATION | Age: 37
End: 2022-04-05
Payer: COMMERCIAL

## 2022-04-05 DIAGNOSIS — R39.15 URINARY URGENCY: ICD-10-CM

## 2022-04-05 DIAGNOSIS — R10.2 PELVIC PAIN: Primary | ICD-10-CM

## 2022-04-05 PROCEDURE — 97112 NEUROMUSCULAR REEDUCATION: CPT | Performed by: PHYSICAL THERAPIST

## 2022-04-05 PROCEDURE — 97530 THERAPEUTIC ACTIVITIES: CPT | Performed by: PHYSICAL THERAPIST

## 2022-04-05 NOTE — PROGRESS NOTES
Daily Note     Today's date: 2022  Patient name: Ronal Vargas  : 1985  MRN: [id number]  Referring provider: Vanessa Tyson MD  Dx:   Encounter Diagnosis     ICD-10-CM    1  Pelvic pain  R10 2    2  Urinary urgency  R39 15        Start Time:   Stop Time: 845  Total time in clinic (min): 71 minutes    Subjective: Pt did her bladder diary for one day but left it at home  Pt reports she noticed she ignores the first urge and it will decrease and then she will get a second urge and she will have to go  She is having leakage with only a drop or 2 of urine a few times during the day  Once was when getting up off the floor, once was going up the stairs, once was getting out of the car  She correlated leakage with longer voiding time/more full bladder  Pt reports she was voiding once an hour, between 8-15 secs  Objective: See treatment diary below      Assessment: Tolerated treatment well  Significant time spent discussing bladder diary with focus on urge deferral strategies, urgency, typical phases of micturition, association between anxiety and urge/urgency/urinary leakage  Pt also cued on importance of managing IAP to decrease urinary leakage associated with stress incontinence and focus on exhaling on exertion  Pt able to perform a good PFM contraction palpated eternally in supine  Pt challenged with performing TAC with initial compensation with rectus abdominus and PPT  After cueing, pt able to perform TAC correctly 75% of the time  Pt provided verbal understanding and return demonstration of TAC and PFM contraction and ability to perform both simultaneously  Pt demonstrated supine to sit transfers and sit to stand transfers with focus on exhaling on exertion, TAC/PFM followed by full relaxation after transfer  Pt to incorporate urge deferral strategies into HEP to extend intervoid interval form 1 hour to 1 25-2 hours   Pt to incorporate the knack into daily activities as well as prior to a cough or sneeze to manage urinary leakage  Pt will benefit from internal/external assessment NV and possible biofeedback to assess PFM contraction, endurance and ability to return to full relaxation  Plan: Continue per plan of care               Precautions: 4 months postpartum, PPD, hx of CHF  Daily Treatment Diary    Manuals 3/24 4/5        External assess Prn, nv nv        Internal assess Prn, nv nv        TLF STM/skin rolling          Lower abdominal skin rolling           Neuro Re-Ed          TAC review 15 mins        PF review 5 mins        TAC+PF review 10 mins        TAC+PF with march          TAC+PF with alt knee fall out          TAC+PF with ball squeeze          TAC+PF with SLR          TAC+PF with s/l ABD          TAC+PF with bridge                              Diaphragmatic breathing          Ther Ex          Supine piri stretch          LTR          Seated piri stretch          Supine butterfly stretch                                                  Ther Activity          Sit to stand transfers  5 mins        Supine to sit transfer  5 mins        Bladder Diary  review review        Urge delay:QF nv review        Urge delay: HR nv review        Freeze, breathe, squeeze  nv review        The knack nv review         Exhale on exertion  review        Gait Training                              Modalities

## 2022-04-08 ENCOUNTER — TELEPHONE (OUTPATIENT)
Dept: PSYCHIATRY | Facility: CLINIC | Age: 37
End: 2022-04-08

## 2022-04-08 NOTE — TELEPHONE ENCOUNTER
Offered 4/13 at 9:00 with Dr Darlene Marquez  Patient stated she would rather stay with Dr Gwendolyn Bearden as that is who Becky Florentino recommended

## 2022-04-08 NOTE — TELEPHONE ENCOUNTER
Offered sooner appointment with Dr Jay Vazquez (4/12 2:45)  Patient was unable to make that appointment and requested to keep her 4/18 appointment with Dr Glo Dickson

## 2022-04-12 ENCOUNTER — POSTPARTUM VISIT (OUTPATIENT)
Dept: OBGYN CLINIC | Facility: MEDICAL CENTER | Age: 37
End: 2022-04-12
Payer: COMMERCIAL

## 2022-04-12 ENCOUNTER — TELEMEDICINE (OUTPATIENT)
Dept: BEHAVIORAL/MENTAL HEALTH CLINIC | Facility: CLINIC | Age: 37
End: 2022-04-12
Payer: COMMERCIAL

## 2022-04-12 VITALS — BODY MASS INDEX: 50.12 KG/M2 | SYSTOLIC BLOOD PRESSURE: 130 MMHG | DIASTOLIC BLOOD PRESSURE: 70 MMHG | WEIGHT: 293 LBS

## 2022-04-12 DIAGNOSIS — F42.2 MIXED OBSESSIONAL THOUGHTS AND ACTS: Primary | ICD-10-CM

## 2022-04-12 DIAGNOSIS — F41.1 GENERALIZED ANXIETY DISORDER: ICD-10-CM

## 2022-04-12 PROCEDURE — 90834 PSYTX W PT 45 MINUTES: CPT | Performed by: SOCIAL WORKER

## 2022-04-12 PROCEDURE — 99213 OFFICE O/P EST LOW 20 MIN: CPT | Performed by: OBSTETRICS & GYNECOLOGY

## 2022-04-12 NOTE — LETTER
April 12, 2022     Patient: Laure Boston   YOB: 1985   Date of Visit: 4/12/2022       To whom it may concern:    Felicitas Way is under my professional care for the supervision for the supervision of her pregnancy/postpartum care  She is currently undergoing treatment for postpartum depression  Her symptoms have been refractory to first-line measures  I am requesting she stay out of work for 1 week to facilitate her treatment plan (4/18/22-4/22/22)  After this time, she will require intermittent leave on an as needed basis for symptom exacerbation or to attend follow up appointments (1-2d/wk)  We will complete supplemental forms certifying these recommendations once received by our office  If you have questions, please do not hesitate to call me       Sincerely,        Mary Stewart MD
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
## 2022-04-12 NOTE — PROGRESS NOTES
Assessment:  29 y o  [id number] who presents as a follow up PPD  Plan:  Diagnoses and all orders for this visit:    Postpartum depression associated with first pregnancy  Generalized anxiety disorder  - Transitioning medical management to psychiatry  - Continue therapy    __________________________________________________________________    Subjective   Jimmie Chandler is a 29 y o  [id number] who presents as a follow up of PPD  Patient reports she has noticed a more recent exacerbation  Reports her cat becames suddently sick last week  Had to put her down last Thursday  Found herself crying during work/staff meeting  Reporting increased aggression  This was present even before last week, but has been increasing with time  Endorses rapid fluctuations in mood  She's most distressed by disproportionate nature of her responses  she feels shes getting extreme responses to perceived minimal stressors  She is noting some improvement in her SI (has remained passive, no plans/intent to act)  No HI or difficulties with self care/childcare  continues to have support at home from partner where able  Has upcoming appt with psych  Discussed options for management  Recommend given short interval between now and upcoming appt, defer to psych for medical management  Patient in agreement  Understands we remain a resource prn  The following portions of the patient's history were reviewed and updated as appropriate: allergies, current medications, past medical history, past social history and problem list     Review of Systems  Review of Systems   Constitutional: Positive for fatigue  Negative for chills and fever  Respiratory: Negative for cough and shortness of breath  Psychiatric/Behavioral: Positive for agitation, behavioral problems, dysphoric mood and sleep disturbance  Negative for self-injury  The patient is nervous/anxious               Objective  /70   Wt (!) 145 kg (320 lb)   BMI 50 12 kg/m² Physical Exam:  Physical Exam  Constitutional:       General: She is not in acute distress  Appearance: Normal appearance  She is not ill-appearing, toxic-appearing or diaphoretic  Eyes:      General: No scleral icterus  Right eye: No discharge  Left eye: No discharge  Conjunctiva/sclera: Conjunctivae normal    Cardiovascular:      Rate and Rhythm: Normal rate  Pulmonary:      Effort: Pulmonary effort is normal  No respiratory distress  Musculoskeletal:         General: No swelling  Skin:     General: Skin is warm and dry  Coloration: Skin is not jaundiced or pale  Findings: No bruising or erythema  Neurological:      Mental Status: She is alert  Psychiatric:         Attention and Perception: Attention and perception normal  She is attentive  Mood and Affect: Mood is depressed  Affect is tearful  Affect is not labile, blunt, flat or inappropriate  Speech: Speech normal          Behavior: Behavior normal  Behavior is not agitated, aggressive, withdrawn or hyperactive  Behavior is cooperative  Thought Content:  Thought content normal          Judgment: Judgment normal

## 2022-04-14 ENCOUNTER — APPOINTMENT (OUTPATIENT)
Dept: PHYSICAL THERAPY | Facility: REHABILITATION | Age: 37
End: 2022-04-14
Payer: COMMERCIAL

## 2022-04-18 ENCOUNTER — OFFICE VISIT (OUTPATIENT)
Dept: PSYCHIATRY | Facility: CLINIC | Age: 37
End: 2022-04-18
Payer: COMMERCIAL

## 2022-04-18 DIAGNOSIS — F33.1 MODERATE EPISODE OF RECURRENT MAJOR DEPRESSIVE DISORDER (HCC): Primary | ICD-10-CM

## 2022-04-18 DIAGNOSIS — F42.9 OBSESSIVE-COMPULSIVE DISORDER, UNSPECIFIED TYPE: ICD-10-CM

## 2022-04-18 DIAGNOSIS — F41.1 GAD (GENERALIZED ANXIETY DISORDER): ICD-10-CM

## 2022-04-18 PROCEDURE — 90792 PSYCH DIAG EVAL W/MED SRVCS: CPT | Performed by: STUDENT IN AN ORGANIZED HEALTH CARE EDUCATION/TRAINING PROGRAM

## 2022-04-18 RX ORDER — ESCITALOPRAM OXALATE 10 MG/1
10 TABLET ORAL DAILY
Qty: 14 TABLET | Refills: 0 | Status: SHIPPED | OUTPATIENT
Start: 2022-04-18 | End: 2022-05-03

## 2022-04-18 RX ORDER — SERTRALINE HYDROCHLORIDE 100 MG/1
100 TABLET, FILM COATED ORAL DAILY
Qty: 30 TABLET | Refills: 1 | Status: SHIPPED | OUTPATIENT
Start: 2022-05-02 | End: 2022-05-02 | Stop reason: SDUPTHER

## 2022-04-18 NOTE — BH TREATMENT PLAN
TREATMENT PLAN (Medication Management Only)        [facility]    Name and Date of Birth:  Paulina Costello 29 y o  1985  Date of Treatment Plan: April 18, 2022  Diagnosis/Diagnoses:    1  Moderate episode of recurrent major depressive disorder ([address] )    2  MARIE (generalized anxiety disorder)    3  Obsessive-compulsive disorder, unspecified type      Strengths/Personal Resources for Self-Care: supportive family, taking medications as prescribed, ability to communicate needs, ability to understand psychiatric illness, average or above intelligence, financial security, good physical health, independence, motivation for treatment, stable employment, well educated  Area/Areas of need (in own words): anxiety symptoms, depressive symptoms, obsessive-compulsive symptoms  1  Long Term Goal: improve control of anxiety, improve control of depression, improve control of obsessive thoughts  Target Date: 6 months - 10/18/2022  Person/Persons responsible for completion of goal: Safia  2  Short Term Objective (s) - How will we reach this goal?:   A  Provider new recommended medication/dosage changes and/or continue medication(s): continue current medications as prescribed (Zoloft, Lexapro and Wellbutrin XL)  B   Start sleep training babies  Target Date: 6 months - 10/18/2022  Person/Persons Responsible for Completion of Goal: Safia  Progress Towards Goals: stable, initiating treatment  Treatment Modality: medication management every 6 weeks, continue psychotherapy with own therapist  Review due 6 months from date of this plan: 6 months - 10/18/2022  Expected length of service: ongoing treatment unless revised  My Physician/PA/NP and I have developed this plan together and I agree to work on the goals and objectives  I understand the treatment goals that were developed for my treatment

## 2022-04-18 NOTE — PATIENT INSTRUCTIONS
· Decrease Lexapro to 10 mg for 2 weeks, then stop  · Start Zoloft 50 mg at night for depression, OCD, anxiety  · In 2 weeks, after lexapro is stopped, increase Zoloft to 100 mg at night  · Continue Welllbutrin  mg for depression
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
## 2022-04-19 ENCOUNTER — OFFICE VISIT (OUTPATIENT)
Dept: PHYSICAL THERAPY | Facility: REHABILITATION | Age: 37
End: 2022-04-19
Payer: COMMERCIAL

## 2022-04-19 DIAGNOSIS — R10.2 PELVIC PAIN: Primary | ICD-10-CM

## 2022-04-19 DIAGNOSIS — R39.15 URINARY URGENCY: ICD-10-CM

## 2022-04-19 PROCEDURE — 97110 THERAPEUTIC EXERCISES: CPT | Performed by: PHYSICAL THERAPIST

## 2022-04-19 PROCEDURE — 97140 MANUAL THERAPY 1/> REGIONS: CPT | Performed by: PHYSICAL THERAPIST

## 2022-04-19 PROCEDURE — 97530 THERAPEUTIC ACTIVITIES: CPT | Performed by: PHYSICAL THERAPIST

## 2022-04-19 NOTE — PROGRESS NOTES
Daily Note     Today's date: 2022  Patient name: Mony Kim  : 1985  MRN: [id number]  Referring provider: Merari Braden MD  Dx:   Encounter Diagnosis     ICD-10-CM    1  Pelvic pain  R10 2    2  Urinary urgency  R39 15        Start Time: 1023  Stop Time: 932  Total time in clinic (min): 56 minutes    Subjective: Pt had an appointment with psychiatry for PPD  Pt is using urge deferral strategies and is able to be successful using them to extend intervoid interval to 2 hours  At work, she can go 3 hours between voiding  She is trying to perform PFM contraction prior to a cough or sneeze and exhale on exertion  Pt reports the core weakness and pelvic pain are more of an issue  She reports the pelvic pain has been decreasing  She has difficulty sitting up from a supine position as well as direct contact over her abdomen such as when leaning over a sink  She reports getting up off the floor is getting easier         Objective: See treatment diary below  Pt provided consent prior to and throughout initiation of external/internal assessment  Pt declined second person in the room      Position: supine exam    General Perineum Exam:   Perineum intact: y  Pelvic organ prolapse at rest: N    Visual Inspection of Perineum:   Excursion of perineal body in cephalad direction with contraction of pelvic floor muscles (PFM): good  Excursion of perineal body in caudal direction with relaxation of pelvic floor muscles (PFM): good  Involuntary relaxation with bearing down: improved with cueing      Involuntary contraction with coughing: N, corrected with cueing  Anal Marbury: present  Cotton swab test: non-tender  Sensation: intact     Pelvic Organ Prolapse   Position: hook-lying  At rest: none  With bearing down: none       Pelvic Floor Muscle Exam     PERFECT Score   Power right: 2  Power left: 2  Endurance (seconds to max): 4  Repetitions (before fatigue): 6  Fast flicks (in 10 seconds): 11     Tenderness to palpation: 3 rd layer R side 3 o'clock      Assessment: Tolerated treatment well  Patient comes to PT with 2 days of bladder diary completed  She presents with improved urinary frequency  She does present with 4-6 leakages per day, some related to urge incontinence with triggers such as running water or when pulling her pants down to use the bathroom and some related to stress incontinence when transferring off of the floor  Perfomred internal assessment  Pt presents with good contraction and relaxation of PFM with good PFM strength  Pt does present with decreased endurance for max hold of PFM contraction  Pt has mild tenderness 3rd layer PFM at 3 o'clock with no referral of pain elsewhere  Initiated LE and post pelvic stretches to lengthen and stretch the hips and post pelvic floor  Will benefit form initiation of strengthening NV  Plan: Continue per plan of care  Assess compliance with HEP             Precautions: 4 months postpartum, PPD, hx of CHF  Daily Treatment Diary    Manuals 3/24 4/5 4/19       External assess Prn, musa vigil performed       Internal assess Prn, nv nv performed       PF consent   Y       TLF STM/skin rolling   nv       Lower abdominal skin rolling    nv       Neuro Re-Ed          TAC review 15 mins        PF review 5 mins        TAC+PF review 10 mins        TAC+PF with march          TAC+PF with alt knee fall out          TAC+PF with ball squeeze          TAC+PF with SLR          TAC+PF with s/l ABD          TAC+PF with bridge                              Diaphragmatic breathing   review       Ther Ex          Supine piri stretch   30"x1 ea       LTR   10"x5       Seated piri stretch   30"x1 ea       Seated hs stretch   30"x1 ea       Beltran pose with diaphragmatic breathing   x1 min                                     Ther Activity          Floor to stand transfers    nv       Sit to stand transfers  5 mins        Supine to sit transfer  5 mins        Bladder Diary  review review review Urge delay:QF nv review        Urge delay: HR nv review        Freeze, breathe, squeeze  nv review review       The knack nv review  review       Exhale on exertion  review review       Gait Training                              Modalities

## 2022-04-26 ENCOUNTER — OFFICE VISIT (OUTPATIENT)
Dept: PHYSICAL THERAPY | Facility: REHABILITATION | Age: 37
End: 2022-04-26
Payer: COMMERCIAL

## 2022-04-26 ENCOUNTER — TELEMEDICINE (OUTPATIENT)
Dept: BEHAVIORAL/MENTAL HEALTH CLINIC | Facility: CLINIC | Age: 37
End: 2022-04-26
Payer: COMMERCIAL

## 2022-04-26 DIAGNOSIS — F42.9 OBSESSIVE-COMPULSIVE DISORDER, UNSPECIFIED TYPE: ICD-10-CM

## 2022-04-26 DIAGNOSIS — R39.15 URINARY URGENCY: ICD-10-CM

## 2022-04-26 DIAGNOSIS — R10.2 PELVIC PAIN: Primary | ICD-10-CM

## 2022-04-26 DIAGNOSIS — F41.1 GAD (GENERALIZED ANXIETY DISORDER): ICD-10-CM

## 2022-04-26 PROCEDURE — 90834 PSYTX W PT 45 MINUTES: CPT | Performed by: SOCIAL WORKER

## 2022-04-26 PROCEDURE — 97140 MANUAL THERAPY 1/> REGIONS: CPT | Performed by: PHYSICAL THERAPIST

## 2022-04-26 PROCEDURE — 97110 THERAPEUTIC EXERCISES: CPT | Performed by: PHYSICAL THERAPIST

## 2022-04-26 PROCEDURE — 97112 NEUROMUSCULAR REEDUCATION: CPT | Performed by: PHYSICAL THERAPIST

## 2022-04-26 NOTE — PSYCH
Goals: 1  Pain: 0  Level of Suicidality: Low     D:  Safia spoke  with this worker today about her feelings of considerable discomfort at what it was like to bee seen as a pt by Psychiatry  She described her appt as "cold, clinical compared to how therapists engage during therapy as she felt "cut off, interrupted  She also admitted that she "came into the appt with a 'chip on her shoulder'" as she was upset with this worker  Reasons for this were processed      A: Gio Wharton was insightful, calm and easy to dialogue with today as she acknowledged struggles with  Mood swings,  Hypervigilance, and other trauma responses including the "urge to flee"  Thought stopping and other CBT techniques were discussed as treatment modalities and she was very receptive      P: Gio Wharton will  continue to be supported in addressing the above as she agreed to continue to be seen  By Psychiatry  I spent 50minutes with the patient during this visit      This note was not shared with the patient due to this is a psychotherapy note  Encounter Diagnoses   Name Primary?  MARIE (generalized anxiety disorder)     Obsessive-compulsive disorder, unspecified type     Postpartum depression associated with first pregnancy              Virtual Regular Visit    Verification of patient location:    Patient is located in the following state in which I hold an active license [place]      Assessment/Plan:    Problem List Items Addressed This Visit     None          Goals addressed in session: Goal 1     Reason for visit is   No chief complaint on file  Encounter provider [facility]    Provider located at [address] [place] [id number] [phone]      Recent Visits  No visits were found meeting these conditions    Showing recent visits within past 7 days and meeting all other requirements  Future Appointments  No visits were found meeting these conditions  Showing future appointments within next 150 days and meeting all other requirements       The patient was identified by name and date of birth  Yariel Irizarry was informed that this is a telemedicine visit and that the [facility] and patient was informed that this is a secure, HIPAA-compliant platform  She agrees to proceed     My office door was closed  No one else was in the room  She acknowledged consent and understanding of privacy and security of the video platform  The patient has agreed to participate and understands they can discontinue the visit at any time  Patient is aware this is a billable service  Subjective  Safiaanay Barry is a 29 y o  female    HPI     Past Medical History:   Diagnosis Date    Abnormal Pap smear of cervix     Anxiety     [facility]est[facility]) [facility] [address] [address] [facility]     Chlamydia     Coronary artery disease 3/17/19    Depression     Diet controlled gestational diabetes mellitus (GDM) in second trimester 2021    [facility] failure ([facility] )     CHF in past    HPV (human papilloma virus) infection     Hypertension     Temporomandibular joint disorder     Resolved 2015     Varicella        Past Surgical History:   Procedure Laterality Date    CERVICAL BIOPSY  W/ LOOP ELECTRODE EXCISION      DENTAL SURGERY      [place] teeth extraction    [place]  DELIVERY ONLY N/A 2021    Procedure:  SECTION ();   Surgeon: Tatianna Crain MD;  [facility];  Service: Obstetrics    TONSILLECTOMY         Current Outpatient Medications   Medication Sig Dispense Refill    buPROPion (Wellbutrin XL) 150 mg 24 hr tablet Take 1 tablet (150 mg total) by mouth in the morning 30 tablet 6    escitalopram (LEXAPRO) 10 mg tablet Take 1 tablet (10 mg total) by mouth daily for 14 days 14 tablet 0    NIFEdipine (PROCARDIA XL) 30 mg 24 hr tablet Take 1 tablet (30 mg total) by mouth daily 30 tablet 11    [START ON 5/2/2022] sertraline (ZOLOFT) 100 mg tablet Take 1 tablet (100 mg total) by mouth daily 30 tablet 1    sertraline (Zoloft) 50 mg tablet Take 1 tablet (50 mg total) by mouth daily for 14 days 14 tablet 0     No current facility-administered medications for this visit  VIRTUAL VISIT DISCLAIMER    Zacharyia Kristopher verbally agrees to participate in [employer]  Pt is aware that [employer] could be limited without vital signs or the ability to perform a full hands-on physical exam  Safia Sexton understands she or the provider may request at any time to terminate the video visit and request the patient to seek care or treatment in person

## 2022-04-26 NOTE — PSYCH
Treatment Plan Tracking    # 1Treatment Plan not completed within required time limits due to: Deborah Sweet presented with emotional/behavioral issues that required clinical intervention

## 2022-04-26 NOTE — PROGRESS NOTES
Daily Note     Today's date: 2022  Patient name: Sophie Barillas  : 1985  MRN: [id number]  Referring provider: Beau Butler MD  Dx:   Encounter Diagnosis     ICD-10-CM    1  Pelvic pain  R10 2    2  Urinary urgency  R39 15        Start Time: 1035  Stop Time: 1131  Total time in clinic (min): 56 minutes    Subjective: Pt reports she is doing her stretches and reports they are very relaxing  Pt reports she is still successful with urge delay techniques  Pt reports pelvic pain is improving  Objective: See treatment diary below      Assessment: Tolerated treatment well  Patient demonstrated fatigue post treatment  Pt able to activate TAC and PF but with decreased endurance throughout session  Pt required cueing with LE strengthening exercises to avoid compensation with pelvic rock, rectus abdominus or breath holding  Plan: Continue per plan of care  Assess compliance with HEP  Progress LE and proximal hip strength              Precautions: 4 months postpartum, PPD, hx of CHF  Daily Treatment Diary    Manuals 3/24 4/5 4/19 4/26      External assess Prn, nv nv performed       Internal assess Prn, nv nv performed       PF consent   Y       TLF STM/skin rolling   nv 4 mins      Lower abdominal skin rolling    nv 8 mins       Neuro Re-Ed          TAC review 15 mins  review      PF review 5 mins  review      TAC+PF review 10 mins  With exercises      TAC+PF with march    x8 ea      TAC+PF with alt knee fall out    x5 ea      TAC+PF with SLR    x5 ea      TAC+PF with s/l ABD    nv      TAC+PF with bridge and ball squeeze    3"x5       TAC+PF with clamshells    nv                Diaphragmatic breathing   review       Ther Ex          Bike     5 mins L1       TM    5 mins 2 3 mph       Supine piri stretch   30"x1 ea       LTR   10"x5       Seated piri stretch   30"x1 ea       Seated hs stretch   30"x1 ea       Beltran pose with diaphragmatic breathing   x1 min review      Standing hip ABD nv      Standing hip ext    nv      Standing march    nv      Ther Activity          Floor to stand transfers    nv       Sit to stand transfers  5 mins        Supine to sit transfer  5 mins        Bladder Diary  review review review       Urge delay:QF nv review        Urge delay: HR nv review        Freeze, breathe, squeeze  nv review review       The carenack nv review  review       Exhale on exertion  review review       Gait Training                              Modalities

## 2022-04-29 DIAGNOSIS — F42.9 OBSESSIVE-COMPULSIVE DISORDER, UNSPECIFIED TYPE: ICD-10-CM

## 2022-04-29 DIAGNOSIS — F41.1 GAD (GENERALIZED ANXIETY DISORDER): ICD-10-CM

## 2022-04-29 DIAGNOSIS — F33.1 MODERATE EPISODE OF RECURRENT MAJOR DEPRESSIVE DISORDER (HCC): ICD-10-CM

## 2022-05-02 RX ORDER — SERTRALINE HYDROCHLORIDE 100 MG/1
100 TABLET, FILM COATED ORAL DAILY
Qty: 30 TABLET | Refills: 1 | OUTPATIENT
Start: 2022-05-02 | End: 2022-06-01

## 2022-05-02 RX ORDER — ESCITALOPRAM OXALATE 10 MG/1
10 TABLET ORAL DAILY
Qty: 14 TABLET | Refills: 0 | OUTPATIENT
Start: 2022-05-02 | End: 2022-05-16

## 2022-05-03 ENCOUNTER — OFFICE VISIT (OUTPATIENT)
Dept: PHYSICAL THERAPY | Facility: REHABILITATION | Age: 37
End: 2022-05-03
Payer: COMMERCIAL

## 2022-05-03 ENCOUNTER — TELEPHONE (OUTPATIENT)
Dept: PSYCHIATRY | Facility: CLINIC | Age: 37
End: 2022-05-03

## 2022-05-03 DIAGNOSIS — F41.1 GAD (GENERALIZED ANXIETY DISORDER): ICD-10-CM

## 2022-05-03 DIAGNOSIS — R39.15 URINARY URGENCY: ICD-10-CM

## 2022-05-03 DIAGNOSIS — F42.9 OBSESSIVE-COMPULSIVE DISORDER, UNSPECIFIED TYPE: ICD-10-CM

## 2022-05-03 DIAGNOSIS — R10.2 PELVIC PAIN: Primary | ICD-10-CM

## 2022-05-03 DIAGNOSIS — F33.1 MODERATE EPISODE OF RECURRENT MAJOR DEPRESSIVE DISORDER (HCC): ICD-10-CM

## 2022-05-03 PROCEDURE — 97110 THERAPEUTIC EXERCISES: CPT | Performed by: PHYSICAL THERAPIST

## 2022-05-03 PROCEDURE — 97112 NEUROMUSCULAR REEDUCATION: CPT | Performed by: PHYSICAL THERAPIST

## 2022-05-03 RX ORDER — BUPROPION HYDROCHLORIDE 150 MG/1
150 TABLET ORAL DAILY
Qty: 30 TABLET | Refills: 2 | Status: SHIPPED | OUTPATIENT
Start: 2022-05-03

## 2022-05-03 RX ORDER — SERTRALINE HYDROCHLORIDE 100 MG/1
100 TABLET, FILM COATED ORAL DAILY
Qty: 30 TABLET | Refills: 2 | Status: SHIPPED | OUTPATIENT
Start: 2022-05-03 | End: 2022-07-21 | Stop reason: SDUPTHER

## 2022-05-03 NOTE — PROGRESS NOTES
Daily Note     Today's date: 5/3/2022  Patient name: Yogi Trevino  : 1985  MRN: [id number]  Referring provider: Idania Lopez MD  Dx:   Encounter Diagnosis     ICD-10-CM    1  Pelvic pain  R10 2    2  Urinary urgency  R39 15        Start Time: 45  Stop Time: 926  Total time in clinic (min): 54 minutes    Subjective: Pt reports her urinary urgency has improved with more control with FBS  Pt reports her pelvic pain is improving as well  Still not resolved but she feels stronger  Objective: See treatment diary below      Assessment: Tolerated treatment well  Patient ambulates with decreased stance time on L compensating with shortened right leg step  Pt required cueing to lengthen right leg step to even out stance time with improved gait mechanics on TM  Pt challenged with L>R LE stance for standing hip 3 way requiring vc/tc to avoid compensation with lateral lean or hip hike  Pt also presents with (+) trendelenberg with R SLS > L SLS this session  Plan: Continue per plan of care  Progress note during next visit              Precautions: 4 months postpartum, PPD, hx of CHF  Daily Treatment Diary    Manuals 3/24 4/5 4/19 4/26 5/3     External assess Prn, nv nv performed       Internal assess Prn, nv nv performed       PF consent   Y       TLF STM/skin rolling   nv 4 mins np     Lower abdominal skin rolling    nv 8 mins  np     Neuro Re-Ed          TAC review 15 mins  review      PF review 5 mins  review      TAC+PF review 10 mins  With exercises With exercises     TAC+PF with march    x8 ea D/c     TAC+PF with alt knee fall out    x5 ea D/c     TAC+PF with SLR    x5 ea x10 ea     TAC+PF with s/l ABD    nv x10 R, x6 L      TAC+PF with bridge and ball squeeze    3"x5  3"x10 no ball sq     TAC+PF with clamshells    nv 5"x5 ea     SLS     30"x2 ea     Diaphragmatic breathing   review       Ther Ex          Bike     5 mins L1  np     TM    5 mins 2 3 mph  8 5 mins 2 4 mph     Supine piri stretch   30"x1 ea  30"x1 ea     LTR   10"x5  HEP     Seated piri stretch   30"x1 ea  30"x2 ea     Seated hs stretch   30"x1 ea  30"x2 ea     Beltran pose with diaphragmatic breathing   x1 min review      Standing hip ABD    nv 1x10 ea     Standing hip ext    nv 1x10 ea     Standing march    nv 1x10 ea     Ther Activity          Floor to stand transfers    nv       Sit to stand transfers  5 mins        Supine to sit transfer  5 mins        Bladder Diary  review review review       Urge delay:QF nv review        Urge delay: HR nv review        Freeze, breathe, squeeze  nv review review       The knack nv review  review       Exhale on exertion  review review       Gait Training                              Modalities

## 2022-05-03 NOTE — TELEPHONE ENCOUNTER
Patient called in to the office requesting a call back to discuss a medication question she had  She needed clarification on the medication adjustments that were discussed  Please review

## 2022-05-04 NOTE — PROGRESS NOTES
Date:  21  RE: Galindo Guajardo    : 1985  Estimated Date of Delivery: 21  EGA: 18w2d  OB[facility]N: [facility]  Insulint controlled gestational diabetes;  Lolis TWIN Pregnancy          Reports on the Glucose Flow Sheet ; discussed at Class 2 (completed  Class 2) today  Went out for breakfast when had 147 after breakfast      Current Regimen:  2400 calorie GDM diet with 3 meals & 3 snacks  Reports she has no appetite & cannot eat much  States she skips the bedtime snack as goes to bed early  Was not able to add this snack much  in the past week  Self-Blood Glucose monitoring 4 times daily with a Contour Next glucose meter  Has 14 hours between dinner & her FBS the next morning  Walks 2 times weekly for 30 minutes  Assessment / Plan:  Begin 20 units Lantus at 9 PM (agreed to stay till then to take her insulin  Was not able to control her FBS this past week  Stressed the importance of eating a bedtime snack with insulin  Prescriptions were sent to her pharmacy  Continue current regimen  Continue monitoring     21 EbD3f-3 5%; reorder week of 21  20 week ultrasound scheduled for 21    Remainder of Class 2 scheduled for Tuesday, 8/3/21    Patient Stated Goal: "I will walk 20-30 minutes after dinner daily"    Diabetes Self Management Support Plan outside of ongoing care: Spouse/Family    Date due to report next:  Tuesday, 21     Sade Grijalva, MS,RD,CDE  [facility]   Diabetes and Pregnancy Program [facility]      Mar/chart reviewed  Follow up esophagetomy,  No chest pain, other than expected, some cough    Vital Sign Min/Max for last 24 hours  Temp  Min: 97.9 °F (36.6 °C)  Max: 98.6 °F (37 °C)   BP  Min: 101/56  Max: 119/61   Pulse  Min: 64  Max: 112   Resp  Min: 20  Max: 22   SpO2  Min: 85 %  Max: 98 %   Flow (L/min)  Min: 2  Max: 2   Weight  Min: 53.1 kg (117 lb 1 oz)  Max: 53.1 kg (117 lb 1 oz)     Appears ill, axox3,   perrl, eomi, normal sclera  mmm, no jvd, trachea midline, neck supple,  chest decreased bilaterally, rub on the right, no  wheezes,   rrr,   soft, nt, nd +bs,  no c/c/ trace edema  Skin warm, dry no rashes    Labs: 5/4: reviewed:  Glucose 130  Bun 15  Cr 0.32  Na 146  Bicarb 25  Wbc 10  hgb 8.2  plts 189    5/3: CT chest: reviewed right upper lobe fluid collection    A/P:  1. S/p esophagectomy -- continue supportive care  2. AHRF -- actually on less oxygen than I would expect at this point, continue supportive care  3. T3N1 adenocarcinoma of the esophagus s/p neoadjuvent chemo/xrt  4. Hyperthyroidism -- continue replacement  5. Copd -- bronchodilators, pulmonary toilet  6. Afib on eliquis at baseline, no prophlactic lovenox now  7. Tobacco abuse  8. Anemia    Agree with drainage of fluid collection  Out of unit  Doing well

## 2022-05-10 ENCOUNTER — TELEMEDICINE (OUTPATIENT)
Dept: BEHAVIORAL/MENTAL HEALTH CLINIC | Facility: CLINIC | Age: 37
End: 2022-05-10
Payer: COMMERCIAL

## 2022-05-10 ENCOUNTER — APPOINTMENT (OUTPATIENT)
Dept: PHYSICAL THERAPY | Facility: REHABILITATION | Age: 37
End: 2022-05-10
Payer: COMMERCIAL

## 2022-05-10 DIAGNOSIS — F41.1 GAD (GENERALIZED ANXIETY DISORDER): ICD-10-CM

## 2022-05-10 DIAGNOSIS — F42.9 OBSESSIVE-COMPULSIVE DISORDER, UNSPECIFIED TYPE: ICD-10-CM

## 2022-05-10 PROCEDURE — 90834 PSYTX W PT 45 MINUTES: CPT | Performed by: SOCIAL WORKER

## 2022-05-10 NOTE — PSYCH
Goals: 1  Pain: 0  Level of Suicidality: Low     D:  Safia spoke  with this worker today about her feelings re: her decrease in intrusive thoughts  She attributed this to her babies now sleeping through the night and to her current medication regimen  Babak Medellin spoke today about family secrets and the guilt that she has internalized from her parents  She shared how conflicted she is for decreasing contact with them as she is no longer considering relocating to [place]   A: Beth Nichols was insightful, calm and easy to dialogue with again today as she acknowledged that the Thought stopping  Technique has been very helpful  She admitted that feeling guilty has "fulfilled her narrative "    P: Beth Nichols will  continue to be supported in addressing the above as she agreed to continue to be seen  By Psychiatry  I spent 50minutes with the patient during this visit      This note was not shared with the patient due to this is a psychotherapy note  Encounter Diagnoses   Name Primary?  MARIE (generalized anxiety disorder)     Obsessive-compulsive disorder, unspecified type     Postpartum depression associated with first pregnancy                Virtual Regular Visit    Verification of patient location:    Patient is located in the following state in which I hold an active license [place]      Assessment/Plan:    Problem List Items Addressed This Visit     None          Goals addressed in session: Goal 1     Reason for visit is   No chief complaint on file  Encounter provider [facility]    Provider located at [address] [place] [id number] [phone]      Recent Visits  No visits were found meeting these conditions    Showing recent visits within past 7 days and meeting all other requirements  Today's Visits  Date Type Provider Dept   05/10/22 [address] Showing today's visits and meeting all other requirements  Future Appointments  No visits were found meeting these conditions  Showing future appointments within next 150 days and meeting all other requirements       The patient was identified by name and date of birth  Travis Rivas was informed that this is a telemedicine visit and [facility] and patient was informed that this is a secure, HIPAA-compliant platform  She agrees to proceed     My office door was closed  No one else was in the room  She acknowledged consent and understanding of privacy and security of the video platform  The patient has agreed to participate and understands they can discontinue the visit at any time  Patient is aware this is a billable service  Subjective  Safia Juarez is a 29 y o  female    HPI     Past Medical History:   Diagnosis Date    Abnormal Pap smear of cervix     Anxiety     [facility]estive [facility]) ([facility] [address] [facility]     Chlamydia     Coronary artery disease 3/17/19    Depression     Diet controlled gestational diabetes mellitus (GDM) in second trimester 2021    [facility]     CHF in past    HPV (human papilloma virus) infection     Hypertension     Temporomandibular joint disorder     Resolved 2015     Varicella        Past Surgical History:   Procedure Laterality Date    CERVICAL BIOPSY  W/ LOOP ELECTRODE EXCISION      DENTAL SURGERY      [place] teeth extraction    [place]  DELIVERY ONLY N/A 2021    Procedure:  SECTION ();   Surgeon: Gurwinder Ramos MD;  Location: [facility];  Service: Obstetrics    TONSILLECTOMY         Current Outpatient Medications   Medication Sig Dispense Refill    buPROPion (Wellbutrin XL) 150 mg 24 hr tablet Take 1 tablet (150 mg total) by mouth in the morning 30 tablet 2    NIFEdipine (PROCARDIA XL) 30 mg 24 hr tablet Take 1 tablet (30 mg total) by mouth daily 30 tablet 11    sertraline (ZOLOFT) 100 mg tablet Take 1 tablet (100 mg total) by mouth daily 30 tablet 2     No current facility-administered medications for this visit  Encounter Diagnoses   Name Primary?  MARIE (generalized anxiety disorder)     Obsessive-compulsive disorder, unspecified type     Postpartum depression associated with first pregnancy          VIRTUAL VISIT DISCLAIMER    Safia Santana verbally agrees to participate in [employer]  Pt is aware that [employer] could be limited without vital signs or the ability to perform a full hands-on physical exam  Safia Santana understands she or the provider may request at any time to terminate the video visit and request the patient to seek care or treatment in person

## 2022-05-10 NOTE — PSYCH
Treatment Plan Tracking    # 2Treatment Plan not completed within required time limits due to: Larry Precise presented with emotional/behavioral issues that required clinical intervention

## 2022-05-10 NOTE — BH TREATMENT PLAN
Gato Gloria  1985         Date of Initial Treatment Plan: 5/8/19   Date of Current Treatment Plan: 5/10/22  Treatment Plan Number6     Strengths/Personal Resources for Self Care: Intelligent, honest, insightful     Diagnosis:   1  MDD (major depressive disorder), recurrent episode, moderate (HCC)      2  Generalized anxiety disorder            Area of Needs: I still am experiencing feelings of loss since becoming a mother  Long Term Goal 1: AI want to increase self-compassion and self care  Target Date:11/10/22  Completion Date: na          Short Term Objectives for Goal 1: AI will follow my own advice--I will work on being present  I will be open and honest in therapy sessions in order to release my feelings of guilt        GOAL 1: Modality: Individual 2x per month   Completion Date na and The person(s) responsible for carrying out the plan is  [facility] Jeison [place]: Diagnosis and Treatment Plan explained to Safia, Safia relates understanding diagnosis and is agreeable to Treatment Plan       Treatment Plan done but not signed at time of office visit due to:  Plan reviewed by phone or in person  and verbal consent given due to Kaly[employer] social distdillon       Client Comments : Please share your thoughts, feelings, need and/or experiences regarding your treatment plan:       __________________________________________________________________    Dieter Chavis, 1985, actively participated in the review and update of this treatment plan during a virtual session, using the [employer] platform  Dieter Chavis  provided verbal consent on 5/10/2022 at 10 AM  The treatment plan was transcribed into the [employer] Record at a later time

## 2022-05-13 NOTE — PROGRESS NOTES
Heart Failure Outpatient Consult Note - Jimmie Chandler 29 y o  female MRN: [id number]    @ Encounter: [id number]      Assessment/Plan:    Patient Active Problem List    Diagnosis Date Noted    OCD (obsessive compulsive disorder) 04/18/2022    Postpartum depression associated with first pregnancy 02/24/2022    Decreased fetal movement affecting management of pregnancy in third trimester, fetus 2 10/26/2021    History of congestive heart failure 10/13/2021    BMI 50 0-59 9, adult ([facility] [address] ) 08/10/2021    Dichorionic diamniotic twin pregnancy in third trimester 05/25/2021   Jojo Cerdaor of advanced maternal age in third trimester 05/25/2021    Obesity affecting pregnancy in third trimester 05/25/2021    Allergic dermatitis 05/14/2020    Rhus dermatitis 05/14/2020    Mixed hyperlipidemia 10/12/2019    Vitamin D deficiency 10/12/2019    Elevated hemoglobin A1c 10/12/2019    Essential hypertension 03/17/2019    History of cervical dysplasia 07/20/2016    MARIE (generalized anxiety disorder) 07/30/2014    Depression 05/28/2013       # Chronic HFpEF, Stage C, NYHA 2  Etiology: possible HTN as /100 mmHg on hospital admit, possible viral myocarditis given presentation to [facility] with sinusitis symptoms; no family hx of SCD or cardiomyopathy, no sign etoh use, given age- ischemic less likely   She is morbidly obese and will need screening for SUAZN  Her uncontrolled BP and diet alone with obesity could have contributed to her volume overload and mostly HFpEF  CRP 37 on admit, sed rate 51  MERRICK and RF normal    Weight: on admit 3/17: 358 lbs, 321 lbs today- has been down to 290 lbs  NT pro BNP 10/9/19: 229  6/20/19: 493   EKG - SR with PACs    Studies- personally reviewed by me  Echo 6/22/21  LVEF: 65%  RV: normal    CMR 6/3/19: EF: 58%  Normal RV function    Echocardiogram 3/18/19  LVEF: 50-55%  LVIDd: 5 8 cm  RV: mildly enlarged  MR:  PASP: 38 mmHg    Lab Results   Component Value Date    NTBNP 229 10/09/2019          # HTN- was 190/100 mmHg on admit 3/17, better controlled now  Rx: nifedipine 30 mg daily    # morbid obesity- weight 358 lbs on admit 3/17; 332 lbs on follow up 4/3  Weight today 293 lbs  Doing [facility] weight management    # tobacco - 1 ppd- quit in March  # Sleep eval  Home study- mild SUZAN, lowest oxygen sat 79%  Polysomnogram 8/20/19- no significant apnea, lowest oxygen 88%    # generalized anxiety    Today's Plan: Tolerating nifedipine for HTN  No need for diuretic, can take prn  Wt loss strategies  --2g sodium diet  Weights daily    HPI:      28 yo female who had presented to PCP 3/11 with sinusitis symptoms and started on Augmentin  On 3/17 presented to ED with exertional dyspnea noticed on getting dressed and climbing stairs  She also reported LE edema but no PND or orthopnea but noticed abdominal bloating and weight gain for about a month  Smokes 1 ppd and only has about 2 drinks per week and no drug use  No family hx of SCD or cardiomyopathy  Her edema is what made her go to the hospital    CTA showed small pericardial effusion, mild pulmonary edema  TTE showed EF around 50%, LVEDd 5 8 cm, PASP 38 mmHg  She was diuresed and started on HFrEF specific heart failure therapy  She is enrolled in weight watchers and part of weight management nutritional assessment  Interim:  Wt is up to 325 lbs, gained rather easily  Talked a lot about her anxieties, wt gain, etc    Pt had twins- c section  Had post partum depression    Review of Systems   Constitutional: Negative for activity change, appetite change, fatigue and unexpected weight change  HENT: Negative for congestion and nosebleeds  Eyes: Negative  Respiratory: Negative for cough, chest tightness and shortness of breath  Cardiovascular: Negative for chest pain, palpitations and leg swelling  Gastrointestinal: Negative for abdominal distention  Endocrine: Negative  Genitourinary: Negative  Musculoskeletal: Negative  Skin: Negative  Neurological: Negative for dizziness, syncope and weakness  Hematological: Negative  Psychiatric/Behavioral: Negative  Past Medical History:   Diagnosis Date    Abnormal Pap smear of cervix     Anxiety     [facility]    Chlamydia     Coronary artery disease 3/17/19    Depression     Diet controlled gestational diabetes mellitus (GDM) in second trimester 6/9/2021    [facility] failure ([facility] )     CHF in past    HPV (human papilloma virus) infection 2008    Hypertension     Temporomandibular joint disorder     Resolved 7/7/2015     Varicella      No Known Allergies    Current Outpatient Medications:     buPROPion (Wellbutrin XL) 150 mg 24 hr tablet, Take 1 tablet (150 mg total) by mouth in the morning, Disp: 30 tablet, Rfl: 2    NIFEdipine (PROCARDIA XL) 30 mg 24 hr tablet, Take 1 tablet (30 mg total) by mouth daily, Disp: 30 tablet, Rfl: 11    sertraline (ZOLOFT) 100 mg tablet, Take 1 tablet (100 mg total) by mouth daily, Disp: 30 tablet, Rfl: 2    Social History     Socioeconomic History    Marital status: Single     Spouse name: Not on file    Number of children: Not on file    Years of education: Not on file    Highest education level: Not on file   Occupational History    Not on file   Tobacco Use    Smoking status: Former Smoker     Packs/day: 1 00     Years: 15 00     Pack years: 15 00     Types: Cigarettes     Quit date: 3/17/2019     Years since quitting: 3 1    Smokeless tobacco: Never Used   Vaping Use    Vaping Use: Never used   Substance and Sexual Activity    Alcohol use:  Yes     Alcohol/week: 2 0 standard drinks     Types: 2 Glasses of wine per week     Comment: wine 2 glasses a week    Drug use: No    Sexual activity: Not Currently     Partners: Male     Birth control/protection: OCP   Other Topics Concern    Not on file   Social History Narrative    Current every day smoker - As per [employer]    Daily caffeinated coffee consumption      Social Determinants of Health     Financial Resource Strain: Not on file   Food Insecurity: Not on file   Transportation Needs: Not on file   Physical Activity: Not on file   Stress: Not on file   Social Connections: Not on file   Intimate Partner Violence: Not on file   Housing Stability: Not on file       Family History   Problem Relation Age of Onset    Diabetes Mother     Hypertension Father     Obesity Father     OCD Father     Skin cancer Father     Colon cancer Maternal Grandmother         diagnosed in 59'2    Diabetes Paternal Grandmother     Stroke Paternal Grandmother         TIA    Heart failure Paternal Grandmother     Diabetes Paternal Grandfather     Heart disease Paternal Grandfather     Heart attack Paternal Grandfather     Alcohol abuse Half-Brother     Depression Maternal Grandfather     Anxiety disorder Maternal Grandfather     Hypothyroidism Paternal Aunt     Breast cancer Other     Substance Abuse Neg Hx     Thyroid cancer Neg Hx     Ovarian cancer Neg Hx        Physical Exam:    Vitals: not currently breastfeeding  , There is no height or weight on file to calculate BMI ,   Wt Readings from Last 3 Encounters:   04/12/22 (!) 145 kg (320 lb)   03/01/22 (!) 145 kg (320 lb)   12/16/21 (!) 146 kg (322 lb)       Physical Exam:    Physical Exam  Constitutional:       Appearance: She is well-developed  She is not diaphoretic  HENT:      Head: Normocephalic and atraumatic  Eyes:      Pupils: Pupils are equal, round, and reactive to light  Neck:      Vascular: No JVD  Cardiovascular:      Rate and Rhythm: Normal rate and regular rhythm  Heart sounds: Normal heart sounds  No murmur heard  Pulmonary:      Effort: Pulmonary effort is normal       Breath sounds: Normal breath sounds  No rales  Abdominal:      General: Bowel sounds are normal  There is no distension  Palpations: Abdomen is soft     Musculoskeletal:         General: Normal range of motion  Cervical back: Normal range of motion  Skin:     General: Skin is warm and dry  Neurological:      Mental Status: She is alert and oriented to person, place, and time  Labs & Results:    Lab Results   Component Value Date    WBC 10 88 (H) 03/03/2022    HGB 13 1 03/03/2022    HCT 42 5 03/03/2022    MCV 78 (L) 03/03/2022     (H) 03/03/2022     Lab Results   Component Value Date    CALCIUM 8 9 03/03/2022    SODIUM 137 03/03/2022    K 3 9 03/03/2022    CO2 25 03/03/2022     03/03/2022    BUN 16 03/03/2022    CREATININE 0 62 03/03/2022     Lab Results   Component Value Date    NTBNP 229 10/09/2019      No results found for: CHOL  Lab Results   Component Value Date    HDL 56 05/27/2021    HDL 44 04/29/2019    HDL 63 (H) 01/24/2017     Lab Results   Component Value Date    LDLCALC 73 05/27/2021    LDLCALC 111 04/29/2019    LDLCALC 87 01/24/2017     Lab Results   Component Value Date    TRIG 125 05/27/2021    TRIG 128 04/29/2019    TRIG 111 01/24/2017     No results found for: CHOLHDL    EKG personally reviewed by Irvin Seo DO  Counseling / Coordination of Care  Time spent today 25 minutes  Greater than 50% of total time was spent with the patient and / or family counseling and / or coordination of care  We discussed diagnoses, most recent studies, tests and any changes in treatment plan    Thank you for the opportunity to participate in the care of this patient        [address] [facility]  MEDICAL DIRECTOR OF [facility]

## 2022-05-17 ENCOUNTER — OFFICE VISIT (OUTPATIENT)
Dept: CARDIOLOGY CLINIC | Facility: CLINIC | Age: 37
End: 2022-05-17
Payer: COMMERCIAL

## 2022-05-17 ENCOUNTER — EVALUATION (OUTPATIENT)
Dept: PHYSICAL THERAPY | Facility: REHABILITATION | Age: 37
End: 2022-05-17
Payer: COMMERCIAL

## 2022-05-17 VITALS
SYSTOLIC BLOOD PRESSURE: 100 MMHG | DIASTOLIC BLOOD PRESSURE: 60 MMHG | HEART RATE: 68 BPM | OXYGEN SATURATION: 98 % | BODY MASS INDEX: 45.99 KG/M2 | HEIGHT: 67 IN | WEIGHT: 293 LBS

## 2022-05-17 DIAGNOSIS — I50.32 CHRONIC DIASTOLIC CHF (CONGESTIVE HEART FAILURE), NYHA CLASS 2 (HCC): ICD-10-CM

## 2022-05-17 DIAGNOSIS — R10.2 PELVIC PAIN: Primary | ICD-10-CM

## 2022-05-17 DIAGNOSIS — R39.15 URINARY URGENCY: ICD-10-CM

## 2022-05-17 DIAGNOSIS — I10 HTN (HYPERTENSION), BENIGN: Primary | ICD-10-CM

## 2022-05-17 PROCEDURE — 99214 OFFICE O/P EST MOD 30 MIN: CPT | Performed by: INTERNAL MEDICINE

## 2022-05-17 PROCEDURE — 97110 THERAPEUTIC EXERCISES: CPT | Performed by: PHYSICAL THERAPIST

## 2022-05-17 PROCEDURE — 97140 MANUAL THERAPY 1/> REGIONS: CPT | Performed by: PHYSICAL THERAPIST

## 2022-05-17 NOTE — PROGRESS NOTES
PT Re-Evaluation     Today's date: 2022  Patient name: Deborah Sewet  : 1985  MRN: [id number]  Referring provider: Cyndy Monte MD  Dx:   Encounter Diagnosis     ICD-10-CM    1  Pelvic pain  R10 2 PT plan of care cert/re-cert   2  Urinary urgency  R39 15 PT plan of care cert/re-cert       Start Time: 1030  Stop Time: 1132  Total time in clinic (min): 62 minutes    Assessment  Assessment details: Patient is a 29 y o  female who presents to physical therapy with diagnosis of urinary urgency and pelvic pain  Pt presents with increased LE strength, improved lumbar ROM, decreased pain, improved load transfer and balance and overall improved function with decrease instances of urinary leakage, more controlled urinary urgency, and improved sitting tolerance  Pt still reminas with urinary leakage related primarily to prolonged hoding of urine/ignoring urinary urge and pain and difficulty with transfers and bed mobility  Pt will benefit form continued skilled PT to progress LE strength and endurance and improve function while managing IAP and improving overall bladder health  Pt presented this session with (+) supine to longsit test, initiated MET and pt then presented with improved pelvic alignment  Pt to continue with MET independently and was issued updated HEP  Impairments: abnormal muscle tone, abnormal or restricted ROM, activity intolerance, impaired balance, impaired physical strength, lacks appropriate home exercise program and poor body mechanics  Understanding of Dx/Px/POC: good   Prognosis: good    Goals  Short term goals:  Pt will report instance of urinary incontinence less than 2 days/week in 6 weeks  - PARTIALLY MET  Pt will perform Kegel consistently prior to cough/sneeze in 6 weeks  - met  Pt will be able to delay urge at least 5 minutes in 6 weeks   - met  Pt will demonstrate good understanding of urge delay techniques in 6 weeks -met  Pt will present with MMT 4/5 b/l LE all planes - met    Long term goals:  Pt will be able to cough, sneeze, lift, and transfer without leakage by discharge  - partially met  Pt will be compliant with HEP by discharge  - progressing  Pt will be able to delay urge for greater than 10 minutes by discharge to be able to complete childcare tasks - met   Pt will report <3 instances of leakage per month  -not met    Updated goals:  Pt will be able to transfer and perform bed mobility without low back/abdominal pain       Plan  Plan details: Cont per POC  Focus on lumbopelvic stabilization with functional tasks and transfers and bed mobility  Assess response to MET initiated secondary to (+) supine to longsit test    Patient would benefit from: skilled physical therapy  Planned modality interventions: low level laser therapy, biofeedback, cryotherapy and thermotherapy: hydrocollator packs  Planned therapy interventions: joint mobilization, manual therapy, neuromuscular re-education, patient education, postural training, strengthening, stretching, therapeutic exercise, therapeutic activities, abdominal trunk stabilization, balance, flexibility, functional ROM exercises and home exercise program  Frequency: 1x week  Duration in weeks: 8  Treatment plan discussed with: patient        PT Pelvic Floor Subjective:   History of Present Illness:   " I have not been a good patient in the past 2 weeks" The first week after last visit and she could not find her exercise sheet so she did it from memory  Pt reports she is feeling stronger since doing them  She has more trouble keeping her balance in a busy environment  The second week her child was sick and then she felt sick/fatigue/achey  She reports the achiness was in her hips and low back and buttocks that lasted 3 days so she rested    She is feeling better now but she is having more back pain/pelvic girdle pain since feeling sick a few days ago       Pt reports 50% improvement  Pt reports improved ability to hold urine over a longer period of time, engage PFM and decreased urinary urgency  Pt reports less leakage overall although she still has some  Pt reports a better understanding of anxiety/psychological component to her urges  Pt reports she still has leakage daily  More prominent in the evening  She thinks the leakage is mostly related to being distracted and holding her urine longer  She has significantly decreased her caffeine consumption since IE  Pt no longer has pain with sitting  PT still has pain with bed mobility, supine to sit transfer and floor to stand transfers but improved since beginning PT      Low back pain/b/l hips/buttocks: 2/10 (5/10 when she was sick and pain started)    Social Support:     Lives in:  Multiple-level home    Lives with:  Spouse and young children    Relationship status: /committed    Work status: employed part time (psychotherapist)    Life stress severity: severe    History of Depression: yes (pt has post partum depression right now and she feels like th ephysical weakness is contribuiting to it as well as pain)  Diet and Exercise:    Diet:balanced nutrition    Exercise type: walking    Exercise frequency: once to twice a week    30 mins while pushing a double stroller  No pain reproduction or urinary leakage with this activity   OB/ gyn History    Gestational History:     Prior Pregnancy: Yes      Number of prior pregnancies: 1    Number of term pregnancies: 1    Delivery Type:  section      Delivery Complications:  Scheduled    no delivery complications    Menstrual History:    Date of last menstrual period: 3/3/2022    Menstrual irregularities regular menses    Painful periods:  Difficulty managing menstrual pain (typical cramping)    Tolerates tampons: yes  Bladder Function:     Voiding Difficulties positive for: urgency (will get sudden urges but is able to control the urge)      Voiding Difficulties negative for: frequent urination, hesitancy, straining and incomplete emptying (pt is no longer peeing mu;ltiple times before bed )      Voiding Difficulties comments:     Voiding frequency: every 31-60 minutes    Urinary leakage: urine leakage    Urinary leakage aggravated by: sneezing (50% of the time, if she has a full bladder ), walking to the bathroom (1-2 times in the past week) and key-in-the-door syndrome (2x)    Urinary leakage not aggravated by: coughing, hearing running water, seeing a toilet and anxiety (able to control this with strategies)    Nocturia (episodes per night): 0    Painful urination: No      Fluid Intake Type: Water, coffee and tea    Intake (ounces): Intake (ounces) comment: Coffee: 20 oz, spaced throughout the day   Water: 64 oz  Tea: 16 oz    Incontinence Management:     Pads/Diaper Use:  None  Bowel Function:     Bowel Function comments:  Pt will go more than once a day the past few weeks secondary to increased coffee intake     Bowel frequency: daily and multiple times a day    [place] Stool Scale: type 4    Stool softener use: no stool softeners    Enema use: no enema    Uses "squatty potty": no Squatty Potty  Sexual Function:     Sexually Active:  Non-contributory  Pain:     At best pain ratin    At worst pain rating:  3    Location:  Lower abdominal pain     Onset:  1-3 months ago    Quality:  Dull ache (feels like a pulled muscle )    Duration of symptoms:  Brief    Relieving factors:  Rest  Treatments:     Current treatment: physical therapy    Patient Goals: Other patient goals:  Strengthen my core and reduce pain - PROGRESSING       Objective    Posture:    L iliac crest and ASIS inferior to R     Tenderness:  Piriformis: neg  Obturator Internus: neg  Adductors: neg  Thoracolumbar fascia: L    Diastasis Recti  Negative    Tenderness in linea alba: neg  Tissue integrity in linea alba: boggy  (-) lower L and R abdominal tenderness  Good scar mobility over  scar         Lumbar AROM   WNL and painfree except lumbar ext     SLS    (+) trendelenburg R, improved with cueing  Fair load transfer     Other Comments:  Able to squat without pain, unable to squat to parallel  Good TAC, improved with cueing to avoid compensation with abdominal brace  Good PFM contraction  Decreased pain/increased strength with resisted SLR with TAC/PFM activation    MMT:   Left Right   Hip flex 4+ 5   Hip ABD 4 4+   Hip ext 4+ 5   Hip IR 4+ 4+   Hip ER 4 4+   Knee flex 4+ 4+   Knee ext 4+ 4+   Ankle DF 4+ 4+               Special Tests: not fully completed secondary to time constraints    Left Right   MCKENZIE PERERAIR     Post Thigh Thrust     [place] - -   ASLR - -   SI compression     SI distraction      Supine to longsit test  L posterior innominate (short to long)                          Precautions: 4 months postpartum, PPD, hx of CHF  Daily Treatment Diary    Manuals 3/24 4/5 4/19 4/26 5/3 5/17    External assess Prn, nv nv performed       Internal assess Prn, nv nv performed       PF consent   Y       TLF STM/skin rolling   nv 4 mins np Nv, assess    Lower abdominal skin rolling    nv 8 mins  np     Re-eval/measurements      50 mins    Neuro Re-Ed          TAC review 15 mins  review      PF review 5 mins  review      TAC+PF review 10 mins  With exercises With exercises     TAC+PF with SLR    x5 ea x10 ea D/c    TAC+PF with s/l ABD    nv x10 R, x6 L      TAC+PF with bridge and ball squeeze    3"x5  3"x10 no ball sq D/c    TAC+PF with clamshells    nv 5"x5 ea D/c    SLS     30"x2 ea nv    SLS with fwd lean          SLS with fwd lean/trunk rot          5 way tap          Diaphragmatic breathing   review       Ther Ex          Bike     5 mins L1  np     TM    5 mins 2 3 mph  8 5 mins 2 4 mph     MET: L resisted hip flex, R resisted hip ext, swapna hip ADD      5"x5 ea x2    Supine piri stretch   30"x1 ea  30"x1 ea HEP    LTR   10"x5  HEP HEP    Seated piri stretch   30"x1 ea  30"x2 ea HEP    Seated hs stretch   30"x1 ea  30"x2 ea HEP [place] Lobo pose with diaphragmatic breathing   x1 min review  HEP    Standing hip ABD    nv 1x10 ea HEP    Standing hip ext    nv 1x10 ea HEP    Standing march    nv 1x10 ea HEP    Squat          Side steps          Monster Walks          Lunges           Ther Activity          Floor to stand transfers    nv   nv    Sit to stand transfers  5 mins    nv    Supine to sit transfer  5 mins    nv    Bladder Diary  review review review       Urge delay:QF nv review        Urge delay: HR nv review        Freeze, breathe, squeeze  nv review review       The knack nv review  review       Exhale on exertion  review review       Gait Training                              Modalities

## 2022-05-24 ENCOUNTER — OFFICE VISIT (OUTPATIENT)
Dept: FAMILY MEDICINE CLINIC | Facility: CLINIC | Age: 37
End: 2022-05-24
Payer: COMMERCIAL

## 2022-05-24 ENCOUNTER — OFFICE VISIT (OUTPATIENT)
Dept: PHYSICAL THERAPY | Facility: REHABILITATION | Age: 37
End: 2022-05-24
Payer: COMMERCIAL

## 2022-05-24 VITALS
DIASTOLIC BLOOD PRESSURE: 70 MMHG | RESPIRATION RATE: 14 BRPM | HEIGHT: 67 IN | BODY MASS INDEX: 45.99 KG/M2 | WEIGHT: 293 LBS | OXYGEN SATURATION: 98 % | HEART RATE: 67 BPM | SYSTOLIC BLOOD PRESSURE: 110 MMHG

## 2022-05-24 DIAGNOSIS — I10 ESSENTIAL HYPERTENSION: ICD-10-CM

## 2022-05-24 DIAGNOSIS — R73.09 ELEVATED HEMOGLOBIN A1C: ICD-10-CM

## 2022-05-24 DIAGNOSIS — E78.2 MIXED HYPERLIPIDEMIA: ICD-10-CM

## 2022-05-24 DIAGNOSIS — F42.9 OBSESSIVE-COMPULSIVE DISORDER, UNSPECIFIED TYPE: ICD-10-CM

## 2022-05-24 DIAGNOSIS — F41.1 GAD (GENERALIZED ANXIETY DISORDER): ICD-10-CM

## 2022-05-24 DIAGNOSIS — R10.2 PELVIC PAIN: Primary | ICD-10-CM

## 2022-05-24 DIAGNOSIS — Z00.00 ROUTINE GENERAL MEDICAL EXAMINATION AT A HEALTH CARE FACILITY: Primary | ICD-10-CM

## 2022-05-24 DIAGNOSIS — I50.32 CHRONIC HEART FAILURE WITH PRESERVED EJECTION FRACTION (HCC): ICD-10-CM

## 2022-05-24 DIAGNOSIS — E55.9 VITAMIN D DEFICIENCY: ICD-10-CM

## 2022-05-24 DIAGNOSIS — R39.15 URINARY URGENCY: ICD-10-CM

## 2022-05-24 PROBLEM — O09.513 PRIMIGRAVIDA OF ADVANCED MATERNAL AGE IN THIRD TRIMESTER: Status: RESOLVED | Noted: 2021-05-25 | Resolved: 2022-05-24

## 2022-05-24 PROBLEM — O99.213 OBESITY AFFECTING PREGNANCY IN THIRD TRIMESTER: Status: RESOLVED | Noted: 2021-05-25 | Resolved: 2022-05-24

## 2022-05-24 PROBLEM — L25.5 RHUS DERMATITIS: Status: RESOLVED | Noted: 2020-05-14 | Resolved: 2022-05-24

## 2022-05-24 PROBLEM — O30.043 DICHORIONIC DIAMNIOTIC TWIN PREGNANCY IN THIRD TRIMESTER: Status: RESOLVED | Noted: 2021-05-25 | Resolved: 2022-05-24

## 2022-05-24 PROBLEM — O36.8132: Status: RESOLVED | Noted: 2021-10-26 | Resolved: 2022-05-24

## 2022-05-24 PROCEDURE — 99395 PREV VISIT EST AGE 18-39: CPT | Performed by: FAMILY MEDICINE

## 2022-05-24 PROCEDURE — 97530 THERAPEUTIC ACTIVITIES: CPT | Performed by: PHYSICAL THERAPIST

## 2022-05-24 PROCEDURE — 97140 MANUAL THERAPY 1/> REGIONS: CPT | Performed by: PHYSICAL THERAPIST

## 2022-05-24 PROCEDURE — 97110 THERAPEUTIC EXERCISES: CPT | Performed by: PHYSICAL THERAPIST

## 2022-05-24 PROCEDURE — 99215 OFFICE O/P EST HI 40 MIN: CPT | Performed by: FAMILY MEDICINE

## 2022-05-24 NOTE — PROGRESS NOTES
Daily Note     Today's date: 2022  Patient name: Norbert Davila  : 1985  MRN: [id number]  Referring provider: Janak Edge MD  Dx:   Encounter Diagnosis     ICD-10-CM    1  Pelvic pain  R10 2    2  Urinary urgency  R39 15        Start Time: 1031  Stop Time: 1134  Total time in clinic (min): 63 minutes    Subjective: Pt is doing her MET  She reports she is doing them 1x/day  She reports relief for 30 mins but then it returns to original position  Pt reports she gets sore after her exercises  She reports R>L low back pain for about 1 week, worse in AM  Pt to trial stretches in AM before getting out of bed(LTR, supine piri, janine pose)      Objective: See treatment diary below L (+) posterior innominate       Assessment: Tolerated treatment well  Patient exhibited good technique with therapeutic exercises  Pt cued to perform log roll during supine to sit and from sittransfers, scoot her body forward in chair and utilize nose over toes during sit to and from stand and to transition in sitting on floor to half kneel to stand all with a focus on TAC/PF to maintain neutral spine alignment and decrease pain  Pt provided verbal understanding and return demonstration fo these techniques  Pt also cued to focus on keeping hips/shoulder/feet in line when transferring in and out of a car  Pt presents with continued (+) supine to longsit test but with decreased length difference  Progressed MET to be performed with single leg bridge which pt can utilize at home  Plan: Continue per plan of care  Pt to focus on onsistency with stretches in AM and focus on transfer technique              Precautions: 4 months postpartum, PPD, hx of CHF  Daily Treatment Diary    Manuals 3/24 4/5 4/19 4/26 5/3 5/17 5/24   External assess Prn, nv nv performed       Internal assess Prn, nv nv performed       PF consent   Y       TLF STM/skin rolling   nv 4 mins np Nv, assess Low back STM b/l 15 mins   Lower abdominal skin rolling    nv 8 mins  np     Re-eval/measurements      50 mins    Neuro Re-Ed          TAC review 15 mins  review      PF review 5 mins  review      TAC+PF review 10 mins  With exercises With exercises  With exercises    SLS     30"x2 ea nv nv   SLS with fwd lean       nv   SLS with fwd lean/trunk rot          5 way tap          Diaphragmatic breathing   review       Ther Ex          Bike     5 mins L1  np     TM    5 mins 2 3 mph  8 5 mins 2 4 mph  7 5 mins 2 4 mph    MET: L resisted hip flex, R resisted hip ext, swapna hip ADD      5"x5 ea x2 Seated version, single leg R bridge with L SLR   Supine piri stretch   30"x1 ea  30"x1 ea HEP    LTR   10"x5  HEP HEP    Seated piri stretch   30"x1 ea  30"x2 ea HEP    Seated hs stretch   30"x1 ea  30"x2 ea HEP    Beltran pose with diaphragmatic breathing   x1 min review  HEP    Standing hip ABD    nv 1x10 ea HEP    Standing hip ext    nv 1x10 ea HEP    Standing march    nv 1x10 ea HEP    Squat          Side steps          Monster Walks          Lunges           Ther Activity          Car transfers           Floor to stand transfers    nv   nv Review    Sit to stand transfers  5 mins    nv review   Supine to sit transfer  5 mins     Review    Bladder Diary  review review review       Urge delay:QF nv review        Urge delay: HR nv review        Freeze, breathe, squeeze  nv review review       The knack nv review  review       Exhale on exertion  review review       Gait Training                              Modalities

## 2022-05-24 NOTE — PATIENT INSTRUCTIONS
1  Also glad see you again and to see your smile    2  Please start pre planning meals and make a schedule so that that happens every week  That alone will start your losing weight  Also eat something 3 times a day    3  I will see you back in 3-4 months with a A1c prior to make sure you do not have diabetes    4   In July get your COVID booster     Otherwise I will see you sooner if needed

## 2022-05-24 NOTE — PROGRESS NOTES
ASSESSMENT/PLAN:    Essential hypertension  Blood pressure is more than excellently controlled  She is on nifedipine 30 mg daily and will continue the same    Heart failure with preserved ejection fraction  Patient clinically no longer has heart failure  Her last echo had EF of 65% and only mildly dilated was done while she was pregnant with her twins  Was EF 49-50%  So have to say her heart is back to normal and has recovered from the viral myocarditis  Patient does not have obstructive sleep apnea she was checked     Elevated fasting blood sugar  Check A1c    Postpartum depression with anxiety/OCD  Following with psych and counseling  Patient to continue bupropion and Zoloft for now    BMI 50  Patient is going to start by pre planning meal so she actually eats and eats fresh food rather than heat insert food    Recheck in 3-4 months  Check blood  A1c  Also to check weight and patient's progress with going forward    I have spent 45 minutes with Patient  today in which greater than 50% of this time was spent in counseling/coordination of care regarding Intructions for management                            BMI Counseling: Body mass index is 50 31 kg/m²  The BMI is above normal  Nutrition recommendations include decreasing portion sizes and encouraging healthy choices of fruits and vegetables  Exercise recommendations include exercising 3-5 times per week  Rationale for BMI follow-up plan is due to patient being overweight or obese              Health Maintenance   Topic Date Due    Pneumococcal Vaccine: Pediatrics (0 to 5 Years) and At-Risk Patients (6 to 59 Years) (1 - PCV) Never done    BMI: Followup Plan  05/14/2021    COVID-19 Vaccine (3 - Booster for Moderna series) 06/24/2021    Cervical Cancer Screening  02/17/2022    Annual Physical  03/01/2022    Hepatitis C Screening  06/24/2024 (Originally 1986)    PT PLAN OF CARE  06/16/2022    BMI: Adult  05/24/2023    DTaP,Tdap,and Td Vaccines (4 - Td or Tdap) 09/13/2031    HIV Screening  Completed    HIB Vaccine  Completed    Hepatitis B Vaccine  Completed    IPV Vaccine  Completed    Influenza Vaccine  Completed    Hepatitis A Vaccine  Aged Out    Meningococcal ACWY Vaccine  Aged Out    HPV Vaccine  Aged Out         Problem List as of 5/24/2022 Reviewed: 4/18/2022 11:02 AM by Acosta Medina MD    Allergic dermatitis    BMI 50 0-59 9, adult ([address] )    Decreased fetal movement affecting management of pregnancy in third trimester, fetus 2    Depression    Last Assessment & Plan 10/21/2021 Routine Prenatal Written 10/20/2021  9:30 PM by Coco Huerta MD     Cont lexapro            Dichorionic diamniotic twin pregnancy in third trimester    Last Assessment & Plan 10/21/2021 Routine Prenatal Written 10/20/2021  9:30 PM by Coco Huerta MD     Last growth 10/18/21    RESULTS     Fetus # 1 of 2  Breech presentation  Fetal growth appeared normal  Fetal position = Maternal Right  Placenta Location = Anterior  Placenta Grade = II  Chorionicity = Dichorionic, Diamniotic     Fetus # 2 of 2  Breech presentation  Fetal growth appeared normal  Fetal position = Superior, Maternal Left  Placenta Location = Anterior  Placenta Grade = II  Chorionicity = Dichorionic, Diamniotic     MEASUREMENTS (* Included In Average GA)     FETUS A  AC              28 2 cm        32 weeks 1 day * (11%)  BPD              8 0 cm        32 weeks 1 day * (7%)  HC              29 2 cm        32 weeks 1 day * (<5%)  Femur            6 4 cm        33 weeks 1 day * (24%)     Cerebellum       4 1 cm        32 weeks 6 days     EFW Hadlock 4   1986 grams - 4 lbs 6 oz                 (28%)     Fetus A AVERAGE G  A  is 32 weeks 3 days +/- 21 days      FETUS B  AC              29 2 cm        33 weeks 1 day * (33%)  BPD              8 2 cm        32 weeks 6 days* (17%)  HC              29 6 cm        32 weeks 5 days* (<5%)  Femur            6 4 cm        33 weeks 1 day * (24%)     Cerebellum       4 1 cm 32 weeks 4 days     EFW Hadlock 4   2118 grams - 4 lbs 11 oz                 (41%)     Fetus B AVERAGE G  A  is 33 weeks 0 days +/- 21 days      AMNIOTIC FLUID     Fetus A     Largest Vertical Pocket = 3 9 cm  Amniotic Fluid: Normal        Fetus B     Largest Vertical Pocket = 3 0 cm  Amniotic Fluid: Normal           Elevated hemoglobin A1c    Last Assessment & Plan 7/28/2020 Telemedicine Written 7/23/2020  8:42 AM by Gadiel Desir PA-C     5 8 --> 6% --> 5 6 %  -continue with lifestyle changes  -can consider metformin or Saxenda           Essential hypertension    Last Assessment & Plan 7/28/2020 Telemedicine Written 7/23/2020  8:42 AM by Gadiel Desir PA-C     HTN/CHF  -on carvedilol, lisinopril, and furosemide  -continue to monitor  -Continue management with Cardiology- Dr Hero Dhillon  -per 8/28/19 visit with cardiology: "If loses 20 more lbs and systolic BP < 096 mmHg can stop coreg " Weight from 8/28 - 298           MARIE (generalized anxiety disorder)    History of cervical dysplasia    History of congestive heart failure    Last Assessment & Plan 10/21/2021 Routine Prenatal Written 10/20/2021  9:33 PM by Akila Isabel MD     6/22/21- EF 65%  MFM recommend low threshold for delivery based on multiple medical factors            Mixed hyperlipidemia    Obesity affecting pregnancy in third trimester    Last Assessment & Plan 10/21/2021 Routine Prenatal Written 10/20/2021  9:30 PM by Akila Isabel MD     BMI started at 52   Currently 54    12 4 kg (27 lb 6 4 oz)               OCD (obsessive compulsive disorder)    Postpartum depression associated with first pregnancy    Primigravida of advanced maternal age in third trimester    Last Assessment & Plan 7/19/2021 Routine Prenatal Written 7/16/2021 10:28 PM by Akila Isabel MD     Will be 36 at delivery       NIPT low risk   afp negative            Rhus dermatitis    Vitamin D deficiency            Subjective:   Chief Complaint   Patient presents with    Physical Exam     Patient is here for recheck  I have not seen her in over 2 years because of life being crazy    During the last 6 months life was especially crazy for her    Patient initially had a diagnosis of heart failure with preserved ejection fraction thought to be brought on by a viral myocarditis  She then was pregnant with twins  The twins are now 10month-old and hand full to say the least    They at 3months of age his twins and patient contracted COVID  Patient then had postpartum depression and OCD  She was treated through Psychiatry and is back now and is on medications at a keeping her stable    She lost her baby weight but still has a lot a weight to go    So hard part is part as she does any during the day but then finds quick food to throw knee of an like pizza at nighttime that does not help her weight  Her  works 2nd shift and otherwise is very healthy with the twins and with the household        patient ID: Nanda Fu is a 29 y o  female  Patient's past medical history, surgical history, family history, social history, and Tobacco history reviewed with patient  MED LIST WAS REVIEWED AND UPDATED    ROS  As per HPI  Rest of 12 point review of systems negative     Objective:      VITALS:  Wt Readings from Last 3 Encounters:   05/24/22 (!) 146 kg (321 lb 3 2 oz)   05/17/22 (!) 146 kg (321 lb)   04/12/22 (!) 145 kg (320 lb)     BP Readings from Last 3 Encounters:   05/24/22 110/70   05/17/22 100/60   04/12/22 130/70     Pulse Readings from Last 3 Encounters:   05/24/22 67   05/17/22 68   11/08/21 80     Body mass index is 50 31 kg/m²  Laboratory Results: All pertinent labs and studies were reviewed with patient during this office visit with highlights of the results contained in this note in the ASSESSMENT AND PLAN section       Physical Exam      Gen    BMI 50  No acute distress well-appearing well-nourished appears stated age    Mental status  Good judgment and insight oriented to time person and place, recent and remote memory intact mood and affect normal cooperative and patient is reasonable    HEENT  PERRLA 3 mm, EOMI without nystagmus, normocephalic atraumatic without facial weakness      Neck   supple no masses trachea midline positive click normal carotid upstrokes with no bruits    Cor  Regular rhythm without ectopy or murmur, no S3-S4, normal palpation that is no heave lift or thrill    Vascular  No edema, good pedal pulses    Lungs  CTA bilaterally in no respiratory distress no wheezes rhonchi or rales, normal to palpation no tactile fremitus    Abdomen  Difficult to palpate organs secondary to size  Soft, no palpable masses, no hepatosplenomegaly, normal bowel sounds, nontender    Lymphatics  No palpable nodes in the neck, supraclavicular area, axilla, or groin     Musculoskeletal  No clubbing cyanosis or edema muscle tone normal    Skin  no rashes or abnormal appearing lesions    Neuro  Normal ambulation, cranial nerves 2-12 grossly intact, higher functioning with reasoning intact

## 2022-05-24 NOTE — PROGRESS NOTES
SUBJECTIVE:-------------------------------------------------------------------------------------------    Arleth Newman is a 29 y o   female and is here for routine health maintenance       Health Maintenance   Topic Date Due    Pneumococcal Vaccine: Pediatrics (0 to 5 Years) and At-Risk Patients (6 to 59 Years) (1 - PCV) Never done    COVID-19 Vaccine (3 - Booster for Moderna series) 06/24/2021    Cervical Cancer Screening  02/17/2022    Annual Physical  03/01/2022    Hepatitis C Screening  06/24/2024 (Originally 1985)    PT PLAN OF CARE  06/16/2022    BMI: Followup Plan  05/24/2023    BMI: Adult  05/24/2023    DTaP,Tdap,and Td Vaccines (4 - Td or Tdap) 09/13/2031    HIV Screening  Completed    HIB Vaccine  Completed    Hepatitis B Vaccine  Completed    IPV Vaccine  Completed    Influenza Vaccine  Completed    Hepatitis A Vaccine  Aged Out    Meningococcal ACWY Vaccine  Aged Out    HPV Vaccine  Aged Out     Immunization History   Administered Date(s) Administered    COVID-19 MODERNA VACC 0 5 ML IM 12/27/2020, 01/24/2021    DTP 1986, 01/23/1987, 02/27/1987, 08/20/1990    Hep A, adult 04/16/2010    Hep B, adult 09/28/2000, 10/26/2000, 07/27/2001    Hib (HbOC) 08/20/1990    INFLUENZA 10/13/2016, 09/30/2017, 10/09/2019    Influenza Quadrivalent Preservative Free 3 years and older IM 10/13/2016    Influenza, injectable, quadrivalent, preservative free 0 5 mL 09/30/2021    Influenza, seasonal, injectable 11/05/2009    MMR 06/17/1988, 08/26/1991    Meningococcal Polysaccharide (MPSV4) 06/22/2004    OPV 1986, 07/02/1987, 10/02/1987, 08/20/1990    Td (adult), adsorbed 09/28/2000    Tdap 1986, 01/23/1987, 02/27/1987, 08/20/1990, 05/14/2020, 09/13/2021    Tetanus, adsorbed 03/29/2010    Tuberculin Skin Test-PPD Intradermal 05/27/2015, 07/07/2015    Varicella 1986, 01/01/1990         Diet and Physical Activity  Diet: poor diet  Body mass index is 50 31 kg/m²  Exercise: never      General Health  Hearing:  Is normal  Vision: sees ophthalmologist/optometrist yearly  Dental:  Sees dentist every 6 months      Smoker no        ASSESSMENT/PLAN:-------------------------------------------------------------------------------------------    Patient's physical is up-to-date   Immunizations; COVID July 2022  Cancer screenings are up-to-date      Patient instructed on exercise  Patient instructed on healthy choices for diet       NEXT PHYSICAL 1 YEAR          The following portions of the patient's history were reviewed and updated as appropriate: allergies, current medications, past family history, past medical history, past social history, past surgical history and problem list       OBJECTIVE:---------------------------------------------------------------------------------------------------    /70   Pulse 67   Resp 14   Ht 5' 7" (1 702 m)   Wt (!) 146 kg (321 lb 3 2 oz)   SpO2 98%   BMI 50 31 kg/m²   Wt Readings from Last 3 Encounters:   05/24/22 (!) 146 kg (321 lb 3 2 oz)   05/17/22 (!) 146 kg (321 lb)   04/12/22 (!) 145 kg (320 lb)     BP Readings from Last 3 Encounters:   05/24/22 110/70   05/17/22 100/60   04/12/22 130/70     Pulse Readings from Last 3 Encounters:   05/24/22 67   05/17/22 68   11/08/21 80     Body mass index is 50 31 kg/m²    Health Maintenance   Topic Date Due    Pneumococcal Vaccine: Pediatrics (0 to 5 Years) and At-Risk Patients (6 to 59 Years) (1 - PCV) Never done    COVID-19 Vaccine (3 - Booster for Moderna series) 06/24/2021    Cervical Cancer Screening  02/17/2022    Annual Physical  03/01/2022    Hepatitis C Screening  06/24/2024 (Originally 1985)    PT PLAN OF CARE  06/16/2022    BMI: Followup Plan  05/24/2023    BMI: Adult  05/24/2023    DTaP,Tdap,and Td Vaccines (4 - Td or Tdap) 09/13/2031    HIV Screening  Completed    HIB Vaccine  Completed    Hepatitis B Vaccine  Completed    IPV Vaccine  Completed    Influenza Vaccine  Completed    Hepatitis A Vaccine  Aged Out    Meningococcal ACWY Vaccine  Aged Out    HPV Vaccine  Aged Out       ROS:   12 point review of systems negative            PHYSICAL EXAM:    Gen  No acute distress well-appearing well-nourished appears stated age    Mental status  Good judgment and insight oriented to time person and place, recent and remote memory intact mood and affect normal cooperative and patient is reasonable    HEENT  PERRLA 3 mm, EOMI without nystagmus, TMs clear, turbinates open pink no exudate, pharynx benign, tongue midline    Neck   supple no masses trachea midline positive click normal carotid upstrokes with no bruits    Cor  Regular rhythm without ectopy or murmur, no S3-S4, normal palpation that is no heave lift or thrill    Vascular  No edema, good pedal pulses    Lungs  CTA bilaterally in no respiratory distress no wheezes rhonchi or rales, normal to palpation no tactile fremitus    Abdomen  Soft, no palpable masses, no hepatosplenomegaly, normal bowel sounds, nontender    Lymphatics  No palpable nodes in the neck, supraclavicular area, axilla, or groin     Musculoskeletal  No clubbing cyanosis or edema muscle tone normal 12 point review of systems is negative    Skin  no rashes or abnormal appearing lesions    Neuro  Normal ambulation, cranial nerves 2-12 grossly intact, higher functioning with reasoning intact

## 2022-05-31 ENCOUNTER — TELEMEDICINE (OUTPATIENT)
Dept: PSYCHIATRY | Facility: CLINIC | Age: 37
End: 2022-05-31

## 2022-05-31 ENCOUNTER — APPOINTMENT (OUTPATIENT)
Dept: PHYSICAL THERAPY | Facility: REHABILITATION | Age: 37
End: 2022-05-31
Payer: COMMERCIAL

## 2022-05-31 DIAGNOSIS — F41.1 GAD (GENERALIZED ANXIETY DISORDER): ICD-10-CM

## 2022-05-31 DIAGNOSIS — F42.9 OBSESSIVE-COMPULSIVE DISORDER, UNSPECIFIED TYPE: Primary | ICD-10-CM

## 2022-05-31 DIAGNOSIS — F33.1 MODERATE EPISODE OF RECURRENT MAJOR DEPRESSIVE DISORDER (HCC): ICD-10-CM
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
## 2022-06-02 ENCOUNTER — OFFICE VISIT (OUTPATIENT)
Dept: PHYSICAL THERAPY | Facility: REHABILITATION | Age: 37
End: 2022-06-02
Payer: COMMERCIAL

## 2022-06-02 DIAGNOSIS — R10.2 PELVIC PAIN: ICD-10-CM

## 2022-06-02 DIAGNOSIS — R39.15 URINARY URGENCY: Primary | ICD-10-CM

## 2022-06-02 PROCEDURE — 97112 NEUROMUSCULAR REEDUCATION: CPT | Performed by: PHYSICAL THERAPIST

## 2022-06-02 PROCEDURE — 97530 THERAPEUTIC ACTIVITIES: CPT | Performed by: PHYSICAL THERAPIST

## 2022-06-02 PROCEDURE — 97110 THERAPEUTIC EXERCISES: CPT | Performed by: PHYSICAL THERAPIST

## 2022-06-02 NOTE — PROGRESS NOTES
Daily Note     Today's date: 2022  Patient name: Nanda Fu  : 1985  MRN: [id number]  Referring provider: Hanna Guzmán MD  Dx:   Encounter Diagnosis     ICD-10-CM    1  Urinary urgency  R39 15    2  Pelvic pain  R10 2        Start Time: 1003  Stop Time: 1100  Total time in clinic (min): 57 minutes    Subjective: "Everything seems to be working the way it is supposed to be" She reports everything is getting better  She still has b/l low back pain that she notices when she wakes up in AM  When she sits at work, she will get low back/buttock dull/annoying pain  She will get this a few hours into her day  At work, she sits on a stool or a chair that is crooked  Objective: See treatment diary below      Assessment: Tolerated treatment well  Patient cued on desk/chair set up at each individual work location  Pt cued on concepts including making sure hips are at or slightly above knee level, to minimize twisting/rotation using a rolling desk as an option to face patients straight on while also having her computer in front of her  Pt can use a stool under her feet when needed to assure good hip/knee alignment  Pt also cued to activate core and be aware of neutral pelvis to minimize ant pelvic tilt when using her computer  Pt ambulates on TM with decreased stance time on L and increased swing phase secondary to continued LLE weakness  Initiated PB and quadruped pelvic tilting and circles to improve pelvic girdle proprioceptive awareness, improve pelvic mobility and activate core strength  Plan: Continue per plan of care    Pt to focus on pelvic girdle positioning and desk set up           Precautions: 4 months postpartum, PPD, hx of CHF  Daily Treatment Diary    Manuals    External assess          Internal assess          PF consent          TLF STM/skin rolling      Nv, assess Low back STM b/l 15 mins   Lower abdominal skin rolling           Re-eval/measurements 50 mins    Neuro Re-Ed          TAC          PF          TAC+PF       With exercises    SLS      nv nv   SLS with fwd lean       nv   SLS with fwd lean/trunk rot          5 way tap          PB pelvic tilt/pelvic circles CW/CCW Review, x10 ea          Quad lumbar cat/camel  x10 ea         Quad sidebending  x10ea         Quad pelvic circles M/L, A/P x5 ea         Seated ball march On chair          Seated ball LAQ          Diaphragmatic breathing          Ther Ex          Bike           TM 10 mins 2 4 mins       7 5 mins 2 4 mph    MET: L resisted hip flex, R resisted hip ext, swapna hip ADD      5"x5 ea x2 Seated version, single leg R bridge with L SLR   Supine piri stretch      HEP    LTR      HEP    Seated piri stretch      HEP    Seated hs stretch      HEP    Beltran pose with diaphragmatic breathing      HEP    Standing hip ABD      HEP    Standing hip ext      HEP    Standing march      HEP    Squat nv         Side steps nv         Monster Walks nv         Lunges  nv         Ther Activity          Desk/chair setup 20 mins         Car transfers           Floor to stand transfers       nv Review    Sit to stand transfers      nv review   Supine to sit transfer       Review    Bladder Diary           Urge delay:QF          Urge delay: HR          Freeze, breathe, squeeze           The knack          Exhale on exertion          Gait Training                              Modalities

## 2022-06-07 ENCOUNTER — OFFICE VISIT (OUTPATIENT)
Dept: PHYSICAL THERAPY | Facility: REHABILITATION | Age: 37
End: 2022-06-07
Payer: COMMERCIAL

## 2022-06-07 DIAGNOSIS — R39.15 URINARY URGENCY: Primary | ICD-10-CM

## 2022-06-07 DIAGNOSIS — R10.2 PELVIC PAIN: ICD-10-CM

## 2022-06-07 PROCEDURE — 97112 NEUROMUSCULAR REEDUCATION: CPT | Performed by: PHYSICAL THERAPIST

## 2022-06-07 PROCEDURE — 97110 THERAPEUTIC EXERCISES: CPT | Performed by: PHYSICAL THERAPIST

## 2022-06-07 PROCEDURE — 97530 THERAPEUTIC ACTIVITIES: CPT | Performed by: PHYSICAL THERAPIST

## 2022-06-07 NOTE — PROGRESS NOTES
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  This is for one of her working locations  She will be going to her other working location tomorrow  Objective: See treatment diary below      Assessment: Tolerated treatment well  Patient challenged with maintaining neutral spine during functional tasks such as during squatting and lifting  Significant time in session focusing on PPT to achieve neutral spine while hingeing at hips and shifting weight back during a squat  Also cued on alternative lifting/bending strategy including golfers   Pt challenged with L>R SLS  Compensates with significant weight shift to L with inability to maintian upright posture without UE support  Patient cued to focus on maintaining hips level when walking and during load transfer  Pt to focus on SLS daily 30"x2-3 reps  Plan: Continue per plan of care              Precautions: 4 months postpartum, PPD, hx of CHF  Daily Treatment Diary    Manuals    External assess          Internal assess          PF consent          TLF STM/skin rolling       Low back STM b/l 15 mins   Lower abdominal skin rolling           Re-eval/measurements          Neuro Re-Ed          Single leg posture  4 mins         SLS  30"x3 ea     nv   SLS with fwd lean  x5 ea     nv   SLS with fwd lean/trunk rot          5 way tap          PB pelvic tilt/pelvic circles CW/CCW Review, x10 ea  nv        Quad lumbar cat/camel  x10 ea nv        Quad sidebending  x10ea nv        Quad pelvic circles M/L, A/P x5 ea nv        Seated ball march On chair          Seated ball LAQ          Diaphragmatic breathing          Ther Ex          Bike           TM 10 mins 2 4 mins  10 mins 2 5 mph      7 5 mins 2 4 mph    MET: L resisted hip flex, R resisted hip ext, swapna hip ADD       Seated version, single leg R bridge with L SLR   Supine piri stretch          LTR          Seated piri stretch  30"x1ea        Seated hs stretch          Beltran pose with diaphragmatic breathing          Wall hip ABD/ER swapna          Side steps nv         Monster Walks nv         Lunges  nv         Ther Activity          Squatting/picking object off of floor  30 mins  mins        Self STM for low back with tennis ball  5 mins         Desk/chair setup 20 mins         Car transfers           Floor to stand transfers        Review    Sit to stand transfers       review   Supine to sit transfer       Review    Bladder Diary           Urge delay:QF          Urge delay: HR          Freeze, breathe, squeeze           The knack          Exhale on exertion          Gait Training                              Modalities

## 2022-06-09 ENCOUNTER — TELEPHONE (OUTPATIENT)
Dept: PSYCHIATRY | Facility: CLINIC | Age: 37
End: 2022-06-09

## 2022-06-09 NOTE — TELEPHONE ENCOUNTER
Per [facility], patent no showed her therapy appointment for today, 6/9/22

## 2022-06-10 ENCOUNTER — OFFICE VISIT (OUTPATIENT)
Dept: FAMILY MEDICINE CLINIC | Facility: CLINIC | Age: 37
End: 2022-06-10
Payer: COMMERCIAL

## 2022-06-10 VITALS
DIASTOLIC BLOOD PRESSURE: 80 MMHG | HEART RATE: 64 BPM | HEIGHT: 67 IN | SYSTOLIC BLOOD PRESSURE: 120 MMHG | BODY MASS INDEX: 45.99 KG/M2 | RESPIRATION RATE: 16 BRPM | WEIGHT: 293 LBS

## 2022-06-10 DIAGNOSIS — R10.13 EPIGASTRIC PAIN: Primary | ICD-10-CM

## 2022-06-10 PROCEDURE — 99214 OFFICE O/P EST MOD 30 MIN: CPT | Performed by: FAMILY MEDICINE

## 2022-06-10 NOTE — PROGRESS NOTES
Assessment/Plan:    Diarrhea with mid abdominal pain  Patient will have clear fluids for 2 days  Then she will start following a low FODMAP diet    Reassured her that this certainly can be stress and certainly does not sound infectious at all  Patient will get ultrasound abdomen in only if this does not seem to change with her diet  Then we will call her with results    Recheck as needed her scheduled otherwise               Subjective:   Dieter Chavis is a 29 y  o female  Chief Complaint   Patient presents with    Diarrhea     Times 3 weeks     Patient is here with 3 weeks of loose stools  Last time she was here she had some of it but did not think anything of it  She seems to go to the bathroom most soon as she eats depending on what she eats  For examples stuff pepper set her right off  Peanut butter bread she has no problems        The only thing that is really changed it is not eating 1-2 yogurt today and drinking more coffee which is now back down to 1 coffee  Otherwise she has also the down on her water consumption    She has not traveled and has not can not      Past medical history, social history, and family history reviewed as appropriate for the complaint of this patient  MEDICATIONS REVIEWED AND UPDATED    10 point review of systems performed, the remainder of the ROS is negative except for what is noted in the history of chief complaint    Objective:    Vitals:    06/10/22 1504   BP: 120/80   Pulse: 64   Resp: 16     Body mass index is 50 24 kg/m²  Physical Exam    General  Patient in no acute distress, well appearing, well nourished and appears stated age    Mental status  Good judgment and insight, oriented to time person and place, recent and remote memory is intact, mood and affect are normal, cooperative, and patient is reasonable      Abdomen  Generally soft no hepatosplenomegaly some tenderness right mid abdomen right below the epigastric area only  No peritoneal signs  No palpable masses, normal bowel sounds

## 2022-06-10 NOTE — PATIENT INSTRUCTIONS
For the next 48 hours only drink clear liquids to try to give her bowels arrest   That would be water soup stock of any kind, Jell-O Gatorade clear soda like coke with a bubble started out and any of the power drinks    Along with the 48 hours of fluid start acidophilus 1 pill twice a day  The active acidophilus is usually over-the-counter at the pharmacy [facility] to ask for it    Then I would like you to start the low FODMAP diet  You eat things on the right side and try to avoid ones on the left  After you have eat in the things on the on the right hand side, and eliminated anything that bothers you they are you start on the left side and eliminate any foods that bother you there    This will give you good idea what you can and can not eat what her body likes and does not like    Please also scheduled to get an ultrasound of her abdomen      Low FODMAPs Diet     You need a diet that limits, but does not eliminate foods that contain: lactose, fructose, fructans, galactans, and sugar alcohols (polyols)  This is often referred to as a low FODMAPs diet as it is low in fermetable oligo-, di-, and monosaccharides and polyls (FODMAPs)  The low FODMAPs diet will help minimize symptoms, such as bloating, cramping, burping, flatulence, and constipation and/or diarrhea, that are often associated with Irritable Bowel Syndrome (IBS)  Because there are different levels of intolerance, you need to eliminate foods high in FODMAPs for 6-8 weeks and then gradually reintroduce foods to identify bothersome foods  Reintroduce one food every four days with a 2-week break between bothersome foods  The end result is to identify the threshold that you are able to consume FODMAP containing foods without causing bothersome GI symptoms       Type of Food High in FODMAPs Low in FODMAPs   Milk -Milk: Cow, Sheep, Goat, Soy  -Creamy soups made with    milk  -Evaporated milk  -Sweetened condensed milk -Milk: Snook, Coconut, Hazelnut, Hemp, Rice  -Lactose free cow's milk  -Lactose free jesse  -Lactose free ice cream    (non-dairy alternatives)  -Purchase lactase enzyme to  make your own evaporated or  condensed milk if needed   Yogurt -Cow's milk yogurt (Greek yogurt is lowest in FODMAPs)  -Soy yogurt -Coconut milk yogurt   Cheese -Cottage cheese  -Ricotta cheese  -[place]sc[place]one cheese -Hard cheeses including  cheddar, Swiss, brie, blue  cheese, mozzarella, parmesan,  and feta  -No more than 2 tablespoons ricotta or cottage cheese  -Lactose free cottage cheese   Dairy-based  Condiments -Sour cream  -Whipping cream -Butter  -Half and half  -Cream cheese   Dairy-based desserts -Ice cream  -Frozen yogurt  -Sherbet -Sorbet from FODMAPs friendly fruit   Fruit -Apples, Pears  -Cherries, Raspberries,  Blackberries  -Watermelon  -Nectarines, White peaches, Apricots, Plums  -Peaches  -Prunes  -Kain, Papaya  -Persimmon  -Orange juice  -Canned fruit  -Large portions of any fruit -Banana  -Blueberries, Strawberries  -Cantaloupe, Honeydew  -Grapefruit, Lemon, Lime  -Grapes  -Kiwi  -Pineapple  -Rhubarb  -Tangelos  - <1/4 avocado  - <1 tablespoon dried fruit     Limit consumption to one low FODMAPs fruit per meal   Consume ripe fruit   (Firm, less- ripe fruit contains more fructose )   Vegetables -Artichokes  -Asparagus  -Sugar snap peas  -Cabbage  -Onions  -Shallot  -Neil  -Onion and garlic salt  powders  -Garlic  -Cauliflower  -Mushrooms  -Pumpkin  -Green Peppers -Bok marv, Bean sprouts  -Red bell pepper  -Lettuce, Spinach  -Carrots  -Chives, Spring onion (green part    Only)  -Cucumber  -Eggplant  -Green beans  -Tomato  -Potatoes  -Garlic infused oil; purchase    flavored oil or saute onion and    garlic in oil and then discard    onion and garlic  -Water chestnuts  -<1 stick celery  -<1/2 cup sweet potato, broccoli,    Christine sprouts, butternut    squash, fennel  -<1/3 cup green peas  -<10 snow peas         Grains -Wheat  -Rye  -Barley-large quantities  -Spelt -Brown rice  -Oats, oat bran  -Quinoa  -Corn  -Gluten-free bread, cereals,    pastas and crackers without    honey, apple/pear juice, agave    or HFCS  -Namaste Food Perfect Flour  Blend or Alray Christ Gluten Free  -Multi-Purpose Flour   Legumes -Chickpeas, Hummus  -Kidney beans, Baked beans  -Edamame  -Lentils  -Soy milk -Tofu  -Peanuts   Nuts and Seeds -Pistachios -10-15 max or 1-2 tablespoons of Almonds, Macadamia, Pecans,  Pine nuts, Walnuts, Pumpkin  Seed, Sesame seeds, and  Sunflower seeds   Sweeteners -Honey  -Agave  -High fructose corn syrup  -Sorbitol, Mannitol, Xylitol,  Maltitol  -Splenda (may alter friendly  gut baldemar) -Sugar  -Glucose, Sucrose  -Pure maple syrup  -Aspartame   Additives -Inulin, found in yogurt, jesse, cereals, and other foods with added fiber  -FOS    (fructo- oligosaccharides)  -Sugar alcohols (see sweeteners)  -Chicory root    Alcohol -Rum -Wine, Beer  -Vodka, Gin  -Limit to one serving as all  alcohol is a gastric irritant   Protein-rich food  -Fish, Chicken, [place] Eggs,  Meat   Fat-rich food  -Olive and canola oil  -Olives  -<1/4 avocado      Sample Low FODMAPs Diet Menu:     Breakfast  [place] Corn Flakes or oats, with rice or almond milk, banana and 1 tablespoon sliced almonds  Montalvo's or [employer] oatmeal with 1 tablespoon dried fruit and nuts  Quinoa flakes with rice or almond milk, 3/4 cup strawberries, and 1 tablespoon pecans     Lunch  Tera's white bread sandwich with sliced turkey, lettuce or spinach leaves, tomato, sliced cheddar cheese and [place] lactose-free vanilla yogurt, 1/2 cup blueberries and baby carrots  Stir licona with brown rice or rice noodles, chicken, shrimp, or beef, peppers and [facility], ask for no onion or garlic and the sauce on the side  Fruit salad with 1 cup (total) low FODMAP fruits, kiwi, strawberries and blueberries, spinach salad with lemon dressing and cherry tomatoes, and brown rice cakes with natural almond butter     Snack  Glutino pretzels or Blue Mely[place]a [place] Nut thins and mozzarella string cheese  Hard boiled egg and cherry tomatoes  Pumpkin seeds  Brown rice cakes with natural peanut butter  Banana and handful almonds  1 stick celery with natural almond butter or,  Carrots and red pepper dipped in [employer] chicken or salmon with baked sweet potato with olive oil or butter, sauteed spinach and red peppers seasoned with green parts of onion, salt, pepper, handful of pine nuts and olive oil, and a kiwi  Dena's baked potato and a side salad with chicken, bring your own homemade salad dressing that does not contain garlic or onion  Charla

## 2022-06-14 ENCOUNTER — APPOINTMENT (OUTPATIENT)
Dept: PHYSICAL THERAPY | Facility: REHABILITATION | Age: 37
End: 2022-06-14
Payer: COMMERCIAL

## 2022-06-21 ENCOUNTER — OFFICE VISIT (OUTPATIENT)
Dept: PHYSICAL THERAPY | Facility: REHABILITATION | Age: 37
End: 2022-06-21
Payer: COMMERCIAL

## 2022-06-21 DIAGNOSIS — R39.15 URINARY URGENCY: Primary | ICD-10-CM

## 2022-06-21 DIAGNOSIS — R10.2 PELVIC PAIN: ICD-10-CM

## 2022-06-21 PROCEDURE — 97140 MANUAL THERAPY 1/> REGIONS: CPT | Performed by: PHYSICAL THERAPIST

## 2022-06-21 PROCEDURE — 97110 THERAPEUTIC EXERCISES: CPT | Performed by: PHYSICAL THERAPIST

## 2022-06-21 NOTE — PROGRESS NOTES
Re-evaluation     Today's date: 2022  Patient name: Mony Kim  : 1985  MRN: [id number]  Referring provider: Merari Braden MD  Dx:   Encounter Diagnosis     ICD-10-CM    1  Urinary urgency  R39 15    2  Pelvic pain  R10 2        Start Time: 22  Stop Time: 999  Total time in clinic (min): 53 minutes    Subjective: Pt repots she is finding it difficult to balance everything  Pt reports she does not recall having any urinary leakage since last visit  She has decreased her coffee intake  Pt reports overall 90% improvement  She has decreased back/hip pain and improved body awareness  She has modified her desk set up at work which has really helped her symptoms  Pt reports she has a little bit of pain in her back and she feels discouraged about the difficulty she has with balance with activities  Pt reports she feels like if she committed to an at home yoga 3x/week she would improve  Pt reports compliance with tennis ball for self STM for her low back  Objective: See treatment diary below      Tenderness:  Piriformis: neg  Obturator Internus: neg  Adductors: neg  Thoracolumbar fascia: neg    Lumbar AROM   WNL    SLS  Good load transfer, (-) trendelenberg to the R 50% of the time    Other Comments:  Able to squat without pain, unable to squat to parallel  Good TAC without compensation  Good PFM contraction      MMT:   Left Right   Hip flex 4+ 5   Hip ABD 4+ 4+   Hip ext 5 5   Hip IR 4+ 4+   Hip ER 4+ 4+   Knee flex 4+ 4+   Knee ext 4+ 4+   Ankle DF 4+ 4+                 Assessment: Tolerated treatment well  Patient presents with increased strength, decreased pain and improved body awareness  Pt has met most of her LTG's and feels able to manage her symptoms independently  Pt will continue with HEP and will be placed on a 30 day hold  If pt needs skilled services in that time frame, she can return to PT under this episode of care  After 30 day hold, pt will be d/c'ed from skilled PT   Pt is in agreement with this POC  Goals  Short term goals:  Pt will report instance of urinary incontinence less than 2 days/week in 6 weeks  - MET  Pt will perform Kegel consistently prior to cough/sneeze in 6 weeks  - met  Pt will be able to delay urge at least 5 minutes in 6 weeks  - met  Pt will demonstrate good understanding of urge delay techniques in 6 weeks -met  Pt will present with MMT 4/5 b/l LE all planes - met    Long term goals:  Pt will be able to cough, sneeze, lift, and transfer without leakage by discharge  - met  Pt will be compliant with HEP by discharge  - progressing  Pt will be able to delay urge for greater than 10 minutes by discharge to be able to complete childcare tasks - met   Pt will report <3 instances of leakage per month  -met    Updated goals:  Pt will be able to transfer and perform bed mobility without low back/abdominal pain - progressing     Plan: Pt to trial yoga 1-3x/week for 10-20 mins per session  Pt would prefer to use [employer] with [employer]  Pt on hold x30 days and will continue with independent HEP as well as walking 10 mins 2-3x/week and will begin her yoga videos 1-3x/week  Pt can return if needed in the 30 days under this episode of care  If pt does not return in that time frame, pt will be d/c'ed from skilled PT              Precautions: 4 months postpartum, PPD, hx of CHF  Daily Treatment Diary    Manuals 6/2 6/7 6/21       External assess          Internal assess          PF consent          TLF STM/skin rolling          Lower abdominal skin rolling           Re-eval/measurements   43 mins       Neuro Re-Ed          Single leg posture  4 mins         SLS  30"x3 ea        SLS with fwd lean  x5 ea        SLS with fwd lean/trunk rot          5 way tap          PB pelvic tilt/pelvic circles CW/CCW Review, x10 ea  nv        Quad lumbar cat/camel  x10 ea nv        Quad sidebending  x10ea nv        Quad pelvic circles M/L, A/P x5 ea nv        Seated ball march On chair Seated ball LAQ          Diaphragmatic breathing          Ther Ex          Bike           TM 10 mins 2 4 mins  10 mins 2 5 mph  10 mins 2 5 mph       MET: L resisted hip flex, R resisted hip ext, swapna hip ADD          Supine piri stretch          LTR          Seated piri stretch  30"x1ea        Seated hs stretch          Beltran pose with diaphragmatic breathing          Wall hip ABD/ER swapna          Side steps nv         Monster Walks nv         Lunges  nv         Ther Activity          Squatting/picking object off of floor  30 mins  mins        Self STM for low back with tennis ball  5 mins         Desk/chair setup 20 mins         Car transfers           Floor to stand transfers           Sit to stand transfers          Supine to sit transfer          Bladder Diary           Urge delay:QF          Urge delay: HR          Freeze, breathe, squeeze           The knack          Exhale on exertion          Gait Training                              Modalities

## 2022-06-28 ENCOUNTER — TELEMEDICINE (OUTPATIENT)
Dept: BEHAVIORAL/MENTAL HEALTH CLINIC | Facility: CLINIC | Age: 37
End: 2022-06-28
Payer: COMMERCIAL

## 2022-06-28 DIAGNOSIS — F42.9 OBSESSIVE-COMPULSIVE DISORDER, UNSPECIFIED TYPE: ICD-10-CM

## 2022-06-28 DIAGNOSIS — F41.1 GAD (GENERALIZED ANXIETY DISORDER): ICD-10-CM

## 2022-06-28 PROCEDURE — 90834 PSYTX W PT 45 MINUTES: CPT | Performed by: SOCIAL WORKER

## 2022-06-28 NOTE — PSYCH
Goals: 1  Pain: 0  Level of Suicidality: Low     D:  Safia spoke  with this worker today about her feelings re: her babies' sleep regression  Candida Tripathi verbalized that her  wanted to join, as well  She expressed that she "spirals" and becomes overly sensitive in a variety of situations, several of which she described   A: Trevor Rodriguez was insightful, calm and easy to dialogue with again today as she acknowledged that she has different bonds with each of her daughters  Giuseppe admitted that he has not been as supportive of her as he should have been  She does not share with him as a result of that  She was receptive to feedback on utilizing DBT and Mindfulness, as well  P: Trevor Rodriguez will  continue to be supported in addressing the above as she agreed to continue to be seen  By Psychiatry  I spent 50minutes with the patient during this visit      This note was not shared with the patient due to this is a psychotherapy note  Virtual Regular Visit    Verification of patient location:    Patient is located in the following state in which I hold an active license [place]      Assessment/Plan:    Problem List Items Addressed This Visit    None         Goals addressed in session: Goal 1     Reason for visit is   No chief complaint on file  Encounter provider [facility]    Provider located at [address] [place] [id number] [phone]      Recent Visits  No visits were found meeting these conditions  Showing recent visits within past 7 days and meeting all other requirements  Future Appointments  No visits were found meeting these conditions  Showing future appointments within next 150 days and meeting all other requirements       The patient was identified by name and date of birth   Sophie Barillas was informed that this is a telemedicine visit and that the visit is being conducted through[employer] and patient was informed that this is a secure, HIPAA-compliant platform  She agrees to proceed     My office door was closed  No one else was in the room  She acknowledged consent and understanding of privacy and security of the video platform  The patient has agreed to participate and understands they can discontinue the visit at any time  Patient is aware this is a billable service  Subjective  Safia Calderón [facility] is a 29 y o  female    HPI     Past Medical History:   Diagnosis Date    Abnormal Pap smear of cervix     Anxiety     CHF (congestive heart failure) ([facility] [address] ) 2019    Chlamydia     Coronary artery disease 3/17/19    Depression     Diet controlled gestational diabetes mellitus (GDM) in second trimester 2021    Heart failure ([facility] [address] )     CHF in past    HPV (human papilloma virus) infection     Hypertension     Temporomandibular joint disorder     Resolved 2015     Varicella        Past Surgical History:   Procedure Laterality Date    CERVICAL BIOPSY  W/ LOOP ELECTRODE EXCISION      DENTAL SURGERY      [place] teeth extraction    [place]  DELIVERY ONLY N/A 2021    Procedure:  SECTION (); Surgeon: Clara Agudelo MD;  Location[facility];  Service: Obstetrics    TONSILLECTOMY         Current Outpatient Medications   Medication Sig Dispense Refill    buPROPion (Wellbutrin XL) 150 mg 24 hr tablet Take 1 tablet (150 mg total) by mouth in the morning 30 tablet 2    NIFEdipine (PROCARDIA XL) 30 mg 24 hr tablet Take 1 tablet (30 mg total) by mouth daily 30 tablet 11    sertraline (ZOLOFT) 100 mg tablet Take 1 tablet (100 mg total) by mouth daily 30 tablet 2     No current facility-administered medications for this visit  VIRTUAL VISIT DISCLAIMER    Larry Yang verbally agrees to participate in [employer]   Pt is aware that Virtual Care Services could be limited without vital signs or the ability to perform a full hands-on physical Chilango Solano understands she or the provider may request at any time to terminate the video visit and request the patient to seek care or treatment in person

## 2022-07-14 ENCOUNTER — SOCIAL WORK (OUTPATIENT)
Dept: BEHAVIORAL/MENTAL HEALTH CLINIC | Facility: CLINIC | Age: 37
End: 2022-07-14
Payer: COMMERCIAL

## 2022-07-14 DIAGNOSIS — F41.1 GAD (GENERALIZED ANXIETY DISORDER): ICD-10-CM

## 2022-07-14 DIAGNOSIS — F33.1 MODERATE EPISODE OF RECURRENT MAJOR DEPRESSIVE DISORDER (HCC): ICD-10-CM

## 2022-07-14 DIAGNOSIS — F42.9 OBSESSIVE-COMPULSIVE DISORDER, UNSPECIFIED TYPE: ICD-10-CM

## 2022-07-14 PROCEDURE — 90834 PSYTX W PT 45 MINUTES: CPT | Performed by: SOCIAL WORKER

## 2022-07-14 NOTE — PSYCH
Goals: 1  Pain: 0  Level of Suicidality: Low     D:  Safia spoke  with this worker today about her feelings re: having reached out to her parents via text to vocalize how unsupportive they have been  She also shared that she has been very hostile with her  as a result of the babies' sleep difficulties  She shared reasons that she has decided to take unpaid time off work   Marine Pinna: Tabitha Bonilla was insightful, calm and easy to dialogue with again today as she acknowledged that she remains fearful of making the "wrong" decisions  P:  Safia will  continue to be supported in addressing the above as she agreed to continue to be seen  By Psychiatry        I spent 50minutes with the patient during this visit      This note was not shared with the patient due to this is a psychotherapy note  Encounter Diagnoses   Name Primary?     MARIE (generalized anxiety disorder)     Obsessive-compulsive disorder, unspecified type     Postpartum depression associated with first pregnancy     Moderate episode of recurrent major depressive disorder ([facility] [address] )

## 2022-07-15 ENCOUNTER — TELEPHONE (OUTPATIENT)
Dept: PSYCHIATRY | Facility: CLINIC | Age: 37
End: 2022-07-15

## 2022-07-15 NOTE — TELEPHONE ENCOUNTER
Attempted to call pt to discuss her potential medical ACLI, left pt a voicemail regarding potentially increasing her Zoloft as well  Will discuss further at next visit

## 2022-07-21 ENCOUNTER — TELEMEDICINE (OUTPATIENT)
Dept: PSYCHIATRY | Facility: CLINIC | Age: 37
End: 2022-07-21

## 2022-07-21 DIAGNOSIS — F41.1 GAD (GENERALIZED ANXIETY DISORDER): ICD-10-CM

## 2022-07-21 DIAGNOSIS — F33.1 MODERATE EPISODE OF RECURRENT MAJOR DEPRESSIVE DISORDER (HCC): Primary | ICD-10-CM

## 2022-07-21 DIAGNOSIS — F42.9 OBSESSIVE-COMPULSIVE DISORDER, UNSPECIFIED TYPE: ICD-10-CM

## 2022-07-21 RX ORDER — SERTRALINE HYDROCHLORIDE 100 MG/1
100 TABLET, FILM COATED ORAL DAILY
Qty: 30 TABLET | Refills: 2 | Status: SHIPPED | OUTPATIENT
Start: 2022-07-21 | End: 2022-10-03 | Stop reason: SDUPTHER
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
## 2022-08-15 ENCOUNTER — TELEMEDICINE (OUTPATIENT)
Dept: BEHAVIORAL/MENTAL HEALTH CLINIC | Facility: CLINIC | Age: 37
End: 2022-08-15
Payer: COMMERCIAL

## 2022-08-15 DIAGNOSIS — F33.1 MODERATE EPISODE OF RECURRENT MAJOR DEPRESSIVE DISORDER (HCC): ICD-10-CM

## 2022-08-15 DIAGNOSIS — F41.1 GAD (GENERALIZED ANXIETY DISORDER): ICD-10-CM

## 2022-08-15 DIAGNOSIS — F42.9 OBSESSIVE-COMPULSIVE DISORDER, UNSPECIFIED TYPE: ICD-10-CM

## 2022-08-15 PROCEDURE — 90834 PSYTX W PT 45 MINUTES: CPT | Performed by: SOCIAL WORKER

## 2022-08-15 NOTE — PSYCH
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     MARIE (generalized anxiety disorder)     Moderate episode of recurrent major depressive disorder (HCC)     Obsessive-compulsive disorder, unspecified type

## 2022-09-13 ENCOUNTER — TELEPHONE (OUTPATIENT)
Dept: PSYCHIATRY | Facility: CLINIC | Age: 37
End: 2022-09-13

## 2022-09-13 NOTE — TELEPHONE ENCOUNTER
[employer] called in reference to Short term disability letter  I did inform rep that we would need to have verbal consent in order to discus that with them

## 2022-09-13 NOTE — TELEPHONE ENCOUNTER
Medical records request/form was received today (date range of 7/1/22-present) from [address] [place] [address],[address] in Dr Debra Christensen for review upon her arrival back to work

## 2022-09-19 ENCOUNTER — TELEPHONE (OUTPATIENT)
Dept: PSYCHIATRY | Facility: CLINIC | Age: 37
End: 2022-09-19

## 2022-09-19 NOTE — TELEPHONE ENCOUNTER
New medical record request was received from [address] [place] [address] for time frame of 5/1/2022 to 9/14/2022  Will be placed in Dr Bowser

## 2022-09-19 NOTE — TELEPHONE ENCOUNTER
Called patient back about letter and to see if she would like to make a f/u appt with Dr Bhavik Urena or another provider

## 2022-09-20 ENCOUNTER — TELEPHONE (OUTPATIENT)
Dept: PSYCHIATRY | Facility: CLINIC | Age: 37
End: 2022-09-20

## 2022-09-20 NOTE — TELEPHONE ENCOUNTER
lEizabeth from [employer] on behalf of [employer] called to verify that the fax was received

## 2022-09-27 ENCOUNTER — TELEMEDICINE (OUTPATIENT)
Dept: BEHAVIORAL/MENTAL HEALTH CLINIC | Facility: CLINIC | Age: 37
End: 2022-09-27
Payer: COMMERCIAL

## 2022-09-27 DIAGNOSIS — F42.9 OBSESSIVE-COMPULSIVE DISORDER, UNSPECIFIED TYPE: ICD-10-CM

## 2022-09-27 DIAGNOSIS — F33.1 MODERATE EPISODE OF RECURRENT MAJOR DEPRESSIVE DISORDER (HCC): ICD-10-CM

## 2022-09-27 DIAGNOSIS — F41.1 GAD (GENERALIZED ANXIETY DISORDER): ICD-10-CM

## 2022-09-27 PROCEDURE — 90834 PSYTX W PT 45 MINUTES: CPT | Performed by: SOCIAL WORKER

## 2022-09-27 NOTE — PSYCH
Goals: 1  Pain: 0  Level of Suicidality: Low     D:  Safia spoke  with this worker today about her feelings re: having returned to work yesterday after having spent the past two months at home with her babies  She shared that she has been "having trouble finding 'me,'"  But, Damon Elizabeth focused on how little she feels like her  understands Kirk Bellis with her as she reported that she "needs someone who she is intellectually attracted to "    A: Adonis Mathur was once again insightful, calm and easy to dialogue with today as she shared re: the above  She accepted this worker' s feedback on ways that she can focus more in her own needs, fulfillment and less on ways that   Amberly Hooks is not fulfilling her  P:  Safia will  continue to be supported in addressing the above as she returns back to a part-time work schedule         I spent 50minutes with the patient during this visit      This note was not shared with the patient due to this is a psychotherapy note  Encounter Diagnoses   Name Primary?     MARIE (generalized anxiety disorder)     Obsessive-compulsive disorder, unspecified type     Moderate episode of recurrent major depressive disorder ([facility] [address] )

## 2022-09-28 ENCOUNTER — TELEPHONE (OUTPATIENT)
Dept: PSYCHIATRY | Facility: CLINIC | Age: 37
End: 2022-09-28

## 2022-09-28 NOTE — TELEPHONE ENCOUNTER
Two medical records requests were processed on 9/28/22    [employer]-5/1/22-9/14/22  [employer] -Case # [id number]

## 2022-10-03 ENCOUNTER — TELEMEDICINE (OUTPATIENT)
Dept: PSYCHIATRY | Facility: CLINIC | Age: 37
End: 2022-10-03
Payer: COMMERCIAL

## 2022-10-03 DIAGNOSIS — F41.1 GAD (GENERALIZED ANXIETY DISORDER): ICD-10-CM

## 2022-10-03 DIAGNOSIS — F42.9 OBSESSIVE-COMPULSIVE DISORDER, UNSPECIFIED TYPE: ICD-10-CM

## 2022-10-03 DIAGNOSIS — F33.1 MODERATE EPISODE OF RECURRENT MAJOR DEPRESSIVE DISORDER (HCC): Primary | ICD-10-CM

## 2022-10-03 PROCEDURE — 99214 OFFICE O/P EST MOD 30 MIN: CPT | Performed by: PSYCHIATRY & NEUROLOGY

## 2022-10-03 RX ORDER — SERTRALINE HYDROCHLORIDE 100 MG/1
100 TABLET, FILM COATED ORAL DAILY
Qty: 30 TABLET | Refills: 2 | Status: SHIPPED | OUTPATIENT
Start: 2022-10-03 | End: 2022-11-02

## 2022-10-03 RX ORDER — GABAPENTIN 600 MG/1
600 TABLET ORAL DAILY
Qty: 30 TABLET | Refills: 1 | Status: SHIPPED | OUTPATIENT
Start: 2022-10-03 | End: 2022-11-02
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
## 2022-10-12 ENCOUNTER — APPOINTMENT (OUTPATIENT)
Dept: LAB | Facility: CLINIC | Age: 37
End: 2022-10-12
Payer: COMMERCIAL

## 2022-10-12 DIAGNOSIS — R71.8 LOW MEAN CORPUSCULAR VOLUME (MCV): ICD-10-CM

## 2022-10-12 DIAGNOSIS — D72.829 LEUKOCYTOSIS, UNSPECIFIED TYPE: ICD-10-CM

## 2022-10-12 DIAGNOSIS — I10 ESSENTIAL HYPERTENSION: ICD-10-CM

## 2022-10-12 DIAGNOSIS — D75.839 THROMBOCYTOSIS: ICD-10-CM

## 2022-10-12 DIAGNOSIS — R73.09 ELEVATED HEMOGLOBIN A1C: ICD-10-CM

## 2022-10-12 DIAGNOSIS — E55.9 VITAMIN D DEFICIENCY: ICD-10-CM

## 2022-10-12 LAB
25(OH)D3 SERPL-MCNC: 31.4 NG/ML (ref 30–100)
BASOPHILS # BLD AUTO: 0.04 THOUSANDS/ΜL (ref 0–0.1)
BASOPHILS NFR BLD AUTO: 0 % (ref 0–1)
EOSINOPHIL # BLD AUTO: 0.26 THOUSAND/ΜL (ref 0–0.61)
EOSINOPHIL NFR BLD AUTO: 2 % (ref 0–6)
ERYTHROCYTE [DISTWIDTH] IN BLOOD BY AUTOMATED COUNT: 16.1 % (ref 11.6–15.1)
EST. AVERAGE GLUCOSE BLD GHB EST-MCNC: 126 MG/DL
FERRITIN SERPL-MCNC: 21 NG/ML (ref 8–388)
HBA1C MFR BLD: 6 %
HCT VFR BLD AUTO: 41.5 % (ref 34.8–46.1)
HGB BLD-MCNC: 12.8 G/DL (ref 11.5–15.4)
IMM GRANULOCYTES # BLD AUTO: 0.05 THOUSAND/UL (ref 0–0.2)
IMM GRANULOCYTES NFR BLD AUTO: 0 % (ref 0–2)
LYMPHOCYTES # BLD AUTO: 4.17 THOUSANDS/ΜL (ref 0.6–4.47)
LYMPHOCYTES NFR BLD AUTO: 33 % (ref 14–44)
MCH RBC QN AUTO: 24.7 PG (ref 26.8–34.3)
MCHC RBC AUTO-ENTMCNC: 30.8 G/DL (ref 31.4–37.4)
MCV RBC AUTO: 80 FL (ref 82–98)
MONOCYTES # BLD AUTO: 0.83 THOUSAND/ΜL (ref 0.17–1.22)
MONOCYTES NFR BLD AUTO: 7 % (ref 4–12)
NEUTROPHILS # BLD AUTO: 7.15 THOUSANDS/ΜL (ref 1.85–7.62)
NEUTS SEG NFR BLD AUTO: 58 % (ref 43–75)
NRBC BLD AUTO-RTO: 0 /100 WBCS
PLATELET # BLD AUTO: 447 THOUSANDS/UL (ref 149–390)
PMV BLD AUTO: 9.2 FL (ref 8.9–12.7)
RBC # BLD AUTO: 5.18 MILLION/UL (ref 3.81–5.12)
TSH SERPL DL<=0.05 MIU/L-ACNC: 0.92 UIU/ML (ref 0.45–4.5)
WBC # BLD AUTO: 12.5 THOUSAND/UL (ref 4.31–10.16)

## 2022-10-12 PROCEDURE — 85025 COMPLETE CBC W/AUTO DIFF WBC: CPT

## 2022-10-12 PROCEDURE — 36415 COLL VENOUS BLD VENIPUNCTURE: CPT

## 2022-10-12 PROCEDURE — 84443 ASSAY THYROID STIM HORMONE: CPT

## 2022-10-12 PROCEDURE — 82306 VITAMIN D 25 HYDROXY: CPT

## 2022-10-12 PROCEDURE — 83036 HEMOGLOBIN GLYCOSYLATED A1C: CPT

## 2022-10-12 PROCEDURE — 82728 ASSAY OF FERRITIN: CPT

## 2022-10-18 ENCOUNTER — TELEMEDICINE (OUTPATIENT)
Dept: BEHAVIORAL/MENTAL HEALTH CLINIC | Facility: CLINIC | Age: 37
End: 2022-10-18
Payer: COMMERCIAL

## 2022-10-18 DIAGNOSIS — F33.1 MODERATE EPISODE OF RECURRENT MAJOR DEPRESSIVE DISORDER (HCC): ICD-10-CM

## 2022-10-18 DIAGNOSIS — F42.9 OBSESSIVE-COMPULSIVE DISORDER, UNSPECIFIED TYPE: ICD-10-CM

## 2022-10-18 DIAGNOSIS — F41.1 GAD (GENERALIZED ANXIETY DISORDER): ICD-10-CM

## 2022-10-18 PROCEDURE — 90834 PSYTX W PT 45 MINUTES: CPT | Performed by: SOCIAL WORKER
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
## 2022-11-08 ENCOUNTER — TELEMEDICINE (OUTPATIENT)
Dept: BEHAVIORAL/MENTAL HEALTH CLINIC | Facility: CLINIC | Age: 37
End: 2022-11-08

## 2022-11-08 DIAGNOSIS — F32.5 MAJOR DEPRESSIVE DISORDER IN FULL REMISSION, UNSPECIFIED WHETHER RECURRENT (HCC): ICD-10-CM

## 2022-11-08 DIAGNOSIS — F41.1 GAD (GENERALIZED ANXIETY DISORDER): Primary | ICD-10-CM

## 2022-11-08 DIAGNOSIS — F42.9 OBSESSIVE-COMPULSIVE DISORDER, UNSPECIFIED TYPE: ICD-10-CM

## 2022-11-08 DIAGNOSIS — F41.1 GENERALIZED ANXIETY DISORDER: ICD-10-CM

## 2022-11-08 NOTE — PSYCH
Psychotherapy Provided: Individual Psychotherapy 50 minutes     Length of time in session: 50 minutes, follow up in 2 week    Encounter Diagnoses   Name Primary? • MARIE (generalized anxiety disorder) Yes   • Major depressive disorder in full remission, unspecified whether recurrent ([place] [id number] )    • Generalized anxiety disorder    • Obsessive-compulsive disorder, unspecified type          Goals addressed in session: Goal 1     Pain:      none    0    Current suicide risk : Low      D:  Safia spoke  with this worker today about her feelings re: having tendered her resignation and accepted a new position with more flexibility and opportunity for learning  She spent the remainder of today's session to process her relationship with her parents and their most recent visit to her home  A: Yaima Quiles was once again insightful, calm and easy to dialogue with today as she shared re: the above  She accepted this worker' s support as she continues to grieve and process the lack of care that she received / receives from her parents       P: Venu Muñiz supported in addressing the above with the support of her Psychiatrist and this worker  Behavioral Health Treatment Plan [facility]: Diagnosis and Treatment Plan explained to Ralf Sal relates understanding diagnosis and is agreeable to Treatment Plan   Yes     Visit start and stop times:    11/08/22  Start Time: 0800  Stop Time: 0850  Total Visit Time: 50 minutes    Virtual Regular Visit    Verification of patient location:    Patient is located in the following state in which I hold an active license [place]      Assessment/Plan:    Problem List Items Addressed This Visit        Other    MARIE (generalized anxiety disorder) - Primary    OCD (obsessive compulsive disorder)      Other Visit Diagnoses     Major depressive disorder in full remission, unspecified whether recurrent ([place] [address] )        Generalized anxiety disorder                       Reason for visit is Chief Complaint   Patient presents with   • Virtual Regular Visit        Encounter provider [facility]    Provider located at [address] [place] [address] Dakota Summers  [place] [address] [phone]      Recent Visits  No visits were found meeting these conditions  Showing recent visits within past 7 days and meeting all other requirements  Today's Visits  Date Type Provider Dept   11/08/22 [address] today's visits and meeting all other requirements  Future Appointments  No visits were found meeting these conditions  Showing future appointments within next 150 days and meeting all other requirements       The patient was identified by name and date of birth  Sydnie Ramirez was informed that this is a telemedicine visit and that the visit is being conducted throughthe SpeechVive platform  She agrees to proceed     My office door was closed  No one else was in the room  She acknowledged consent and understanding of privacy and security of the video platform  The patient has agreed to participate and understands they can discontinue the visit at any time  Patient is aware this is a billable service  Subjective  Safia Ellsworth is a 40 y o  female          HPI     Past Medical History:   Diagnosis Date   • Abnormal Pap smear of cervix    • Anxiety    • CHF (congestive heart failure) ([facility] [address] ) 2019   • Chlamydia    • Coronary artery disease 3/17/19   • Depression    • Diet controlled gestational diabetes mellitus (GDM) in second trimester 6/9/2021   • Heart failure ([address] )     CHF in past   • HPV (human papilloma virus) infection 2008   • Hypertension    • Temporomandibular joint disorder     Resolved 7/7/2015    • Varicella        Past Surgical History:   Procedure Laterality Date   • CERVICAL BIOPSY  W/ LOOP ELECTRODE EXCISION  2009   • DENTAL SURGERY [place] teeth extraction   • [place]  DELIVERY ONLY N/A 2021    Procedure:  SECTION (); Surgeon: Kate Alamo MD;  Location: [facility];  Service: Obstetrics   • TONSILLECTOMY         Current Outpatient Medications   Medication Sig Dispense Refill   • gabapentin (Neurontin) 600 MG tablet Take 1 tablet (600 mg total) by mouth daily 30 tablet 1   • NIFEdipine (PROCARDIA XL) 30 mg 24 hr tablet Take 1 tablet (30 mg total) by mouth daily 30 tablet 11   • sertraline (ZOLOFT) 100 mg tablet Take 1 tablet (100 mg total) by mouth daily Take one 100 mg tablet with one 50 mg tablet, for a daily combination dose of 150 mg  30 tablet 2   • sertraline (Zoloft) 50 mg tablet Take 1 tablet (50 mg total) by mouth daily 30 tablet 2     No current facility-administered medications for this visit

## 2022-11-09 NOTE — PSYCH
Treatment Plan Tracking    # 1Treatment Plan not completed within required time limits due to: Elin Kawasaki presented with emotional/behavioral issues that required clinical intervention

## 2022-11-10 ENCOUNTER — TELEPHONE (OUTPATIENT)
Dept: PSYCHIATRY | Facility: CLINIC | Age: 37
End: 2022-11-10

## 2022-11-10 ENCOUNTER — TELEMEDICINE (OUTPATIENT)
Dept: PSYCHIATRY | Facility: CLINIC | Age: 37
End: 2022-11-10

## 2022-11-10 DIAGNOSIS — F41.1 GAD (GENERALIZED ANXIETY DISORDER): ICD-10-CM

## 2022-11-10 DIAGNOSIS — F42.9 OBSESSIVE-COMPULSIVE DISORDER, UNSPECIFIED TYPE: ICD-10-CM

## 2022-11-10 DIAGNOSIS — F33.1 MODERATE EPISODE OF RECURRENT MAJOR DEPRESSIVE DISORDER (HCC): Primary | ICD-10-CM

## 2022-11-10 RX ORDER — GABAPENTIN 600 MG/1
600 TABLET ORAL DAILY
Qty: 30 TABLET | Refills: 1 | Status: SHIPPED | OUTPATIENT
Start: 2022-11-10 | End: 2022-12-10

## 2022-11-10 RX ORDER — SERTRALINE HYDROCHLORIDE 100 MG/1
200 TABLET, FILM COATED ORAL
Qty: 60 TABLET | Refills: 0 | Status: SHIPPED | OUTPATIENT
Start: 2022-11-10 | End: 2022-12-10
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
## 2022-11-10 NOTE — TELEPHONE ENCOUNTER
Left a message for patient requesting she call back to schedule 1-2 month transfer of care appointment  She was last seen, virtually on 11/10/22

## 2022-11-16 ENCOUNTER — TELEPHONE (OUTPATIENT)
Dept: PSYCHIATRY | Facility: CLINIC | Age: 37
End: 2022-11-16

## 2022-11-16 NOTE — TELEPHONE ENCOUNTER
Medical records request for [facility]  F # [id number] was processed 9/28/22 per Viviane Price at [facility]  Will place a call to insurance company

## 2022-11-16 NOTE — TELEPHONE ENCOUNTER
Release point called wanting to know if we received their medical records request for Dr Alejandra Gilliam for the pt  Writer checked the chart and saw nothing unless it was missed

## 2022-11-22 ENCOUNTER — TELEMEDICINE (OUTPATIENT)
Dept: BEHAVIORAL/MENTAL HEALTH CLINIC | Facility: CLINIC | Age: 37
End: 2022-11-22

## 2022-11-22 DIAGNOSIS — F32.5 MAJOR DEPRESSIVE DISORDER IN FULL REMISSION, UNSPECIFIED WHETHER RECURRENT (HCC): ICD-10-CM

## 2022-11-22 DIAGNOSIS — F41.1 GAD (GENERALIZED ANXIETY DISORDER): Primary | ICD-10-CM

## 2022-11-22 DIAGNOSIS — F42.9 OBSESSIVE-COMPULSIVE DISORDER, UNSPECIFIED TYPE: ICD-10-CM

## 2022-11-22 NOTE — PSYCH
Psychotherapy Provided: Individual Psychotherapy 50 minutes     Length of time in session: 50 minutes, follow up in 2 week    Encounter Diagnoses   Name Primary? • MARIE (generalized anxiety disorder) Yes   • Major depressive disorder in full remission, unspecified whether recurrent ([place] [id number] )    • Obsessive-compulsive disorder, unspecified type          Goals addressed in session: Goal 1     Pain:      none    0    Current suicide risk : Low      D:  Safia spoke  with this worker today about her feelings re: having not engaged in any dialogue with her parents since they left her home  She initially verbalized that she has been "depressed and irritable" but was able to reframe this as "grieving and sad" as she addresses her desire to let go of her past   A: Mariya Stevenson was once again insightful, calm and easy to dialogue with today as she shared re: the above  She accepted this worker' s support as she continues to grieve and process the lack of care that she received / receives from her parents       P: Terell Rios supported in addressing the above with the support of her Psychiatrist and this worker  Behavioral Health Treatment Plan [facility]: Diagnosis and Treatment Plan explained to Sabrina Villalobos relates understanding diagnosis and is agreeable to Treatment Plan  Yes     Visit start and stop times:    11/22/22  Start Time: 0800  Stop Time: 0850  Total Visit Time: 50 minutes    Virtual Regular Visit    Verification of patient location:    Patient is located in the following state in which I hold an active license [place]      Assessment/Plan:    Problem List Items Addressed This Visit        Other    MARIE (generalized anxiety disorder) - Primary    OCD (obsessive compulsive disorder)   Other Visit Diagnoses     Major depressive disorder in full remission, unspecified whether recurrent ([place] [address] )                       Reason for visit is   No chief complaint on file         Encounter provider Dolly Benoit [facility]    Provider located at [address] [place] [address] [facility] [id number] [phone]      Recent Visits  No visits were found meeting these conditions  Showing recent visits within past 7 days and meeting all other requirements  Today's Visits  Date Type Provider Dept   22 [facility] [place] today's visits and meeting all other requirements  Future Appointments  No visits were found meeting these conditions  Showing future appointments within next 150 days and meeting all other requirements       The patient was identified by name and date of birth  Ge Covington was informed that this is a telemedicine visit and that the visit is being conducted throughthe [employer] platform  She agrees to proceed     My office door was closed  No one else was in the room  She acknowledged consent and understanding of privacy and security of the video platform  The patient has agreed to participate and understands they can discontinue the visit at any time  Patient is aware this is a billable service  Subjective  Safia Batista is a 40 y o  female          HPI     Past Medical History:   Diagnosis Date   • Abnormal Pap smear of cervix    • Anxiety    • CHF (congestive heart failure) ([address] )    • Chlamydia    • Coronary artery disease 3/17/19   • Depression    • Diet controlled gestational diabetes mellitus (GDM) in second trimester 2021   • Heart failure ([address] )     CHF in past   • HPV (human papilloma virus) infection    • Hypertension    • Temporomandibular joint disorder     Resolved 2015    • Varicella        Past Surgical History:   Procedure Laterality Date   • CERVICAL BIOPSY  W/ LOOP ELECTRODE EXCISION     • DENTAL SURGERY      [place] teeth extraction   • [place]  DELIVERY ONLY N/A 2021    Procedure:  SECTION (); Surgeon: Gabe David MD;  Location: [facility];  Service: Obstetrics   • TONSILLECTOMY         Current Outpatient Medications   Medication Sig Dispense Refill   • gabapentin (Neurontin) 600 MG tablet Take 1 tablet (600 mg total) by mouth daily 30 tablet 1   • NIFEdipine (PROCARDIA XL) 30 mg 24 hr tablet Take 1 tablet (30 mg total) by mouth daily 30 tablet 11   • sertraline (ZOLOFT) 100 mg tablet Take 2 tablets (200 mg total) by mouth daily at bedtime 60 tablet 0     No current facility-administered medications for this visit

## 2022-11-22 NOTE — BH TREATMENT PLAN
Ardell Dec  1985         Date of Initial Treatment Plan: 5/8/19   Date of Current Treatment Plan: 11/22/22  Treatment Plan Number 7     Strengths/Personal Resources for Self Care: Intelligent, honest, insightful     Diagnosis:   1  MDD (major depressive disorder), recurrent episode, moderate (HCC)      2  Generalized anxiety disorder            Area of Needs: I am experiencing feelings of grief and guilt re: my relationship with my parents  Long Term Goal 1: AI want to accept things as they are  Target Date:5/22/23  Completion Date: na          Short Term Objectives for Goal 1: AI will continue to work toward having my internal boundaries match my external boundaries  I will process my feelings and struggles in therapy re: this work  I will let go of holding onto my past, my expectations, and my beliefs          GOAL 1: Modality: Individual 2x per month   Completion Date na and The person(s) responsible for carrying out the plan is  [facility]: Diagnosis and Treatment Plan explained to Safia, Safia relates understanding diagnosis and is agreeable to Treatment Plan       Treatment Plan done but not signed at time of office visit due to:  Plan reviewed by phone or in person  and verbal consent given due to Kalyani social distdillon       Client Comments : Please share your thoughts, feelings, need and/or experiences regarding your treatment plan:       __________________________________________________________________    Ge Covington, 1985, actively participated in the review and update of this treatment plan during a virtual session, using the [employer] platform  Ge Covington  provided verbal consent on 11/22/2022 at 8 AM  The treatment plan was transcribed into the [facility] at a later time

## 2022-12-06 ENCOUNTER — TELEMEDICINE (OUTPATIENT)
Dept: BEHAVIORAL/MENTAL HEALTH CLINIC | Facility: CLINIC | Age: 37
End: 2022-12-06

## 2022-12-06 DIAGNOSIS — F42.9 OBSESSIVE-COMPULSIVE DISORDER, UNSPECIFIED TYPE: ICD-10-CM

## 2022-12-06 DIAGNOSIS — F41.1 GENERALIZED ANXIETY DISORDER: ICD-10-CM

## 2022-12-06 DIAGNOSIS — F32.5 MAJOR DEPRESSIVE DISORDER IN FULL REMISSION, UNSPECIFIED WHETHER RECURRENT (HCC): Primary | ICD-10-CM

## 2022-12-06 NOTE — PSYCH
Psychotherapy Provided: Individual Psychotherapy 50 minutes     Length of time in session: 50 minutes, follow up in 2 week      Encounter Diagnoses   Name Primary? • Major depressive disorder in full remission, unspecified whether recurrent ([place] [address] ) Yes   • Obsessive-compulsive disorder, unspecified type    • Generalized anxiety disorder          Goals addressed in session: Goal 1     Pain:      none    0    Current suicide risk : Low      D:  Safia spoke  with this worker today about her feelings re: having  Had COVID during the past week  She also shared how several stressful incidents have negatively impacted the positive state that she was in at the time of her last session  She shared that "this is why people don't do therapy" as she vented re:" these situations  A: Jazzy Barr was insightful, despite this as she recognizes that she "needs these stressful situations to 'not take her out of commission  '"   She was able to accept this worker' s support as she continues to grieve and process the lack of care that she received / receives from her parents       P: Marty Mossrosales supported in addressing the above with the support of her Psychiatrist and this worker  Behavioral Health Treatment Plan [facility]: Diagnosis and Treatment Plan explained to Beth Lester relates understanding diagnosis and is agreeable to Treatment Plan   Yes     Visit start and stop times:    12/06/22  Start Time: 0800  Stop Time: 0850  Total Visit Time: 50 minutes    Virtual Regular Visit    Verification of patient location:    Patient is located in the following state in which I hold an active license [place]      Assessment/Plan:    Problem List Items Addressed This Visit        Other    OCD (obsessive compulsive disorder)   Other Visit Diagnoses     Major depressive disorder in full remission, unspecified whether recurrent ([facility] )    -  Primary    Generalized anxiety disorder                       Reason for visit is No chief complaint on file  Encounter provider [facility]    Provider located at [address] [place] [address]  [address]  [place] [address] [phone]      Recent Visits  No visits were found meeting these conditions  Showing recent visits within past 7 days and meeting all other requirements  Today's Visits  Date Type Provider Dept   12/06/22 [address] today's visits and meeting all other requirements  Future Appointments  No visits were found meeting these conditions  Showing future appointments within next 150 days and meeting all other requirements       The patient was identified by name and date of birth  Tahira Zapata was informed that this is a telemedicine visit and that the visit is being conducted throughthe [employer] platform  She agrees to proceed     My office door was closed  No one else was in the room  She acknowledged consent and understanding of privacy and security of the video platform  The patient has agreed to participate and understands they can discontinue the visit at any time  Patient is aware this is a billable service  Subjective  Safia Mcnair is a 40 y o  female          HPI     Past Medical History:   Diagnosis Date   • Abnormal Pap smear of cervix    • Anxiety    • CHF (congestive heart failure) ([address] ) 2019   • Chlamydia    • Coronary artery disease 3/17/19   • Depression    • Diet controlled gestational diabetes mellitus (GDM) in second trimester 6/9/2021   • Heart failure ([address] )     CHF in past   • HPV (human papilloma virus) infection 2008   • Hypertension    • Temporomandibular joint disorder     Resolved 7/7/2015    • Varicella        Past Surgical History:   Procedure Laterality Date   • CERVICAL BIOPSY  W/ LOOP ELECTRODE EXCISION  2009   • DENTAL SURGERY      [place] teeth extraction   • [place]  DELIVERY ONLY N/A 2021    Procedure:  SECTION (); Surgeon: Hiwot Hope MD;  [facility];  Service: Obstetrics   • TONSILLECTOMY         Current Outpatient Medications   Medication Sig Dispense Refill   • gabapentin (Neurontin) 600 MG tablet Take 1 tablet (600 mg total) by mouth daily 30 tablet 1   • NIFEdipine (PROCARDIA XL) 30 mg 24 hr tablet Take 1 tablet (30 mg total) by mouth daily 30 tablet 11   • sertraline (ZOLOFT) 100 mg tablet Take 2 tablets (200 mg total) by mouth daily at bedtime 60 tablet 0     No current facility-administered medications for this visit

## 2022-12-20 ENCOUNTER — TELEMEDICINE (OUTPATIENT)
Dept: BEHAVIORAL/MENTAL HEALTH CLINIC | Facility: CLINIC | Age: 37
End: 2022-12-20

## 2022-12-20 DIAGNOSIS — F41.1 GENERALIZED ANXIETY DISORDER: ICD-10-CM

## 2022-12-20 DIAGNOSIS — F42.9 OBSESSIVE-COMPULSIVE DISORDER, UNSPECIFIED TYPE: ICD-10-CM

## 2022-12-20 DIAGNOSIS — F32.5 MAJOR DEPRESSIVE DISORDER IN FULL REMISSION, UNSPECIFIED WHETHER RECURRENT (HCC): Primary | ICD-10-CM

## 2022-12-20 NOTE — PSYCH
Psychotherapy Provided: Individual Psychotherapy 50 minutes     Length of time in session: 50 minutes, follow up in 2 week      Encounter Diagnoses   Name Primary? • Major depressive disorder in full remission, unspecified whether recurrent ([facility] ) Yes   • Obsessive-compulsive disorder, unspecified type    • Generalized anxiety disorder          Goals addressed in session: Goal 1     Pain:      none    0    Current suicide risk : Low      D:  Safia spoke  with this worker today about her conflicted feelings re: limiting her contact with her parents over the upcoming holiday,  Reasons for this were discussed  Donn Ramirez also set several intentions as she completed her last full-time day of work yesterday prior to several weeks off before starting a new job in January  A: Sheryle Chi was very insightful today as she verbalized a desire to "start treating herself as well as she treats her twins," to incorporate some "morning movement" into her days, and to "realign" her life  P:  Safia will  continue to be supported in addressing the above as she learns to trust herself   with the support of her Psychiatrist and this worker  Behavioral Health Treatment Plan [facility]: Diagnosis and Treatment Plan explained to Chip Al relates understanding diagnosis and is agreeable to Treatment Plan  Yes     Visit start and stop times:    12/20/22  Start Time: 0800  Stop Time: 0850  Total Visit Time: 50 minutes    Virtual Regular Visit    Verification of patient location:    Patient is located in the following state in which I hold an active license [place]      Assessment/Plan:    Problem List Items Addressed This Visit        Other    OCD (obsessive compulsive disorder)   Other Visit Diagnoses     Major depressive disorder in full remission, unspecified whether recurrent ([facility] )    -  Primary    Generalized anxiety disorder                       Reason for visit is   No chief complaint on file         Encounter provider [facility]    Provider located at [address] [place] [id number] [phone]      Recent Visits  No visits were found meeting these conditions  Showing recent visits within past 7 days and meeting all other requirements  Today's Visits  Date Type Provider Dept   22 [address] today's visits and meeting all other requirements  Future Appointments  No visits were found meeting these conditions  Showing future appointments within next 150 days and meeting all other requirements       The patient was identified by name and date of birth  Curtis Ackerman was informed that this is a telemedicine visit and that the visit is being conducted throughthe AmWell Now platform  She agrees to proceed     My office door was closed  No one else was in the room  She acknowledged consent and understanding of privacy and security of the video platform  The patient has agreed to participate and understands they can discontinue the visit at any time  Patient is aware this is a billable service  Subjective  Safia Mir is a 40 y o  female          HPI     Past Medical History:   Diagnosis Date   • Abnormal Pap smear of cervix    • Anxiety    • CHF (congestive heart failure) ([facility] ) 2019   • Chlamydia    • Coronary artery disease 3/17/19   • Depression    • Diet controlled gestational diabetes mellitus (GDM) in second trimester 2021   • Heart failure ([facility] )     CHF in past   • HPV (human papilloma virus) infection    • Hypertension    • Temporomandibular joint disorder     Resolved 2015    • Varicella        Past Surgical History:   Procedure Laterality Date   • CERVICAL BIOPSY  W/ LOOP ELECTRODE EXCISION     • DENTAL SURGERY      [place] teeth extraction   • [place]  DELIVERY ONLY N/A 2021    Procedure:  SECTION (); Surgeon: Vini Philippe MD;  Location: [facility];  Service: Obstetrics   • TONSILLECTOMY         Current Outpatient Medications   Medication Sig Dispense Refill   • gabapentin (Neurontin) 600 MG tablet Take 1 tablet (600 mg total) by mouth daily 30 tablet 1   • NIFEdipine (PROCARDIA XL) 30 mg 24 hr tablet Take 1 tablet (30 mg total) by mouth daily 30 tablet 11   • sertraline (ZOLOFT) 100 mg tablet Take 2 tablets (200 mg total) by mouth daily at bedtime 60 tablet 0     No current facility-administered medications for this visit

## 2023-01-10 ENCOUNTER — TELEMEDICINE (OUTPATIENT)
Dept: BEHAVIORAL/MENTAL HEALTH CLINIC | Facility: CLINIC | Age: 38
End: 2023-01-10

## 2023-01-10 DIAGNOSIS — F32.5 MAJOR DEPRESSIVE DISORDER IN FULL REMISSION, UNSPECIFIED WHETHER RECURRENT (HCC): Primary | ICD-10-CM

## 2023-01-10 DIAGNOSIS — F42.9 OBSESSIVE-COMPULSIVE DISORDER, UNSPECIFIED TYPE: ICD-10-CM

## 2023-01-10 DIAGNOSIS — F41.1 GENERALIZED ANXIETY DISORDER: ICD-10-CM

## 2023-01-10 NOTE — PSYCH
Psychotherapy Provided: Individual Psychotherapy 50 minutes     Length of time in session: 50 minutes, follow up in 2 week      Encounter Diagnoses   Name Primary? 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 as she shared details re: the stress of her transition  She verbalized ways that she has worked to set boundaries and present herself in an assertive manner in order to do things "differently "   A: Ruth Ann Mai accepted this worker's support and feedback re: ways that she could be triggered by previous work experiences as she begins at a new employer and was encouraged to utilize mindfulness during this transition time  P:  Safia will  continue to be supported in addressing the above as she learns to trust herself   with the support of her Psychiatrist and this worker  Behavioral Health Treatment Plan [facility]: Diagnosis and Treatment Plan explained to Med Corona relates understanding diagnosis and is agreeable to Treatment Plan  Yes     Visit start and stop times:    01/10/23  Start Time: 0800  Stop Time: 0830  Total Visit Time: 30 minutes    Virtual Regular Visit    Verification of patient location:    Patient is located in the following state in which I hold an active license [place]      Assessment/Plan:    Problem List Items Addressed This Visit        Other    OCD (obsessive compulsive disorder)   Other Visit Diagnoses     Major depressive disorder in full remission, unspecified whether recurrent ([facility] )    -  Primary    Generalized anxiety disorder                       Reason for visit is   No chief complaint on file         Encounter provider [facility]    Provider located at [address] [place] [id number] [phone]      Recent Visits  No visits were found meeting these conditions  Showing recent visits within past 7 days and meeting all other requirements  Today's Visits  Date Type Provider Dept   01/10/23 [facility] [place] today's visits and meeting all other requirements  Future Appointments  No visits were found meeting these conditions  Showing future appointments within next 150 days and meeting all other requirements       The patient was identified by name and date of birth  Surendra Christian was informed that this is a telemedicine visit and that the visit is being conducted throughthe AmWell Now platform  She agrees to proceed     My office door was closed  No one else was in the room  She acknowledged consent and understanding of privacy and security of the video platform  The patient has agreed to participate and understands they can discontinue the visit at any time  Patient is aware this is a billable service  Subjective  Safia Stinson is a 40 y o  female          HPI     Past Medical History:   Diagnosis Date   • Abnormal Pap smear of cervix    • Anxiety    • CHF (congestive heart failure) ([facility] )    • Chlamydia    • Coronary artery disease 3/17/19   • Depression    • Diet controlled gestational diabetes mellitus (GDM) in second trimester 2021   • Heart failure ([facility] )     CHF in past   • HPV (human papilloma virus) infection    • Hypertension    • Temporomandibular joint disorder     Resolved 2015    • Varicella        Past Surgical History:   Procedure Laterality Date   • CERVICAL BIOPSY  W/ LOOP ELECTRODE EXCISION     • DENTAL SURGERY      [place] teeth extraction   • [place]  DELIVERY ONLY N/A 2021    Procedure:  SECTION (); Surgeon: Beata Botello MD;  Location: [facility];  Service: Obstetrics   • TONSILLECTOMY  1990       Current Outpatient Medications   Medication Sig Dispense Refill   • gabapentin (Neurontin) 600 MG tablet Take 1 tablet (600 mg total) by mouth daily 30 tablet 1   • NIFEdipine (PROCARDIA XL) 30 mg 24 hr tablet Take 1 tablet (30 mg total) by mouth daily 90 tablet 4   • sertraline (ZOLOFT) 100 mg tablet Take 2 tablets (200 mg total) by mouth daily at bedtime 60 tablet 0     No current facility-administered medications for this visit

## 2023-01-13 ENCOUNTER — TELEPHONE (OUTPATIENT)
Dept: OTHER | Facility: OTHER | Age: 38
End: 2023-01-13

## 2023-01-13 NOTE — TELEPHONE ENCOUNTER
Pt called to cancel their apt for today as she is not feeling well  Please call pt back to reschedule

## 2023-01-31 ENCOUNTER — TELEMEDICINE (OUTPATIENT)
Dept: BEHAVIORAL/MENTAL HEALTH CLINIC | Facility: CLINIC | Age: 38
End: 2023-01-31

## 2023-01-31 DIAGNOSIS — F42.9 OBSESSIVE-COMPULSIVE DISORDER, UNSPECIFIED TYPE: ICD-10-CM

## 2023-01-31 DIAGNOSIS — F32.5 MAJOR DEPRESSIVE DISORDER IN FULL REMISSION, UNSPECIFIED WHETHER RECURRENT (HCC): Primary | ICD-10-CM

## 2023-01-31 DIAGNOSIS — F41.1 GENERALIZED ANXIETY DISORDER: ICD-10-CM

## 2023-01-31 NOTE — PSYCH
Behavioral Health Psychotherapy Progress Note    Psychotherapy Provided: Individual Psychotherapy     Encounter Diagnoses   Name Primary? 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 as she shared details re: the freedom and flexibility that she has been given in this role  She verbalized ways that she has continued to vocalize her expectations while also presenting herself in an assertive manner neetu the practice owners  During this session, this clinician used the following therapeutic modalities: Supportive Psychotherapy    Substance Abuse was not addressed during this session  If the client is diagnosed with a co-occurring substance use disorder, please indicate any changes in the frequency or amount of use: n/a  Stage of change for addressing substance use diagnoses: No substance use/Not applicable    ASSESSMENT:  Madelyn Elmore presents with a Euthymic/ normal mood  Jose Pickard requested this worker's support and feedback re: ways that she could continue with the above without coming across as "cocky "  She  was encouraged to utilize mindfulness during this transition time  her affect is Normal range and intensity, which is congruent, with her mood and the content of the session  The client has made progress on their goals  Madelyn Elmore presents with a minimal risk of suicide, minimal risk of self-harm, and minimal risk of harm to others  For any risk assessment that surpasses a "low" rating, a safety plan must be developed      A safety plan was indicated: no  If yes, describe in detail     PLAN: Between sessions, Madelyn Elmore will read recommended books on the therapy process as she expands her skills  At the next session, the therapist will use Supportive Psychotherapy to address the above in further detail  Behavioral Health Treatment Plan and Discharge Planning: Froy Frausto is aware of and agrees to continue to work on their treatment plan  They have identified and are working toward their discharge goals   yes    Visit start and stop times:    01/31/23  Start Time: 0900  Stop Time: 0950  Total Visit Time: 50 minutes

## 2023-02-08 NOTE — PROGRESS NOTES
Heart Failure Outpatient Consult Note - Joyce Ly 40 y o  female MRN: [id number]    @ Encounter: [id number]      Assessment/Plan:    Patient Active Problem List    Diagnosis Date Noted   • OCD (obsessive compulsive disorder) 04/18/2022   • Postpartum depression associated with first pregnancy 02/24/2022   • History of congestive heart failure 10/13/2021   • BMI 50 0-59 9, adult ([facility] ) 08/10/2021   • Allergic dermatitis 05/14/2020   • Mixed hyperlipidemia 10/12/2019   • Vitamin D deficiency 10/12/2019   • Elevated hemoglobin A1c 10/12/2019   • Essential hypertension 03/17/2019   • History of cervical dysplasia 07/20/2016   • MARIE (generalized anxiety disorder) 07/30/2014   • Moderate episode of recurrent major depressive disorder ([facility] ) 05/28/2013       # Chronic HFpEF, Stage C, NYHA 2  Etiology: possible HTN as /100 mmHg on hospital admit, possible viral myocarditis given presentation to [facility] with sinusitis symptoms; no family hx of SCD or cardiomyopathy, no sign etoh use, given age- ischemic less likely   She is morbidly obese and will need screening for SUZAN  Her uncontrolled BP and diet alone with obesity could have contributed to her volume overload and mostly HFpEF  CRP 37 on admit, sed rate 51  MERRICK and RF normal    Weight: 308 lbs today- has been down to 290 lbs  NT pro BNP: 10/9/19: 229  6/20/19: 493   EKG - SR with PACs    Studies- personally reviewed by me  Echo 6/22/21  LVEF: 65%  RV: normal    CMR 6/3/19: EF: 58%  Normal RV function    Echocardiogram 3/18/19  LVEF: 50-55%  LVIDd: 5 8 cm  RV: mildly enlarged  MR:  PASP: 38 mmHg    # HTN- was 190/100 mmHg on admit 3/17, better controlled now  Rx: nifedipine 30 mg daily    # morbid obesity- weight 358 lbs on admit 3/17; 332 lbs on follow up 4/3  Weight today 308 lbs  Doing [facility] weight management    # tobacco - 1 ppd- quit in March  # Sleep eval  Home study- mild SUZAN, lowest oxygen sat 79%  Polysomnogram 8/20/19- no significant apnea, lowest oxygen 88%    # generalized anxiety  # post partum depression- on Zoloft    # pre diabetic, HgA1c 6 on 10/12/22    Today's Plan: Tolerating nifedipine for HTN  No need for diuretic, can take prn  Wt loss strategies  --2g sodium diet  Weights daily    HPI:      39 yo female who had presented to PCP 3/11 with sinusitis symptoms and started on Augmentin  On 3/17 presented to ED with exertional dyspnea noticed on getting dressed and climbing stairs  She also reported LE edema but no PND or orthopnea but noticed abdominal bloating and weight gain for about a month  Smokes 1 ppd and only has about 2 drinks per week and no drug use  No family hx of SCD or cardiomyopathy  Her edema is what made her go to the hospital    CTA showed small pericardial effusion, mild pulmonary edema  TTE showed EF around 50%, LVEDd 5 8 cm, PASP 38 mmHg  She was diuresed and started on HFrEF specific heart failure therapy  She is enrolled in weight watchers and part of weight management nutritional assessment  Interim:  How are the twins doing? Weight down to 308 lbs  No longer works at [employer] of raising twins  Review of Systems   Constitutional: Negative for activity change, appetite change, fatigue and unexpected weight change  HENT: Negative for congestion and nosebleeds  Eyes: Negative  Respiratory: Negative for cough, chest tightness and shortness of breath  Cardiovascular: Negative for chest pain, palpitations and leg swelling  Gastrointestinal: Negative for abdominal distention  Endocrine: Negative  Genitourinary: Negative  Musculoskeletal: Negative  Skin: Negative  Neurological: Negative for dizziness, syncope and weakness  Hematological: Negative  Psychiatric/Behavioral: Negative        Past Medical History:   Diagnosis Date   • Abnormal Pap smear of cervix    • Anxiety    • CHF (congestive heart failure) ([facility] ) 2019   • Chlamydia    • Coronary artery disease 3/17/19   • Depression    • Diet controlled gestational diabetes mellitus (GDM) in second trimester 6/9/2021   • Heart failure ([facility] )     CHF in past   • HPV (human papilloma virus) infection 2008   • Hypertension    • Temporomandibular joint disorder     Resolved 7/7/2015    • Varicella      No Known Allergies    Current Outpatient Medications:   •  gabapentin (Neurontin) 600 MG tablet, Take 1 tablet (600 mg total) by mouth daily, Disp: 30 tablet, Rfl: 1  •  NIFEdipine (PROCARDIA XL) 30 mg 24 hr tablet, Take 1 tablet (30 mg total) by mouth daily, Disp: 90 tablet, Rfl: 4  •  sertraline (ZOLOFT) 100 mg tablet, Take 2 tablets (200 mg total) by mouth daily at bedtime, Disp: 60 tablet, Rfl: 0    Social History     Socioeconomic History   • Marital status: Single     Spouse name: Not on file   • Number of children: Not on file   • Years of education: Not on file   • Highest education level: Not on file   Occupational History   • Not on file   Tobacco Use   • Smoking status: Former     Packs/day: 1 00     Years: 15 00     Pack years: 15 00     Types: Cigarettes     Quit date: 3/17/2019     Years since quitting: 3 9   • Smokeless tobacco: Never   Vaping Use   • Vaping Use: Never used   Substance and Sexual Activity   • Alcohol use:  Yes     Alcohol/week: 2 0 standard drinks     Types: 2 Glasses of wine per week     Comment: wine 2 glasses a week   • Drug use: No   • Sexual activity: Not Currently     Partners: Male     Birth control/protection: OCP   Other Topics Concern   • Not on file   Social History Narrative    Current every day smoker - As per [employer]    Daily caffeinated coffee consumption      Social Determinants of Health     Financial Resource Strain: Not on file   Food Insecurity: Not on file   Transportation Needs: Not on file   Physical Activity: Not on file   Stress: Not on file   Social Connections: Not on file   Intimate Partner Violence: Not on file   Housing Stability: Not on file       Family History   Problem Relation Age of Onset   • Diabetes Mother    • Hypertension Father    • Obesity Father    • OCD Father    • Skin cancer Father    • Colon cancer Maternal Grandmother         diagnosed in 59'2   • Diabetes Paternal Grandmother    • Stroke Paternal Grandmother         TIA   • Heart failure Paternal Grandmother    • Diabetes Paternal Grandfather    • Heart disease Paternal Grandfather    • Heart attack Paternal Grandfather    • Alcohol abuse Half-Brother    • Depression Maternal Grandfather    • Anxiety disorder Maternal Grandfather    • Hypothyroidism Paternal Aunt    • Breast cancer Other    • Substance Abuse Neg Hx    • Thyroid cancer Neg Hx    • Ovarian cancer Neg Hx        Physical Exam:    Vitals: Blood pressure 122/80, pulse 95, height 5' 7" (1 702 m), weight (!) 140 kg (308 lb), SpO2 97 %, not currently breastfeeding , Body mass index is 48 24 kg/m² ,   Wt Readings from Last 3 Encounters:   02/10/23 (!) 140 kg (308 lb)   06/10/22 (!) 146 kg (320 lb 12 8 oz)   05/24/22 (!) 146 kg (321 lb 3 2 oz)       Physical Exam:    Physical Exam  Constitutional:       Appearance: She is well-developed  She is not diaphoretic  HENT:      Head: Normocephalic and atraumatic  Eyes:      Pupils: Pupils are equal, round, and reactive to light  Neck:      Vascular: No JVD  Cardiovascular:      Rate and Rhythm: Normal rate and regular rhythm  Heart sounds: Normal heart sounds  No murmur heard  Pulmonary:      Effort: Pulmonary effort is normal       Breath sounds: Normal breath sounds  No rales  Abdominal:      General: Bowel sounds are normal  There is no distension  Palpations: Abdomen is soft  Musculoskeletal:         General: Normal range of motion  Cervical back: Normal range of motion  Right lower leg: Edema present  Left lower leg: Edema present  Skin:     General: Skin is warm and dry  Neurological:      Mental Status: She is alert and oriented to person, place, and time         Labs & Results:    Lab Results   Component Value Date    WBC 12 50 (H) 10/12/2022    HGB 12 8 10/12/2022    HCT 41 5 10/12/2022    MCV 80 (L) 10/12/2022     (H) 10/12/2022     Lab Results   Component Value Date    CALCIUM 8 9 03/03/2022    SODIUM 137 03/03/2022    K 3 9 03/03/2022    CO2 25 03/03/2022     03/03/2022    BUN 16 03/03/2022    CREATININE 0 62 03/03/2022     Lab Results   Component Value Date    NTBNP 229 10/09/2019      No results found for: CHOL  Lab Results   Component Value Date    HDL 56 05/27/2021    HDL 44 04/29/2019    HDL 63 (H) 01/24/2017     Lab Results   Component Value Date    LDLCALC 73 05/27/2021    LDLCALC 111 04/29/2019    LDLCALC 87 01/24/2017     Lab Results   Component Value Date    TRIG 125 05/27/2021    TRIG 128 04/29/2019    TRIG 111 01/24/2017     No results found for: CHOLHDL    EKG personally reviewed by Evangelina Royal  Counseling / Coordination of Care  Time spent today 25 minutes  Greater than 50% of total time was spent with the patient and / or family counseling and / or coordination of care  We discussed diagnoses, most recent studies, tests and any changes in treatment plan    Thank you for the opportunity to participate in the care of this patient        [address] [place] [address] PULMONARY HYPERTENSION  MEDICAL DIRECTOR OF [place]

## 2023-02-10 ENCOUNTER — OFFICE VISIT (OUTPATIENT)
Dept: CARDIOLOGY CLINIC | Facility: CLINIC | Age: 38
End: 2023-02-10

## 2023-02-10 VITALS
WEIGHT: 293 LBS | HEIGHT: 67 IN | OXYGEN SATURATION: 97 % | BODY MASS INDEX: 45.99 KG/M2 | SYSTOLIC BLOOD PRESSURE: 122 MMHG | DIASTOLIC BLOOD PRESSURE: 80 MMHG | HEART RATE: 95 BPM

## 2023-02-10 DIAGNOSIS — E78.2 MIXED HYPERLIPIDEMIA: Primary | ICD-10-CM

## 2023-02-10 DIAGNOSIS — I50.32 CHRONIC DIASTOLIC CHF (CONGESTIVE HEART FAILURE), NYHA CLASS 2 (HCC): ICD-10-CM

## 2023-02-10 DIAGNOSIS — I10 HTN (HYPERTENSION), BENIGN: ICD-10-CM

## 2023-02-20 ENCOUNTER — TELEPHONE (OUTPATIENT)
Dept: PSYCHIATRY | Facility: CLINIC | Age: 38
End: 2023-02-20

## 2023-02-24 ENCOUNTER — OFFICE VISIT (OUTPATIENT)
Dept: PSYCHIATRY | Facility: CLINIC | Age: 38
End: 2023-02-24

## 2023-02-24 DIAGNOSIS — G25.81 RESTLESS LEG SYNDROME: ICD-10-CM

## 2023-02-24 DIAGNOSIS — F33.0 MILD EPISODE OF RECURRENT MAJOR DEPRESSIVE DISORDER (HCC): ICD-10-CM

## 2023-02-24 DIAGNOSIS — F41.1 GAD (GENERALIZED ANXIETY DISORDER): ICD-10-CM

## 2023-02-24 DIAGNOSIS — F42.2 MIXED OBSESSIONAL THOUGHTS AND ACTS: Primary | ICD-10-CM

## 2023-02-24 RX ORDER — GABAPENTIN 600 MG/1
600 TABLET ORAL DAILY
Qty: 30 TABLET | Refills: 1 | Status: SHIPPED | OUTPATIENT
Start: 2023-02-24 | End: 2023-03-26

## 2023-02-24 RX ORDER — FLUVOXAMINE MALEATE 50 MG/1
TABLET, COATED ORAL
Qty: 90 TABLET | Refills: 0 | Status: SHIPPED | OUTPATIENT
Start: 2023-02-24 | End: 2023-04-02
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
## 2023-02-28 ENCOUNTER — TELEMEDICINE (OUTPATIENT)
Dept: BEHAVIORAL/MENTAL HEALTH CLINIC | Facility: CLINIC | Age: 38
End: 2023-02-28

## 2023-02-28 DIAGNOSIS — F42.2 MIXED OBSESSIONAL THOUGHTS AND ACTS: ICD-10-CM

## 2023-02-28 DIAGNOSIS — F42.9 OBSESSIVE-COMPULSIVE DISORDER, UNSPECIFIED TYPE: ICD-10-CM

## 2023-02-28 DIAGNOSIS — F41.1 GENERALIZED ANXIETY DISORDER: ICD-10-CM

## 2023-02-28 DIAGNOSIS — F32.5 MAJOR DEPRESSIVE DISORDER IN FULL REMISSION, UNSPECIFIED WHETHER RECURRENT (HCC): Primary | ICD-10-CM

## 2023-02-28 NOTE — PSYCH
Behavioral Health Psychotherapy Progress Note    Psychotherapy Provided: Individual Psychotherapy     Encounter Diagnoses   Name Primary? 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, hosted her mom while she visited  She shared, "its not like me to not be in the leader role," and engaged in a dialogue re: control  Oscar Floyd expressed an interest in "defining her shadow" while also "not wanting to hyperfixate on therapy "    During this session, this clinician used the following therapeutic modalities: Supportive Psychotherapy    Substance Abuse was not addressed during this session  If the client is diagnosed with a co-occurring substance use disorder, please indicate any changes in the frequency or amount of use: n/a  Stage of change for addressing substance use diagnoses: No substance use/Not applicable    ASSESSMENT:  Radha Borrego presents with a Euthymic/ normal mood  Oscar Floyd also shared that she "will never feel successful unless she reaches a lower weight "  However, she also recognizes this to be an illogical measure of success  She shared the desire to relax as she does not appear to be able to "just be "     her affect is Normal range and intensity, which is congruent, with her mood and the content of the session  The client has made progress on their goals  Radha Borrego presents with a minimal risk of suicide, minimal risk of self-harm, and minimal risk of harm to others  For any risk assessment that surpasses a "low" rating, a safety plan must be developed      A safety plan was indicated: no  If yes, describe in detail     PLAN: Between sessions, Carroll Herrera will read articles supplied by this writer  At the next session, the therapist will use Supportive Psychotherapy to address the above in further detail  Behavioral Health Treatment Plan and Discharge Planning: Carroll Herrera is aware of and agrees to continue to work on their treatment plan  They have identified and are working toward their discharge goals  yes    Visit start and stop times:    02/28/23  Start Time: 0900  Stop Time: 0950  Total Visit Time: 50 minutes    Virtual Regular Visit    Verification of patient location:    Patient is located in the following state in which I hold an active license [place]      Assessment/Plan:    Problem List Items Addressed This Visit        Other    OCD (obsessive compulsive disorder)   Other Visit Diagnoses     Major depressive disorder in full remission, unspecified whether recurrent ([facility] [address] [address] )    -  Primary    Generalized anxiety disorder              Reason for visit is   Chief Complaint   Patient presents with   • Virtual Regular Visit        Encounter provider [facility]    Provider located at [address] [place] [id number] [phone]      Recent Visits  No visits were found meeting these conditions  Showing recent visits within past 7 days and meeting all other requirements  Today's Visits  Date Type Provider Dept   02/28/23 [facility] [place] today's visits and meeting all other requirements  Future Appointments  No visits were found meeting these conditions  Showing future appointments within next 150 days and meeting all other requirements       The patient was identified by name and date of birth   Carroll Herrera was informed that this is a telemedicine visit and that the visit is being conducted throughthe [employer] platform  She agrees to proceed     My office door was closed  No one else was in the room  She acknowledged consent and understanding of privacy and security of the video platform  The patient has agreed to participate and understands they can discontinue the visit at any time  Patient is aware this is a billable service  Subjective  Safia Duke is a 40 y o  female    HPI     Past Medical History:   Diagnosis Date   • Abnormal Pap smear of cervix    • Anxiety    • CHF (congestive heart failure) ([facility] [address] )    • Chlamydia    • Coronary artery disease 3/17/19   • Depression    • Diet controlled gestational diabetes mellitus (GDM) in second trimester 2021   • Heart failure ([facility] [address] )     CHF in past   • HPV (human papilloma virus) infection    • Hypertension    • Temporomandibular joint disorder     Resolved 2015    • Varicella        Past Surgical History:   Procedure Laterality Date   • CERVICAL BIOPSY  W/ LOOP ELECTRODE EXCISION     • DENTAL SURGERY      [place] teeth extraction   • [place]  DELIVERY ONLY N/A 2021    Procedure:  SECTION (); Surgeon: Saint Guillaume, MD;  Location: [facility];  Service: Obstetrics   • TONSILLECTOMY         Current Outpatient Medications   Medication Sig Dispense Refill   • fluvoxaMINE (LUVOX) 50 mg tablet Take 1 tablet (50 mg total) by mouth daily at bedtime for 7 days, THEN 2 tablets (100 mg total) daily at bedtime  90 tablet 0   • gabapentin (Neurontin) 600 MG tablet Take 1 tablet (600 mg total) by mouth daily 30 tablet 1   • NIFEdipine (PROCARDIA XL) 30 mg 24 hr tablet Take 1 tablet (30 mg total) by mouth daily 90 tablet 4     No current facility-administered medications for this visit

## 2023-03-14 ENCOUNTER — TELEMEDICINE (OUTPATIENT)
Dept: BEHAVIORAL/MENTAL HEALTH CLINIC | Facility: CLINIC | Age: 38
End: 2023-03-14

## 2023-03-14 DIAGNOSIS — F42.9 OBSESSIVE-COMPULSIVE DISORDER, UNSPECIFIED TYPE: ICD-10-CM

## 2023-03-14 DIAGNOSIS — F32.5 MAJOR DEPRESSIVE DISORDER IN FULL REMISSION, UNSPECIFIED WHETHER RECURRENT (HCC): Primary | ICD-10-CM

## 2023-03-14 DIAGNOSIS — F41.1 GENERALIZED ANXIETY DISORDER: ICD-10-CM

## 2023-03-14 NOTE — PSYCH
Behavioral Health Psychotherapy Progress Note    Psychotherapy Provided: Individual Psychotherapy     Encounter Diagnoses   Name Primary? • Major depressive disorder in full remission, unspecified whether recurrent ([address] ) Yes   • Obsessive-compulsive disorder, unspecified type    • Generalized anxiety disorder      Goals addressed in session: Goal 1     DATA: Safia spoke with this worker today about her  feelings of frustration with her new employer and ways that her job expectations are different than were explained to her during the hiring process  She has also begun to learn about her "shadow self," and  Ways that this impacts her functioning  During this session, this clinician used the following therapeutic modalities: Supportive Psychotherapy    Substance Abuse was not addressed during this session  If the client is diagnosed with a co-occurring substance use disorder, please indicate any changes in the frequency or amount of use: n/a  Stage of change for addressing substance use diagnoses: No substance use/Not applicable    ASSESSMENT:  Joselin Hamm presents with a Euthymic/ normal mood  Aishwarya Mcdaniel did not discuss weight issues or concerns today  Instead, she focused on work related issues  her affect is Normal range and intensity, which is congruent, with her mood and the content of the session  The client has made progress on their goals  Joselin Hamm presents with a minimal risk of suicide, minimal risk of self-harm, and minimal risk of harm to others  For any risk assessment that surpasses a "low" rating, a safety plan must be developed  A safety plan was indicated: no  If yes, describe in detail     PLAN: Between sessions, Joselin Hamm will read additional articles supplied by this writer  At the next session, the therapist will use Supportive Psychotherapy to address the above in further detail      Behavioral Health Treatment Plan and Discharge Planning: Suad Betancourt is aware of and agrees to continue to work on their treatment plan  They have identified and are working toward their discharge goals  yes    Visit start and stop times:    03/15/23  Start Time: 1100  Stop Time: 1150  Total Visit Time: 50 minutes    Virtual Regular Visit    Verification of patient location:    Patient is located in the following state in which I hold an active license [place]      Assessment/Plan:    Problem List Items Addressed This Visit        Other    OCD (obsessive compulsive disorder)   Other Visit Diagnoses     Major depressive disorder in full remission, unspecified whether recurrent ([address] )    -  Primary    Generalized anxiety disorder              Reason for visit is   No chief complaint on file  Encounter provider [facility]    Provider located at [address] [place] [id number] [phone]      Recent Visits  Date Type Provider Dept   03/14/23 [address]   Showing recent visits within past 7 days and meeting all other requirements  Future Appointments  No visits were found meeting these conditions  Showing future appointments within next 150 days and meeting all other requirements       The patient was identified by name and date of birth  Suad Betancourt was informed that this is a telemedicine visit and that the visit is being conducted throughthe [employer] platform  She agrees to proceed     My office door was closed  No one else was in the room  She acknowledged consent and understanding of privacy and security of the video platform  The patient has agreed to participate and understands they can discontinue the visit at any time  Patient is aware this is a billable service  Subjective  Safia Keys is a 40 y o  female          HPI     Past Medical History: Diagnosis Date   • Abnormal Pap smear of cervix    • Anxiety    • CHF (congestive heart failure) ([facility] [address] ) 2019   • Chlamydia    • Coronary artery disease 3/17/19   • Depression    • Diet controlled gestational diabetes mellitus (GDM) in second trimester 2021   • Heart failure ([address] )     CHF in past   • HPV (human papilloma virus) infection    • Hypertension    • Temporomandibular joint disorder     Resolved 2015    • Varicella        Past Surgical History:   Procedure Laterality Date   • CERVICAL BIOPSY  W/ LOOP ELECTRODE EXCISION     • DENTAL SURGERY      [place] teeth extraction   • [place]  DELIVERY ONLY N/A 2021    Procedure:  SECTION (); Surgeon: France Moyer MD;  Location: [facility];  Service: Obstetrics   • TONSILLECTOMY         Current Outpatient Medications   Medication Sig Dispense Refill   • fluvoxaMINE (LUVOX) 50 mg tablet Take 1 tablet (50 mg total) by mouth daily at bedtime for 7 days, THEN 2 tablets (100 mg total) daily at bedtime  90 tablet 0   • gabapentin (Neurontin) 600 MG tablet Take 1 tablet (600 mg total) by mouth daily 30 tablet 1   • NIFEdipine (PROCARDIA XL) 30 mg 24 hr tablet Take 1 tablet (30 mg total) by mouth daily 90 tablet 4     No current facility-administered medications for this visit
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
## 2023-03-29 ENCOUNTER — TELEMEDICINE (OUTPATIENT)
Dept: PSYCHIATRY | Facility: CLINIC | Age: 38
End: 2023-03-29

## 2023-03-29 ENCOUNTER — TELEPHONE (OUTPATIENT)
Dept: PSYCHIATRY | Facility: CLINIC | Age: 38
End: 2023-03-29

## 2023-03-29 DIAGNOSIS — G25.81 RESTLESS LEG SYNDROME: ICD-10-CM

## 2023-03-29 DIAGNOSIS — F41.1 GAD (GENERALIZED ANXIETY DISORDER): ICD-10-CM

## 2023-03-29 DIAGNOSIS — F33.0 MILD EPISODE OF RECURRENT MAJOR DEPRESSIVE DISORDER (HCC): ICD-10-CM

## 2023-03-29 DIAGNOSIS — F42.2 MIXED OBSESSIONAL THOUGHTS AND ACTS: Primary | ICD-10-CM

## 2023-03-29 RX ORDER — GABAPENTIN 600 MG/1
600 TABLET ORAL DAILY
Qty: 30 TABLET | Refills: 2 | Status: SHIPPED | OUTPATIENT
Start: 2023-03-29 | End: 2023-04-28

## 2023-03-29 RX ORDER — FLUVOXAMINE MALEATE 100 MG
100 TABLET ORAL
Qty: 30 TABLET | Refills: 2 | Status: SHIPPED | OUTPATIENT
Start: 2023-03-29

## 2023-03-29 NOTE — TELEPHONE ENCOUNTER
DELIO BORJAS 3/28/23  Pt needs a 3 month f/u  Previous appt was virtual  Please try to schedule on a Thursday or Friday if possible

## 2023-04-06 ENCOUNTER — TELEMEDICINE (OUTPATIENT)
Dept: BEHAVIORAL/MENTAL HEALTH CLINIC | Facility: CLINIC | Age: 38
End: 2023-04-06

## 2023-04-06 ENCOUNTER — DOCUMENTATION (OUTPATIENT)
Dept: BEHAVIORAL/MENTAL HEALTH CLINIC | Facility: CLINIC | Age: 38
End: 2023-04-06

## 2023-04-06 DIAGNOSIS — F32.5 MAJOR DEPRESSIVE DISORDER IN FULL REMISSION, UNSPECIFIED WHETHER RECURRENT (HCC): Primary | ICD-10-CM

## 2023-04-06 DIAGNOSIS — F41.1 GENERALIZED ANXIETY DISORDER: ICD-10-CM

## 2023-04-06 DIAGNOSIS — F42.9 OBSESSIVE-COMPULSIVE DISORDER, UNSPECIFIED TYPE: ICD-10-CM

## 2023-04-06 NOTE — PSYCH
"Behavioral Health Psychotherapy Progress Note    Psychotherapy Provided: Individual Psychotherapy     Encounter Diagnoses   Name Primary? • Major depressive disorder in full remission, unspecified whether recurrent ([facility] [address] ) Yes   • Obsessive-compulsive disorder, unspecified type    • Generalized anxiety disorder      Goals addressed in session: Goal 1     DATA: Reed Cárdenas this worker today about her  feelings of self doubt, burn out, and overwhelm after \"less than 90 days\" at her new job  Reasons for all of this were processed in detail today  During this session, this clinician used the following therapeutic modalities: Supportive Psychotherapy    Substance Abuse was not addressed during this session  If the client is diagnosed with a co-occurring substance use disorder, please indicate any changes in the frequency or amount of use: n/a  Stage of change for addressing substance use diagnoses: No substance use/Not applicable    ASSESSMENT:  Lee Russell presents with a Anxious and Dysthymic mood  Pepe Mcmullen was very open and insightful as she shared re: the above  She was able to acknowledge ways that she is experiencing a trauma response and was discouraged from allowing her feelings to press her to take action  her affect is Tearful, which is congruent, with her mood and the content of the session  The client has made progress on their goals  Lee Russell presents with a minimal risk of suicide, minimal risk of self-harm, and minimal risk of harm to others  For any risk assessment that surpasses a \"low\" rating, a safety plan must be developed  A safety plan was indicated: no  If yes, describe in detail     PLAN: Between sessions, Lee Russell will allow herself to experience distress  At the next session, the therapist will use Supportive Psychotherapy to address the above in further detail      Behavioral Health Treatment Plan and Discharge Planning: Pepe Mcmullen " Cheyanne Lopez is aware of and agrees to continue to work on their treatment plan  They have identified and are working toward their discharge goals  yes    Visit start and stop times:    04/06/23  Start Time: 0800  Stop Time: 0850  Total Visit Time: 50 minutes    Virtual Regular Visit    Verification of patient location:    Patient is located in the following state in which I hold an active license [place]      Assessment/Plan:    Problem List Items Addressed This Visit        Other    OCD (obsessive compulsive disorder)   Other Visit Diagnoses     Major depressive disorder in full remission, unspecified whether recurrent ([facility] [address] )    -  Primary    Generalized anxiety disorder              Reason for visit is   No chief complaint on file  Encounter provider [facility]    Provider located at [address] [place], [place] [id number] [facility] [address] [phone]      Recent Visits  No visits were found meeting these conditions  Showing recent visits within past 7 days and meeting all other requirements  Today's Visits  Date Type Provider Dept   04/06/23 601 [facility] 6 [place] today's visits and meeting all other requirements  Future Appointments  No visits were found meeting these conditions  Showing future appointments within next 150 days and meeting all other requirements       The patient was identified by name and date of birth  Florencio Crouch was informed that this is a telemedicine visit and that the visit is being conducted throughthe [employer] platform  She agrees to proceed     My office door was closed  No one else was in the room  She acknowledged consent and understanding of privacy and security of the video platform  The patient has agreed to participate and understands they can discontinue the visit at any time      Patient is aware this is a billable service  Subjective  Safia Roca is a 40 y o  female    HPI     Past Medical History:   Diagnosis Date   • Abnormal Pap smear of cervix    • Anxiety    • CHF (congestive heart failure) ([facility] [address] ) 2019   • Chlamydia    • Coronary artery disease 3/17/19   • Depression    • Diet controlled gestational diabetes mellitus (GDM) in second trimester 2021   • Heart failure ([facility] [address] )     CHF in past   • HPV (human papilloma virus) infection    • Hypertension    • Temporomandibular joint disorder     Resolved 2015    • Varicella        Past Surgical History:   Procedure Laterality Date   • CERVICAL BIOPSY  W/ LOOP ELECTRODE EXCISION     • DENTAL SURGERY      [place] teeth extraction   • [place]  DELIVERY ONLY N/A 2021    Procedure:  SECTION (); Surgeon: Leela Griffin MD;  Location: [facility];  Service: Obstetrics   • TONSILLECTOMY         Current Outpatient Medications   Medication Sig Dispense Refill   • fluvoxaMINE (LUVOX) 100 mg tablet Take 1 tablet (100 mg total) by mouth daily at bedtime 30 tablet 2   • gabapentin (Neurontin) 600 MG tablet Take 1 tablet (600 mg total) by mouth daily 30 tablet 2   • NIFEdipine (PROCARDIA XL) 30 mg 24 hr tablet Take 1 tablet (30 mg total) by mouth daily 90 tablet 4     No current facility-administered medications for this visit

## 2023-04-27 ENCOUNTER — TELEMEDICINE (OUTPATIENT)
Dept: BEHAVIORAL/MENTAL HEALTH CLINIC | Facility: CLINIC | Age: 38
End: 2023-04-27

## 2023-04-27 DIAGNOSIS — F42.9 OBSESSIVE-COMPULSIVE DISORDER, UNSPECIFIED TYPE: ICD-10-CM

## 2023-04-27 DIAGNOSIS — F32.5 MAJOR DEPRESSIVE DISORDER IN FULL REMISSION, UNSPECIFIED WHETHER RECURRENT (HCC): Primary | ICD-10-CM

## 2023-04-27 DIAGNOSIS — F41.1 GENERALIZED ANXIETY DISORDER: ICD-10-CM

## 2023-04-27 NOTE — PSYCH
"Behavioral Health Psychotherapy Progress Note    Psychotherapy Provided: Individual Psychotherapy     Encounter Diagnoses   Name Primary? 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  She vocalized that ti was initially very stressful, but ended up increasing her confidence  During this session, this clinician used the following therapeutic modalities: Supportive Psychotherapy    Substance Abuse was not addressed during this session  If the client is diagnosed with a co-occurring substance use disorder, please indicate any changes in the frequency or amount of use: n/a  Stage of change for addressing substance use diagnoses: No substance use/Not applicable    ASSESSMENT:  Leslie Rosario presents with a Euthymic/ normal mood  Junito Shanks was very open and insightful as she shared insight about her weight being \"just a symptom\" of her struggles  She remains uncertain of whether she will remain at her current job, but no longer is unsettled by this possibility  her affect is Normal range and intensity, which is congruent, with her mood and the content of the session  The client has made progress on their goals  Leslie Rosario presents with a minimal risk of suicide, minimal risk of self-harm, and minimal risk of harm to others  For any risk assessment that surpasses a \"low\" rating, a safety plan must be developed  A safety plan was indicated: no  If yes, describe in detail     PLAN: Between sessions, Leslie Rosario will consider alternative options for employment      At the next session, the therapist will use Supportive Psychotherapy to address the above in " further detail  Behavioral Health Treatment Plan and Discharge Planning: Jonelle Melo is aware of and agrees to continue to work on their treatment plan  They have identified and are working toward their discharge goals  yes    Visit start and stop times:    04/27/23  Start Time: 0900  Stop Time: 0950  Total Visit Time: 50 minutes    Virtual Regular Visit    Verification of patient location:    Patient is located in the following state in which I hold an active license [place]      Assessment/Plan:    Problem List Items Addressed This Visit        Other    OCD (obsessive compulsive disorder)   Other Visit Diagnoses     Major depressive disorder in full remission, unspecified whether recurrent ([facility] [address] )    -  Primary    Generalized anxiety disorder              Reason for visit is   No chief complaint on file  Encounter provider [facility]    Provider located at [address] [place] [address] [phone]      Recent Visits  No visits were found meeting these conditions  Showing recent visits within past 7 days and meeting all other requirements  Today's Visits  Date Type Provider Dept   04/27/23 601 [facility] 6 [place] today's visits and meeting all other requirements  Future Appointments  No visits were found meeting these conditions  Showing future appointments within next 150 days and meeting all other requirements       The patient was identified by name and date of birth  Jonelle Melo was informed that this is a telemedicine visit and that the visit is being conducted throughthe 7write platform  She agrees to proceed     My office door was closed  No one else was in the room  She acknowledged consent and understanding of privacy and security of the video platform   The patient has agreed to participate and understands they can discontinue the visit at any time  Patient is aware this is a billable service  Subjective  Safia Flores is a 40 y o  female    HPI     Past Medical History:   Diagnosis Date   • Abnormal Pap smear of cervix    • Anxiety    • CHF (congestive heart failure) ([facility] ) 2019   • Chlamydia    • Coronary artery disease 3/17/19   • Depression    • Diet controlled gestational diabetes mellitus (GDM) in second trimester 2021   • Heart failure ([place] [address] )     CHF in past   • HPV (human papilloma virus) infection    • Hypertension    • Temporomandibular joint disorder     Resolved 2015    • Varicella        Past Surgical History:   Procedure Laterality Date   • CERVICAL BIOPSY  W/ LOOP ELECTRODE EXCISION     • DENTAL SURGERY      [place] teeth extraction   • [place]  DELIVERY ONLY N/A 2021    Procedure:  SECTION (); Surgeon: Kathie De Souza MD;  Location: [facility];  Service: Obstetrics   • TONSILLECTOMY         Current Outpatient Medications   Medication Sig Dispense Refill   • fluvoxaMINE (LUVOX) 100 mg tablet Take 1 tablet (100 mg total) by mouth daily at bedtime 30 tablet 2   • gabapentin (Neurontin) 600 MG tablet Take 1 tablet (600 mg total) by mouth daily 30 tablet 2   • NIFEdipine (PROCARDIA XL) 30 mg 24 hr tablet Take 1 tablet (30 mg total) by mouth daily 90 tablet 0     No current facility-administered medications for this visit                      Virtual Regular Visit    Verification of patient location:    Patient is located at Home in the following state in which I hold an active license [place]      Assessment/Plan:    Problem List Items Addressed This Visit        Other    OCD (obsessive compulsive disorder)   Other Visit Diagnoses     Major depressive disorder in full remission, unspecified whether recurrent ([place] [address] )    -  Primary    Generalized anxiety disorder              Goals addressed in session: Goal 1          Reason for visit is   No chief complaint on file  Encounter provider [facility]    Provider located at [address] [place] [address] Dakota Summers  [facility] [address] [phone]      Recent Visits  No visits were found meeting these conditions  Showing recent visits within past 7 days and meeting all other requirements  Today's Visits  Date Type Provider Dept   [address] today's visits and meeting all other requirements  Future Appointments  No visits were found meeting these conditions  Showing future appointments within next 150 days and meeting all other requirements       The patient was identified by name and date of birth  Paulina Dumont was informed that this is a telemedicine visit and that the visit is being conducted throughthe [employer] platform  She agrees to proceed     My office door was closed  No one else was in the room  She acknowledged consent and understanding of privacy and security of the video platform  The patient has agreed to participate and understands they can discontinue the visit at any time  Patient is aware this is a billable service  Subjective  Safia Medina is a 40 y o  female          HPI     Past Medical History:   Diagnosis Date   • Abnormal Pap smear of cervix    • Anxiety    • CHF (congestive heart failure) ([address] )    • Chlamydia    • Coronary artery disease 3/17/19   • Depression    • Diet controlled gestational diabetes mellitus (GDM) in second trimester 2021   • Heart failure ([address] )     CHF in past   • HPV (human papilloma virus) infection    • Hypertension    • Temporomandibular joint disorder     Resolved 2015    • Varicella        Past Surgical History:   Procedure Laterality Date   • CERVICAL BIOPSY  W/ LOOP ELECTRODE EXCISION     • DENTAL SURGERY      [place] teeth extraction   • [place]  DELIVERY ONLY N/A 2021    Procedure:  SECTION (); Surgeon: Evan Augdelo MD;  Location: [facility];  Service: Obstetrics   • TONSILLECTOMY         Current Outpatient Medications   Medication Sig Dispense Refill   • fluvoxaMINE (LUVOX) 100 mg tablet Take 1 tablet (100 mg total) by mouth daily at bedtime 30 tablet 2   • gabapentin (Neurontin) 600 MG tablet Take 1 tablet (600 mg total) by mouth daily 30 tablet 2   • NIFEdipine (PROCARDIA XL) 30 mg 24 hr tablet Take 1 tablet (30 mg total) by mouth daily 90 tablet 0     No current facility-administered medications for this visit          No Known Allergies

## 2023-06-09 ENCOUNTER — TELEPHONE (OUTPATIENT)
Dept: PSYCHIATRY | Facility: CLINIC | Age: 38
End: 2023-06-09

## 2023-06-09 DIAGNOSIS — F41.1 GAD (GENERALIZED ANXIETY DISORDER): ICD-10-CM

## 2023-06-09 DIAGNOSIS — F42.2 MIXED OBSESSIONAL THOUGHTS AND ACTS: ICD-10-CM

## 2023-06-09 DIAGNOSIS — F33.0 MILD EPISODE OF RECURRENT MAJOR DEPRESSIVE DISORDER (HCC): ICD-10-CM

## 2023-06-09 RX ORDER — FLUVOXAMINE MALEATE 100 MG
100 TABLET ORAL
Qty: 30 TABLET | Refills: 2 | Status: SHIPPED | OUTPATIENT
Start: 2023-06-09

## 2023-06-19 ENCOUNTER — TELEMEDICINE (OUTPATIENT)
Dept: BEHAVIORAL/MENTAL HEALTH CLINIC | Facility: CLINIC | Age: 38
End: 2023-06-19
Payer: COMMERCIAL

## 2023-06-19 DIAGNOSIS — F32.5 MAJOR DEPRESSIVE DISORDER IN FULL REMISSION, UNSPECIFIED WHETHER RECURRENT (HCC): Primary | ICD-10-CM

## 2023-06-19 DIAGNOSIS — F42.9 OBSESSIVE-COMPULSIVE DISORDER, UNSPECIFIED TYPE: ICD-10-CM

## 2023-06-19 DIAGNOSIS — F42.2 MIXED OBSESSIONAL THOUGHTS AND ACTS: ICD-10-CM

## 2023-06-19 DIAGNOSIS — F41.1 GENERALIZED ANXIETY DISORDER: ICD-10-CM

## 2023-06-19 PROCEDURE — 90834 PSYTX W PT 45 MINUTES: CPT | Performed by: SOCIAL WORKER

## 2023-06-19 NOTE — PSYCH
"Behavioral Health Psychotherapy Progress Note    Psychotherapy Provided: Individual Psychotherapy     Encounter Diagnoses   Name Primary? • Major depressive disorder in full remission, unspecified whether recurrent ([facility] [address] ) Yes   • Obsessive-compulsive disorder, unspecified type    • Generalized anxiety disorder    • Mixed obsessional thoughts and acts      Goals addressed in session: Goal 1     DATA: Chani Garner spoke with this worker today about her  feelings re: being terminated from her job last week and having no support from any family, friends  She also vocalized that she and her  have not been doing well and reasons for this were explored in detail  During this session, this clinician used the following therapeutic modalities: Supportive Psychotherapy    Substance Abuse was not addressed during this session  If the client is diagnosed with a co-occurring substance use disorder, please indicate any changes in the frequency or amount of use: n/a  Stage of change for addressing substance use diagnoses: No substance use/Not applicable    ASSESSMENT:  Kory Mcneill presents with a Anxious and Depressed mood  Chani Garner was receptive to this worker's support as she acknowledged that engaging in EMDR work with herself would be helpful  She was able to recognize ways that her thoughts are contributing to feeling more overwhelmed  her affect is Tearful, which is congruent, with her mood and the content of the session  The client has not made progress on their goals  Kory Mcneill presents with a low risk of suicide, low risk of self-harm, and minimal risk of harm to others  For any risk assessment that surpasses a \"low\" rating, a safety plan must be developed  A safety plan was indicated: no  If yes, describe in detail     PLAN: Between sessions, Kory Mcneill will consider alternative options for employment      At the next session, the therapist will use Supportive " Psychotherapy to address the above in further detail  Behavioral Health Treatment Plan and Discharge Planning: Jazzy Chu is aware of and agrees to continue to work on their treatment plan  They have identified and are working toward their discharge goals  yes    Visit start and stop times:    06/19/23  Start Time: 0800  Stop Time: 0850  Total Visit Time: 50 minutes    Virtual Regular Visit    Verification of patient location:    Patient is located in the following state in which I hold an active license [place]      Assessment/Plan:    Problem List Items Addressed This Visit        Other    OCD (obsessive compulsive disorder)   Other Visit Diagnoses     Major depressive disorder in full remission, unspecified whether recurrent ([facility] [address] )    -  Primary    Generalized anxiety disorder              Reason for visit is   No chief complaint on file  Encounter provider [facility]    Provider located at [address] [place] [id number] [phone]      Recent Visits  No visits were found meeting these conditions  Showing recent visits within past 7 days and meeting all other requirements  Today's Visits  Date Type Provider Dept   06/19/23 601 [facility] 6 [place] today's visits and meeting all other requirements  Future Appointments  No visits were found meeting these conditions  Showing future appointments within next 150 days and meeting all other requirements       The patient was identified by name and date of birth  Jazzy Chu was informed that this is a telemedicine visit and that the visit is being conducted throughthe [employer] platform  She agrees to proceed     My office door was closed  No one else was in the room  She acknowledged consent and understanding of privacy and security of the video platform   The patient has agreed to participate and understands they can discontinue the visit at any time  Patient is aware this is a billable service  Subjective  Safia Corrales is a 40 y o  female    HPI     Past Medical History:   Diagnosis Date   • Abnormal Pap smear of cervix    • Anxiety    • CHF (congestive heart failure) ([facility] ) 2019   • Chlamydia    • Coronary artery disease 3/17/19   • Depression    • Diet controlled gestational diabetes mellitus (GDM) in second trimester 2021   • Heart failure ([place] [address] )     CHF in past   • HPV (human papilloma virus) infection    • Hypertension    • Temporomandibular joint disorder     Resolved 2015    • Varicella        Past Surgical History:   Procedure Laterality Date   • CERVICAL BIOPSY  W/ LOOP ELECTRODE EXCISION     • DENTAL SURGERY      [place] teeth extraction   • [place]  DELIVERY ONLY N/A 2021    Procedure:  SECTION (); Surgeon: Maria Fernanda Boone MD;  Location: [facility];  Service: Obstetrics   • TONSILLECTOMY         Current Outpatient Medications   Medication Sig Dispense Refill   • fluvoxaMINE (LUVOX) 100 mg tablet Take 1 tablet (100 mg total) by mouth daily at bedtime 30 tablet 2   • gabapentin (Neurontin) 600 MG tablet Take 1 tablet (600 mg total) by mouth daily 30 tablet 2   • NIFEdipine (PROCARDIA XL) 30 mg 24 hr tablet Take 1 tablet (30 mg total) by mouth daily 90 tablet 0     No current facility-administered medications for this visit                      Virtual Regular Visit    Verification of patient location:    Patient is located at Home in the following state in which I hold an active license [place]      Assessment/Plan:    Problem List Items Addressed This Visit        Other    OCD (obsessive compulsive disorder)   Other Visit Diagnoses     Major depressive disorder in full remission, unspecified whether recurrent ([place] [address] )    -  Primary    Generalized anxiety disorder              Goals addressed in session: Goal 1 Reason for visit is   No chief complaint on file  Encounter provider [facility]    Provider located at [address] [place] [address] [phone]      Recent Visits  No visits were found meeting these conditions  Showing recent visits within past 7 days and meeting all other requirements  Today's Visits  Date Type Provider Dept   06/19/23 [address] today's visits and meeting all other requirements  Future Appointments  No visits were found meeting these conditions  Showing future appointments within next 150 days and meeting all other requirements       The patient was identified by name and date of birth  Norberto Gary was informed that this is a telemedicine visit and that the visit is being conducted throughthe Te[employer]ape platform  She agrees to proceed     My office door was closed  No one else was in the room  She acknowledged consent and understanding of privacy and security of the video platform  The patient has agreed to participate and understands they can discontinue the visit at any time  Patient is aware this is a billable service  Subjective  Safia Hahn Sullivan is a 40 y o  female          HPI     Past Medical History:   Diagnosis Date   • Abnormal Pap smear of cervix    • Anxiety    • CHF (congestive heart failure) ([address] ) 2019   • Chlamydia    • Coronary artery disease 3/17/19   • Depression    • Diet controlled gestational diabetes mellitus (GDM) in second trimester 6/9/2021   • Heart failure ([address] )     CHF in past   • HPV (human papilloma virus) infection 2008   • Hypertension    • Temporomandibular joint disorder     Resolved 7/7/2015    • Varicella        Past Surgical History:   Procedure Laterality Date   • CERVICAL BIOPSY  W/ LOOP ELECTRODE EXCISION  2009   • DENTAL SURGERY      [place] teeth extraction   • [place]  DELIVERY ONLY N/A 2021    Procedure:  SECTION (); Surgeon: Celine Shane MD;  Location: [facility];  Service: Obstetrics   • TONSILLECTOMY         Current Outpatient Medications   Medication Sig Dispense Refill   • fluvoxaMINE (LUVOX) 100 mg tablet Take 1 tablet (100 mg total) by mouth daily at bedtime 30 tablet 2   • gabapentin (Neurontin) 600 MG tablet Take 1 tablet (600 mg total) by mouth daily 30 tablet 2   • NIFEdipine (PROCARDIA XL) 30 mg 24 hr tablet Take 1 tablet (30 mg total) by mouth daily 90 tablet 0     No current facility-administered medications for this visit          No Known Allergies

## 2023-06-19 NOTE — BH TREATMENT PLAN
Outpatient Behavioral Health Psychotherapy Treatment Plan    Kristel Troykevin  1985     Date of Initial Psychotherapy Assessment:    Date of Current Treatment Plan: 06/19/23  Treatment Plan Target Date: 12/19/23  Treatment Plan Expiration Date: 12/19/23    Diagnosis:   1  Major depressive disorder in full remission, unspecified whether recurrent ([facility] [address] )        2  Obsessive-compulsive disorder, unspecified type        3  Generalized anxiety disorder        4  Mixed obsessional thoughts and acts            Area(s) of Need: I feel very alone  I am grieving what I did not receive as a child  Long Term Goal 1 (in the client's own words): I want to feel more self confident, both personally and professionally,     Stage of Change: Preparation    Target Date for completion: 12/19/23     Anticipated therapeutic modalities: Supportive Psychotherapy     People identified to complete this goal: Jaquan zhao      Objective 1: (identify the means of measuring success in meeting the objective): I will continue to set / maintain emotional boundaries in my family relationships      Objective 2: (identify the means of measuring success in meeting the objective): I will engage in acts of self care daily  I am currently under the care of a [facility] psychiatric provider: yes    My [facility] psychiatric provider is: Dr López Mackay    I am currently taking psychiatric medications: Yes, as prescribed    I feel that I will be ready for discharge from mental health care when I reach the following (measurable goal/objective): When I am more self confident    I have created my Crisis Plan and have been offered a copy of this plan    [address]: Diagnosis and Treatment Plan explained to [address] [place] [address] acknowledges an understanding of their diagnosis  Kristel Lacy agrees to this treatment plan  I have been offered a copy of this Treatment Plan  Yes    Deepti Davies, 1985, actively participated in the review and update of this treatment plan during a virtual session, using the [employer]  Deepti Davies  provided verbal consent on 6/19/2023 at 8a AM  The treatment plan was transcribed into the [employer] Record at a later time

## 2023-06-20 ENCOUNTER — TELEPHONE (OUTPATIENT)
Dept: PSYCHIATRY | Facility: CLINIC | Age: 38
End: 2023-06-20

## 2023-06-20 NOTE — TELEPHONE ENCOUNTER
received VM from Jennifer Rincon to discuss medication  Called and spoke to Jennifer Rincon, she stated she is taking Luvox 100 mg and she wants to know if it can be increased or can she go back on Lexapro  She is feeling extreme agitation, poor frustration, irrational anger  She is angry all the time   Not sure if symptoms are is from post partum

## 2023-06-21 DIAGNOSIS — F42.2 MIXED OBSESSIONAL THOUGHTS AND ACTS: Primary | ICD-10-CM

## 2023-06-21 RX ORDER — FLUVOXAMINE MALEATE 100 MG
100 TABLET ORAL 2 TIMES DAILY
Qty: 60 TABLET | Refills: 3 | Status: SHIPPED | OUTPATIENT
Start: 2023-06-21

## 2023-06-21 NOTE — TELEPHONE ENCOUNTER
Spoke to [facility] directly  She reports uptick in agitation, anxiety, and mood lability in the context of several psychosocial stressors (termination from job, familial discord, challenges related to motherhood etc )  Her frustration tolerance has been low  We discussed avenues for intervention, including medication intervention  Given benefit with Luvox regarding OCD symptomatology that was previously overwhelming, she was agreeable to optimize this agent rather than cross-tapering to another medication  As such, will optimize Luvox 100mg QHS to 50mg AM, 100mg QHS  She will continue this for 1 week then optimize Luvox to 100mg BID  New script sent to pharmacy  No further action required

## 2023-06-26 ENCOUNTER — TELEMEDICINE (OUTPATIENT)
Dept: BEHAVIORAL/MENTAL HEALTH CLINIC | Facility: CLINIC | Age: 38
End: 2023-06-26
Payer: COMMERCIAL

## 2023-06-26 DIAGNOSIS — F42.9 OBSESSIVE-COMPULSIVE DISORDER, UNSPECIFIED TYPE: ICD-10-CM

## 2023-06-26 DIAGNOSIS — F32.5 MAJOR DEPRESSIVE DISORDER IN FULL REMISSION, UNSPECIFIED WHETHER RECURRENT (HCC): Primary | ICD-10-CM

## 2023-06-26 DIAGNOSIS — F41.1 GENERALIZED ANXIETY DISORDER: ICD-10-CM

## 2023-06-26 PROCEDURE — 90834 PSYTX W PT 45 MINUTES: CPT | Performed by: SOCIAL WORKER

## 2023-06-26 NOTE — PSYCH
"Behavioral Health Psychotherapy Progress Note    Psychotherapy Provided: Individual Psychotherapy     Encounter Diagnoses   Name Primary? • Major depressive disorder in full remission, unspecified whether recurrent ([address] ) Yes   • Obsessive-compulsive disorder, unspecified type    • Generalized anxiety disorder      Goals addressed in session: Goal 1     DATA: Dari Juarez spoke with this worker today about her  feelings re: last weeks's session as she was upset by several of this worker's comments  Dari Juarez also expressed how much she has been struggling in her marriage and also the \"limited\" bond that she feels is present between she and her twins  She agreed to attempt to remain present while spending time with her girls  During this session, this clinician used the following therapeutic modalities: Supportive Psychotherapy    Substance Abuse was not addressed during this session  If the client is diagnosed with a co-occurring substance use disorder, please indicate any changes in the frequency or amount of use: n/a  Stage of change for addressing substance use diagnoses: No substance use/Not applicable    ASSESSMENT:  Angelo Murguia presents with a Anxious and Depressed mood  Dari Juarez was receptive to this worker's explanation re: previous comments made  She also acknowledged that her perception \"skews\" things  her affect is Tearful, which is congruent, with her mood and the content of the session  The client has not made progress on their goals  Angelo Murguia presents with a low risk of suicide, low risk of self-harm, and minimal risk of harm to others  For any risk assessment that surpasses a \"low\" rating, a safety plan must be developed  A safety plan was indicated: no  If yes, describe in detail     PLAN: Between sessions, Angelo Murguia will consider alternative options for employment      At the next session, the therapist will use Supportive Psychotherapy to address " the above in further detail  Behavioral Health Treatment Plan and Discharge Planning: Barbara Edmond is aware of and agrees to continue to work on their treatment plan  They have identified and are working toward their discharge goals  yes    Visit start and stop times:    06/26/23  Start Time: 0800  Stop Time: 0850  Total Visit Time: 50 minutes    Virtual Regular Visit    Verification of patient location:    Patient is located in the following state in which I hold an active license [place]      Assessment/Plan:    Problem List Items Addressed This Visit        Other    OCD (obsessive compulsive disorder)   Other Visit Diagnoses     Major depressive disorder in full remission, unspecified whether recurrent ([facility] [address] )    -  Primary    Generalized anxiety disorder              Reason for visit is   No chief complaint on file  Encounter provider [facility]    Provider located at [address] [place] [id number] [phone]      Recent Visits  Date Type Provider Dept   06/19/23 601 [facility] 6 [place] recent visits within past 7 days and meeting all other requirements  Today's Visits  Date Type Provider Dept   06/26/23 [address]   Showing today's visits and meeting all other requirements  Future Appointments  No visits were found meeting these conditions  Showing future appointments within next 150 days and meeting all other requirements       The patient was identified by name and date of birth  Barbara Edmond was informed that this is a telemedicine visit and that the visit is being conducted throughthe [employer] platform  She agrees to proceed     My office door was closed  No one else was in the room    She acknowledged consent and understanding of privacy and security of the video platform  The patient has agreed to participate and understands they can discontinue the visit at any time  Patient is aware this is a billable service  Subjective  Safia Aldrich is a 40 y o  female    HPI     Past Medical History:   Diagnosis Date   • Abnormal Pap smear of cervix    • Anxiety    • CHF (congestive heart failure) ([place] [address] )    • Chlamydia    • Coronary artery disease 3/17/19   • Depression    • Diet controlled gestational diabetes mellitus (GDM) in second trimester 2021   • Heart failure ([place] [address] )     CHF in past   • HPV (human papilloma virus) infection    • Hypertension    • Temporomandibular joint disorder     Resolved 2015    • Varicella        Past Surgical History:   Procedure Laterality Date   • CERVICAL BIOPSY  W/ LOOP ELECTRODE EXCISION     • DENTAL SURGERY      [place] teeth extraction   • [place]  DELIVERY ONLY N/A 2021    Procedure:  SECTION (); Surgeon: Dylon Taveras MD;  Location: [facility];  Service: Obstetrics   • TONSILLECTOMY         Current Outpatient Medications   Medication Sig Dispense Refill   • fluvoxaMINE (LUVOX) 100 mg tablet Take 1 tablet (100 mg total) by mouth 2 (two) times a day 60 tablet 3   • gabapentin (Neurontin) 600 MG tablet Take 1 tablet (600 mg total) by mouth daily 30 tablet 2   • NIFEdipine (PROCARDIA XL) 30 mg 24 hr tablet Take 1 tablet (30 mg total) by mouth daily 90 tablet 0     No current facility-administered medications for this visit          Virtual Regular Visit    Verification of patient location:    Patient is located at Home in the following state in which I hold an active license [place]      Assessment/Plan:    Problem List Items Addressed This Visit        Other    OCD (obsessive compulsive disorder)   Other Visit Diagnoses     Major depressive disorder in full remission, unspecified whether recurrent ([place] [address] )    -  Primary    Generalized anxiety disorder              Goals addressed in session: Goal 1          Reason for visit is   No chief complaint on file  Encounter provider [facility]    Provider located at [address] [place] [id number] [phone]      Recent Visits  Date Type Provider Dept   06/19/23 601 [facility] 6 [place] recent visits within past 7 days and meeting all other requirements  Today's Visits  Date Type Provider Dept   06/26/23 [address]   Showing today's visits and meeting all other requirements  Future Appointments  No visits were found meeting these conditions  Showing future appointments within next 150 days and meeting all other requirements       The patient was identified by name and date of birth  Deepti Davies was informed that this is a telemedicine visit and that the visit is being conducted throughthe [employer] platform  She agrees to proceed     My office door was closed  No one else was in the room  She acknowledged consent and understanding of privacy and security of the video platform  The patient has agreed to participate and understands they can discontinue the visit at any time  Patient is aware this is a billable service  Subjective  Safia Schafer is a 40 y o  female          HPI     Past Medical History:   Diagnosis Date   • Abnormal Pap smear of cervix    • Anxiety    • CHF (congestive heart failure) ([facility] [address] ) 2019   • Chlamydia    • Coronary artery disease 3/17/19   • Depression    • Diet controlled gestational diabetes mellitus (GDM) in second trimester 6/9/2021   • Heart failure ([address] )     CHF in past   • HPV (human papilloma virus) infection 2008   • Hypertension    • Temporomandibular joint disorder     Resolved 7/7/2015    • Varicella        Past Surgical History:   Procedure Laterality Date   • CERVICAL BIOPSY  W/ LOOP ELECTRODE EXCISION     • DENTAL SURGERY      [place] teeth extraction   • [place]  DELIVERY ONLY N/A 2021    Procedure:  SECTION (); Surgeon: Natacha Watson MD;  Location: [facility];  Service: Obstetrics   • TONSILLECTOMY         Current Outpatient Medications   Medication Sig Dispense Refill   • fluvoxaMINE (LUVOX) 100 mg tablet Take 1 tablet (100 mg total) by mouth 2 (two) times a day 60 tablet 3   • gabapentin (Neurontin) 600 MG tablet Take 1 tablet (600 mg total) by mouth daily 30 tablet 2   • NIFEdipine (PROCARDIA XL) 30 mg 24 hr tablet Take 1 tablet (30 mg total) by mouth daily 90 tablet 0     No current facility-administered medications for this visit          No Known Allergies

## 2023-07-17 ENCOUNTER — TELEMEDICINE (OUTPATIENT)
Dept: BEHAVIORAL/MENTAL HEALTH CLINIC | Facility: CLINIC | Age: 38
End: 2023-07-17
Payer: COMMERCIAL

## 2023-07-17 DIAGNOSIS — F42.9 OBSESSIVE-COMPULSIVE DISORDER, UNSPECIFIED TYPE: ICD-10-CM

## 2023-07-17 DIAGNOSIS — F41.1 GENERALIZED ANXIETY DISORDER: ICD-10-CM

## 2023-07-17 DIAGNOSIS — F32.5 MAJOR DEPRESSIVE DISORDER IN FULL REMISSION, UNSPECIFIED WHETHER RECURRENT (HCC): Primary | ICD-10-CM

## 2023-07-17 PROCEDURE — 90834 PSYTX W PT 45 MINUTES: CPT | Performed by: SOCIAL WORKER

## 2023-07-17 NOTE — PSYCH
Behavioral Health Psychotherapy Progress Note    Psychotherapy Provided: Individual Psychotherapy     Encounter Diagnoses   Name Primary? • Major depressive disorder in full remission, unspecified whether recurrent ([address]) Yes   • Obsessive-compulsive disorder, unspecified type    • Generalized anxiety disorder      Goals addressed in session: Goal 1     DATA: Juan Torres spoke with this worker today about her  feelings re: dissatisfaction in her marriage and visit from her mom last week. She indicated that she has felt "unable" to engage in any acts of self care as she is consumed with anger and frustration and "just waiting for the babies to start crying."   During this session, this clinician used the following therapeutic modalities: Supportive Psychotherapy    Substance Abuse was not addressed during this session. If the client is diagnosed with a co-occurring substance use disorder, please indicate any changes in the frequency or amount of use: n/a. Stage of change for addressing substance use diagnoses: No substance use/Not applicable    ASSESSMENT:  Antonietta Scott presents with a Anxious and Depressed mood. Juan Torres was not receptive to considering PHP for additional support. She was future oriented despite some SI with thoughts of plans. 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 her affect is Tearful, which is congruent, with her mood and the content of the session. The client has not made progress on their goals. Antonietta Scott presents with a low risk of suicide, low risk of self-harm, and minimal risk of harm to others. For any risk assessment that surpasses a "low" rating, a safety plan must be developed. A safety plan was indicated: no  If yes, describe in detail     PLAN: Between sessions, Antonietta Scott will consider alternative options for employment.     At the next session, the therapist will use Supportive Psychotherapy to address the above in further detail. Behavioral Health Treatment Plan and Discharge Planning: Paralee Gosselin is aware of and agrees to continue to work on their treatment plan. Her Psychiatrist was also consulted. They have identified and are working toward their discharge goals. yes    Visit start and stop times:    07/17/23  Start Time: 0800  Stop Time: 0850  Total Visit Time: 50 minutes    Virtual Regular Visit    Verification of patient location:    Patient is located in the following state in which I hold an active license [place]      Assessment/Plan:    Problem List Items Addressed This Visit        Other    OCD (obsessive compulsive disorder)   Other Visit Diagnoses     Major depressive disorder in full remission, unspecified whether recurrent ([address])    -  Primary    Generalized anxiety disorder              Reason for visit is   No chief complaint on file. Encounter provider [facility]    Provider located at [address] [place] [id number] [phone]      Recent Visits  No visits were found meeting these conditions. Showing recent visits within past 7 days and meeting all other requirements  Today's Visits  Date Type Provider Dept   07/17/23 3504 [facility] today's visits and meeting all other requirements  Future Appointments  No visits were found meeting these conditions. Showing future appointments within next 150 days and meeting all other requirements       The patient was identified by name and date of birth. Paralee Gosselin was informed that this is a telemedicine visit and that the visit is being conducted throughthe [employer] platform. She agrees to proceed. .  My office door was closed. No one else was in the room.   She acknowledged consent and understanding of privacy and security of the video platform. The patient has agreed to participate and understands they can discontinue the visit at any time. Patient is aware this is a billable service. Kelly Judge is a 40 y.o. female  . HPI     Past Medical History:   Diagnosis Date   • Abnormal Pap smear of cervix    • Anxiety    • CHF (congestive heart failure) ([address]) 2019   • Chlamydia    • Coronary artery disease 3/17/19   • Depression    • Diet controlled gestational diabetes mellitus (GDM) in second trimester 2021   • Heart failure ([address])     CHF in past   • HPV (human papilloma virus) infection    • Hypertension    • Temporomandibular joint disorder     Resolved 2015    • Varicella        Past Surgical History:   Procedure Laterality Date   • CERVICAL BIOPSY  W/ LOOP ELECTRODE EXCISION     • DENTAL SURGERY      [place] teeth extraction   • [place]  DELIVERY ONLY N/A 2021    Procedure:  SECTION (); Surgeon: Luis Santiago MD;  Location: [facility];  Service: Obstetrics   • TONSILLECTOMY         Current Outpatient Medications   Medication Sig Dispense Refill   • fluvoxaMINE (LUVOX) 100 mg tablet Take 1 tablet (100 mg total) by mouth 2 (two) times a day 60 tablet 3   • gabapentin (Neurontin) 600 MG tablet Take 1 tablet (600 mg total) by mouth daily 30 tablet 2   • NIFEdipine (PROCARDIA XL) 30 mg 24 hr tablet Take 1 tablet (30 mg total) by mouth daily 90 tablet 0     No current facility-administered medications for this visit.         Virtual Regular Visit    Verification of patient location:    Patient is located at Home in the following state in which I [place] an [place] license [place]      Assessment/Plan:    Problem List Items Addressed This Visit        Other    OCD (obsessive compulsive disorder)   Other Visit Diagnoses     Major depressive disorder in full remission, unspecified whether recurrent ([address])    -  Primary    Generalized anxiety disorder              Goals addressed in session: Goal 1          Reason for visit is   No chief complaint on file. Encounter provider [facility]    Provider located at [address] [place] [id number] [phone]      Recent Visits  No visits were found meeting these conditions. Showing recent visits within past 7 days and meeting all other requirements  Today's Visits  Date Type Provider Dept   07/17/23 3504 [facility] today's visits and meeting all other requirements  Future Appointments  No visits were found meeting these conditions. Showing future appointments within next 150 days and meeting all other requirements       The patient was identified by name and date of birth. Karl Najera was informed that this is a telemedicine visit and that the visit is being conducted throughthe [employer] platform. She agrees to proceed. .  My office door was closed. No one else was in the room. She acknowledged consent and understanding of privacy and security of the video platform. The patient has agreed to participate and understands they can discontinue the visit at any time. Patient is aware this is a billable service. Subjective  Safia Gomez is a 40 y.o. female  .       HPI     Past Medical History:   Diagnosis Date   • Abnormal Pap smear of cervix    • Anxiety    • CHF (congestive heart failure) ([address]) 2019   • Chlamydia    • Coronary artery disease 3/17/19   • Depression    • Diet controlled gestational diabetes mellitus (GDM) in second trimester 6/9/2021   • Heart failure ([address])     CHF in past   • HPV (human papilloma virus) infection 2008   • Hypertension    • Temporomandibular joint disorder     Resolved 7/7/2015    • Varicella        Past Surgical History:   Procedure Laterality Date   • CERVICAL BIOPSY W/ LOOP ELECTRODE EXCISION     • DENTAL SURGERY      [place] teeth extraction   • [place]  DELIVERY ONLY N/A 2021    Procedure:  SECTION (); Surgeon: Leighann Bloom MD;  Location: [facility];  Service: Obstetrics   • TONSILLECTOMY         Current Outpatient Medications   Medication Sig Dispense Refill   • fluvoxaMINE (LUVOX) 100 mg tablet Take 1 tablet (100 mg total) by mouth 2 (two) times a day 60 tablet 3   • gabapentin (Neurontin) 600 MG tablet Take 1 tablet (600 mg total) by mouth daily 30 tablet 2   • NIFEdipine (PROCARDIA XL) 30 mg 24 hr tablet Take 1 tablet (30 mg total) by mouth daily 90 tablet 0     No current facility-administered medications for this visit.         No Known Allergies

## 2023-07-25 DIAGNOSIS — I10 HTN (HYPERTENSION), BENIGN: ICD-10-CM

## 2023-07-25 RX ORDER — NIFEDIPINE 30 MG/1
30 TABLET, EXTENDED RELEASE ORAL DAILY
Qty: 90 TABLET | Refills: 0 | Status: SHIPPED | OUTPATIENT
Start: 2023-07-25

## 2023-08-08 ENCOUNTER — TELEMEDICINE (OUTPATIENT)
Dept: BEHAVIORAL/MENTAL HEALTH CLINIC | Facility: CLINIC | Age: 38
End: 2023-08-08
Payer: COMMERCIAL

## 2023-08-08 DIAGNOSIS — F42.9 OBSESSIVE-COMPULSIVE DISORDER, UNSPECIFIED TYPE: ICD-10-CM

## 2023-08-08 DIAGNOSIS — F41.1 GENERALIZED ANXIETY DISORDER: ICD-10-CM

## 2023-08-08 DIAGNOSIS — F32.5 MAJOR DEPRESSIVE DISORDER IN FULL REMISSION, UNSPECIFIED WHETHER RECURRENT (HCC): Primary | ICD-10-CM

## 2023-08-08 PROCEDURE — 90834 PSYTX W PT 45 MINUTES: CPT | Performed by: SOCIAL WORKER

## 2023-08-08 NOTE — PSYCH
Behavioral Health Psychotherapy Progress Note    Psychotherapy Provided: Individual Psychotherapy     Encounter Diagnoses   Name Primary? 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 She also  vocalized a desire to "get back to the newly  feeling" with her . During this session, this clinician used the following therapeutic modalities: Supportive Psychotherapy    Substance Abuse was not addressed during this session. If the client is diagnosed with a co-occurring substance use disorder, please indicate any changes in the frequency or amount of use: n/a. Stage of change for addressing substance use diagnoses: No substance use/Not applicable    ASSESSMENT:  Shari Grissom presents with a Euthymic/ normal mood. Sheela Dobson indcated that she did not feel capable of being fully vulnerable as she was currently in "work mode."  She did not present as anxious or depressed today. her affect is Normal range and intensity, which is congruent, with her mood and the content of the session. The client has not made progress on their goals. Shari Grissom presents with a low risk of suicide, low risk of self-harm, and minimal risk of harm to others. For any risk assessment that surpasses a "low" rating, a safety plan must be developed. A safety plan was indicated: no  If yes, describe in detail     PLAN: Between sessions, Shari Grissom will resarch the topic of mindfulness and self compassion. At the next session, the therapist will use Supportive Psychotherapy to address the above in further detail.     Behavioral Health Treatment Plan and Discharge Planning: Stevo Grace is aware of and agrees to continue to work on their treatment plan. Her Psychiatrist was also consulted. They have identified and are working toward their discharge goals. yes    Visit start and stop times:    08/08/23  Start Time: 1000  Stop Time: 1050  Total Visit Time: 50 minutes    Virtual Regular Visit    Verification of patient location:    Patient is located in the following state in which I hold an active license [place]      Assessment/Plan:    Problem List Items Addressed This Visit        Other    OCD (obsessive compulsive disorder)   Other Visit Diagnoses     Major depressive disorder in full remission, unspecified whether recurrent ([address])    -  Primary    Generalized anxiety disorder              Reason for visit is   No chief complaint on file. Encounter provider [facility]    Provider located at [address] [place] [id number] [phone]      Recent Visits  No visits were found meeting these conditions. Showing recent visits within past 7 days and meeting all other requirements  Today's Visits  Date Type Provider Dept   08/08/23 3504 [facility] today's visits and meeting all other requirements  Future Appointments  No visits were found meeting these conditions. Showing future appointments within next 150 days and meeting all other requirements       The patient was identified by name and date of birth. Stevo Grace was informed that this is a telemedicine visit and that the visit is being conducted throughthe [employer] platform. She agrees to proceed. .  My office door was closed. No one else was in the room. She acknowledged consent and understanding of privacy and security of the video platform.  The patient has agreed to participate and understands they can discontinue the visit at any time. Patient is aware this is a billable service. Subjective  Safia Puga is a 40 y.o. female  . HPI     Past Medical History:   Diagnosis Date   • Abnormal Pap smear of cervix    • Anxiety    • CHF (congestive heart failure) ([address]) 2019   • Chlamydia    • Coronary artery disease 3/17/19   • Depression    • Diet controlled gestational diabetes mellitus (GDM) in second trimester 2021   • Heart failure ([address])     CHF in past   • HPV (human papilloma virus) infection    • Hypertension    • Temporomandibular joint disorder     Resolved 2015    • Varicella        Past Surgical History:   Procedure Laterality Date   • CERVICAL BIOPSY  W/ LOOP ELECTRODE EXCISION     • DENTAL SURGERY      [place] teeth extraction   • [place]  DELIVERY ONLY N/A 2021    Procedure:  SECTION (); Surgeon: Addy Santos MD;  Location: [facility];  Service: Obstetrics   • TONSILLECTOMY         Current Outpatient Medications   Medication Sig Dispense Refill   • fluvoxaMINE (LUVOX) 100 mg tablet Take 1 tablet (100 mg total) by mouth 2 (two) times a day 60 tablet 3   • gabapentin (Neurontin) 600 MG tablet Take 1 tablet (600 mg total) by mouth daily 30 tablet 2   • NIFEdipine (PROCARDIA XL) 30 mg 24 hr tablet Take 1 tablet (30 mg total) by mouth daily 90 tablet 0     No current facility-administered medications for this visit. Virtual Regular Visit    Verification of patient location:    Patient is located at Home in the following state in which I hold an active license [place]      Assessment/Plan:    Problem List Items Addressed This Visit        Other    OCD (obsessive compulsive disorder)   Other Visit Diagnoses     Major depressive disorder in full remission, unspecified whether recurrent ([address])    -  Primary    Generalized anxiety disorder              Goals addressed in session: Goal 1          Reason for visit is   No chief complaint on file.        Encounter provider [facility]    Provider located at [address] [place] [address]  [address] [place] [id number] [phone]      Recent Visits  No visits were found meeting these conditions. Showing recent visits within past 7 days and meeting all other requirements  Today's Visits  Date Type Provider Dept   23 [id number] today's visits and meeting all other requirements  Future Appointments  No visits were found meeting these conditions. Showing future appointments within next 150 days and meeting all other requirements       The patient was identified by name and date of birth. Loco Christian was informed that this is a telemedicine visit and that the visit is being conducted throughthe [employer] platform. She agrees to proceed. .  My office door was closed. No one else was in the room. She acknowledged consent and understanding of privacy and security of the video platform. The patient has agreed to participate and understands they can discontinue the visit at any time. Patient is aware this is a billable service. Subjective  Safia Espinoza is a 40 y.o. female  .       HPI     Past Medical History:   Diagnosis Date   • Abnormal Pap smear of cervix    • Anxiety    • CHF (congestive heart failure) ([address])    • Chlamydia    • Coronary artery disease 3/17/19   • Depression    • Diet controlled gestational diabetes mellitus (GDM) in second trimester 2021   • Heart failure ([address])     CHF in past   • HPV (human papilloma virus) infection    • Hypertension    • Temporomandibular joint disorder     Resolved 2015    • Varicella        Past Surgical History:   Procedure Laterality Date   • CERVICAL BIOPSY  W/ LOOP ELECTRODE EXCISION     • DENTAL SURGERY      [place] teeth extraction   • [place]  DELIVERY ONLY N/A 2021    Procedure:  SECTION (); Surgeon: Jagruti Curry MD;  Location: [facility];  Service: Obstetrics   • TONSILLECTOMY  1990       Current Outpatient Medications   Medication Sig Dispense Refill   • fluvoxaMINE (LUVOX) 100 mg tablet Take 1 tablet (100 mg total) by mouth 2 (two) times a day 60 tablet 3   • gabapentin (Neurontin) 600 MG tablet Take 1 tablet (600 mg total) by mouth daily 30 tablet 2   • NIFEdipine (PROCARDIA XL) 30 mg 24 hr tablet Take 1 tablet (30 mg total) by mouth daily 90 tablet 0     No current facility-administered medications for this visit.         No Known Allergies

## 2023-10-16 DIAGNOSIS — I10 HTN (HYPERTENSION), BENIGN: ICD-10-CM

## 2023-10-16 RX ORDER — NIFEDIPINE 30 MG/1
30 TABLET, EXTENDED RELEASE ORAL DAILY
Qty: 90 TABLET | Refills: 2 | Status: SHIPPED | OUTPATIENT
Start: 2023-10-16

## 2024-01-18 ENCOUNTER — OFFICE VISIT (OUTPATIENT)
Dept: CARDIOLOGY CLINIC | Facility: CLINIC | Age: 39
End: 2024-01-18
Payer: COMMERCIAL

## 2024-01-18 VITALS
SYSTOLIC BLOOD PRESSURE: 118 MMHG | BODY MASS INDEX: 45.99 KG/M2 | HEART RATE: 72 BPM | WEIGHT: 293 LBS | DIASTOLIC BLOOD PRESSURE: 74 MMHG | OXYGEN SATURATION: 99 % | HEIGHT: 67 IN

## 2024-01-18 DIAGNOSIS — E78.2 MIXED HYPERLIPIDEMIA: ICD-10-CM

## 2024-01-18 DIAGNOSIS — F41.1 GAD (GENERALIZED ANXIETY DISORDER): Primary | ICD-10-CM

## 2024-01-18 DIAGNOSIS — R73.09 ELEVATED HEMOGLOBIN A1C: ICD-10-CM

## 2024-01-18 DIAGNOSIS — I10 ESSENTIAL HYPERTENSION: ICD-10-CM

## 2024-01-18 PROCEDURE — 99214 OFFICE O/P EST MOD 30 MIN: CPT | Performed by: INTERNAL MEDICINE

## 2024-01-18 NOTE — PROGRESS NOTES
Heart Failure Outpatient Consult Note - Safia Payan 38 y.o. female MRN: [id number]    @ Encounter: [id number]      Assessment/Plan:    Patient Active Problem List    Diagnosis Date Noted    OCD (obsessive compulsive disorder) 04/18/2022    Postpartum depression associated with first pregnancy 02/24/2022    History of congestive heart failure 10/13/2021    BMI 50.0-59.9, adult ([facility]) 08/10/2021    Allergic dermatitis 05/14/2020    Mixed hyperlipidemia 10/12/2019    Vitamin D deficiency 10/12/2019    Elevated hemoglobin A1c 10/12/2019    Essential hypertension 03/17/2019    History of cervical dysplasia 07/20/2016    MARIE (generalized anxiety disorder) 07/30/2014    Mild episode of recurrent major depressive disorder (HCC) 05/28/2013       # Chronic HFpEF, Stage C, NYHA 2  Etiology: possible HTN as /100 mmHg on hospital admit, possible viral myocarditis given presentation to [facility] with sinusitis symptoms; no family hx of SCD or cardiomyopathy, no sign etoh use, given age- ischemic less likely. She is morbidly obese and will need screening for SUZAN  Her uncontrolled BP and diet alone with obesity could have contributed to her volume overload and mostly HFpEF  CRP 37 on admit, sed rate 51  MERRICK and RF normal    Weight: 322 lbs up from 308 lbs - has been down to 290 lbs  NT pro BNP: 10/9/19: 229  6/20/19: 493   EKG - SR with PACs    Studies- personally reviewed by me  Echo 6/22/21  LVEF: 65%  RV: normal    CMR 6/3/19: EF: 58%  Normal RV function    Echocardiogram 3/18/19  LVEF: 50-55%  LVIDd: 5.8 cm  RV: mildly enlarged  MR:  PASP: 38 mmHg    # HTN- was 190/100 mmHg on admit 3/17, better controlled now  Rx: nifedipine 30 mg daily    # morbid obesity- weight 358 lbs on admit 3/17; 332 lbs on follow up 4/3  Weight today 308 lbs  Doing [facility] weight management    # tobacco - 1 ppd- quit in March  # Sleep eval  Home study- mild SUZAN, lowest oxygen sat 79%  Polysomnogram 8/20/19- no significant apnea,  lowest oxygen 88%    # generalized anxiety  # post partum depression- on Zoloft    # pre diabetic, HgA1c 6 on 10/12/22    Today's Plan:  Tolerating nifedipine for HTN  No need for diuretic, can take prn  Wt loss strategies  HgA1c still around 6  --2g sodium diet  Weights daily    HPI:      37 yo female who had presented to PCP 3/11 with sinusitis symptoms and started on Augmentin. On 3/17 presented to ED with exertional dyspnea noticed on getting dressed and climbing stairs. She also reported LE edema but no PND or orthopnea but noticed abdominal bloating and weight gain for about a month. Smokes 1 ppd and only has about 2 drinks per week and no drug use. No family hx of SCD or cardiomyopathy. Her edema is what made her go to the hospital.   CTA showed small pericardial effusion, mild pulmonary edema. TTE showed EF around 50%, LVEDd 5.8 cm, PASP 38 mmHg.  She was diuresed and started on HFrEF specific heart failure therapy. She is enrolled in weight watchers and part of weight management nutritional assessment.     Interim:  How are the twins doing? Girls are 2  Wt up 14 lbs  Unhappy with her weight, quality of life  Review of Systems   Constitutional:  Negative for activity change, appetite change, fatigue and unexpected weight change (wt up 14 lbs).   HENT:  Negative for congestion and nosebleeds.    Eyes: Negative.    Respiratory:  Negative for cough, chest tightness and shortness of breath.    Cardiovascular:  Negative for chest pain, palpitations and leg swelling.   Gastrointestinal:  Negative for abdominal distention.   Endocrine: Negative.    Genitourinary: Negative.    Musculoskeletal: Negative.    Skin: Negative.    Neurological:  Negative for dizziness, syncope and weakness.   Hematological: Negative.    Psychiatric/Behavioral: Negative.       Past Medical History:   Diagnosis Date    Abnormal Pap smear of cervix     Anxiety     CHF (congestive heart failure) ([facility]) 2019    Chlamydia     Coronary artery  disease 3/17/19    Depression     Diet controlled gestational diabetes mellitus (GDM) in second trimester 2021    Heart failure (HCC)     CHF in past    HPV (human papilloma virus) infection     Hypertension     Temporomandibular joint disorder     Resolved 2015     Varicella      No Known Allergies  .    Current Outpatient Medications:     fluvoxaMINE (LUVOX) 100 mg tablet, Take 1 tablet (100 mg total) by mouth 2 (two) times a day, Disp: 60 tablet, Rfl: 3    NIFEdipine (PROCARDIA XL) 30 mg 24 hr tablet, Take 1 tablet (30 mg total) by mouth daily, Disp: 90 tablet, Rfl: 2    gabapentin (Neurontin) 600 MG tablet, Take 1 tablet (600 mg total) by mouth daily (Patient not taking: Reported on 2024), Disp: 30 tablet, Rfl: 2    Social History     Socioeconomic History    Marital status: Single     Spouse name: Not on file    Number of children: Not on file    Years of education: Not on file    Highest education level: Not on file   Occupational History    Not on file   Tobacco Use    Smoking status: Former     Current packs/day: 0.00     Average packs/day: 1 pack/day for 15.0 years (15.0 ttl pk-yrs)     Types: Cigarettes     Start date: 3/17/2004     Quit date: 3/17/2019     Years since quittin.8    Smokeless tobacco: Never   Vaping Use    Vaping status: Never Used   Substance and Sexual Activity    Alcohol use: Yes     Alcohol/week: 2.0 standard drinks of alcohol     Types: 2 Glasses of wine per week     Comment: wine 2 glasses a week    Drug use: No    Sexual activity: Not Currently     Partners: Male     Birth control/protection: OCP   Other Topics Concern    Not on file   Social History Narrative    Current every day smoker - As per [employer]    Daily caffeinated coffee consumption      Social Determinants of Health     Financial Resource Strain: Not on file   Food Insecurity: Not on file   Transportation Needs: Not on file   Physical Activity: Not on file   Stress: Not on file   Social  "Connections: Not on file   Intimate Partner Violence: Unknown (10/12/2019)    Humiliation, Afraid, Rape, and Kick questionnaire     Fear of Current or Ex-Partner: Not on file     Emotionally Abused: Not on file     Physically Abused: Not on file     Sexually Abused: Not asked   Housing Stability: Not on file       Family History   Problem Relation Age of Onset    Diabetes Mother     Hypertension Father     Obesity Father     OCD Father     Skin cancer Father     Colon cancer Maternal Grandmother         diagnosed in 60'2    Diabetes Paternal Grandmother     Stroke Paternal Grandmother         TIA    Heart failure Paternal Grandmother     Diabetes Paternal Grandfather     Heart disease Paternal Grandfather     Heart attack Paternal Grandfather     Alcohol abuse Half-Brother     Depression Maternal Grandfather     Anxiety disorder Maternal Grandfather     Hypothyroidism Paternal Aunt     Breast cancer Other     Substance Abuse Neg Hx     Thyroid cancer Neg Hx     Ovarian cancer Neg Hx        Physical Exam:    Vitals: Blood pressure 118/74, pulse 72, height 5' 7\" (1.702 m), weight (!) 146 kg (322 lb), SpO2 99%, not currently breastfeeding., Body mass index is 50.43 kg/m².,   Wt Readings from Last 3 Encounters:   01/18/24 (!) 146 kg (322 lb)   02/10/23 (!) 140 kg (308 lb)   06/10/22 (!) 146 kg (320 lb 12.8 oz)       Physical Exam:    Physical Exam  Constitutional:       Appearance: She is well-developed. She is not diaphoretic.   HENT:      Head: Normocephalic and atraumatic.   Eyes:      Pupils: Pupils are equal, round, and reactive to light.   Neck:      Vascular: No JVD.   Cardiovascular:      Rate and Rhythm: Normal rate and regular rhythm.      Heart sounds: Normal heart sounds. No murmur heard.  Pulmonary:      Effort: Pulmonary effort is normal.      Breath sounds: Normal breath sounds. No rales.   Abdominal:      General: Bowel sounds are normal. There is no distension.      Palpations: Abdomen is soft. " "  Musculoskeletal:         General: Normal range of motion.      Cervical back: Normal range of motion.      Right lower leg: Edema present.      Left lower leg: Edema present.   Skin:     General: Skin is warm and dry.   Neurological:      Mental Status: She is alert and oriented to person, place, and time.       Labs & Results:    Lab Results   Component Value Date    WBC 12.50 (H) 10/12/2022    HGB 12.8 10/12/2022    HCT 41.5 10/12/2022    MCV 80 (L) 10/12/2022     (H) 10/12/2022     Lab Results   Component Value Date    CALCIUM 8.9 03/03/2022    SODIUM 137 03/03/2022    K 3.9 03/03/2022    CO2 25 03/03/2022     03/03/2022    BUN 16 03/03/2022    CREATININE 0.62 03/03/2022     Lab Results   Component Value Date    NTBNP 229 10/09/2019      No results found for: \"CHOL\"  Lab Results   Component Value Date    HDL 56 05/27/2021    HDL 44 04/29/2019    HDL 63 (H) 01/24/2017     Lab Results   Component Value Date    LDLCALC 73 05/27/2021    LDLCALC 111 04/29/2019    LDLCALC 87 01/24/2017     Lab Results   Component Value Date    TRIG 125 05/27/2021    TRIG 128 04/29/2019    TRIG 111 01/24/2017     No results found for: \"CHOLHDL\"    EKG personally reviewed by Randy Mazariegos.     Counseling / Coordination of Care  Time spent today 25 minutes.  Greater than 50% of total time was spent with the patient and / or family counseling and / or coordination of care.  We discussed diagnoses, most recent studies, tests and any changes in treatment plan    Thank you for the opportunity to participate in the care of this patient.      RANDY MAZARIEGOS D.O.  DIRECTOR OF HEART FAILURE/ PULMONARY HYPERTENSION  MEDICAL DIRECTOR OF LVAD PROGRAM  [facility]    "

## 2024-02-16 DIAGNOSIS — I10 HTN (HYPERTENSION), BENIGN: ICD-10-CM

## 2024-02-16 RX ORDER — NIFEDIPINE 30 MG/1
30 TABLET, EXTENDED RELEASE ORAL DAILY
Qty: 90 TABLET | Refills: 2 | Status: SHIPPED | OUTPATIENT
Start: 2024-02-16

## 2024-02-19 ENCOUNTER — TELEPHONE (OUTPATIENT)
Dept: PSYCHIATRY | Facility: CLINIC | Age: 39
End: 2024-02-19

## 2024-02-19 DIAGNOSIS — F42.2 MIXED OBSESSIONAL THOUGHTS AND ACTS: ICD-10-CM

## 2024-02-19 RX ORDER — FLUVOXAMINE MALEATE 100 MG
100 TABLET ORAL
Qty: 30 TABLET | Refills: 2 | Status: SHIPPED | OUTPATIENT
Start: 2024-02-19 | End: 2024-03-12

## 2024-02-19 NOTE — TELEPHONE ENCOUNTER
Received fax from [employer] requesting refill of Fluvoxamine 50 mg.         Will need a follow up scheduled for refill review

## 2024-03-06 ENCOUNTER — DOCUMENTATION (OUTPATIENT)
Dept: BEHAVIORAL/MENTAL HEALTH CLINIC | Facility: CLINIC | Age: 39
End: 2024-03-06

## 2024-03-06 NOTE — PROGRESS NOTES
Psychotherapy Discharge Summary    Preferred Name: Safia Payan  YOB: 1985    Admission date to psychotherapy: 5/8/19    Referred by: self    Presenting Problem: Safia began therapy with this worker for work-related stress and ED issues that she had never before discussed.      Course of treatment included : medication management, psychiatric evaluation, and individual therapy     Progress/Outcome of Treatment Goals (brief summary of course of treatment) Safia met monthly with this worker during which time she addressed her trauma history, work stressors, ED, and relationship conflicts.     Treatment Complications (if any):     Treatment Progress: good    Current [facility] Psychiatric Provider: Dr. Mckeon    Discharge Medications include: (ongoing)     Discharge Date: 3/6/24    Discharge Diagnosis: No diagnosis found.    Criteria for Discharge:  at her request    Aftercare recommendations include (include specific referral names and phone numbers, if appropriate): will continue to be seen for medication management    Prognosis: good
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
## 2024-03-12 ENCOUNTER — TELEMEDICINE (OUTPATIENT)
Dept: PSYCHIATRY | Facility: CLINIC | Age: 39
End: 2024-03-12
Payer: COMMERCIAL

## 2024-03-12 DIAGNOSIS — F33.0 MILD EPISODE OF RECURRENT MAJOR DEPRESSIVE DISORDER (HCC): Primary | ICD-10-CM

## 2024-03-12 DIAGNOSIS — F41.1 GAD (GENERALIZED ANXIETY DISORDER): ICD-10-CM

## 2024-03-12 DIAGNOSIS — F42.2 MIXED OBSESSIONAL THOUGHTS AND ACTS: ICD-10-CM

## 2024-03-12 PROCEDURE — 90833 PSYTX W PT W E/M 30 MIN: CPT | Performed by: STUDENT IN AN ORGANIZED HEALTH CARE EDUCATION/TRAINING PROGRAM

## 2024-03-12 PROCEDURE — 99214 OFFICE O/P EST MOD 30 MIN: CPT | Performed by: STUDENT IN AN ORGANIZED HEALTH CARE EDUCATION/TRAINING PROGRAM

## 2024-03-12 RX ORDER — FLUVOXAMINE MALEATE 100 MG/1
100 CAPSULE, EXTENDED RELEASE ORAL
Qty: 30 CAPSULE | Refills: 3 | Status: SHIPPED | OUTPATIENT
Start: 2024-03-12

## 2024-03-12 NOTE — BH TREATMENT PLAN
TREATMENT PLAN (Medication Management Only)        [facility]      Name and Date of Birth:  Safia Payan 38 y.o. 1985 MRN: [id number]    Date of Visit: March 12, 2024    Diagnosis/Diagnoses:    1. Mild episode of recurrent major depressive disorder (HCC)  Fluvoxamine Maleate 100 MG CP24      2. MARIE (generalized anxiety disorder)  Fluvoxamine Maleate 100 MG CP24      3. Mixed obsessional thoughts and acts  Fluvoxamine Maleate 100 MG CP24          Strengths/Personal Resources for Self-Care: supportive family, taking medications as prescribed, ability to adapt to life changes, ability to communicate needs, ability to communicate well, ability to understand psychiatric illness, average or above intelligence, motivation for treatment, ability to negotiate basic needs, being resoureceful, special hobby/interest, stable employment, willingness to work on problems.    Area/Areas of need (in own words): anxiety symptoms  1. Long Term Goal: improve control of anxiety.  Target Date:6 months - 9/12/2024  Person/Persons responsible for completion of goal: Safia  2.  Short Term Objective (s) - How will we reach this goal?:   A. Provider new recommended medication/dosage changes and/or continue medication(s): continue current medications as prescribed.  B. Keep regularly scheduled psychiatric appointments  C. Maintain adherence to psychotropic medication regimen   D. Prioritize more self-care and self-love  E. Maintain appropriate dietary intake  F. Practice adequate sleep hygiene    G. Give self permission to spend time away from family to decompress    Target Date:6 months - 9/12/2024  Person/Persons Responsible for Completion of Goal: Safia    Progress Towards Goals: continuing treatment    Treatment Modality: medication management every 3 months    Review due 180 days from date of this plan: 6 months - 9/12/2024  Expected length of service: ongoing  treatment    My Physician/PA/NP and I have developed this plan together and I agree to work on the goals and objectives. I understand the treatment goals that were developed for my treatment. Treatment Plan was completed with Safia's assistance and input throughout today's session. Safia consented to the information provided and documented above. Unfortunately, treatment plan was not signed at the time of this office visit secondary to issues related to signature keypad.

## 2024-03-13 ENCOUNTER — TELEPHONE (OUTPATIENT)
Dept: PSYCHIATRY | Facility: CLINIC | Age: 39
End: 2024-03-13

## 2024-03-13 NOTE — TELEPHONE ENCOUNTER
Called and left message for patient to return a call to [phone] and schedule 4 month follow up with provider (7/12/2024). Please schedule upon return call. Thank you.    Please schedule virtually per provider note.

## 2024-05-03 ENCOUNTER — TELEPHONE (OUTPATIENT)
Dept: PSYCHIATRY | Facility: CLINIC | Age: 39
End: 2024-05-03
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
## 2024-05-06 ENCOUNTER — DOCUMENTATION (OUTPATIENT)
Dept: BEHAVIORAL/MENTAL HEALTH CLINIC | Facility: CLINIC | Age: 39
End: 2024-05-06

## 2024-05-06 ENCOUNTER — TELEMEDICINE (OUTPATIENT)
Dept: BEHAVIORAL/MENTAL HEALTH CLINIC | Facility: CLINIC | Age: 39
End: 2024-05-06

## 2024-05-06 DIAGNOSIS — F41.1 GENERALIZED ANXIETY DISORDER: ICD-10-CM

## 2024-05-06 DIAGNOSIS — F42.9 OBSESSIVE-COMPULSIVE DISORDER, UNSPECIFIED TYPE: Primary | ICD-10-CM

## 2024-05-06 DIAGNOSIS — F43.10 POST TRAUMATIC STRESS DISORDER (PTSD): ICD-10-CM
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
## 2024-05-08 NOTE — TELEPHONE ENCOUNTER
Outreach attempted today, 5/8/24, without success. Voicemail left requesting call back. I did speak directly to Safia's psychotherapist who updated me on recent events.

## 2024-05-16 ENCOUNTER — TELEMEDICINE (OUTPATIENT)
Dept: BEHAVIORAL/MENTAL HEALTH CLINIC | Facility: CLINIC | Age: 39
End: 2024-05-16

## 2024-05-16 DIAGNOSIS — F42.9 OBSESSIVE-COMPULSIVE DISORDER, UNSPECIFIED TYPE: Primary | ICD-10-CM

## 2024-05-16 DIAGNOSIS — F41.1 GENERALIZED ANXIETY DISORDER: ICD-10-CM

## 2024-05-16 DIAGNOSIS — F43.10 POST TRAUMATIC STRESS DISORDER (PTSD): ICD-10-CM

## 2024-05-16 DIAGNOSIS — F32.5 MAJOR DEPRESSIVE DISORDER IN FULL REMISSION, UNSPECIFIED WHETHER RECURRENT (HCC): ICD-10-CM

## 2024-05-16 NOTE — PSYCH
"Behavioral Health Psychotherapy Progress Note    Psychotherapy Provided: Individual Psychotherapy     Encounter Diagnoses   Name Primary?   • Obsessive-compulsive disorder, unspecified type Yes   • Generalized anxiety disorder    • Post traumatic stress disorder (PTSD)    • Major depressive disorder in full remission, unspecified whether recurrent (HCC)        Goals addressed in session: Goal 1     DATA: Safia spoke with this worker today about her  feelings that \"every day is a struggle\" as her anger has lessened and allowed sadness to be present.  She reported that it has been increasingly difficult not to text Giuseppe after seeing him at the child support conference earlier this week and that she is not sleeping.     During this session, this clinician used the following therapeutic modalities: Supportive Psychotherapy    Substance Abuse was not addressed during this session. If the client is diagnosed with a co-occurring substance use disorder, please indicate any changes in the frequency or amount of use: n/a. Stage of change for addressing substance use diagnoses: No substance use/Not applicable    ASSESSMENT:  Safia Payan presents with a Anxious and Dysthymic mood.  Safia will continue to attemtp to connect with Dr. Hernandez while also attending weekly EMDR sessions.  She is grateful for this worker's support and is aware that she can reach out if feeling unsafe.   her affect is Normal range and intensity, which is congruent, with her mood and the content of the session. The client has not made progress on their goals.     Safia Payan presents with a low risk of suicide, low risk of self-harm, and minimal risk of harm to others.    For any risk assessment that surpasses a \"low\" rating, a safety plan must be developed.    A safety plan was indicated: no  If yes, describe in detail     PLAN: Between sessions, Safia Payan will reach out to a forensic psychologist for " guidance on interviews for her twin 2 year old daughters.  A CYS report will also be made by this writer.    At the next session, the therapist will use Supportive Psychotherapy to address the above in further detail.    Behavioral Health Treatment Plan and Discharge Planning: Safia Payan is aware of and agrees to continue to work on their treatment plan. Her Psychiatrist was also consulted.  They have identified and are working toward their discharge goals. yes    Visit start and stop times:    05/16/24  Start Time: 1300  Stop Time: 1356  Total Visit Time: 56 minutes    Virtual Regular Visit    Verification of patient location:    Patient is located in the following state in which I hold an active license [place]      Assessment/Plan:    Problem List Items Addressed This Visit        Behavioral Health    OCD (obsessive compulsive disorder) - Primary   Other Visit Diagnoses     Generalized anxiety disorder        Post traumatic stress disorder (PTSD)        Major depressive disorder in full remission, unspecified whether recurrent (HCC)                  Reason for visit is   No chief complaint on file.       Encounter provider Sujatha Espana    Provider located at [facility] [place]  [facility] [place]  [address] [address] [phone]      Recent Visits  No visits were found meeting these conditions.  Showing recent visits within past 7 days and meeting all other requirements  Today's Visits  Date Type Provider Dept   05/16/24 Telemedicine Sujatha Espana  [facility] [place]   Showing today's visits and meeting all other requirements  Future Appointments  No visits were found meeting these conditions.  Showing future appointments within next 150 days and meeting all other requirements       The patient was identified by name and date of birth. Safia Payan was informed that this is a telemedicine visit  and that the visit is being conducted throughSonics platform. She agrees to proceed..  My office door was closed. No one else was in the room.  She acknowledged consent and understanding of privacy and security of the video platform. The patient has agreed to participate and understands they can discontinue the visit at any time.    Patient is aware this is a billable service.     Subjective  Safia Payan is a 38 y.o. female  .      HPI     Past Medical History:   Diagnosis Date   • Abnormal Pap smear of cervix    • Anxiety    • CHF (congestive heart failure) (HCC)    • Chlamydia    • Coronary artery disease 3/17/19   • Depression    • Diet controlled gestational diabetes mellitus (GDM) in second trimester 2021   • Heart failure (HCC)     CHF in past   • HPV (human papilloma virus) infection    • Hypertension    • Temporomandibular joint disorder     Resolved 2015    • Varicella        Past Surgical History:   Procedure Laterality Date   • CERVICAL BIOPSY  W/ LOOP ELECTRODE EXCISION     • DENTAL SURGERY      [place] teeth extraction   • [place]  DELIVERY ONLY N/A 2021    Procedure:  SECTION ();  Surgeon: Christel Guerra MD;  Location: [facility];  Service: Obstetrics   • TONSILLECTOMY         Current Outpatient Medications   Medication Sig Dispense Refill   • Fluvoxamine Maleate 100 MG CP24 Take 1 capsule (100 mg total) by mouth daily at bedtime 30 capsule 3   • NIFEdipine (PROCARDIA XL) 30 mg 24 hr tablet Take 1 tablet (30 mg total) by mouth daily 90 tablet 2     No current facility-administered medications for this visit.        Virtual Regular Visit    Verification of patient location:    Patient is located at Home in the following state in which I hold an active license [place]      Assessment/Plan:    Problem List Items Addressed This Visit        Behavioral Health    OCD (obsessive compulsive disorder) - Primary   Other Visit Diagnoses     Generalized  anxiety disorder        Post traumatic stress disorder (PTSD)        Major depressive disorder in full remission, unspecified whether recurrent (HCC)                  Goals addressed in session: Goal 1          Reason for visit is   No chief complaint on file.       Encounter provider Sujatha Espana    Provider located at [facility] [place]  [facility] [place]  [address]  [place] [id number] [phone]      Recent Visits  No visits were found meeting these conditions.  Showing recent visits within past 7 days and meeting all other requirements  Today's Visits  Date Type Provider Dept   05/16/24 Telemedicine Sujatha Espana  [facility] [place]   Showing today's visits and meeting all other requirements  Future Appointments  No visits were found meeting these conditions.  Showing future appointments within next 150 days and meeting all other requirements       The patient was identified by name and date of birth. Safia Payan was informed that this is a telemedicine visit and that the visit is being conducted throughthe [employer] platform. She agrees to proceed..  My office door was closed. No one else was in the room.  She acknowledged consent and understanding of privacy and security of the video platform. The patient has agreed to participate and understands they can discontinue the visit at any time.    Patient is aware this is a billable service.     Subjective  Safia Payan is a 38 y.o. female  .      HPI     Past Medical History:   Diagnosis Date   • Abnormal Pap smear of cervix    • Anxiety    • CHF (congestive heart failure) (HCC) 2019   • Chlamydia    • Coronary artery disease 3/17/19   • Depression    • Diet controlled gestational diabetes mellitus (GDM) in second trimester 6/9/2021   • Heart failure (HCC)     CHF in past   • HPV (human papilloma virus) infection 2008   • Hypertension    •  Temporomandibular joint disorder     Resolved 2015    • Varicella        Past Surgical History:   Procedure Laterality Date   • CERVICAL BIOPSY  W/ LOOP ELECTRODE EXCISION     • DENTAL SURGERY      [place] teeth extraction   • [place]  DELIVERY ONLY N/A 2021    Procedure:  SECTION ();  Surgeon: Christel Guerra MD;  Location: [facility];  Service: Obstetrics   • TONSILLECTOMY         Current Outpatient Medications   Medication Sig Dispense Refill   • Fluvoxamine Maleate 100 MG CP24 Take 1 capsule (100 mg total) by mouth daily at bedtime 30 capsule 3   • NIFEdipine (PROCARDIA XL) 30 mg 24 hr tablet Take 1 tablet (30 mg total) by mouth daily 90 tablet 2     No current facility-administered medications for this visit.        No Known Allergies        Treatment Plan Tracking    # 0Treatment Plan not completed within required time limits due to: Safia Payan presented with emotional/behavioral issues that required clinical intervention.

## 2024-05-21 ENCOUNTER — TELEMEDICINE (OUTPATIENT)
Dept: BEHAVIORAL/MENTAL HEALTH CLINIC | Facility: CLINIC | Age: 39
End: 2024-05-21

## 2024-05-21 DIAGNOSIS — F32.5 MAJOR DEPRESSIVE DISORDER IN FULL REMISSION, UNSPECIFIED WHETHER RECURRENT (HCC): ICD-10-CM

## 2024-05-21 DIAGNOSIS — F41.1 GENERALIZED ANXIETY DISORDER: ICD-10-CM

## 2024-05-21 DIAGNOSIS — F43.10 POST TRAUMATIC STRESS DISORDER (PTSD): ICD-10-CM

## 2024-05-21 DIAGNOSIS — F42.9 OBSESSIVE-COMPULSIVE DISORDER, UNSPECIFIED TYPE: Primary | ICD-10-CM

## 2024-05-21 NOTE — PSYCH
"Behavioral Health Psychotherapy Progress Note    Psychotherapy Provided: Individual Psychotherapy     Encounter Diagnoses   Name Primary?   • Obsessive-compulsive disorder, unspecified type Yes   • Generalized anxiety disorder    • Post traumatic stress disorder (PTSD)    • Major depressive disorder in full remission, unspecified whether recurrent (HCC)          Goals addressed in session: Goal 1     DATA: Safia spoke with this worker today about her  feelings of confusion and uncertainty about her .  She reported that she received full physical and legal custody of her girls, but believes it is important for children to have relationships with both parents.  She again shared that she has spent time talking with Giuseppe in order to try and understand why he is engaging in some of his online activities, however he continues to deny any wrongdoing.  During this session, this clinician used the following therapeutic modalities: Supportive Psychotherapy    Substance Abuse was not addressed during this session. If the client is diagnosed with a co-occurring substance use disorder, please indicate any changes in the frequency or amount of use: n/a. Stage of change for addressing substance use diagnoses: No substance use/Not applicable    ASSESSMENT:  Safia Payan presents with a Anxious mood.  Safia will continue to attempt to connect with Dr. Hernandez while also attending weekly EMDR sessions.  She is grateful for this worker's support and is aware that she can reach out if feeling unsafe.   her affect is Normal range and intensity, which is congruent, with her mood and the content of the session. The client has not made progress on their goals.     Safia Payan presents with a low risk of suicide, low risk of self-harm, and minimal risk of harm to others.    For any risk assessment that surpasses a \"low\" rating, a safety plan must be developed.    A safety plan was indicated: no  If yes, " describe in detail     PLAN: Between sessions, Safia Payan will reach out to a forensic psychologist for guidance on interviews for her twin 2 year old daughters.  A CYS report will also be made by this writer.    At the next session, the therapist will use Supportive Psychotherapy to address the above in further detail.    Behavioral Health Treatment Plan and Discharge Planning: Safia Payan is aware of and agrees to continue to work on their treatment plan. Her Psychiatrist was also consulted.  They have identified and are working toward their discharge goals. yes    Visit start and stop times:    05/22/24  Start Time: 1220  Stop Time: 1300  Total Visit Time: 40 minutes    Virtual Regular Visit    Verification of patient location:    Patient is located in the following state in which I hold an active license [place]      Assessment/Plan:    Problem List Items Addressed This Visit        Behavioral Health    OCD (obsessive compulsive disorder) - Primary   Other Visit Diagnoses     Generalized anxiety disorder        Post traumatic stress disorder (PTSD)        Major depressive disorder in full remission, unspecified whether recurrent (HCC)                    Reason for visit is   No chief complaint on file.       Encounter provider Sujatha Espana    Provider located at [facility] [place]  [facility] [place]  [address] [id number] [phone]      Recent Visits  Date Type Provider Dept   05/21/24 Telemedicine [facility] E[facility]a [facility] [place]   05/16/24 Telemedicine Sujatha Espana [facility] [place]   Showing recent visits within past 7 days and meeting all other requirements  Future Appointments  No visits were found meeting these conditions.  Showing future appointments within next 150 days and meeting all other requirements       The patient was identified by name and date of  birth. Safia Payan was informed that this is a telemedicine visit and that the visit is being conducted throughthe [employer] platform. She agrees to proceed..  My office door was closed. No one else was in the room.  She acknowledged consent and understanding of privacy and security of the video platform. The patient has agreed to participate and understands they can discontinue the visit at any time.    Patient is aware this is a billable service.     Subjective  Safia Payan is a 38 y.o. female  .      HPI     Past Medical History:   Diagnosis Date   • Abnormal Pap smear of cervix    • Anxiety    • CHF (congestive heart failure) ([facility])    • Chlamydia    • Coronary artery disease 3/17/19   • Depression    • Diet controlled gestational diabetes mellitus (GDM) in second trimester 2021   • Heart failure (HCC)     CHF in past   • HPV (human papilloma virus) infection    • Hypertension    • Temporomandibular joint disorder     Resolved 2015    • Varicella        Past Surgical History:   Procedure Laterality Date   • CERVICAL BIOPSY  W/ LOOP ELECTRODE EXCISION     • DENTAL SURGERY      [place] teeth extraction   • [place]  DELIVERY ONLY N/A 2021    Procedure:  SECTION ();  Surgeon: Christel Guerra MD;  Location: [facility];  Service: Obstetrics   • TONSILLECTOMY         Current Outpatient Medications   Medication Sig Dispense Refill   • Fluvoxamine Maleate 100 MG CP24 Take 1 capsule (100 mg total) by mouth daily at bedtime 30 capsule 3   • NIFEdipine (PROCARDIA XL) 30 mg 24 hr tablet Take 1 tablet (30 mg total) by mouth daily 90 tablet 2     No current facility-administered medications for this visit.        Virtual Regular Visit    Verification of patient location:    Patient is located at Home in the following state in which I hold an active license [place]      Assessment/Plan:    Problem List Items Addressed This Visit        Behavioral Health    OCD  (obsessive compulsive disorder) - Primary   Other Visit Diagnoses     Generalized anxiety disorder        Post traumatic stress disorder (PTSD)        Major depressive disorder in full remission, unspecified whether recurrent (HCC)                    Goals addressed in session: Goal 1          Reason for visit is   No chief complaint on file.       Encounter provider Sujatha Espana    Provider located at [facility] [place]  [facility] [place]  [address]  [place] [address] [phone]      Recent Visits  Date Type Provider Dept   05/21/24 Telemedicine [facility]atha Espana [facility] [place]   05/16/24 Telemedicine Sujatha Espana [facility] [place]   Showing recent visits within past 7 days and meeting all other requirements  Future Appointments  No visits were found meeting these conditions.  Showing future appointments within next 150 days and meeting all other requirements       The patient was identified by name and date of birth. Safia Payan was informed that this is a telemedicine visit and that the visit is being conducted throughthe [facility] platform. She agrees to proceed..  My office door was closed. No one else was in the room.  She acknowledged consent and understanding of privacy and security of the video platform. The patient has agreed to participate and understands they can discontinue the visit at any time.    Patient is aware this is a billable service.     Subjective  Safia Payan is a 38 y.o. female  .      HPI     Past Medical History:   Diagnosis Date   • Abnormal Pap smear of cervix    • Anxiety    • CHF (congestive heart failure) (HCC) 2019   • Chlamydia    • Coronary artery disease 3/17/19   • Depression    • Diet controlled gestational diabetes mellitus (GDM) in second trimester 6/9/2021   • Heart failure (HCC)     CHF in past   • HPV (human papilloma virus)  infection    • Hypertension    • Temporomandibular joint disorder     Resolved 2015    • Varicella        Past Surgical History:   Procedure Laterality Date   • CERVICAL BIOPSY  W/ LOOP ELECTRODE EXCISION     • DENTAL SURGERY      [place] teeth extraction   • [place]  DELIVERY ONLY N/A 2021    Procedure:  SECTION ();  Surgeon: Christel Guerra MD;  Location: [facility];  Service: Obstetrics   • TONSILLECTOMY         Current Outpatient Medications   Medication Sig Dispense Refill   • Fluvoxamine Maleate 100 MG CP24 Take 1 capsule (100 mg total) by mouth daily at bedtime 30 capsule 3   • NIFEdipine (PROCARDIA XL) 30 mg 24 hr tablet Take 1 tablet (30 mg total) by mouth daily 90 tablet 2     No current facility-administered medications for this visit.        No Known Allergies

## 2024-05-29 ENCOUNTER — TELEMEDICINE (OUTPATIENT)
Dept: BEHAVIORAL/MENTAL HEALTH CLINIC | Facility: CLINIC | Age: 39
End: 2024-05-29

## 2024-05-29 DIAGNOSIS — F42.9 OBSESSIVE-COMPULSIVE DISORDER, UNSPECIFIED TYPE: Primary | ICD-10-CM

## 2024-05-29 DIAGNOSIS — F43.10 POST TRAUMATIC STRESS DISORDER (PTSD): ICD-10-CM

## 2024-05-29 DIAGNOSIS — F32.5 MAJOR DEPRESSIVE DISORDER IN FULL REMISSION, UNSPECIFIED WHETHER RECURRENT (HCC): ICD-10-CM

## 2024-05-29 DIAGNOSIS — F41.1 GENERALIZED ANXIETY DISORDER: ICD-10-CM
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
## 2024-06-12 ENCOUNTER — TELEMEDICINE (OUTPATIENT)
Dept: BEHAVIORAL/MENTAL HEALTH CLINIC | Facility: CLINIC | Age: 39
End: 2024-06-12

## 2024-06-12 DIAGNOSIS — F32.5 MAJOR DEPRESSIVE DISORDER IN FULL REMISSION, UNSPECIFIED WHETHER RECURRENT (HCC): ICD-10-CM

## 2024-06-12 DIAGNOSIS — F42.9 OBSESSIVE-COMPULSIVE DISORDER, UNSPECIFIED TYPE: Primary | ICD-10-CM

## 2024-06-12 DIAGNOSIS — F41.1 GENERALIZED ANXIETY DISORDER: ICD-10-CM

## 2024-06-12 NOTE — PSYCH
"Behavioral Health Psychotherapy Progress Note    Psychotherapy Provided: Individual Psychotherapy     Encounter Diagnoses   Name Primary?   • Obsessive-compulsive disorder, unspecified type Yes   • Generalized anxiety disorder    • Major depressive disorder in full remission, unspecified whether recurrent (HCC)              Goals addressed in session: Goal 1     DATA: Safia spoke with this worker today about her  feelings of re: her  and the court's decision to extend the PFA for a full calendar year.  She shared that this week has been considerably better although mornings are her hardest time for negative thinking.  She shared how helpful it was for her father to meet with Giuseppe this week, as well.   During this session, this clinician used the following therapeutic modalities: Supportive Psychotherapy    Substance Abuse was not addressed during this session. If the client is diagnosed with a co-occurring substance use disorder, please indicate any changes in the frequency or amount of use: n/a. Stage of change for addressing substance use diagnoses: No substance use/Not applicable    ASSESSMENT:  Safia Payan presents with a Euthymic/ normal mood.  Safia was able to laugh throughout today's session and acknowledged that doing so felt good.  She admits that she is grieving the loss of her family, dreams she had for their future. She is looking forward to her scheduled appt with her Psychiatrist tomorrow.   her affect is Normal range and intensity, which is congruent, with her mood and the content of the session. The client has not made progress on their goals.     Safia Payan presents with a low risk of suicide, low risk of self-harm, and minimal risk of harm to others.    For any risk assessment that surpasses a \"low\" rating, a safety plan must be developed.    A safety plan was indicated: no  If yes, describe in detail     PLAN: Between sessions, Safia Payan will " reach out to a forensic psychologist for guidance on interviews for her twin 2 year old daughters.  A CYS report will also be made by this writer.    At the next session, the therapist will use Supportive Psychotherapy to address the above in further detail.    Behavioral Health Treatment Plan and Discharge Planning: Safia Payan is aware of and agrees to continue to work on their treatment plan. Her Psychiatrist was also consulted.  They have identified and are working toward their discharge goals. yes    Visit start and stop times:    06/12/24  Start Time: 0800  Stop Time: 0857  Total Visit Time: 57 minutes    Virtual Regular Visit    Verification of patient location:    Patient is located in the following state in which I hold an active license [place]      Assessment/Plan:    Problem List Items Addressed This Visit        Behavioral Health    OCD (obsessive compulsive disorder) - Primary   Other Visit Diagnoses     Generalized anxiety disorder        Major depressive disorder in full remission, unspecified whether recurrent (HCC)                        Reason for visit is   No chief complaint on file.       Encounter provider Sujatha Espana    Provider located at [facility] [place]  [facility] [place]  [address] [place] [address] [phone]      Recent Visits  No visits were found meeting these conditions.  Showing recent visits within past 7 days and meeting all other requirements  Today's Visits  Date Type Provider Dept   06/12/24 Telemedicine Sujatha Espana  [facility] [place]   Showing today's visits and meeting all other requirements  Future Appointments  No visits were found meeting these conditions.  Showing future appointments within next 150 days and meeting all other requirements       The patient was identified by name and date of birth. Safia Payan was informed that this is a telemedicine visit  and that the visit is being conducted through[employer] platform. She agrees to proceed..  My office door was closed. No one else was in the room.  She acknowledged consent and understanding of privacy and security of the video platform. The patient has agreed to participate and understands they can discontinue the visit at any time.    Patient is aware this is a billable service.     Subjective  Safia Payan is a 38 y.o. female  .      HPI     Past Medical History:   Diagnosis Date   • Abnormal Pap smear of cervix    • Anxiety    • CHF (congestive heart failure) (HCC)    • Chlamydia    • Coronary artery disease 3/17/19   • Depression    • Diet controlled gestational diabetes mellitus (GDM) in second trimester 2021   • Heart failure (HCC)     CHF in past   • HPV (human papilloma virus) infection    • Hypertension    • Temporomandibular joint disorder     Resolved 2015    • Varicella        Past Surgical History:   Procedure Laterality Date   • CERVICAL BIOPSY  W/ LOOP ELECTRODE EXCISION     • DENTAL SURGERY      [place] teeth extraction   • [place]  DELIVERY ONLY N/A 2021    Procedure:  SECTION ();  Surgeon: Christel Guerra MD;  Location: [facility];  Service: Obstetrics   • TONSILLECTOMY         Current Outpatient Medications   Medication Sig Dispense Refill   • Fluvoxamine Maleate 100 MG CP24 Take 1 capsule (100 mg total) by mouth daily at bedtime 30 capsule 3   • NIFEdipine (PROCARDIA XL) 30 mg 24 hr tablet Take 1 tablet (30 mg total) by mouth daily 90 tablet 2     No current facility-administered medications for this visit.        Virtual Regular Visit    Verification of patient location:    Patient is located at Other in the following state in which I hold an active license [place]      Assessment/Plan:    Problem List Items Addressed This Visit        Behavioral Health    OCD (obsessive compulsive disorder) - Primary   Other Visit Diagnoses      Generalized anxiety disorder        Major depressive disorder in full remission, unspecified whether recurrent (HCC)                          Goals addressed in session: Goal 1          Reason for visit is   No chief complaint on file.       Encounter provider Sujatha Espana    Provider located at [facility] [place]  [facility] [place]  [address]  [place] [address] [phone]      Recent Visits  No visits were found meeting these conditions.  Showing recent visits within past 7 days and meeting all other requirements  Today's Visits  Date Type Provider Dept   06/12/24 Telemedicine Sujatha Espana  [facility] [place]   Showing today's visits and meeting all other requirements  Future Appointments  No visits were found meeting these conditions.  Showing future appointments within next 150 days and meeting all other requirements       The patient was identified by name and date of birth. Safia Payan was informed that this is a telemedicine visit and that the visit is being conducted throughthe [employer] platform. She agrees to proceed..  My office door was closed. No one else was in the room.  She acknowledged consent and understanding of privacy and security of the video platform. The patient has agreed to participate and understands they can discontinue the visit at any time.    Patient is aware this is a billable service.     Subjective  Safia Payan is a 38 y.o. female  .      HPI     Past Medical History:   Diagnosis Date   • Abnormal Pap smear of cervix    • Anxiety    • CHF (congestive heart failure) (HCC) 2019   • Chlamydia    • Coronary artery disease 3/17/19   • Depression    • Diet controlled gestational diabetes mellitus (GDM) in second trimester 6/9/2021   • Heart failure (HCC)     CHF in past   • HPV (human papilloma virus) infection 2008   • Hypertension    • Temporomandibular joint disorder      Resolved 2015    • Varicella        Past Surgical History:   Procedure Laterality Date   • CERVICAL BIOPSY  W/ LOOP ELECTRODE EXCISION     • DENTAL SURGERY      [place] teeth extraction   • [place]  DELIVERY ONLY N/A 2021    Procedure:  SECTION ();  Surgeon: Christel Guerra MD;  Location[facility];  Service: Obstetrics   • TONSILLECTOMY         Current Outpatient Medications   Medication Sig Dispense Refill   • Fluvoxamine Maleate 100 MG CP24 Take 1 capsule (100 mg total) by mouth daily at bedtime 30 capsule 3   • NIFEdipine (PROCARDIA XL) 30 mg 24 hr tablet Take 1 tablet (30 mg total) by mouth daily 90 tablet 2     No current facility-administered medications for this visit.        No Known Allergies
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
## 2024-06-13 ENCOUNTER — TELEMEDICINE (OUTPATIENT)
Dept: PSYCHIATRY | Facility: CLINIC | Age: 39
End: 2024-06-13

## 2024-06-13 DIAGNOSIS — F33.0 MILD EPISODE OF RECURRENT MAJOR DEPRESSIVE DISORDER (HCC): Primary | ICD-10-CM

## 2024-06-13 DIAGNOSIS — F41.1 GAD (GENERALIZED ANXIETY DISORDER): ICD-10-CM

## 2024-06-13 DIAGNOSIS — F43.0 ACUTE STRESS DISORDER: ICD-10-CM

## 2024-06-13 DIAGNOSIS — F42.2 MIXED OBSESSIONAL THOUGHTS AND ACTS: ICD-10-CM

## 2024-06-13 RX ORDER — LORAZEPAM 0.5 MG/1
0.5 TABLET ORAL DAILY PRN
Qty: 10 TABLET | Refills: 0 | Status: SHIPPED | OUTPATIENT
Start: 2024-06-13

## 2024-06-13 RX ORDER — FLUVOXAMINE MALEATE 100 MG/1
100 CAPSULE, EXTENDED RELEASE ORAL
Qty: 30 CAPSULE | Refills: 3 | Status: SHIPPED | OUTPATIENT
Start: 2024-06-13

## 2024-06-25 ENCOUNTER — TELEMEDICINE (OUTPATIENT)
Dept: BEHAVIORAL/MENTAL HEALTH CLINIC | Facility: CLINIC | Age: 39
End: 2024-06-25
Payer: COMMERCIAL

## 2024-06-25 DIAGNOSIS — F32.5 MAJOR DEPRESSIVE DISORDER IN FULL REMISSION, UNSPECIFIED WHETHER RECURRENT (HCC): ICD-10-CM

## 2024-06-25 DIAGNOSIS — F43.10 POST TRAUMATIC STRESS DISORDER (PTSD): ICD-10-CM

## 2024-06-25 DIAGNOSIS — F41.1 GENERALIZED ANXIETY DISORDER: ICD-10-CM

## 2024-06-25 DIAGNOSIS — F42.9 OBSESSIVE-COMPULSIVE DISORDER, UNSPECIFIED TYPE: Primary | ICD-10-CM

## 2024-06-25 PROCEDURE — 90834 PSYTX W PT 45 MINUTES: CPT | Performed by: SOCIAL WORKER

## 2024-06-25 NOTE — PSYCH
"Behavioral Health Psychotherapy Progress Note    Psychotherapy Provided: Individual Psychotherapy     Encounter Diagnoses   Name Primary?   • Obsessive-compulsive disorder, unspecified type Yes   • Generalized anxiety disorder    • Major depressive disorder in full remission, unspecified whether recurrent (HCC)    • Post traumatic stress disorder (PTSD)                Goals addressed in session: Goal 1     DATA: Safia spoke with this worker today about her  feelings of re: her  and the ways that she has continued to seek him out for support.  She again shared that  mornings are her hardest time for negative thinking.  Safia expressed  her conflicting feelings re: remaining in practice as she needs to \"be there\" for her young children.   During this session, this clinician used the following therapeutic modalities: Supportive Psychotherapy    Substance Abuse was not addressed during this session. If the client is diagnosed with a co-occurring substance use disorder, please indicate any changes in the frequency or amount of use: n/a. Stage of change for addressing substance use diagnoses: No substance use/Not applicable    ASSESSMENT:  Safia Payan presents with a Euthymic/ normal mood.  Safia admits to being overwhelmed by overthinking, however, she presented ideas today in an organized and well-thought out fashion.      her affect is Normal range and intensity and Tearful, which is congruent, with her mood and the content of the session. The client has not made progress on their goals.     Safia Payan presents with a low risk of suicide, low risk of self-harm, and minimal risk of harm to others.    For any risk assessment that surpasses a \"low\" rating, a safety plan must be developed.    A safety plan was indicated: no  If yes, describe in detail     PLAN: Between sessions, Safia Payan will reach out to a forensic psychologist for guidance on interviews for her twin " 2 year old daughters.  A CYS report will also be made by this writer.    At the next session, the therapist will use Supportive Psychotherapy to address the above in further detail.    Behavioral Health Treatment Plan and Discharge Planning: Safia Payan is aware of and agrees to continue to work on their treatment plan. .  They have identified and are working toward their discharge goals. yes    Visit start and stop times:    06/25/24      Virtual Regular Visit    Verification of patient location:    Patient is located in the following state in which I hold an active license [place]      Assessment/Plan:    Problem List Items Addressed This Visit        Behavioral Health    OCD (obsessive compulsive disorder) - Primary   Other Visit Diagnoses     Generalized anxiety disorder        Major depressive disorder in full remission, unspecified whether recurrent (HCC)        Post traumatic stress disorder (PTSD)                            Reason for visit is   No chief complaint on file.       Encounter provider Sujatha Espana    Provider located at [facility] [place]  [facility] [place]  [address] [address] [phone]      Recent Visits  No visits were found meeting these conditions.  Showing recent visits within past 7 days and meeting all other requirements  Today's Visits  Date Type Provider Dept   06/25/24 Telemedicine Sujatha Espana [facility] [place]   Showing today's visits and meeting all other requirements  Future Appointments  No visits were found meeting these conditions.  Showing future appointments within next 150 days and meeting all other requirements       The patient was identified by name and date of birth. Safia Payan was informed that this is a telemedicine visit and that the visit is being conducted throughthe [employer] platform. She agrees to proceed..  My office door was closed. No  one else was in the room.  She acknowledged consent and understanding of privacy and security of the video platform. The patient has agreed to participate and understands they can discontinue the visit at any time.    Patient is aware this is a billable service.     Subjective  Safia Payan is a 38 y.o. female  .      HPI     Past Medical History:   Diagnosis Date   • Abnormal Pap smear of cervix    • Anxiety    • CHF (congestive heart failure) (HCC)    • Chlamydia    • Coronary artery disease 3/17/19   • Depression    • Diet controlled gestational diabetes mellitus (GDM) in second trimester 2021   • Heart failure (HCC)     CHF in past   • HPV (human papilloma virus) infection    • Hypertension    • Temporomandibular joint disorder     Resolved 2015    • Varicella        Past Surgical History:   Procedure Laterality Date   • CERVICAL BIOPSY  W/ LOOP ELECTRODE EXCISION     • DENTAL SURGERY      [place] teeth extraction   • [place]  DELIVERY ONLY N/A 2021    Procedure:  SECTION ();  Surgeon: Christel Guerra MD;  Location: [facility];  Service: Obstetrics   • TONSILLECTOMY         Current Outpatient Medications   Medication Sig Dispense Refill   • Fluvoxamine Maleate 100 MG CP24 Take 1 capsule (100 mg total) by mouth daily at bedtime 30 capsule 3   • LORazepam (Ativan) 0.5 mg tablet Take 1 tablet (0.5 mg total) by mouth daily as needed for anxiety 10 tablet 0   • NIFEdipine (PROCARDIA XL) 30 mg 24 hr tablet Take 1 tablet (30 mg total) by mouth daily 90 tablet 2     No current facility-administered medications for this visit.        Virtual Regular Visit    Verification of patient location:    Patient is located at Other in the following state in which I hold an active license [place]      Assessment/Plan:    Problem List Items Addressed This Visit        Behavioral Health    OCD (obsessive compulsive disorder) - Primary   Other Visit Diagnoses     Generalized anxiety  disorder        Major depressive disorder in full remission, unspecified whether recurrent (HCC)        Post traumatic stress disorder (PTSD)                              Goals addressed in session: Goal 1          Reason for visit is   No chief complaint on file.       Encounter provider Sujatha Espana    Provider located at [facility] [place]  [facility] [place]  [address]  [place] [address] [phone]      Recent Visits  No visits were found meeting these conditions.  Showing recent visits within past 7 days and meeting all other requirements  Today's Visits  Date Type Provider Dept   06/25/24 Telemedicine Sujatha Espana  [facility] [place]   Showing today's visits and meeting all other requirements  Future Appointments  No visits were found meeting these conditions.  Showing future appointments within next 150 days and meeting all other requirements       The patient was identified by name and date of birth. Safia Payan was informed that this is a telemedicine visit and that the visit is being conducted throughthe [employer] platform. She agrees to proceed..  My office door was closed. No one else was in the room.  She acknowledged consent and understanding of privacy and security of the video platform. The patient has agreed to participate and understands they can discontinue the visit at any time.    Patient is aware this is a billable service.     Subjective  Safia Payan is a 38 y.o. female  .      HPI     Past Medical History:   Diagnosis Date   • Abnormal Pap smear of cervix    • Anxiety    • CHF (congestive heart failure) (HCC) 2019   • Chlamydia    • Coronary artery disease 3/17/19   • Depression    • Diet controlled gestational diabetes mellitus (GDM) in second trimester 6/9/2021   • Heart failure (HCC)     CHF in past   • HPV (human papilloma virus) infection 2008   • Hypertension    •  Temporomandibular joint disorder     Resolved 2015    • Varicella        Past Surgical History:   Procedure Laterality Date   • CERVICAL BIOPSY  W/ LOOP ELECTRODE EXCISION     • DENTAL SURGERY      [place] teeth extraction   • [place]  DELIVERY ONLY N/A 2021    Procedure:  SECTION ();  Surgeon: Christel Guerra MD;  Location: [facility];  Service: Obstetrics   • TONSILLECTOMY         Current Outpatient Medications   Medication Sig Dispense Refill   • Fluvoxamine Maleate 100 MG CP24 Take 1 capsule (100 mg total) by mouth daily at bedtime 30 capsule 3   • LORazepam (Ativan) 0.5 mg tablet Take 1 tablet (0.5 mg total) by mouth daily as needed for anxiety 10 tablet 0   • NIFEdipine (PROCARDIA XL) 30 mg 24 hr tablet Take 1 tablet (30 mg total) by mouth daily 90 tablet 2     No current facility-administered medications for this visit.        No Known Allergies

## 2024-08-07 ENCOUNTER — TELEMEDICINE (OUTPATIENT)
Dept: BEHAVIORAL/MENTAL HEALTH CLINIC | Facility: CLINIC | Age: 39
End: 2024-08-07
Payer: COMMERCIAL

## 2024-08-07 DIAGNOSIS — F42.9 OBSESSIVE-COMPULSIVE DISORDER, UNSPECIFIED TYPE: Primary | ICD-10-CM

## 2024-08-07 DIAGNOSIS — F32.5 MAJOR DEPRESSIVE DISORDER IN FULL REMISSION, UNSPECIFIED WHETHER RECURRENT (HCC): ICD-10-CM

## 2024-08-07 DIAGNOSIS — F43.10 POST TRAUMATIC STRESS DISORDER (PTSD): ICD-10-CM

## 2024-08-07 PROCEDURE — 90834 PSYTX W PT 45 MINUTES: CPT | Performed by: SOCIAL WORKER

## 2024-08-07 NOTE — PSYCH
"Behavioral Health Psychotherapy Progress Note    Psychotherapy Provided: Individual Psychotherapy     Encounter Diagnoses   Name Primary?   • Obsessive-compulsive disorder, unspecified type Yes   • Major depressive disorder in full remission, unspecified whether recurrent (HCC)    • Post traumatic stress disorder (PTSD)          Goals addressed in session: Goal 1     DATA: Safia spoke with this worker today about her  conflicted feelings of re: her decision to move (back) to [place] where her parents will be able to assist her both financially and in the care of her twin daughters. She reported concern that she is uncertain of their ability to care for them better than they cared for her and this has impacted her ability to sleep .   During this session, this clinician used the following therapeutic modalities: Supportive Psychotherapy    Substance Abuse was not addressed during this session. If the client is diagnosed with a co-occurring substance use disorder, please indicate any changes in the frequency or amount of use: n/a. Stage of change for addressing substance use diagnoses: No substance use/Not applicable    ASSESSMENT:  Safia Payan presents with a Euthymic/ normal mood.  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    her affect is Normal range and intensity, which is congruent, with her mood and the content of the session. The client has not made progress on their goals.     Safia Payan presents with a low risk of suicide, low risk of self-harm, and minimal risk of harm to others.    For any risk assessment that surpasses a \"low\" rating, a safety plan must be developed.    A safety plan was indicated: no  If yes, describe in detail     PLAN: Between sessions, Safia Payan will  be seen for one final session.  At the next session, the therapist will use Supportive Psychotherapy to address the above in further " detail.    Behavioral Health Treatment Plan and Discharge Planning: Safia Payan is aware of and agrees to continue to work on their treatment plan. .  They have identified and are working toward their discharge goals. yes    Visit start and stop times:    08/07/24      Virtual Regular Visit    Verification of patient location:    Patient is located in the following state in which I hold an active license [place]      Assessment/Plan:    Problem List Items Addressed This Visit        Behavioral Health    OCD (obsessive compulsive disorder) - Primary   Other Visit Diagnoses     Major depressive disorder in full remission, unspecified whether recurrent (HCC)        Post traumatic stress disorder (PTSD)                              Reason for visit is   No chief complaint on file.       Encounter provider Sujatha Espana    Provider located at [facility] [place]  [facility] [place]  [address] [address] [phone]      Recent Visits  No visits were found meeting these conditions.  Showing recent visits within past 7 days and meeting all other requirements  Today's Visits  Date Type Provider Dept   08/07/24 Telemedicine Sujatha Espana [facility] [place]   Showing today's visits and meeting all other requirements  Future Appointments  No visits were found meeting these conditions.  Showing future appointments within next 150 days and meeting all other requirements       The patient was identified by name and date of birth. Safia Payan was informed that this is a telemedicine visit and that the visit is being conducted throughthe [employer] platform. She agrees to proceed..  My office door was closed. No one else was in the room.  She acknowledged consent and understanding of privacy and security of the video platform. The patient has agreed to participate and understands they can discontinue the visit at any  time.    Patient is aware this is a billable service.     Subjective  Safia Payan is a 38 y.o. female  .      HPI     Past Medical History:   Diagnosis Date   • Abnormal Pap smear of cervix    • Anxiety    • CHF (congestive heart failure) (HCC)    • Chlamydia    • Coronary artery disease 3/17/19   • Depression    • Diet controlled gestational diabetes mellitus (GDM) in second trimester 2021   • Heart failure (HCC)     CHF in past   • HPV (human papilloma virus) infection    • Hypertension    • Temporomandibular joint disorder     Resolved 2015    • Varicella        Past Surgical History:   Procedure Laterality Date   • CERVICAL BIOPSY  W/ LOOP ELECTRODE EXCISION     • DENTAL SURGERY      [place] teeth extraction   • [place]  DELIVERY ONLY N/A 2021    Procedure:  SECTION ();  Surgeon: Christel Guerra MD;  Location: [facility];  Service: Obstetrics   • TONSILLECTOMY         Current Outpatient Medications   Medication Sig Dispense Refill   • Fluvoxamine Maleate 100 MG CP24 Take 1 capsule (100 mg total) by mouth daily at bedtime 30 capsule 3   • LORazepam (Ativan) 0.5 mg tablet Take 1 tablet (0.5 mg total) by mouth daily as needed for anxiety 10 tablet 0   • NIFEdipine (PROCARDIA XL) 30 mg 24 hr tablet Take 1 tablet (30 mg total) by mouth daily 90 tablet 2     No current facility-administered medications for this visit.        Virtual Regular Visit    Verification of patient location:    Patient is located at Other in the following state in which I hold an active license [place]      Assessment/Plan:    Problem List Items Addressed This Visit        Behavioral Health    OCD (obsessive compulsive disorder) - Primary   Other Visit Diagnoses     Major depressive disorder in full remission, unspecified whether recurrent (HCC)        Post traumatic stress disorder (PTSD)                        Goals addressed in session: Goal 1          Reason for visit is   No chief  complaint on file.       Encounter provider Sujatha Espana    Provider located at [facility] [place]  [facility] [place]  [address]  [place] [address] [phone]      Recent Visits  No visits were found meeting these conditions.  Showing recent visits within past 7 days and meeting all other requirements  Today's Visits  Date Type Provider Dept   24 Telemedicine Sujatha Espana [facility] [place]   Showing today's visits and meeting all other requirements  Future Appointments  No visits were found meeting these conditions.  Showing future appointments within next 150 days and meeting all other requirements       The patient was identified by name and date of birth. Safia Payan was informed that this is a telemedicine visit and that the visit is being conducted throughthe [employer] platform. She agrees to proceed..  My office door was closed. No one else was in the room.  She acknowledged consent and understanding of privacy and security of the video platform. The patient has agreed to participate and understands they can discontinue the visit at any time.    Patient is aware this is a billable service.     Subjective  Safia Payan is a 38 y.o. female  .      HPI     Past Medical History:   Diagnosis Date   • Abnormal Pap smear of cervix    • Anxiety    • CHF (congestive heart failure) (HCC)    • Chlamydia    • Coronary artery disease 3/17/19   • Depression    • Diet controlled gestational diabetes mellitus (GDM) in second trimester 2021   • Heart failure (HCC)     CHF in past   • HPV (human papilloma virus) infection    • Hypertension    • Temporomandibular joint disorder     Resolved 2015    • Varicella        Past Surgical History:   Procedure Laterality Date   • CERVICAL BIOPSY  W/ LOOP ELECTRODE EXCISION     • DENTAL SURGERY      [place] teeth extraction   • [place]   DELIVERY ONLY N/A 2021    Procedure:  SECTION ();  Surgeon: Christel Guerra MD;  Location: [facility];  Service: Obstetrics   • TONSILLECTOMY         Current Outpatient Medications   Medication Sig Dispense Refill   • Fluvoxamine Maleate 100 MG CP24 Take 1 capsule (100 mg total) by mouth daily at bedtime 30 capsule 3   • LORazepam (Ativan) 0.5 mg tablet Take 1 tablet (0.5 mg total) by mouth daily as needed for anxiety 10 tablet 0   • NIFEdipine (PROCARDIA XL) 30 mg 24 hr tablet Take 1 tablet (30 mg total) by mouth daily 90 tablet 2     No current facility-administered medications for this visit.        No Known Allergies
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
## 2024-08-15 ENCOUNTER — TELEMEDICINE (OUTPATIENT)
Dept: PSYCHIATRY | Facility: CLINIC | Age: 39
End: 2024-08-15
Payer: COMMERCIAL

## 2024-08-15 DIAGNOSIS — F33.0 MILD EPISODE OF RECURRENT MAJOR DEPRESSIVE DISORDER (HCC): ICD-10-CM

## 2024-08-15 DIAGNOSIS — F43.0 ACUTE STRESS DISORDER: ICD-10-CM

## 2024-08-15 DIAGNOSIS — F41.1 GAD (GENERALIZED ANXIETY DISORDER): Primary | ICD-10-CM

## 2024-08-15 DIAGNOSIS — F42.2 MIXED OBSESSIONAL THOUGHTS AND ACTS: ICD-10-CM

## 2024-08-15 DIAGNOSIS — F32.81 PMDD (PREMENSTRUAL DYSPHORIC DISORDER): ICD-10-CM

## 2024-08-15 PROCEDURE — 99214 OFFICE O/P EST MOD 30 MIN: CPT | Performed by: STUDENT IN AN ORGANIZED HEALTH CARE EDUCATION/TRAINING PROGRAM

## 2024-08-15 PROCEDURE — 90833 PSYTX W PT W E/M 30 MIN: CPT | Performed by: STUDENT IN AN ORGANIZED HEALTH CARE EDUCATION/TRAINING PROGRAM

## 2024-08-15 RX ORDER — LORAZEPAM 0.5 MG/1
0.5 TABLET ORAL DAILY PRN
Qty: 30 TABLET | Refills: 0 | Status: SHIPPED | OUTPATIENT
Start: 2024-08-15

## 2024-08-15 RX ORDER — FLUVOXAMINE MALEATE 100 MG/1
100 CAPSULE, EXTENDED RELEASE ORAL
Qty: 90 CAPSULE | Refills: 1 | Status: SHIPPED | OUTPATIENT
Start: 2024-08-15

## 2024-08-15 NOTE — Clinical Note
Please contact Rehana and schedule virtual follow-up at end of October or beginning of November (preferably on Thursday only)

## 2024-08-19 ENCOUNTER — TELEPHONE (OUTPATIENT)
Dept: PSYCHIATRY | Facility: CLINIC | Age: 39
End: 2024-08-19

## 2024-08-19 NOTE — TELEPHONE ENCOUNTER
Called and left message for patient to return a call to [phone] and schedule virtual follow-up at end of October or beginning of November (preferably on Thursday only) with provider (Dr. Romero Mckeon). Please schedule upon return call. Thank you.

## 2024-09-03 ENCOUNTER — TELEMEDICINE (OUTPATIENT)
Dept: BEHAVIORAL/MENTAL HEALTH CLINIC | Facility: CLINIC | Age: 39
End: 2024-09-03
Payer: COMMERCIAL

## 2024-09-03 DIAGNOSIS — F43.10 POST TRAUMATIC STRESS DISORDER (PTSD): ICD-10-CM

## 2024-09-03 DIAGNOSIS — F41.1 GENERALIZED ANXIETY DISORDER: ICD-10-CM

## 2024-09-03 DIAGNOSIS — F42.9 OBSESSIVE-COMPULSIVE DISORDER, UNSPECIFIED TYPE: Primary | ICD-10-CM

## 2024-09-03 DIAGNOSIS — F32.5 MAJOR DEPRESSIVE DISORDER IN FULL REMISSION, UNSPECIFIED WHETHER RECURRENT (HCC): ICD-10-CM

## 2024-09-03 PROCEDURE — 90837 PSYTX W PT 60 MINUTES: CPT | Performed by: SOCIAL WORKER

## 2024-09-03 NOTE — PSYCH
"Behavioral Health Psychotherapy Progress Note    Psychotherapy Provided: Individual Psychotherapy     Encounter Diagnoses   Name Primary?   • Obsessive-compulsive disorder, unspecified type Yes   • Major depressive disorder in full remission, unspecified whether recurrent (HCC)    • Post traumatic stress disorder (PTSD)    • Generalized anxiety disorder            Goals addressed in session: Goal 1     DATA: Safia spoke with this worker today about her feelings re: her impending move (back) to [place] where her parents will be able to assist her both financially and in the care of her twin daughters. 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  Safia acknowledged how difficult it has been to pack as there has been loss of her life here, her marriage, and now some of her belongings.   During this session, this clinician used the following therapeutic modalities: Supportive Psychotherapy    Substance Abuse was not addressed during this session. If the client is diagnosed with a co-occurring substance use disorder, please indicate any changes in the frequency or amount of use: n/a. Stage of change for addressing substance use diagnoses: No substance use/Not applicable    ASSESSMENT:  Safia Payan presents with a Euthymic/ normal mood.  Safia demonstrated insight into her struggle with her self image as she continues to \"believe\" that she would love herself if she were thinner.  She was able to accept feedback that loving yourself comes concurrently with taking care of yourself.     her affect is Normal range and intensity, which is congruent, with her mood and the content of the session. The client has not made progress on their goals.     Safia Payan presents with a low risk of suicide, low risk of self-harm, and minimal risk of harm to others.    For any risk assessment that surpasses a \"low\" rating, a safety plan must be developed.    A " safety plan was indicated: no  If yes, describe in detail     PLAN: Between sessions, Safia Payan will  be seen for one final session.  At the next session, the therapist will use Supportive Psychotherapy to address the above in further detail.    Behavioral Health Treatment Plan and Discharge Planning: Safia Payan is being discharged at this time.   Visit start and stop times:    09/03/24      Virtual Regular Visit    Verification of patient location:    Patient is located in the following state in which I hold an active license [place]      Assessment/Plan:    Problem List Items Addressed This Visit        Behavioral Health    OCD (obsessive compulsive disorder) - Primary   Other Visit Diagnoses     Major depressive disorder in full remission, unspecified whether recurrent (HCC)        Post traumatic stress disorder (PTSD)        Generalized anxiety disorder                                  Reason for visit is   No chief complaint on file.       Encounter provider Sujatha Espana    Provider located at [facility] [place]  [facility] [place]  [address]  [place] [id number] [phone]      Recent Visits  No visits were found meeting these conditions.  Showing recent visits within past 7 days and meeting all other requirements  Today's Visits  Date Type Provider Dept   09/03/24 Telemedicine Sujatha Espana [facility] [place]   Showing today's visits and meeting all other requirements  Future Appointments  No visits were found meeting these conditions.  Showing future appointments within next 150 days and meeting all other requirements       The patient was identified by name and date of birth. Safia Payan was informed that this is a telemedicine visit and that the visit is being conducted throughthe [employer] platform. She agrees to proceed..  My office door was closed. No one else was in the room.   She acknowledged consent and understanding of privacy and security of the video platform. The patient has agreed to participate and understands they can discontinue the visit at any time.    Patient is aware this is a billable service.     Subjective  Safia Payan is a 38 y.o. female  .      HPI     Past Medical History:   Diagnosis Date   • Abnormal Pap smear of cervix    • Anxiety    • CHF (congestive heart failure) (HCC)    • Chlamydia    • Coronary artery disease 3/17/19   • Depression    • Diet controlled gestational diabetes mellitus (GDM) in second trimester 2021   • Heart failure (HCC)     CHF in past   • HPV (human papilloma virus) infection    • Hypertension    • Temporomandibular joint disorder     Resolved 2015    • Varicella        Past Surgical History:   Procedure Laterality Date   • CERVICAL BIOPSY  W/ LOOP ELECTRODE EXCISION     • DENTAL SURGERY      [place] teeth extraction   • [place]  DELIVERY ONLY N/A 2021    Procedure:  SECTION ();  Surgeon: Christel Guerra MD;  Location: [facility];  Service: Obstetrics   • TONSILLECTOMY         Current Outpatient Medications   Medication Sig Dispense Refill   • Fluvoxamine Maleate 100 MG CP24 Take 1 capsule (100 mg total) by mouth daily at bedtime 90 capsule 1   • LORazepam (Ativan) 0.5 mg tablet Take 1 tablet (0.5 mg total) by mouth daily as needed for anxiety 30 tablet 0   • NIFEdipine (PROCARDIA XL) 30 mg 24 hr tablet Take 1 tablet (30 mg total) by mouth daily 90 tablet 2     No current facility-administered medications for this visit.        Virtual Regular Visit    Verification of patient location:    Patient is located at Other in the following state in which I hold an active license [place]      Assessment/Plan:    Problem List Items Addressed This Visit        Behavioral Health    OCD (obsessive compulsive disorder) - Primary   Other Visit Diagnoses     Major depressive disorder in full remission,  unspecified whether recurrent (HCC)        Post traumatic stress disorder (PTSD)        Generalized anxiety disorder                          Goals addressed in session: Goal 1          Reason for visit is   No chief complaint on file.       Encounter provider Sujatha Espana    Provider located at [facility] [place]  [facility] [place]  [address]  [place] [address] [phone]      Recent Visits  No visits were found meeting these conditions.  Showing recent visits within past 7 days and meeting all other requirements  Today's Visits  Date Type Provider Dept   09/03/24 Telemedicine Sujatha Espana  [facility] [place]   Showing today's visits and meeting all other requirements  Future Appointments  No visits were found meeting these conditions.  Showing future appointments within next 150 days and meeting all other requirements       The patient was identified by name and date of birth. Safia Payan was informed that this is a telemedicine visit and that the visit is being conducted throughthe [employer] platform. She agrees to proceed..  My office door was closed. No one else was in the room.  She acknowledged consent and understanding of privacy and security of the video platform. The patient has agreed to participate and understands they can discontinue the visit at any time.    Patient is aware this is a billable service.     Subjective  Safia Payan is a 38 y.o. female  .      HPI     Past Medical History:   Diagnosis Date   • Abnormal Pap smear of cervix    • Anxiety    • CHF (congestive heart failure) (HCC) 2019   • Chlamydia    • Coronary artery disease 3/17/19   • Depression    • Diet controlled gestational diabetes mellitus (GDM) in second trimester 6/9/2021   • Heart failure (HCC)     CHF in past   • HPV (human papilloma virus) infection 2008   • Hypertension    • Temporomandibular joint disorder      Resolved 2015    • Varicella        Past Surgical History:   Procedure Laterality Date   • CERVICAL BIOPSY  W/ LOOP ELECTRODE EXCISION     • DENTAL SURGERY      [place] teeth extraction   • [place]  DELIVERY ONLY N/A 2021    Procedure:  SECTION ();  Surgeon: Christel Guerra MD;  Location: [facility];  Service: Obstetrics   • TONSILLECTOMY         Current Outpatient Medications   Medication Sig Dispense Refill   • Fluvoxamine Maleate 100 MG CP24 Take 1 capsule (100 mg total) by mouth daily at bedtime 90 capsule 1   • LORazepam (Ativan) 0.5 mg tablet Take 1 tablet (0.5 mg total) by mouth daily as needed for anxiety 30 tablet 0   • NIFEdipine (PROCARDIA XL) 30 mg 24 hr tablet Take 1 tablet (30 mg total) by mouth daily 90 tablet 2     No current facility-administered medications for this visit.        No Known Allergies        Treatment Plan Tracking    # 3Treatment Plan not completed within required time limits due to: Safia Payan presented with emotional/behavioral issues that required clinical intervention. Furthermore, no additional appts are being scheduled as she is relocating out of state.
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
## 2024-11-14 ENCOUNTER — TELEMEDICINE (OUTPATIENT)
Dept: PSYCHIATRY | Facility: CLINIC | Age: 39
End: 2024-11-14
Payer: COMMERCIAL

## 2024-11-14 ENCOUNTER — TELEPHONE (OUTPATIENT)
Dept: PSYCHIATRY | Facility: CLINIC | Age: 39
End: 2024-11-14

## 2024-11-14 DIAGNOSIS — F42.2 MIXED OBSESSIONAL THOUGHTS AND ACTS: ICD-10-CM

## 2024-11-14 DIAGNOSIS — F41.1 GAD (GENERALIZED ANXIETY DISORDER): ICD-10-CM

## 2024-11-14 DIAGNOSIS — F32.81 PMDD (PREMENSTRUAL DYSPHORIC DISORDER): ICD-10-CM

## 2024-11-14 DIAGNOSIS — F33.0 MILD EPISODE OF RECURRENT MAJOR DEPRESSIVE DISORDER (HCC): Primary | ICD-10-CM

## 2024-11-14 PROCEDURE — 99214 OFFICE O/P EST MOD 30 MIN: CPT | Performed by: STUDENT IN AN ORGANIZED HEALTH CARE EDUCATION/TRAINING PROGRAM

## 2024-11-14 PROCEDURE — 90833 PSYTX W PT W E/M 30 MIN: CPT | Performed by: STUDENT IN AN ORGANIZED HEALTH CARE EDUCATION/TRAINING PROGRAM

## 2024-11-14 RX ORDER — FLUVOXAMINE MALEATE 150 MG/1
150 CAPSULE, EXTENDED RELEASE ORAL
Qty: 90 CAPSULE | Refills: 0 | Status: SHIPPED | OUTPATIENT
Start: 2024-11-14

## 2024-11-14 NOTE — TELEPHONE ENCOUNTER
A medical records request was received 11/14/24 addressed to Dr Mckeon from [facility]. It requests all records.    Paperwork placed in Dr Mckeon's mailbox for his review.

## 2024-11-18 ENCOUNTER — TELEPHONE (OUTPATIENT)
Dept: PSYCHIATRY | Facility: CLINIC | Age: 39
End: 2024-11-18

## 2024-11-18 NOTE — LETTER
11/18/24       Safia Payan   [address] [facility] [address]       Dear Safia Payan :    Since you have completed your treatment with Romero Mckeon MD at [facility], your chart is being closed.     If you wish to return to [facility], you will need to have another initial assessment and intake appointment. Please call [phone] to schedule a new psychiatric intake if you are interested in doing so.      Please follow-up with your primary care provider for continual care.  When you have scheduled an appointment with another agency, please feel free to complete a release of information so that your records can be transferred to your new provider prior to your first appointment.  This will aid with the continuity of your care.      Sincerely,           Romero Mckeon MD     [facility]        We will continue to provide psychotropic medications and/or emergency counseling as deemed appropriate by clinical staff for 45 days from receipt of this letter.                For a referral to another agency, we would suggest that you contact your primary health care provider or insurance company.   Please see Provider's List of agencies below:    Outpatient Mental Health Adult  [facility].  [address].  [facility].  [phone]   Candelario Johnson MD to [address] [facility].  [place]. [phone]   [address] [place]. [phone]   Adriana Cao MD [address] [place] [address]. [phone]   Gautam Baca MD, [address] , [place]. [phone]   Outpatient Mental Health Children, Adolescents and Family  [facility]: [address] [place] [address] [phone]  [facility] [address]  [place] [address]  and [place] [phone], [address]   [phone]  [facility] (14 and over)  [address] [place] [address]  [place]  [phone]  Outpatient Mental Health Telugu Speaking  [facility] [address] [facility] [place] [address] [phone]  [facility]:   [place] [address] Phone: [phone]  [facility] Outpatient:[place], [address] [place] [address] New Admissions [phone] Local [phone]  [facility]:   [facility] :[address] [facility] [address] [place], [place] [address] Phone: [phone]  [facility] Outpatient: [address] [place] [address] Phone: New Admissions [phone] / Local Office [phone]  Drug & Alcohol Services:  [facility]:   [phone] or [phone]  [facility]:  [phone] or [phone]  [facility]:  [phone]  [facility]:  [phone]  [facility]: [phone]    [facility]:   [facility]:  [address] [place] [address]  Phone: [phone]  [facility] CRISIS NUMBERS:    [place]:  [phone]    [place]: [phone] or [phone]    [place] / [place]: [phone]    [facility]: [phone]    [place]: [phone]    [place]: [phone] ([phone])    [place]: [phone]    National Suicide Prevention Hotline:  1-923.838.3521 (TALK)

## 2024-11-25 NOTE — TELEPHONE ENCOUNTER
DISCHARGE LETTER for Romero Mckeon MD (certified and regular) placed in outgoing mail on 11/25/24.    Article #:  [id number]    Address:  [address] [place] [id number]

## 2025-01-23 ENCOUNTER — TELEPHONE (OUTPATIENT)
Age: 40
End: 2025-01-23

## 2025-01-23 NOTE — TELEPHONE ENCOUNTER
Safia started the 150 MG of Fluvoxamine about 1 1/2 weeks ago.  She has been suffering with fatigue and headaches.  States she is aware that it can take time to adjust to medication and wants to know if she should allow dose increase a little more time.  However if provider thinks she should go back down to the 100 MG, which she was on for about 2 years, she is asking if he can possibly send a script for 100 MG dose to [facility] in [facility].  She said if he cannot, due to being recently discharged, she understands and will call and see if her PCP will do it.    Will refer to Dr Mckeon for review.

## 2025-01-23 NOTE — TELEPHONE ENCOUNTER
Patient called requesting a call back from provider regarding medication dosage.     Patient shared she moved to [place] and was discharged from treatment on 11/14/24.    On her last visit with med management provider she was prescribed Fluvoxamine Maleate 150mg.     Patient shared she's been experiencing headaches and fatigue.     Patient would like to know if she should take a lower dose or if she should continue to take medication for another week to see if symptoms go away.     Writer warm transferred to clinical staff for further assistance.

## 2025-01-23 NOTE — TELEPHONE ENCOUNTER
"Chart and concerns reviewed. Given that the change recently occurred (within the last 10 days), I would recommend continued use of the 150mg dose, as the side effects should improve within the next week (after 7-14 days post titration). However, if Safia subjectively feels that this dose and the adverse effects are intolerable, I don't mind sending a 100mg sciprt of ER Luvox to her pharmacy. If she requests tapering of dose back to 100mg, she really should follow-up with a new psychiatrist or PCP after I send the 30 day \"bridge\" script. Please let me know what she decides. "

## 2025-01-24 NOTE — TELEPHONE ENCOUNTER
Returned Safia's call. She will stay on 150 MG of Luvox dosing for a few more days in the hopes that fatigue and headaches improve.  If she feels she cannot tolerate and decides to go back down to 100 MG, she will call the office to request a 1 time courtesy script.  She is aware that she will need to follow up with new psychiatrist or PCP.

## 2025-02-05 ENCOUNTER — DOCUMENTATION (OUTPATIENT)
Dept: BEHAVIORAL/MENTAL HEALTH CLINIC | Facility: CLINIC | Age: 40
End: 2025-02-05

## 2025-02-05 NOTE — PROGRESS NOTES
Psychotherapy Discharge Summary    Preferred Name: Safia Payan  YOB: 1985    Admission date to psychotherapy: 5/8/2019    Referred by: self    Presenting Problem: Stress at job and in relationship with paramour, unresolved trauma from childhood    Course of treatment included : individual therapy     Progress/Outcome of Treatment Goals (brief summary of course of treatment) Safia met with this worker over the next 5 years during which time she changes positions, got , had twin girls, and filed for divorce before relocating back to [place].      Treatment Complications (if any):     Treatment Progress: fair    Current [facility] Psychiatric Provider: Dr. Mckeon    Discharge Medications include:     Discharge Date: 2/5/25 (last session 9/3/24)    Discharge Diagnosis: No diagnosis found.    Criteria for Discharge:  relocating out of state    Aftercare recommendations include (include specific referral names and phone numbers, if appropriate):     Prognosis: fair

## (undated) DEVICE — ADHESIVE SKIN HIGH VISCOSITY EXOFIN 1ML

## (undated) DEVICE — SUT VICRYL 0 CT-1 36 IN J946H

## (undated) DEVICE — SUT PLAIN 3-0 CTX 27 IN 873H

## (undated) DEVICE — GLOVE SRG BIOGEL 7

## (undated) DEVICE — SUT VICRYL 0 CTX 36 IN J978H

## (undated) DEVICE — SUT MONOCRYL 4-0 PS-2 27 IN Y426H

## (undated) DEVICE — SUT PLAIN 3-0 CT-1 27 IN 842H